# Patient Record
Sex: MALE | Race: BLACK OR AFRICAN AMERICAN | NOT HISPANIC OR LATINO | Employment: FULL TIME | URBAN - METROPOLITAN AREA
[De-identification: names, ages, dates, MRNs, and addresses within clinical notes are randomized per-mention and may not be internally consistent; named-entity substitution may affect disease eponyms.]

---

## 2019-01-26 ENCOUNTER — HOSPITAL ENCOUNTER (INPATIENT)
Facility: HOSPITAL | Age: 64
LOS: 2 days | Discharge: HOME | DRG: 101 | End: 2019-01-28
Attending: EMERGENCY MEDICINE | Admitting: EMERGENCY MEDICINE
Payer: MEDICARE

## 2019-01-26 DIAGNOSIS — R56.9 NEW ONSET SEIZURE: Primary | ICD-10-CM

## 2019-01-26 DIAGNOSIS — E87.20 LACTIC ACIDOSIS: ICD-10-CM

## 2019-01-26 DIAGNOSIS — R73.9 HYPERGLYCEMIA: ICD-10-CM

## 2019-01-26 DIAGNOSIS — G40.409 GENERALIZED TONIC-CLONIC SEIZURE: ICD-10-CM

## 2019-01-26 DIAGNOSIS — R56.9 SEIZURE: ICD-10-CM

## 2019-01-26 PROBLEM — Z00.8 MEDICAL CLEARANCE FOR PSYCHIATRIC ADMISSION: Status: ACTIVE | Noted: 2019-01-26

## 2019-01-26 LAB
ALBUMIN SERPL BCP-MCNC: 3.9 G/DL
ALP SERPL-CCNC: 79 U/L
ALT SERPL W/O P-5'-P-CCNC: 23 U/L
AMPHET+METHAMPHET UR QL: NEGATIVE
AMPHET+METHAMPHET UR QL: NEGATIVE
ANION GAP SERPL CALC-SCNC: 15 MMOL/L
AST SERPL-CCNC: 20 U/L
BACTERIA #/AREA URNS AUTO: ABNORMAL /HPF
BARBITURATES UR QL SCN>200 NG/ML: NEGATIVE
BARBITURATES UR QL SCN>200 NG/ML: NEGATIVE
BASOPHILS # BLD AUTO: 0.06 K/UL
BASOPHILS NFR BLD: 0.7 %
BENZODIAZ UR QL SCN>200 NG/ML: NEGATIVE
BENZODIAZ UR QL SCN>200 NG/ML: NEGATIVE
BILIRUB SERPL-MCNC: 0.3 MG/DL
BILIRUB UR QL STRIP: NEGATIVE
BUN SERPL-MCNC: 13 MG/DL
BZE UR QL SCN: NEGATIVE
BZE UR QL SCN: NEGATIVE
CALCIUM SERPL-MCNC: 9.4 MG/DL
CANNABINOIDS UR QL SCN: NEGATIVE
CANNABINOIDS UR QL SCN: NEGATIVE
CHLORIDE SERPL-SCNC: 104 MMOL/L
CK SERPL-CCNC: 314 U/L
CK SERPL-CCNC: 443 U/L
CK SERPL-CCNC: 625 U/L
CLARITY UR REFRACT.AUTO: ABNORMAL
CO2 SERPL-SCNC: 20 MMOL/L
COLOR UR AUTO: YELLOW
CREAT SERPL-MCNC: 1.1 MG/DL
CREAT UR-MCNC: 95 MG/DL
CREAT UR-MCNC: 95 MG/DL
DIFFERENTIAL METHOD: ABNORMAL
EOSINOPHIL # BLD AUTO: 0.2 K/UL
EOSINOPHIL NFR BLD: 2.2 %
ERYTHROCYTE [DISTWIDTH] IN BLOOD BY AUTOMATED COUNT: 14 %
EST. GFR  (AFRICAN AMERICAN): >60 ML/MIN/1.73 M^2
EST. GFR  (NON AFRICAN AMERICAN): >60 ML/MIN/1.73 M^2
ESTIMATED AVG GLUCOSE: 140 MG/DL
ETHANOL SERPL-MCNC: <10 MG/DL
ETHANOL UR-MCNC: <10 MG/DL
GLUCOSE SERPL-MCNC: 159 MG/DL
GLUCOSE UR QL STRIP: NEGATIVE
HBA1C MFR BLD HPLC: 6.5 %
HCT VFR BLD AUTO: 46 %
HGB BLD-MCNC: 14.1 G/DL
HGB UR QL STRIP: ABNORMAL
HYALINE CASTS UR QL AUTO: 0 /LPF
IMM GRANULOCYTES # BLD AUTO: 0.02 K/UL
IMM GRANULOCYTES NFR BLD AUTO: 0.2 %
KETONES UR QL STRIP: NEGATIVE
LACTATE SERPL-SCNC: 1.3 MMOL/L
LACTATE SERPL-SCNC: 3.2 MMOL/L
LEUKOCYTE ESTERASE UR QL STRIP: NEGATIVE
LYMPHOCYTES # BLD AUTO: 3.2 K/UL
LYMPHOCYTES NFR BLD: 38.7 %
MCH RBC QN AUTO: 27.3 PG
MCHC RBC AUTO-ENTMCNC: 30.7 G/DL
MCV RBC AUTO: 89 FL
METHADONE UR QL SCN>300 NG/ML: NEGATIVE
METHADONE UR QL SCN>300 NG/ML: NEGATIVE
MICROSCOPIC COMMENT: ABNORMAL
MONOCYTES # BLD AUTO: 0.6 K/UL
MONOCYTES NFR BLD: 6.6 %
NEUTROPHILS # BLD AUTO: 4.3 K/UL
NEUTROPHILS NFR BLD: 51.6 %
NITRITE UR QL STRIP: NEGATIVE
NRBC BLD-RTO: 0 /100 WBC
OPIATES UR QL SCN: NEGATIVE
OPIATES UR QL SCN: NEGATIVE
PCP UR QL SCN>25 NG/ML: NEGATIVE
PCP UR QL SCN>25 NG/ML: NEGATIVE
PH UR STRIP: 5 [PH] (ref 5–8)
PLATELET # BLD AUTO: 211 K/UL
PMV BLD AUTO: 11.8 FL
POCT GLUCOSE: 150 MG/DL (ref 70–110)
POTASSIUM SERPL-SCNC: 4.7 MMOL/L
PROT SERPL-MCNC: 8.1 G/DL
PROT UR QL STRIP: ABNORMAL
RBC # BLD AUTO: 5.17 M/UL
RBC #/AREA URNS AUTO: 1 /HPF (ref 0–4)
SODIUM SERPL-SCNC: 139 MMOL/L
SP GR UR STRIP: 1.01 (ref 1–1.03)
SQUAMOUS #/AREA URNS AUTO: 1 /HPF
TOXICOLOGY INFORMATION: NORMAL
TOXICOLOGY INFORMATION: NORMAL
TSH SERPL DL<=0.005 MIU/L-ACNC: 2.89 UIU/ML
URN SPEC COLLECT METH UR: ABNORMAL
WBC # BLD AUTO: 8.34 K/UL
WBC #/AREA URNS AUTO: 3 /HPF (ref 0–5)

## 2019-01-26 PROCEDURE — 25000003 PHARM REV CODE 250: Performed by: INTERNAL MEDICINE

## 2019-01-26 PROCEDURE — 99291 CRITICAL CARE FIRST HOUR: CPT | Mod: ,,, | Performed by: EMERGENCY MEDICINE

## 2019-01-26 PROCEDURE — 25000003 PHARM REV CODE 250: Performed by: EMERGENCY MEDICINE

## 2019-01-26 PROCEDURE — 80053 COMPREHEN METABOLIC PANEL: CPT

## 2019-01-26 PROCEDURE — 83036 HEMOGLOBIN GLYCOSYLATED A1C: CPT

## 2019-01-26 PROCEDURE — 95819 EEG AWAKE AND ASLEEP: CPT

## 2019-01-26 PROCEDURE — 99222 1ST HOSP IP/OBS MODERATE 55: CPT | Mod: AI,,, | Performed by: INTERNAL MEDICINE

## 2019-01-26 PROCEDURE — 87040 BLOOD CULTURE FOR BACTERIA: CPT | Mod: 59

## 2019-01-26 PROCEDURE — 96365 THER/PROPH/DIAG IV INF INIT: CPT

## 2019-01-26 PROCEDURE — 83605 ASSAY OF LACTIC ACID: CPT | Mod: 91

## 2019-01-26 PROCEDURE — 84443 ASSAY THYROID STIM HORMONE: CPT

## 2019-01-26 PROCEDURE — 99233 SBSQ HOSP IP/OBS HIGH 50: CPT | Mod: ,,, | Performed by: PSYCHIATRY & NEUROLOGY

## 2019-01-26 PROCEDURE — 93010 ELECTROCARDIOGRAM REPORT: CPT | Mod: ,,, | Performed by: INTERNAL MEDICINE

## 2019-01-26 PROCEDURE — 99233 PR SUBSEQUENT HOSPITAL CARE,LEVL III: ICD-10-PCS | Mod: ,,, | Performed by: PSYCHIATRY & NEUROLOGY

## 2019-01-26 PROCEDURE — 80320 DRUG SCREEN QUANTALCOHOLS: CPT

## 2019-01-26 PROCEDURE — 82550 ASSAY OF CK (CPK): CPT

## 2019-01-26 PROCEDURE — 25500020 PHARM REV CODE 255: Performed by: INTERNAL MEDICINE

## 2019-01-26 PROCEDURE — 93005 ELECTROCARDIOGRAM TRACING: CPT

## 2019-01-26 PROCEDURE — 81001 URINALYSIS AUTO W/SCOPE: CPT

## 2019-01-26 PROCEDURE — 85025 COMPLETE CBC W/AUTO DIFF WBC: CPT

## 2019-01-26 PROCEDURE — 93010 EKG 12-LEAD: ICD-10-PCS | Mod: ,,, | Performed by: INTERNAL MEDICINE

## 2019-01-26 PROCEDURE — 99285 EMERGENCY DEPT VISIT HI MDM: CPT | Mod: 25

## 2019-01-26 PROCEDURE — 95819 PR EEG,W/AWAKE & ASLEEP RECORD: ICD-10-PCS | Mod: 26,,, | Performed by: PSYCHIATRY & NEUROLOGY

## 2019-01-26 PROCEDURE — 80307 DRUG TEST PRSMV CHEM ANLYZR: CPT

## 2019-01-26 PROCEDURE — 99291 PR CRITICAL CARE, E/M 30-74 MINUTES: ICD-10-PCS | Mod: ,,, | Performed by: EMERGENCY MEDICINE

## 2019-01-26 PROCEDURE — A9585 GADOBUTROL INJECTION: HCPCS | Performed by: INTERNAL MEDICINE

## 2019-01-26 PROCEDURE — 95819 EEG AWAKE AND ASLEEP: CPT | Mod: 26,,, | Performed by: PSYCHIATRY & NEUROLOGY

## 2019-01-26 PROCEDURE — 99222 PR INITIAL HOSPITAL CARE,LEVL II: ICD-10-PCS | Mod: AI,,, | Performed by: INTERNAL MEDICINE

## 2019-01-26 PROCEDURE — 20600001 HC STEP DOWN PRIVATE ROOM

## 2019-01-26 PROCEDURE — 63600175 PHARM REV CODE 636 W HCPCS: Performed by: EMERGENCY MEDICINE

## 2019-01-26 RX ORDER — SODIUM CHLORIDE 0.9 % (FLUSH) 0.9 %
5 SYRINGE (ML) INJECTION
Status: DISCONTINUED | OUTPATIENT
Start: 2019-01-26 | End: 2019-01-26

## 2019-01-26 RX ORDER — LEVETIRACETAM 500 MG/1
500 TABLET ORAL 2 TIMES DAILY
Status: DISCONTINUED | OUTPATIENT
Start: 2019-01-26 | End: 2019-01-28 | Stop reason: HOSPADM

## 2019-01-26 RX ORDER — LORAZEPAM 2 MG/ML
4 INJECTION INTRAMUSCULAR
Status: DISCONTINUED | OUTPATIENT
Start: 2019-01-26 | End: 2019-01-28 | Stop reason: HOSPADM

## 2019-01-26 RX ORDER — SODIUM CHLORIDE, SODIUM LACTATE, POTASSIUM CHLORIDE, CALCIUM CHLORIDE 600; 310; 30; 20 MG/100ML; MG/100ML; MG/100ML; MG/100ML
INJECTION, SOLUTION INTRAVENOUS CONTINUOUS
Status: DISCONTINUED | OUTPATIENT
Start: 2019-01-26 | End: 2019-01-27

## 2019-01-26 RX ORDER — LORAZEPAM 2 MG/ML
4 INJECTION INTRAMUSCULAR
Status: DISCONTINUED | OUTPATIENT
Start: 2019-01-26 | End: 2019-01-26

## 2019-01-26 RX ORDER — ONDANSETRON 8 MG/1
8 TABLET, ORALLY DISINTEGRATING ORAL EVERY 8 HOURS PRN
Status: DISCONTINUED | OUTPATIENT
Start: 2019-01-26 | End: 2019-01-28 | Stop reason: HOSPADM

## 2019-01-26 RX ORDER — SODIUM CHLORIDE 0.9 % (FLUSH) 0.9 %
5 SYRINGE (ML) INJECTION
Status: DISCONTINUED | OUTPATIENT
Start: 2019-01-26 | End: 2019-01-28 | Stop reason: HOSPADM

## 2019-01-26 RX ORDER — GADOBUTROL 604.72 MG/ML
8 INJECTION INTRAVENOUS
Status: COMPLETED | OUTPATIENT
Start: 2019-01-26 | End: 2019-01-26

## 2019-01-26 RX ADMIN — LEVETIRACETAM 500 MG: 500 TABLET ORAL at 09:01

## 2019-01-26 RX ADMIN — SODIUM CHLORIDE, SODIUM LACTATE, POTASSIUM CHLORIDE, AND CALCIUM CHLORIDE: .6; .31; .03; .02 INJECTION, SOLUTION INTRAVENOUS at 07:01

## 2019-01-26 RX ADMIN — GADOBUTROL 8 ML: 604.72 INJECTION INTRAVENOUS at 12:01

## 2019-01-26 RX ADMIN — SODIUM CHLORIDE, SODIUM LACTATE, POTASSIUM CHLORIDE, AND CALCIUM CHLORIDE 1000 ML: .6; .31; .03; .02 INJECTION, SOLUTION INTRAVENOUS at 11:01

## 2019-01-26 RX ADMIN — SODIUM CHLORIDE, SODIUM LACTATE, POTASSIUM CHLORIDE, AND CALCIUM CHLORIDE: .6; .31; .03; .02 INJECTION, SOLUTION INTRAVENOUS at 01:01

## 2019-01-26 RX ADMIN — DEXTROSE 2000 MG: 5 SOLUTION INTRAVENOUS at 08:01

## 2019-01-26 NOTE — ED NOTES
Assumed care of patient with family at bedside - patient does not remember event of seizure - patient denies any pain - speech is slightly slurred, but comprehensible - family states it is different than his usual speech - patient oriented to person only, knows it is cold outside, but does not remember month, year or day - await urine for analysis (patient aware) - remains on monitor

## 2019-01-26 NOTE — ASSESSMENT & PLAN NOTE
- Etiology unclear, with no history of trauma, seizures, EtOH abuse, recent infections, or new meds in the setting of completely negative medical history  - No signs or symptoms of infection, UA clear, BCx ordered in abundance of caution  - Recurred x1 to date, s/p keppra load by the ED  - Continue keppra BID  - MRI ordered. EEG ordered  - Neuro consulted, appreciate assistance  - Seizure + fall precautions ordered  - Neuro checks Q4H. Ativan ordered PRN for seizure activity  - NPO for now while monitoring for recurrence, will place a diet if no seizures and his mental status normalizes  - CPK ordered Q6H, will empirically hydrate and monitor for 24h

## 2019-01-26 NOTE — HPI
"Mr. Cruz is a 63 year old man with no known medical history who presents with a first time seizure. His wife says she woke up around 0500 to hear him breathing loudly, like he was catching his breath. She did not witness any shaking but says he was "foaming at the mouth". He was not responsive so EMS was called. Per EMS patient witnessed to have tonic-clonic movements lasting 5 minutes, with 20 minutes of confusion afterwards. No tongue biting or incontinence. Upon arrival to the ED he had an episode described by nursing as "tonic-clonic/decorticate posturing with right eye gaze - lasted approximately 3 minutes".     Family denies a history of seizure in the patient, no history of head trauma. They do report a family history of seizures in his sister and niece.     Labs remarkable for , lactate 3.4 (improved to 1.3)  "

## 2019-01-26 NOTE — ED TRIAGE NOTES
Pt reported to have witnessed tonic clonic seizure at 0545, foaming at mouth, no evidence of loss of bladder/bowel continence.

## 2019-01-26 NOTE — ASSESSMENT & PLAN NOTE
-- resolved quickly  -- likely related to seizure activity  -- will defer any additional workup to primary team

## 2019-01-26 NOTE — HPI
"Mr. Cruz is a 63-year-old man with no medical history who presents after a seizure episode. He was in his usual state of health until until this morning at 0500, when his wife heard abnormal breathing patterns, "like he was catching his breath," and was found to be foaming at the mouth, all while sleeping. They tried to wake him up but couldn't, prompting their calling EMS. He had tonic-clonic motions for about 5 minutes, after which he was confused for 20 minutes with some slurred speech. No tongue biting or urine/stool incontinence. He has no history of head trauma (even remote) or seizures. He did have a hospitalization in 1963 for an infection (when he was 7), though his sister does not remember the exact nature of the infection. No recent fevers, chills, or night sweats. No alcohol use. No nausea, vomiting, constipation, or diarrhea as reported by his wife. The only recent aberration from his baseline that they can describe is that he was "hyper" and more vocal during [employer] yesterday per his  at bedside.    On arrival to the ED he had improved and was hemodynamically stable, but shortly afterwards had another episode that lasted about 5 minutes and was witnessed by the ED. During that episode he had urinary incontinence. He was loaded with 2g keppra and has not had any recurrent episodes. Lab workup largely normal (though tox screen pending) with the exception of metabolic acidosis with bicarb 20 and lactic acidosis at 3.2. He was given 2g of IV keppra, neurology was consulted, and medicine admission was requested for new onset seizures.  "

## 2019-01-26 NOTE — ED NOTES
Tonic-Clonic seizure activity ended - small amount of urinary incontinence without loss of bowel function - patient snoring - oxygen remains in place with sats at 98%

## 2019-01-26 NOTE — ED PROVIDER NOTES
Encounter Date: 1/26/2019    SCRIBE #1 NOTE: I, Allegra Head, am scribing for, and in the presence of,  Dr. Dempsey. I have scribed the entire note.       History     Chief Complaint   Patient presents with    Seizures     Seizure tonight. No hx of seizures, no major medical problems. Family herad unsual breathing sounds and noticed tonic-clonic type activity. Not following commands.      Time patient was seen by the provider: 6:15 AM      The patient is a 63 y.o. male who presents to the ED via EMS for evaluation of seizure. Per EMS, patient's family report they woke up to check on the patient upon hearing unusual breathing sounds, and found him having generalized seizure activity 1 hour PTA, described as tonic-clonic motions, lasted 5 minutes. Denies history of seizure, recent trauma or prescription medications. Patient denies smoking.      The history is provided by the patient.     Review of patient's allergies indicates:  No Known Allergies  History reviewed. No pertinent past medical history.  Past Surgical History:   Procedure Laterality Date    HERNIA REPAIR       History reviewed. No pertinent family history.  Social History     Tobacco Use    Smoking status: Former Smoker   Substance Use Topics    Alcohol use: No    Drug use: No     Review of Systems  General: No fever.  No chills.  Eyes: No visual changes.  Head: No headache.    Integument: No rashes or lesions.  Chest: No shortness of breath.  Cardiovascular: No chest pain.  Abdomen: No abdominal pain. No nausea or vomiting.  Urinary: No abnormal urination.  Neurologic: Positive for seizures. No focal weakness. No numbness.  Hematologic: No easy bruising.  Endocrine: No excessive thirst or urination.   Physical Exam     Initial Vitals   BP Pulse Resp Temp SpO2   01/26/19 0611 01/26/19 0611 01/26/19 0611 01/26/19 0701 01/26/19 0611   138/88 (!) 115 16 98.3 °F (36.8 °C) (!) 94 %      MAP       --                Physical Exam    Nursing note and vitals  reviewed.    Appearance: No acute distress.  Skin: No rashes seen.  Good turgor.  No abrasions.  No ecchymoses.  Eyes: No conjunctival injection.  ENT: Oropharynx clear.    Chest: Clear to auscultation bilaterally.  Good air movement.  No wheezes.  No rhonchi.  Cardiovascular: Regular rate and rhythm.  No murmurs. No gallops. No rubs.  Abdomen: Soft.  Not distended.  Nontender.  No guarding.  No rebound.  Musculoskeletal: Good range of motion all joints.  No deformities.  Neck supple.  No meningismus.  Neurologic: Postictal. Nonfocal. Moves all extremities. motor intact.  Sensation intact.  Cerebellar intact.  Cranial nerves intact.  Mental Status:  Alert and oriented x 3.  Appropriate, conversant.     ED Course   Critical Care  Date/Time: 1/26/2019 8:19 AM  Performed by: Srikanth Dempsey MD  Authorized by: Srikanth Dempsey MD   Total critical care time (exclusive of procedural time) : 60 minutes  Critical care was necessary to treat or prevent imminent or life-threatening deterioration of the following conditions: seizures.        Labs Reviewed   CBC W/ AUTO DIFFERENTIAL - Abnormal; Notable for the following components:       Result Value    MCHC 30.7 (*)     All other components within normal limits    Narrative:     RED USED AS ONE GREEN    COMPREHENSIVE METABOLIC PANEL - Abnormal; Notable for the following components:    CO2 20 (*)     Glucose 159 (*)     All other components within normal limits    Narrative:     RED USED AS ONE GREEN    URINALYSIS, REFLEX TO URINE CULTURE - Abnormal; Notable for the following components:    Appearance, UA Hazy (*)     Protein, UA 2+ (*)     Occult Blood UA 2+ (*)     All other components within normal limits    Narrative:     Preferred Collection Type->Urine, Clean Catch   URINALYSIS MICROSCOPIC - Abnormal; Notable for the following components:    Bacteria, UA Few (*)     All other components within normal limits    Narrative:     Preferred Collection Type->Urine, Clean Catch    POCT GLUCOSE - Abnormal; Notable for the following components:    POCT Glucose 150 (*)     All other components within normal limits   TSH    Narrative:     RED USED AS ONE GREEN    ALCOHOL,MEDICAL (ETHANOL)    Narrative:     RED USED AS ONE GREEN    TOXICOLOGY SCREEN, URINE, RANDOM (COMPLIANCE)   HEMOGLOBIN A1C   DRUG SCREEN PANEL, URINE EMERGENCY   LACTIC ACID, PLASMA   TOXICOLOGY SCREEN, URINE, RANDOM (COMPLIANCE)          Imaging Results          X-Ray Chest 1 View (Final result)  Result time 01/26/19 08:10:24    Final result by Francis Bowie MD (01/26/19 08:10:24)                 Impression:      No acute intrathoracic process.      Electronically signed by: Francis Bowie MD  Date:    01/26/2019  Time:    08:10             Narrative:    EXAMINATION:  XR CHEST 1 VIEW    CLINICAL HISTORY:  Unspecified convulsions    TECHNIQUE:  Single frontal view of the chest was performed.    COMPARISON:  Chest radiograph from 09/29/2014    FINDINGS:  No cardiomegaly.  Normal pulmonary vasculature.  Lungs are clear.  No pneumothorax.  No pleural effusion.  Degenerative changes of the spine.                               CT Head Without Contrast (Final result)  Result time 01/26/19 07:06:52    Final result by Brenda Gray MD (01/26/19 07:06:52)                 Impression:      No CT evidence of acute intracranial abnormality. Clinical correlation and further evaluation as warranted.      Electronically signed by: Brenda Gray MD  Date:    01/26/2019  Time:    07:06             Narrative:    EXAMINATION:  CT HEAD WITHOUT CONTRAST    CLINICAL HISTORY:  Seizures new or progressive;    TECHNIQUE:  Low dose axial images were obtained through the head.  Coronal and sagittal reformations were also performed. Contrast was not administered.    COMPARISON:  None.    FINDINGS:  There is no acute intracranial hemorrhage, hydrocephalus, midline shift or mass effect. Gray-white matter differentiation appears maintained. The  basal cisterns are patent. The mastoid air cells and paranasal sinuses are clear of acute process noting probable small right maxillary sinus mucous retention cyst..  The visualized bones of the calvarium demonstrate no acute osseous abnormality.                                 Medical Decision Making:   History:   Old Medical Records: I decided to obtain old medical records.  Clinical Tests:   Lab Tests: Reviewed and Ordered  Radiological Study: Ordered and Reviewed  Medical Tests: Ordered and Reviewed  ED Management:  Seizure like activity, tonic-clonic described to EMS, per family. Family not present currently. Currently postictal and confused. Underwear does not appeared to be soiled. According to EMS, he has no medical history, takes no medications. We're going to do new onset seizure workup.     7:55 AM - Second seizure occurred in ED. We're going to load with Keppra and admit him.     8:25 AM - Discussed with Neurology. Discussed with Hospital Medicine for admission. New onset seizures x 2. Neurology is comfortable with floor admission. No meningeal signs, fever or leukocytosis to suggest infection. No focal neurologic sign to suggest stroke.            Scribe Attestation:   Scribe #1: I performed the above scribed service and the documentation accurately describes the services I performed. I attest to the accuracy of the note.               Clinical Impression:   The primary encounter diagnosis was New onset seizure. A diagnosis of Seizure was also pertinent to this visit.      Disposition:   Disposition: Admitted  Condition: Naty Dempsey MD  01/26/19 0910

## 2019-01-26 NOTE — ED NOTES
Pt remains post-ictal - no opening eyes to voice or tactile stimulation - during straight cath, patient did eventually grab at my hands, but was able to obtain urine sample - patient will state first name with grossly slurred speech but will not answer any other questions at this time - side rails remain up x 2 and padded - family to remain at bedside - monitor remains in place

## 2019-01-26 NOTE — ED NOTES
Pt seizure noted - tonic-clonic/decorticate posturing with right eye gaze - lasted approximately 3 minutes - minimal oral secretions suctioned - patient without injury - Dr. Dempsey aware - orders received

## 2019-01-26 NOTE — ASSESSMENT & PLAN NOTE
- Admission lactic acid 3.2  - Suspect seizure activity as the precipitant. Will trend CPK for 24h  - Hydrating, will repeat at 1300

## 2019-01-26 NOTE — H&P
"[facility] Hospital Medicine  History & Physical    Patient Name: Cristiano Cruz  MRN: [id number]  Admission Date: 1/26/2019  Attending Physician: Orion Solis MD   Primary Care Provider: Primary Doctor [facility] Medicine Team: [facility] HOSP MED A Orion Solis MD     Patient information was obtained from patient, spouse/SO, relative(s), caregiver / friend and ER records.     Subjective:     Principal Problem:Generalized tonic-clonic seizure    Chief Complaint:   Chief Complaint   Patient presents with    Seizures     Seizure tonight. No hx of seizures, no major medical problems. Family herad unsual breathing sounds and noticed tonic-clonic type activity. Not following commands.         HPI: Mr. Cruz is a 63-year-old man with no medical history who presents after a seizure episode. He was in his usual state of health until until this morning at 0500, when his wife heard abnormal breathing patterns, "like he was catching his breath," and was found to be foaming at the mouth, all while sleeping. They tried to wake him up but couldn't, prompting their calling EMS. He had tonic-clonic motions for about 5 minutes, after which he was confused for 20 minutes with some slurred speech. No tongue biting or urine/stool incontinence. He has no history of head trauma (even remote) or seizures. He did have a hospitalization in 1963 for an infection (when he was 7), though his sister does not remember the exact nature of the infection. No recent fevers, chills, or night sweats. No alcohol use. No nausea, vomiting, constipation, or diarrhea as reported by his wife. The only recent aberration from his baseline that they can describe is that he was "hyper" and more vocal during [employer] yesterday per his  at bedside.    On arrival to the ED he had improved and was hemodynamically stable, but shortly afterwards had another episode that lasted about 5 minutes and was witnessed by the ED. During that " episode he had urinary incontinence. He was loaded with 2g keppra and has not had any recurrent episodes. Lab workup largely normal (though tox screen pending) with the exception of metabolic acidosis with bicarb 20 and lactic acidosis at 3.2. He was given 2g of IV keppra, neurology was consulted, and medicine admission was requested for new onset seizures.    History reviewed. No pertinent past medical history.    Past Surgical History:   Procedure Laterality Date    HERNIA REPAIR         Review of patient's allergies indicates:  No Known Allergies    No current facility-administered medications on file prior to encounter.      Current Outpatient Medications on File Prior to Encounter   Medication Sig    HYDROmorphone (DILAUDID) 2 MG tablet Take 1 tablet (2 mg total) by mouth every 4 (four) hours as needed for Pain (severe pain 7-10/10).    ondansetron (ZOFRAN) 8 MG tablet Take 1 tablet (8 mg total) by mouth every 8 (eight) hours as needed for Nausea.    oxycodone-acetaminophen (PERCOCET) 5-325 mg per tablet Take 1 tablet by mouth every 4 (four) hours as needed for Pain (moderate pain 4-6/10).     Family History     None        Tobacco Use    Smoking status: Former Smoker   Substance and Sexual Activity    Alcohol use: No    Drug use: No    Sexual activity: Yes     Partners: Female     Review of Systems   Unable to perform ROS: Patient unresponsive     Objective:     Vital Signs (Most Recent):  Temp: 98.3 °F (36.8 °C) (01/26/19 0701)  Pulse: (!) 116 (01/26/19 0846)  Resp: (!) 22 (01/26/19 0846)  BP: 113/64 (01/26/19 0846)  SpO2: 95 % (01/26/19 0846) Vital Signs (24h Range):  Temp:  [98.3 °F (36.8 °C)] 98.3 °F (36.8 °C)  Pulse:  [] 116  Resp:  [15-22] 22  SpO2:  [87 %-100 %] 95 %  BP: (113-141)/(64-88) 113/64     Weight: 73.5 kg (162 lb)  Body mass index is 25.37 kg/m².    Physical Exam   Constitutional: No distress.   HENT:   NC in place on 2L   Eyes: Pupils are equal, round, and reactive to light. No  "scleral icterus.   +pterygium OS   Neck: Normal range of motion. Neck supple.   Cardiovascular: Normal rate and regular rhythm.   No murmur heard.  Pulmonary/Chest: Effort normal and breath sounds normal. No respiratory distress. He has no wheezes.   Abdominal: Soft. Bowel sounds are normal. He exhibits no distension. There is no tenderness.   Musculoskeletal: He exhibits no edema or tenderness.   Neurological: He displays normal reflexes.   Sleepy but rouses to voice and physical stimulation  Follows one-step commands (two fingers, squeeze my hand etc.)  Two seemingly involuntary lower extremity movements at the end of my examination   Skin: Skin is warm and dry. No rash noted. He is not diaphoretic. No erythema.   Psychiatric: He has a normal mood and affect. His behavior is normal.         CRANIAL NERVES     CN III, IV, VI   Pupils are equal, round, and reactive to light.       Significant Labs:     CBC:  Recent Labs   Lab 01/26/19 0623   WBC 8.34   GRAN 51.6  4.3   HGB 14.1   HCT 46.0          Chem 10:  Recent Labs   Lab 01/26/19 0623      K 4.7      CO2 20*   BUN 13   CREATININE 1.1   *   CALCIUM 9.4       LFTs:  Recent Labs   Lab 01/26/19 0623   ALKPHOS 79   BILITOT 0.3   AST 20   ALT 23   ALBUMIN 3.9       Significant Imaging:     CXR  "No cardiomegaly.  Normal pulmonary vasculature.  Lungs are clear.  No pneumothorax.  No pleural effusion.  Degenerative changes of the spine." - per interpreting radiologist    CT head  "No CT evidence of acute intracranial abnormality. Clinical correlation and further evaluation as warranted." - per interpreting radiologist    Assessment/Plan:     Generalized tonic-clonic seizure      - Etiology unclear, with no history of trauma, seizures, EtOH abuse, recent infections, or new meds in the setting of completely negative medical history  - No signs or symptoms of infection, UA clear, BCx ordered in abundance of caution  - Recurred x1 to date, s/p " keppra load by the ED  - Continue keppra BID  - MRI ordered. EEG ordered  - Neuro consulted, appreciate assistance  - Seizure + fall precautions ordered  - Neuro checks Q4H. Ativan ordered PRN for seizure activity  - NPO for now while monitoring for recurrence, will place a diet if no seizures and his mental status normalizes  - CPK ordered Q6H, will empirically hydrate and monitor for 24h     Lactic acidosis      - Admission lactic acid 3.2  - Suspect seizure activity as the precipitant. Will trend CPK for 24h  - Hydrating, will repeat at 1300     Hyperglycemia      - Likely from the stress his seizure episodes  - No history of DM  - HbA1c ordered as add-on       VTE Risk Mitigation (From admission, onward)        Ordered     IP VTE LOW RISK PATIENT  Once      01/26/19 0900             Orion Solis MD  Department of Hospital Medicine   [facility]

## 2019-01-26 NOTE — SUBJECTIVE & OBJECTIVE
History reviewed. No pertinent past medical history.    Past Surgical History:   Procedure Laterality Date    HERNIA REPAIR         Review of patient's allergies indicates:  No Known Allergies    No current facility-administered medications on file prior to encounter.      Current Outpatient Medications on File Prior to Encounter   Medication Sig    HYDROmorphone (DILAUDID) 2 MG tablet Take 1 tablet (2 mg total) by mouth every 4 (four) hours as needed for Pain (severe pain 7-10/10).    ondansetron (ZOFRAN) 8 MG tablet Take 1 tablet (8 mg total) by mouth every 8 (eight) hours as needed for Nausea.    oxycodone-acetaminophen (PERCOCET) 5-325 mg per tablet Take 1 tablet by mouth every 4 (four) hours as needed for Pain (moderate pain 4-6/10).     Family History     None        Tobacco Use    Smoking status: Former Smoker   Substance and Sexual Activity    Alcohol use: No    Drug use: No    Sexual activity: Yes     Partners: Female     Review of Systems   Unable to perform ROS: Patient unresponsive     Objective:     Vital Signs (Most Recent):  Temp: 98.3 °F (36.8 °C) (01/26/19 0701)  Pulse: (!) 116 (01/26/19 0846)  Resp: (!) 22 (01/26/19 0846)  BP: 113/64 (01/26/19 0846)  SpO2: 95 % (01/26/19 0846) Vital Signs (24h Range):  Temp:  [98.3 °F (36.8 °C)] 98.3 °F (36.8 °C)  Pulse:  [] 116  Resp:  [15-22] 22  SpO2:  [87 %-100 %] 95 %  BP: (113-141)/(64-88) 113/64     Weight: 73.5 kg (162 lb)  Body mass index is 25.37 kg/m².    Physical Exam   Constitutional: No distress.   HENT:   NC in place on 2L   Eyes: Pupils are equal, round, and reactive to light. No scleral icterus.   +pterygium OS   Neck: Normal range of motion. Neck supple.   Cardiovascular: Normal rate and regular rhythm.   No murmur heard.  Pulmonary/Chest: Effort normal and breath sounds normal. No respiratory distress. He has no wheezes.   Abdominal: Soft. Bowel sounds are normal. He exhibits no distension. There is no tenderness.   Musculoskeletal:  "He exhibits no edema or tenderness.   Neurological: He displays normal reflexes.   Sleepy but rouses to voice and physical stimulation  Follows one-step commands (two fingers, squeeze my hand etc.)  Two seemingly involuntary lower extremity movements at the end of my examination   Skin: Skin is warm and dry. No rash noted. He is not diaphoretic. No erythema.   Psychiatric: He has a normal mood and affect. His behavior is normal.         CRANIAL NERVES     CN III, IV, VI   Pupils are equal, round, and reactive to light.       Significant Labs:     CBC:  Recent Labs   Lab 01/26/19 0623   WBC 8.34   GRAN 51.6  4.3   HGB 14.1   HCT 46.0          Chem 10:  Recent Labs   Lab 01/26/19 0623      K 4.7      CO2 20*   BUN 13   CREATININE 1.1   *   CALCIUM 9.4       LFTs:  Recent Labs   Lab 01/26/19 0623   ALKPHOS 79   BILITOT 0.3   AST 20   ALT 23   ALBUMIN 3.9       Significant Imaging:     CXR  "No cardiomegaly.  Normal pulmonary vasculature.  Lungs are clear.  No pneumothorax.  No pleural effusion.  Degenerative changes of the spine." - per interpreting radiologist    CT head  "No CT evidence of acute intracranial abnormality. Clinical correlation and further evaluation as warranted." - per interpreting radiologist  "

## 2019-01-26 NOTE — ASSESSMENT & PLAN NOTE
New onset seizure without any provoking factors. Labs unremarkable for infectious or metabolic derangements, Utox negative and MRI negative for acute stroke or other abnormalities. Etiology remains unclear at this time, but due to recurrent seizures recommend continue AEDs for now. Loaded with levetiracetam 2g in ED.    Recommendations:  -- Continue keppra 500mg BID - low threshold to increase  -- EEG pending  -- Seizure precautions; ativan IV prn for seizure >5 minutes

## 2019-01-26 NOTE — HOSPITAL COURSE
Mr. Cruz was admitted for new-onset seizures. His mental status normalized, and he had no recurrent seizures following keppra loading in the ED and twice-daily maintenance upon arrival to the floor. Workup into the etiology of his seizures remained negative. EEG was performed, with the preliminary interpretation being no epileptiform activity with normal background. Of note, he was hyperglycemic on arrival, and was diagnosed with DMII on the basis of elevated hemoglobin A1c at 6.5. It is likely this could be controlled by diet, so he was discharged with testing supplies and a strong recommendation to establish care with a PCP. His wife is calling her PCP at  today to facilitate this. Similarly, his BP was elevated, so he will need repeat checks to establish the diagnosis of essential HTN if persistently elevated.    Lastly he will need follow-up with neurology in 6-8 weeks.

## 2019-01-27 PROBLEM — R74.8 ELEVATED CPK: Status: ACTIVE | Noted: 2019-01-27

## 2019-01-27 PROBLEM — E11.65 TYPE 2 DIABETES MELLITUS WITH HYPERGLYCEMIA, WITHOUT LONG-TERM CURRENT USE OF INSULIN: Status: ACTIVE | Noted: 2019-01-26

## 2019-01-27 PROBLEM — E87.20 LACTIC ACIDOSIS: Status: RESOLVED | Noted: 2019-01-26 | Resolved: 2019-01-27

## 2019-01-27 PROBLEM — R03.0 ELEVATED BLOOD PRESSURE READING: Status: ACTIVE | Noted: 2019-01-27

## 2019-01-27 LAB
ANION GAP SERPL CALC-SCNC: 9 MMOL/L
BASOPHILS # BLD AUTO: 0.05 K/UL
BASOPHILS NFR BLD: 0.6 %
BUN SERPL-MCNC: 11 MG/DL
CALCIUM SERPL-MCNC: 9.1 MG/DL
CHLORIDE SERPL-SCNC: 105 MMOL/L
CK SERPL-CCNC: 493 U/L
CK SERPL-CCNC: 553 U/L
CK SERPL-CCNC: 609 U/L
CK SERPL-CCNC: 643 U/L
CO2 SERPL-SCNC: 26 MMOL/L
CREAT SERPL-MCNC: 0.9 MG/DL
DIFFERENTIAL METHOD: ABNORMAL
EOSINOPHIL # BLD AUTO: 0.1 K/UL
EOSINOPHIL NFR BLD: 1.4 %
ERYTHROCYTE [DISTWIDTH] IN BLOOD BY AUTOMATED COUNT: 14.1 %
EST. GFR  (AFRICAN AMERICAN): >60 ML/MIN/1.73 M^2
EST. GFR  (NON AFRICAN AMERICAN): >60 ML/MIN/1.73 M^2
GLUCOSE SERPL-MCNC: 114 MG/DL
HCT VFR BLD AUTO: 41.9 %
HGB BLD-MCNC: 13.7 G/DL
IMM GRANULOCYTES # BLD AUTO: 0.02 K/UL
IMM GRANULOCYTES NFR BLD AUTO: 0.2 %
LYMPHOCYTES # BLD AUTO: 2.7 K/UL
LYMPHOCYTES NFR BLD: 32.3 %
MCH RBC QN AUTO: 28.5 PG
MCHC RBC AUTO-ENTMCNC: 32.7 G/DL
MCV RBC AUTO: 87 FL
MONOCYTES # BLD AUTO: 0.6 K/UL
MONOCYTES NFR BLD: 6.9 %
NEUTROPHILS # BLD AUTO: 5 K/UL
NEUTROPHILS NFR BLD: 58.6 %
NRBC BLD-RTO: 0 /100 WBC
PLATELET # BLD AUTO: 183 K/UL
PMV BLD AUTO: 11.8 FL
POCT GLUCOSE: 108 MG/DL (ref 70–110)
POCT GLUCOSE: 117 MG/DL (ref 70–110)
POTASSIUM SERPL-SCNC: 3.6 MMOL/L
RBC # BLD AUTO: 4.81 M/UL
SODIUM SERPL-SCNC: 140 MMOL/L
WBC # BLD AUTO: 8.46 K/UL

## 2019-01-27 PROCEDURE — 99232 PR SUBSEQUENT HOSPITAL CARE,LEVL II: ICD-10-PCS | Mod: ,,, | Performed by: INTERNAL MEDICINE

## 2019-01-27 PROCEDURE — 25000003 PHARM REV CODE 250: Performed by: INTERNAL MEDICINE

## 2019-01-27 PROCEDURE — 82550 ASSAY OF CK (CPK): CPT | Mod: 91

## 2019-01-27 PROCEDURE — 36415 COLL VENOUS BLD VENIPUNCTURE: CPT

## 2019-01-27 PROCEDURE — 20600001 HC STEP DOWN PRIVATE ROOM

## 2019-01-27 PROCEDURE — 82550 ASSAY OF CK (CPK): CPT

## 2019-01-27 PROCEDURE — 80048 BASIC METABOLIC PNL TOTAL CA: CPT

## 2019-01-27 PROCEDURE — 92523 SPEECH SOUND LANG COMPREHEN: CPT

## 2019-01-27 PROCEDURE — 85025 COMPLETE CBC W/AUTO DIFF WBC: CPT

## 2019-01-27 PROCEDURE — 92610 EVALUATE SWALLOWING FUNCTION: CPT

## 2019-01-27 PROCEDURE — 99232 SBSQ HOSP IP/OBS MODERATE 35: CPT | Mod: ,,, | Performed by: INTERNAL MEDICINE

## 2019-01-27 RX ORDER — INSULIN ASPART 100 [IU]/ML
0-5 INJECTION, SOLUTION INTRAVENOUS; SUBCUTANEOUS
Status: DISCONTINUED | OUTPATIENT
Start: 2019-01-27 | End: 2019-01-28 | Stop reason: HOSPADM

## 2019-01-27 RX ORDER — IBUPROFEN 200 MG
16 TABLET ORAL
Status: DISCONTINUED | OUTPATIENT
Start: 2019-01-27 | End: 2019-01-28 | Stop reason: HOSPADM

## 2019-01-27 RX ORDER — GLUCAGON 1 MG
1 KIT INJECTION
Status: DISCONTINUED | OUTPATIENT
Start: 2019-01-27 | End: 2019-01-28 | Stop reason: HOSPADM

## 2019-01-27 RX ORDER — METFORMIN HYDROCHLORIDE 500 MG/1
500 TABLET ORAL 2 TIMES DAILY WITH MEALS
Qty: 60 TABLET | Refills: 0 | Status: CANCELLED | OUTPATIENT
Start: 2019-01-27 | End: 2019-02-26

## 2019-01-27 RX ORDER — ENOXAPARIN SODIUM 100 MG/ML
40 INJECTION SUBCUTANEOUS EVERY 24 HOURS
Status: DISCONTINUED | OUTPATIENT
Start: 2019-01-27 | End: 2019-01-28 | Stop reason: HOSPADM

## 2019-01-27 RX ORDER — IBUPROFEN 200 MG
24 TABLET ORAL
Status: DISCONTINUED | OUTPATIENT
Start: 2019-01-27 | End: 2019-01-28 | Stop reason: HOSPADM

## 2019-01-27 RX ADMIN — LEVETIRACETAM 500 MG: 500 TABLET ORAL at 09:01

## 2019-01-27 RX ADMIN — LEVETIRACETAM 500 MG: 500 TABLET ORAL at 08:01

## 2019-01-27 NOTE — PLAN OF CARE
Problem: Adult Inpatient Plan of Care  Goal: Plan of Care Review  Outcome: Ongoing (interventions implemented as appropriate)  Patient aaox4, pleasant, cooperative with cares - diminished ST memory. EDGAR, 5/5. No seizure activity t/o shift. VSS. Wife at bedside - high fall risk and seizure precautions explained and maintained. Will continue to monitor.

## 2019-01-27 NOTE — PLAN OF CARE
Chart reviewed. Patient has returned to baseline with no further seizure activity. MRI brain w wo was unremarkable. EEG completed - preliminary interpretation is no epileptiform activity with normal background, but awaiting final read.     New onset seizures with unclear etiology at this time.     Recommendations:  -- If patient remains stable and is at baseline, ok for discharge from Neurology perspective but defer to IM   -- Continue levetiracetam 500mg BID  -- Follow up in Epilepsy clinic in 6-8 weeks - I will send message to clinic  -- Will follow up final EEG      Signing Physician  Malina Lino MD  Neurology Resident, PGY4  Neuro Consult Spectralink # [id number]

## 2019-01-27 NOTE — SUBJECTIVE & OBJECTIVE
Interval History:     Mr. Cruz has significantly improved overnight. He has had no recurrent seizure activity. EEG completed with read pending. His mental status has almost normalized, though he is making some inappropriate remarks that are apparently not in keeping with his normal behavior.    Review of Systems   Constitutional: Negative for chills and fever.   Respiratory: Negative for cough and shortness of breath.    Cardiovascular: Negative for chest pain and leg swelling.   Gastrointestinal: Negative for abdominal pain, constipation, diarrhea, nausea and vomiting.   Genitourinary: Negative for difficulty urinating.   Musculoskeletal: Negative for myalgias.   Neurological: Positive for seizures (none since admission). Negative for weakness and numbness.   Psychiatric/Behavioral: Positive for behavioral problems (calm and cooperative, making sexual-based jokes that are apparently unusual for him). Negative for confusion. The patient is not nervous/anxious.      Objective:     Vital Signs (Most Recent):  Temp: 96.2 °F (35.7 °C) (01/27/19 1103)  Pulse: 69 (01/27/19 1103)  Resp: 18 (01/27/19 1103)  BP: (!) 158/90 (01/27/19 1103)  SpO2: 96 % (01/27/19 1103) Vital Signs (24h Range):  Temp:  [96.2 °F (35.7 °C)-98.7 °F (37.1 °C)] 96.2 °F (35.7 °C)  Pulse:  [69-92] 69  Resp:  [11-26] 18  SpO2:  [92 %-97 %] 96 %  BP: (128-158)/(72-99) 158/90     Weight: 75.1 kg (165 lb 9.1 oz)  Body mass index is 25.93 kg/m².    Intake/Output Summary (Last 24 hours) at 1/27/2019 1210  Last data filed at 1/27/2019 0600  Gross per 24 hour   Intake 2375 ml   Output 350 ml   Net 2025 ml      Physical Exam   Constitutional: He is oriented to person, place, and time. No distress.   Eyes: Pupils are equal, round, and reactive to light.   Cardiovascular: Normal rate and regular rhythm.   No murmur heard.  Pulmonary/Chest: Effort normal and breath sounds normal. No respiratory distress. He has no wheezes.   Abdominal: Soft. Bowel sounds are  normal. He exhibits no distension. There is no tenderness.   Musculoskeletal: He exhibits no edema or tenderness.   Neurological: He is alert and oriented to person, place, and time.   Skin: Skin is warm and dry. No rash noted. He is not diaphoretic. No erythema.   Psychiatric: He has a normal mood and affect.   Calm and cooperative, making sexual-based jokes that are apparently unusual for him       Significant Labs:  CBC:  Recent Labs   Lab 01/26/19  0623 01/27/19  0411   WBC 8.34 8.46   HGB 14.1 13.7*   HCT 46.0 41.9    183     CMP:  Recent Labs   Lab 01/26/19  0623 01/27/19  0411    140   K 4.7 3.6    105   CO2 20* 26   * 114*   BUN 13 11   CREATININE 1.1 0.9   CALCIUM 9.4 9.1   PROT 8.1  --    ALBUMIN 3.9  --    BILITOT 0.3  --    ALKPHOS 79  --    AST 20  --    ALT 23  --    ANIONGAP 15 9   EGFRNONAA >60.0 >60.0

## 2019-01-27 NOTE — ASSESSMENT & PLAN NOTE
- Suspect essential hypertension  - He needs a PCP to f/u as an outpatient with serial repeat measurements to establish the diagnosis

## 2019-01-27 NOTE — ASSESSMENT & PLAN NOTE
- New diagnosis  - HbA1c 6.5 diagnostic of DM  - Ordered aspart SSI + POCT glucose TID with meals and HS  - Diabetic diet  - Will order glucometer, lancet, strips, metformin, and DM education upon discharge. What he really needs is a PCP.

## 2019-01-27 NOTE — ASSESSMENT & PLAN NOTE
- Improving  - Etiology unclear, with no history of trauma, seizures, EtOH abuse, recent infections, or new meds in the setting of completely negative medical history  - No signs or symptoms of infection, UA clear, BCx NGTD  - Recurred x1 to date, s/p keppra load by the ED with none subsequently  - Continue keppra BID  - MRI unremarkable. EEG completed with interpretation pending  - Neuro following, appreciate assistance  - Seizure + fall precautions ordered  - Neuro checks Q4H. Ativan ordered PRN for seizure activity > 5 min  - Lactic acidosis resolved, hydrating for elevated CPK

## 2019-01-27 NOTE — PROGRESS NOTES
"[facility] Hospital Medicine  Progress Note    Patient Name: Cristiano Cruz  MRN: [id number]  Patient Class: IP- Inpatient   Admission Date: 1/26/2019  Length of Stay: 1 days  Attending Physician: Orion Solis MD  Primary Care Provider: Primary Doctor [facility] Medicine Team: [facility] HOSP MED A Orion Solis MD    Subjective:     Principal Problem:Generalized tonic-clonic seizure    HPI:  Mr. Cruz is a 63-year-old man with no medical history who presents after a seizure episode. He was in his usual state of health until until this morning at 0500, when his wife heard abnormal breathing patterns, "like he was catching his breath," and was found to be foaming at the mouth, all while sleeping. They tried to wake him up but couldn't, prompting their calling EMS. He had tonic-clonic motions for about 5 minutes, after which he was confused for 20 minutes with some slurred speech. No tongue biting or urine/stool incontinence. He has no history of head trauma (even remote) or seizures. He did have a hospitalization in 1963 for an infection (when he was 7), though his sister does not remember the exact nature of the infection. No recent fevers, chills, or night sweats. No alcohol use. No nausea, vomiting, constipation, or diarrhea as reported by his wife. The only recent aberration from his baseline that they can describe is that he was "hyper" and more vocal during Judaism yesterday per his  at bedside.    On arrival to the ED he had improved and was hemodynamically stable, but shortly afterwards had another episode that lasted about 5 minutes and was witnessed by the ED. During that episode he had urinary incontinence. He was loaded with 2g keppra and has not had any recurrent episodes. Lab workup largely normal (though tox screen pending) with the exception of metabolic acidosis with bicarb 20 and lactic acidosis at 3.2. He was given 2g of IV keppra, neurology was consulted, and medicine " admission was requested for new onset seizures.    Hospital Course:  1/26 Admitted to . Loaded with keppra in the ED. Neurology consulted. EEG ordered  1/27 Mentation greatly improved. No seizure activity. EEG completed, interpretation pending    Interval History:     Mr. Cruz has significantly improved overnight. He has had no recurrent seizure activity. EEG completed with read pending. His mental status has almost normalized, though he is making some inappropriate remarks that are apparently not in keeping with his normal behavior.    Review of Systems   Constitutional: Negative for chills and fever.   Respiratory: Negative for cough and shortness of breath.    Cardiovascular: Negative for chest pain and leg swelling.   Gastrointestinal: Negative for abdominal pain, constipation, diarrhea, nausea and vomiting.   Genitourinary: Negative for difficulty urinating.   Musculoskeletal: Negative for myalgias.   Neurological: Positive for seizures (none since admission). Negative for weakness and numbness.   Psychiatric/Behavioral: Positive for behavioral problems (calm and cooperative, making sexual-based jokes that are apparently unusual for him). Negative for confusion. The patient is not nervous/anxious.      Objective:     Vital Signs (Most Recent):  Temp: 96.2 °F (35.7 °C) (01/27/19 1103)  Pulse: 69 (01/27/19 1103)  Resp: 18 (01/27/19 1103)  BP: (!) 158/90 (01/27/19 1103)  SpO2: 96 % (01/27/19 1103) Vital Signs (24h Range):  Temp:  [96.2 °F (35.7 °C)-98.7 °F (37.1 °C)] 96.2 °F (35.7 °C)  Pulse:  [69-92] 69  Resp:  [11-26] 18  SpO2:  [92 %-97 %] 96 %  BP: (128-158)/(72-99) 158/90     Weight: 75.1 kg (165 lb 9.1 oz)  Body mass index is 25.93 kg/m².    Intake/Output Summary (Last 24 hours) at 1/27/2019 1210  Last data filed at 1/27/2019 0600  Gross per 24 hour   Intake 2375 ml   Output 350 ml   Net 2025 ml      Physical Exam   Constitutional: He is oriented to person, place, and time. No distress.   Eyes: Pupils are  equal, round, and reactive to light.   Cardiovascular: Normal rate and regular rhythm.   No murmur heard.  Pulmonary/Chest: Effort normal and breath sounds normal. No respiratory distress. He has no wheezes.   Abdominal: Soft. Bowel sounds are normal. He exhibits no distension. There is no tenderness.   Musculoskeletal: He exhibits no edema or tenderness.   Neurological: He is alert and oriented to person, place, and time.   Skin: Skin is warm and dry. No rash noted. He is not diaphoretic. No erythema.   Psychiatric: He has a normal mood and affect.   Calm and cooperative, making sexual-based jokes that are apparently unusual for him       Significant Labs:  CBC:  Recent Labs   Lab 01/26/19  0623 01/27/19  0411   WBC 8.34 8.46   HGB 14.1 13.7*   HCT 46.0 41.9    183     CMP:  Recent Labs   Lab 01/26/19  0623 01/27/19  0411    140   K 4.7 3.6    105   CO2 20* 26   * 114*   BUN 13 11   CREATININE 1.1 0.9   CALCIUM 9.4 9.1   PROT 8.1  --    ALBUMIN 3.9  --    BILITOT 0.3  --    ALKPHOS 79  --    AST 20  --    ALT 23  --    ANIONGAP 15 9   EGFRNONAA >60.0 >60.0     Assessment/Plan:      * Generalized tonic-clonic seizure      - Improving  - Etiology unclear, with no history of trauma, seizures, EtOH abuse, recent infections, or new meds in the setting of completely negative medical history  - No signs or symptoms of infection, UA clear, BCx NGTD  - Recurred x1 to date, s/p keppra load by the ED with none subsequently  - Continue keppra BID  - MRI unremarkable. EEG completed with interpretation pending  - Neuro following, appreciate assistance  - Seizure + fall precautions ordered  - Neuro checks Q4H. Ativan ordered PRN for seizure activity > 5 min  - Lactic acidosis resolved, hydrating for elevated CPK     Elevated blood pressure reading      - Suspect essential hypertension  - He needs a PCP to f/u as an outpatient with serial repeat measurements to establish the diagnosis     Elevated CPK       - Suspect seizure-activity prior to admission as the etiology  - No evidence of rhabdomyolysis  - Plateau reached in the 600s, anticipate improvement soon  - Continue LR infusion for an additional day, will monitor off tomorrow     Type 2 diabetes mellitus with hyperglycemia, without long-term current use of insulin      - New diagnosis  - HbA1c 6.5 diagnostic of DM  - Ordered aspart SSI + POCT glucose TID with meals and HS  - Diabetic diet  - Will order glucometer, lancet, strips, metformin, and DM education upon discharge. What he really needs is a PCP.       VTE Risk Mitigation (From admission, onward)        Ordered     enoxaparin injection 40 mg  Daily      01/27/19 1216     IP VTE LOW RISK PATIENT  Once      01/26/19 0900              Orion Solis MD  Department of Hospital Medicine   [facility]

## 2019-01-27 NOTE — PT/OT/SLP EVAL
"Speech Language Pathology Evaluation  Cognitive/Bedside Swallow/  Discharge Summary    Patient Name:  Cristiano Cruz   MRN:  [id number]   704/704 A    Admitting Diagnosis: Generalized tonic-clonic seizure    Recommendations:                  General Recommendations:  Follow-up not indicated  Diet recommendations:  Regular, Thin   Aspiration Precautions:  · Fully awake and alert for PO intake  · Fully upright position for PO intake  · Small bites/ sips  · Slow rate of eating/ drinking  · 1 bite/ sip @ a time  · Refrain from talking prior to swallow completion   · Discontinue PO upon: coughing, throat clearing, choking, wet vocal quality, wet breath sounds, watery eyes, reddened facial features  General Precautions: Standard, fall    History:     History reviewed. No pertinent past medical history.    Past Surgical History:   Procedure Laterality Date    HERNIA REPAIR       Social History: Per pt, lives with his wife. Does not drive. Responsible for finances.     Prior diet: Per pt, regular consistency solids and thin liquids. Per review of pt's medical chart, regular consistency solid diet and thin liquids ordered by medical team prior to initiation of this evaluation.     Occupation/hobbies/homemaking: Per pt, works in a [employer].     Subjective     "I don't remember it." Pt re: symptoms warranting this hospitalization.     Pain/Comfort:  · Pain Rating 1: 0/10  · Pain Rating Post-Intervention 1: 0/10    Objective:     Cognitive Status:    · Orientation WFL  · Answered 3/3 long term memory q's ind'ly  · During immediate memory task recalled series of 5, 1-digit numbers ind'ly and series of 5 unrelated words ind'ly   · Answered 4/4 basic problem solving q's ind'ly   · Sequenced series of 4 words according to given attribute ind'ly  · Generated comparison/ contrast between 2 similar, common objects ind'ly  · Appeared WFL in spontaneous conversation with clinician and family     Receptive Language:   · Answered 7/7 complex " y/n q's ind'ly  · Followed 4/4 complex directions ind'ly  · Appeared WFL in spontaneous conversation with clinician and family    Pragmatics:    · WFL    Expressive Language:  · Answered 6/6 responsive naming q's ind'ly  · Appeared WFL in spontaneous conversation with clinician and family      Motor Speech:  · WFL    Voice:   · WFL    Oral Musculature Evaluation  · Oral Musculature: WFL  · Dentition: edentulous, rarely or never uses dentures to eat  · Secretion Management: adequate  · Mucosal Quality: good  · Mandibular Strength and Mobility: WFL  · Oral Labial Strength and Mobility: WFL  · Lingual Strength and Mobility: WFL  · Velar Elevation: WFL  · Buccal Strength and Mobility: WFL  · Volitional Cough: WFL  · Volitional Swallow: WFL  · Voice Prior to PO Intake: Clear, dry     Bedside Swallow Eval:   Consistencies Assessed:  · Thin water- multiple cup and straw sips in isolation and as regular consistency solid liquid wash  · Regular consistency solid kim cracker- 1 cracker via bites x~3    Oral Phase:   · Appeared WFL    Pharyngeal Phase:   · No overt clinical signs aspiration observed    Treatment:   Pt awake and alert upon entry with wife and 6 other family members present at bedside. HOB raised. Education provided re: role of SLP, aspiration precautions listed above, and SLP POC. Encouragement provided to request SLP re-consult should pt experience swallowing changes. Pt and family verbalized understanding of education provided and agreement with SLP POC. No further questions.     Assessment:     Cristiano Cruz is a 63 y.o. male with no further acute SLP needs at this time. Please re-consult should changes occur.     Goals:   Multidisciplinary Problems     SLP Goals     Not on file          Multidisciplinary Problems (Resolved)        Problem: SLP Goal    Goal Priority Disciplines Outcome   SLP Goal   (Resolved)     SLP Outcome(s) achieved                 Plan:     · Plan of Care reviewed with:  patient,  spouse, family   · SLP Follow-Up:  No       Discharge recommendations:  Discharge Facility/Level of Care Needs: other (see comments)(Currently no SLP needs upon d/c)     Time Tracking:     SLP Treatment Date:   01/27/19  Speech Start Time:  1238  Speech Stop Time:  1254     Speech Total Time (min):  16 min    Billable Minutes: Eval 8  and Eval Swallow and Oral Function 8    BRENDON Cavazos, CCC-SLP  [phone]  1/27/2019

## 2019-01-27 NOTE — PLAN OF CARE
Problem: SLP Goal  Goal: SLP Goal  Outcome: Outcome(s) achieved Date Met: 01/27/19    Clinical evaluation of swallow and speech/ language/ cognitive evaluation complete. Recommend ongoing regular consistency solid diet and thin liquids. Pt appears at cognitive-linguistic baseline. No further acute SLP needs at this time. Please re-consult should changes occur.     BRENDON Cavazos, CCC-SLP  [phone]  1/27/2019

## 2019-01-27 NOTE — ASSESSMENT & PLAN NOTE
- Suspect seizure-activity prior to admission as the etiology  - No evidence of rhabdomyolysis  - Plateau reached in the 600s, anticipate improvement soon  - Continue LR infusion for an additional day, will monitor off tomorrow

## 2019-01-27 NOTE — CONSULTS
Consult received re: Diabetes. Noted pt's A1C is 6.5%, RD does not provide inpatient education until A1C >9. No education warranted at this time. Will continue to monitor.

## 2019-01-27 NOTE — SUBJECTIVE & OBJECTIVE
History reviewed. No pertinent past medical history.    Past Surgical History:   Procedure Laterality Date    HERNIA REPAIR         Review of patient's allergies indicates:  No Known Allergies    Current Neurological Medications: levetiracetam    No current facility-administered medications on file prior to encounter.      Current Outpatient Medications on File Prior to Encounter   Medication Sig    HYDROmorphone (DILAUDID) 2 MG tablet Take 1 tablet (2 mg total) by mouth every 4 (four) hours as needed for Pain (severe pain 7-10/10).    ondansetron (ZOFRAN) 8 MG tablet Take 1 tablet (8 mg total) by mouth every 8 (eight) hours as needed for Nausea.    oxycodone-acetaminophen (PERCOCET) 5-325 mg per tablet Take 1 tablet by mouth every 4 (four) hours as needed for Pain (moderate pain 4-6/10).     Family History     None        Tobacco Use    Smoking status: Former Smoker   Substance and Sexual Activity    Alcohol use: No    Drug use: No    Sexual activity: Yes     Partners: Female     Review of Systems   Constitutional: Negative for activity change, chills and fever.   HENT: Negative for sinus pressure and trouble swallowing.    Eyes: Negative for pain and visual disturbance.   Respiratory: Negative for cough, chest tightness and shortness of breath.    Cardiovascular: Negative for chest pain and palpitations.   Gastrointestinal: Negative for abdominal pain, diarrhea, nausea and vomiting.   Genitourinary: Negative for difficulty urinating and dysuria.   Musculoskeletal: Negative for back pain and neck pain.   Skin: Negative for rash and wound.   Neurological: Negative for weakness, numbness and headaches.   Psychiatric/Behavioral: Negative for agitation and confusion.     Objective:     Vital Signs (Most Recent):  Temp: 98.6 °F (37 °C) (01/26/19 1702)  Pulse: 92 (01/26/19 1702)  Resp: (!) 26 (01/26/19 1702)  BP: (!) 140/72 (01/26/19 1702)  SpO2: 97 % (01/26/19 1702) Vital Signs (24h Range):  Temp:  [98.3 °F  (36.8 °C)-98.7 °F (37.1 °C)] 98.6 °F (37 °C)  Pulse:  [] 92  Resp:  [11-26] 26  SpO2:  [87 %-100 %] 97 %  BP: (113-151)/(64-99) 140/72     Weight: 73.5 kg (162 lb)  Body mass index is 25.37 kg/m².    Physical Exam   Constitutional: He appears well-developed and well-nourished.   HENT:   Head: Normocephalic and atraumatic.   Eyes: EOM are normal. Pupils are equal, round, and reactive to light.   Pulmonary/Chest: Effort normal. No respiratory distress.   Abdominal: He exhibits no distension.   Neurological: He has normal strength.   Reflex Scores:       Tricep reflexes are 2+ on the right side and 2+ on the left side.       Bicep reflexes are 2+ on the right side and 2+ on the left side.       Brachioradialis reflexes are 2+ on the right side and 2+ on the left side.       Patellar reflexes are 2+ on the right side and 2+ on the left side.       Achilles reflexes are 2+ on the right side and 2+ on the left side.  Skin: Skin is warm and dry.   Psychiatric: He has a normal mood and affect. His speech is normal and behavior is normal.   Nursing note and vitals reviewed.      NEUROLOGICAL EXAMINATION:     MENTAL STATUS   Attention: normal. Concentration: normal.   Speech: speech is normal   Level of consciousness: alert    CRANIAL NERVES     CN III, IV, VI   Pupils are equal, round, and reactive to light.  Extraocular motions are normal.     CN V   Facial sensation intact.     CN VII   Facial expression full, symmetric.     CN XII   Tongue deviation: none    MOTOR EXAM   Muscle bulk: normal  Overall muscle tone: normal    Strength   Strength 5/5 throughout.     REFLEXES     Reflexes   Right brachioradialis: 2+  Left brachioradialis: 2+  Right biceps: 2+  Left biceps: 2+  Right triceps: 2+  Left triceps: 2+  Right patellar: 2+  Left patellar: 2+  Right achilles: 2+  Left achilles: 2+  Right plantar: equivocal  Left plantar: equivocal    SENSORY EXAM   Light touch normal.     GAIT AND COORDINATION     Tremor   Resting  tremor: absent  Intention tremor: absent  Action tremor: absent      Significant Labs:   CBC:   Recent Labs   Lab 01/26/19  0623   WBC 8.34   HGB 14.1   HCT 46.0        CMP:   Recent Labs   Lab 01/26/19  0623   *      K 4.7      CO2 20*   BUN 13   CREATININE 1.1   CALCIUM 9.4   PROT 8.1   ALBUMIN 3.9   BILITOT 0.3   ALKPHOS 79   AST 20   ALT 23   ANIONGAP 15   EGFRNONAA >60.0       Significant Imaging: I have reviewed all pertinent imaging results/findings within the past 24 hours.

## 2019-01-28 VITALS
DIASTOLIC BLOOD PRESSURE: 82 MMHG | TEMPERATURE: 97 F | WEIGHT: 165.38 LBS | BODY MASS INDEX: 25.96 KG/M2 | OXYGEN SATURATION: 95 % | HEIGHT: 67 IN | RESPIRATION RATE: 18 BRPM | HEART RATE: 65 BPM | SYSTOLIC BLOOD PRESSURE: 151 MMHG

## 2019-01-28 PROBLEM — R74.8 ELEVATED CPK: Status: RESOLVED | Noted: 2019-01-27 | Resolved: 2019-01-28

## 2019-01-28 LAB
ANION GAP SERPL CALC-SCNC: 7 MMOL/L
BACTERIA #/AREA URNS AUTO: ABNORMAL /HPF
BILIRUB UR QL STRIP: NEGATIVE
BUN SERPL-MCNC: 11 MG/DL
CALCIUM SERPL-MCNC: 9.4 MG/DL
CHLORIDE SERPL-SCNC: 105 MMOL/L
CK SERPL-CCNC: 474 U/L
CK SERPL-CCNC: 482 U/L
CLARITY UR REFRACT.AUTO: ABNORMAL
CO2 SERPL-SCNC: 28 MMOL/L
COLOR UR AUTO: ABNORMAL
CREAT SERPL-MCNC: 1 MG/DL
EST. GFR  (AFRICAN AMERICAN): >60 ML/MIN/1.73 M^2
EST. GFR  (NON AFRICAN AMERICAN): >60 ML/MIN/1.73 M^2
GLUCOSE SERPL-MCNC: 121 MG/DL
GLUCOSE UR QL STRIP: NEGATIVE
HGB UR QL STRIP: ABNORMAL
HYALINE CASTS UR QL AUTO: 0 /LPF
KETONES UR QL STRIP: NEGATIVE
LEUKOCYTE ESTERASE UR QL STRIP: ABNORMAL
MICROSCOPIC COMMENT: ABNORMAL
NITRITE UR QL STRIP: NEGATIVE
PH UR STRIP: 8 [PH] (ref 5–8)
POTASSIUM SERPL-SCNC: 4.6 MMOL/L
PROT UR QL STRIP: ABNORMAL
RBC #/AREA URNS AUTO: >100 /HPF (ref 0–4)
SODIUM SERPL-SCNC: 140 MMOL/L
SP GR UR STRIP: 1.01 (ref 1–1.03)
SQUAMOUS #/AREA URNS AUTO: 6 /HPF
URN SPEC COLLECT METH UR: ABNORMAL
WBC #/AREA URNS AUTO: >100 /HPF (ref 0–5)

## 2019-01-28 PROCEDURE — 97530 THERAPEUTIC ACTIVITIES: CPT

## 2019-01-28 PROCEDURE — 87088 URINE BACTERIA CULTURE: CPT

## 2019-01-28 PROCEDURE — 25000003 PHARM REV CODE 250: Performed by: INTERNAL MEDICINE

## 2019-01-28 PROCEDURE — 87147 CULTURE TYPE IMMUNOLOGIC: CPT

## 2019-01-28 PROCEDURE — 97161 PT EVAL LOW COMPLEX 20 MIN: CPT

## 2019-01-28 PROCEDURE — 36415 COLL VENOUS BLD VENIPUNCTURE: CPT

## 2019-01-28 PROCEDURE — 87086 URINE CULTURE/COLONY COUNT: CPT

## 2019-01-28 PROCEDURE — 82550 ASSAY OF CK (CPK): CPT

## 2019-01-28 PROCEDURE — 82550 ASSAY OF CK (CPK): CPT | Mod: 91

## 2019-01-28 PROCEDURE — 87077 CULTURE AEROBIC IDENTIFY: CPT

## 2019-01-28 PROCEDURE — 80048 BASIC METABOLIC PNL TOTAL CA: CPT

## 2019-01-28 PROCEDURE — 99238 PR HOSPITAL DISCHARGE DAY,<30 MIN: ICD-10-PCS | Mod: ,,, | Performed by: INTERNAL MEDICINE

## 2019-01-28 PROCEDURE — 99238 HOSP IP/OBS DSCHRG MGMT 30/<: CPT | Mod: ,,, | Performed by: INTERNAL MEDICINE

## 2019-01-28 PROCEDURE — 87186 SC STD MICRODIL/AGAR DIL: CPT

## 2019-01-28 PROCEDURE — 81001 URINALYSIS AUTO W/SCOPE: CPT

## 2019-01-28 RX ORDER — DEXTROSE 4 G
1 TABLET,CHEWABLE ORAL ONCE
Qty: 1 EACH | Refills: 0 | Status: SHIPPED | OUTPATIENT
Start: 2019-01-28 | End: 2019-01-29

## 2019-01-28 RX ORDER — LANCETS
1 EACH MISCELLANEOUS 2 TIMES DAILY
Qty: 100 EACH | Refills: 11 | Status: SHIPPED | OUTPATIENT
Start: 2019-01-28 | End: 2021-07-28

## 2019-01-28 RX ORDER — LEVETIRACETAM 500 MG/1
500 TABLET ORAL 2 TIMES DAILY
Qty: 60 TABLET | Refills: 2 | Status: SHIPPED | OUTPATIENT
Start: 2019-01-28 | End: 2019-02-08 | Stop reason: SDUPTHER

## 2019-01-28 RX ADMIN — LEVETIRACETAM 500 MG: 500 TABLET ORAL at 10:01

## 2019-01-28 NOTE — PLAN OF CARE
Problem: Adult Inpatient Plan of Care  Goal: Plan of Care Review  Outcome: Ongoing (interventions implemented as appropriate)  No new neuro changes, no seizure activity. Mild forgetfulness / impulsiveness. VSS, CBS within goal range. Seizure and fall precautions maintained.

## 2019-01-28 NOTE — CARE UPDATE
01/28/2019      Cristiano Cruz  [address] [place] [address]          [facility] Medicine Dept.  [facility] [address] [place] [address]  [phone] after hours  [phone] fax Diagnosis:  Generalized tonic-clonic seizure                         Debility        To whom it may concern,    Mr. Cruz was hospitalized on 1/26 and should be excused from work until 2/4.            _______________________  Orion Solis MD  01/28/2019

## 2019-01-28 NOTE — CARE UPDATE
EEG pre-calvin negative and CPK down-trending. Will hold fluids overnight and check in the AM in preparation for potential discharge.

## 2019-01-28 NOTE — PT/OT/SLP EVAL
"Physical Therapy Evaluation   Discharge Summary    Patient Name: Cristiano Cruz  MRN: [id number]   Diagnosis: Generalized tonic-clonic seizure    Recommendations:   Discharge Recommendations:  (outpatient PT)   Discharge Equipment Recommendations: none   Barriers to Discharge: n/a    Assessment:   Cristiano Cruz is a 63 y.o. male admitted with a medical diagnosis of Generalized tonic-clonic seizure.  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  he now presents with the following impairments/functional limitations: decreased safety awareness, gait instability. Cristiano Cruz is at their prior level of function with mobility but demonstrated gait instability due to balance deficits. Pt would benefit from outpatient PT services to address these mobility limitations.    History:   History reviewed. No pertinent past medical history.    Past Surgical History:   Procedure Laterality Date    HERNIA REPAIR         Subjective   Patient comments/goals: "I don't remember anything that happened just that I woke up and was here." regarding reason for admit.  Pain/Comfort:  ·  Pain Rating 1: 0/10  · Pain Rating Post-Intervention 1: 0/10     Living Environment:  Home: The patient lives in first floor in apartment with his wife. 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   PLOF:  independent  DME owned: none.    Assistance Available: Upon discharge, patient will return home have assistance from his wife.    Objective:   The patient had peripheral IV    General Precautions: fall, seizure  Recent Surgery: * No surgery found *    Recent Vital Signs:   Pulse: 65 (01/28/19 0741)  BP: (!) 151/82 (01/28/19 0741)  SpO2: 95 % (01/28/19 0741)    Physical Examination:   The patient was found supine in bed.     Cognitive Function:  - Oriented to: person, place, time, situation  - Level of Alertness: awake, alert, pleasant  - Follows " Commands/attention: Follows multistep  commands  - Communication: clear/fluent  - Safety awareness/insight to disability: intact  Musculoskeletal System  Facial droop: n/a    Lower Extremities:  PROM: WFL  Strength:  Muscle Group R LE L LE Comments   Hip ext 5/5 5/5    Hip flexion  5/5 5/5       Knee flexion  5/5 5/5       Knee ext. 5/5 5/5    Ankle DF 5/5 5/5    Ankle PF 5/5 5/5      Integumentary System: Visible skin intact    Neuromuscular System:  · Sensation: grossly intact BLE  · Coordination:    Finger to thumb opposition: intact   Finger to nose: intact   Heel to shin: intact  Posture and gross symmetry: forward head posture, rounded shoulders, flexed trunk    BALANCE:  Sitting: independent  · Time: 5 minutes  · Comments: pt did not require assistance with sitting balance with back unsupported with feet on floor    Standing: independent  · Time: 10 minutes   · Comments: pt shows good standing balance with eyes opened and preferred foot placement at shoulder width  · Lawrence Balance Assessment - shows deficits with balance testing resulting in 41/56 total score = low fall risk  · Sit to stand (4)  · Standing unsupported (4)  · Sitting with back unsupported but feet on floor (4)  · Standing to sitting (4)  · Transfers (4)  · Standing unsupported with eyes closed (3)  · Standing unsupported with feet together (2)  · Reaching forward with outstretched arm while standing (4)  ·  an object from floor when standing (4)  · Turn 360 degrees (3)  · Placing alternate foot on step while standing unsupported (3)  · Standing unsupported with 1 foot in front (0)  · Standing on 1 leg (1)    · Romberg/Sharpened Romberg Tests  · Feet together eyes open - 30 sec  · Feet together eyes closed - 20 sec  · Tandem eyes open - 17 sec  · Tandem eyes closed - <5 sec    FUNCTIONAL MOBILITY ASSESSMENT:  Bed Mobility:  · Rolling/Turning R: independent  · Rolling/Turning L: indepedent  · Supine > sit: independent  · Sit > supine:  NT  · Scooting EOB: independent    Transfers:  · Sit <> stand transfer: independent   · Bed <> chair transfer: independent    Gait:   Gait x 280 feet supervision  · Patient demonstrated path deviation up to 12 inches with slight scissoring gait pattern during gait trial. Pt states that it is normal for him to deviate although wife disagrees.  · Pt reveals decreased sway when prompted to slow down although he continues to deviate laterally due to balance deficits.    Stairs:  Pt ascended/descended 24 step(s) with use of unilateral handrail.    Therapeutic Activities, Education, or Exercises:  The therapist educated the patient and spouse on the role of PT, POC, and therapy recommendations of outpatient PT to address balance deficits.  The therapist discussed the patients current mobility status, deficits, and level of assistance with standing. Therapist discussed results of balance assessments and role of outpatient PT to improve overall safety and mobility. Time was provided for active listening, discussion of health disposition, and discussion of safe discharge recommendations. Therapist answered questions to patient/familys satisfaction within scope of practice.  Patient and family are aware of patient's deficits and therapy progression. White board updated to reflect current level of assistance.    FUNCTIONAL OUTCOME MEASURES:  [facility]  Turning over in bed (including adjusting bedclothes, sheets and blankets)?: 4  Sitting down on and standing up from a chair with arms (e.g., wheelchair, bedside commode, etc.): 4  Moving from lying on back to sitting on the side of the bed?: 4  Moving to and from a bed to a chair (including a wheelchair)?: 4  Need to walk in hospital room?: 4  Climbing 3-5 steps with a railing?: 4  Basic Mobility Total Score: 24    Goals:     Multidisciplinary Problems     Physical Therapy Goals     Not on file          Multidisciplinary Problems (Resolved)        Problem: Physical Therapy Goal     Goal Priority Disciplines Outcome Goal Variances Interventions   Physical Therapy Goal   (Resolved)     PT, PT/OT Outcome(s) achieved     Description:  Pt participated well in therapy today. He performed all transfer tasks independently. Pt will require outpatient PT upon discharge address balance deficits for safety.    Rissamariel Winters, SPT  1/28/2019                    Plan:   Discharge from skilled PT services. Please re-consult therapy if there is a significant change in the patient's functional status or independence.   · Plan of Care Expires:     Plan of Care Reviewed with: patient, spouse    This plan of care has been discussed with the patient and/or family who were involved in its development and are in agreement with the identified goals and treatment plan.     Clinical Decision Making:   COMPLEXITY OF PT EXAMINATION:  HISTORY  - Comorbidities that affect the PT plan of care or the patient's ability to participate in/progress with therapy:  · Type 2 DM  · HTN / elevated BP reading  - Personal Factors:   · N/A  EXAMINATION  - Body Systems:  · Communication ability, affect, cognition, language, and learning style: the assessment of the ability to make needs known, consciousness, orientation, expected emotional /behavioral responses, and learning preferences  · Neuromuscular system: a general assessment of gross coordinated movement (eg, balance, gait, locomotion, transfers, and transitions) and motor function (motor control and motor learning)  · Musculoskeletal system: the assessment of gross symmetry, gross range of motion, gross strength, height, and weight  · Cardiovascular/pulmonary system: the assessment of heart rate, respiratory rate, blood pressure, SpO2, and edema   - Activity or participation limitations:   · N/A  CLINICAL PRESENTATION: Stable and/or uncomplicated  Evaluation Complexity:  Low- 34157     Time Tracking:   PT Received On:  01/28/19  PT Start Time:   0851    PT Stop Time:  0911  PT  Total Time (min): 20 min      Billable Minutes: Evaluation 12 and Therapeutic Activity 8    Aurelia Winters, SPT  1/28/2019

## 2019-01-28 NOTE — PLAN OF CARE
01/28/19 0945   Discharge Assessment   Assessment Type Discharge Planning Assessment   Confirmed/corrected address and phone number on facesheet? Yes   Assessment information obtained from? Patient   Expected Length of Stay (days) 2   Communicated expected length of stay with patient/caregiver yes   Prior to hospitilization cognitive status: Alert/Oriented   Prior to hospitalization functional status: Independent   Current cognitive status: Alert/Oriented   Current Functional Status: Independent   Lives With spouse  (spouse, Caor Cruz ([phone]))   Able to Return to Prior Arrangements yes   Is patient able to care for self after discharge? Yes   Patient's perception of discharge disposition home or selfcare   Readmission Within the Last 30 Days no previous admission in last 30 days   Patient currently being followed by outpatient case management? No   Patient currently receives any other outside agency services? No   Equipment Currently Used at Home none   Do you have any problems affording any of your prescribed medications? No   Is the patient taking medications as prescribed? yes   Does the patient have transportation home? Yes   Transportation Anticipated family or friend will provide  (spouse)   Does the patient receive services at the [facility]? No   Discharge Plan A Home with family   Discharge Plan B Home Health   Patient/Family in Agreement with Plan yes     Dx: seizure  Consult: PT/OT  Pharm: [facility]  Hosp f/u appt: Dr. Malina Lino (neuro for epilepsy) on 4/2/19 at 1300

## 2019-01-29 NOTE — DISCHARGE SUMMARY
"[facility] Hospital Medicine  Discharge Summary      Patient Name: Cristiano Cruz  MRN: [id number]  Admission Date: 1/26/2019  Hospital Length of Stay: 2 days  Discharge Date and Time:  01/28/2019 6:27 PM  Attending Physician: Adamaris att. providers found   Discharging Provider: Orion Solis MD  Primary Care Provider: Primary Doctor Adamaris  [facility] Medicine Team: [facility] HOSP MED A Orion Solis MD    HPI:   Mr. Cruz is a 63-year-old man with no medical history who presents after a seizure episode. He was in his usual state of health until until this morning at 0500, when his wife heard abnormal breathing patterns, "like he was catching his breath," and was found to be foaming at the mouth, all while sleeping. They tried to wake him up but couldn't, prompting their calling EMS. He had tonic-clonic motions for about 5 minutes, after which he was confused for 20 minutes with some slurred speech. No tongue biting or urine/stool incontinence. He has no history of head trauma (even remote) or seizures. He did have a hospitalization in 1963 for an infection (when he was 7), though his sister does not remember the exact nature of the infection. No recent fevers, chills, or night sweats. No alcohol use. No nausea, vomiting, constipation, or diarrhea as reported by his wife. The only recent aberration from his baseline that they can describe is that he was "hyper" and more vocal during Alevism yesterday per his  at bedside.    On arrival to the ED he had improved and was hemodynamically stable, but shortly afterwards had another episode that lasted about 5 minutes and was witnessed by the ED. During that episode he had urinary incontinence. He was loaded with 2g keppra and has not had any recurrent episodes. Lab workup largely normal (though tox screen pending) with the exception of metabolic acidosis with bicarb 20 and lactic acidosis at 3.2. He was given 2g of IV keppra, neurology was consulted, and " medicine admission was requested for new onset seizures.    * No surgery found *      Hospital Course:   Mr. Cruz was admitted for new-onset seizures. His mental status normalized, and he had no recurrent seizures following keppra loading in the ED and twice-daily maintenance upon arrival to the floor. Workup into the etiology of his seizures remained negative. EEG was performed, with the preliminary interpretation being no epileptiform activity with normal background. Of note, he was hyperglycemic on arrival, and was diagnosed with DMII on the basis of elevated hemoglobin A1c at 6.5. It is likely this could be controlled by diet, so he was discharged with testing supplies and a strong recommendation to establish care with a PCP. His wife is calling her PCP at  today to facilitate this. Similarly, his BP was elevated, so he will need repeat checks to establish the diagnosis of essential HTN if persistently elevated.    Lastly he will need follow-up with neurology in 6-8 weeks.     Consults:   Consults (From admission, onward)        Status Ordering Provider     Inpatient consult to neurology  Once     Provider:  (Not yet assigned)    Completed FELIX TIERNEY     Inpatient consult to Registered Dietitian/Nutritionist  Once     Provider:  (Not yet assigned)    Completed SHAHAB ZAPIEN          * Generalized tonic-clonic seizure      - Resolved on keppra  - Etiology unclear, with no history of trauma, seizures, EtOH abuse, recent infections, or new meds in the setting of completely negative medical history  - No signs or symptoms of infection, UA clear, BCx NGTD  - MRI clear  - EEG preliminary interpretation is no epileptiform activity with normal background  - Discharged on keppra BID with plans for epilepsy follow-up       Elevated blood pressure reading      - Suspect essential hypertension  - f/u PCP as above for serial measurements     Type 2 diabetes mellitus with hyperglycemia, without long-term current  use of insulin      - New diagnosis  - HbA1c 6.5 diagnostic of DM  - Discharged with  glucometer, lancet, strips, and DM education upon discharge. He is going to establish care with his wife's PCP, who can determine the need for metformin       Final Active Diagnoses:    Diagnosis Date Noted POA    PRINCIPAL PROBLEM:  Generalized tonic-clonic seizure [G40.409] 01/26/2019 Yes    Elevated blood pressure reading [R03.0] 01/27/2019 Yes    Type 2 diabetes mellitus with hyperglycemia, without long-term current use of insulin [E11.65] 01/26/2019 Yes      Problems Resolved During this Admission:    Diagnosis Date Noted Date Resolved POA    Elevated CPK [R74.8] 01/27/2019 01/28/2019 Yes    Lactic acidosis [E87.2] 01/26/2019 01/27/2019 Yes       Discharged Condition: good    Disposition: Home or Self Care    Follow Up:  Follow-up Information     Malina Bright MD. Schedule an appointment as soon as possible for a visit on 4/2/2019.    Specialty:  Neurology  Why:  at 1:00pm  Contact information:  [facility] [id number]  [phone]                 Patient Instructions:      Ambulatory consult to Neurology Epilepsy   Referral Priority: Routine Referral Type: Consultation   Referral Reason: Specialty Services Required   Requested Specialty: Neurology   Number of Visits Requested: 1     Ambulatory consult to Physical Therapy   Referral Priority: Routine Referral Type: Physical Medicine   Referral Reason: Specialty Services Required   Requested Specialty: Physical Therapy   Number of Visits Requested: 1     Ambulatory consult to Occupational Therapy   Referral Priority: Routine Referral Type: Occupational Therapy   Referral Reason: Specialty Services Required   Requested Specialty: Occupational Therapy   Number of Visits Requested: 1     Call MD for:  temperature >100.4     Call MD for:  persistent nausea and vomiting or diarrhea     Call MD for:  severe uncontrolled pain     Call MD for:  redness,  tenderness, or signs of infection (pain, swelling, redness, odor or green/yellow discharge around incision site)     Call MD for:  difficulty breathing or increased cough     Call MD for:  severe persistent headache     Call MD for:  worsening rash     Call MD for:  persistent dizziness, light-headedness, or visual disturbances     Call MD for:  increased confusion or weakness         Medications:  Reconciled Home Medications:      Medication List      START taking these medications    ACCU-CHEK KRISTOPHER PLUS METER Misc  Generic drug:  blood-glucose meter  Use as directed     ACCU-CHEK KRISTOPHER PLUS TEST STRP Strp  Generic drug:  blood sugar diagnostic  Use 2 (two) times daily.     ACCU-CHEK FASTCLIX LANCET DRUM Misc  Generic drug:  lancets  Use twice daily as directed     levETIRAcetam 500 MG Tab  Commonly known as:  KEPPRA  Take 1 tablet (500 mg total) by mouth 2 (two) times daily.        STOP taking these medications    HYDROmorphone 2 MG tablet  Commonly known as:  DILAUDID     ondansetron 8 MG tablet  Commonly known as:  ZOFRAN     oxyCODONE-acetaminophen 5-325 mg per tablet  Commonly known as:  PERCOCET            Indwelling Lines/Drains at time of discharge:   Lines/Drains/Airways          None          Time spent on the discharge of patient: < 30 minutes  Patient was seen and examined on the date of discharge and determined to be suitable for discharge.         Orion Solis MD  Department of Hospital Medicine  [facility]

## 2019-01-29 NOTE — ASSESSMENT & PLAN NOTE
- New diagnosis  - HbA1c 6.5 diagnostic of DM  - Discharged with  glucometer, lancet, strips, and DM education upon discharge. He is going to establish care with his wife's PCP, who can determine the need for metformin

## 2019-01-29 NOTE — ASSESSMENT & PLAN NOTE
- Resolved on keppra  - Etiology unclear, with no history of trauma, seizures, EtOH abuse, recent infections, or new meds in the setting of completely negative medical history  - No signs or symptoms of infection, UA clear, BCx NGTD  - MRI clear  - EEG preliminary interpretation is no epileptiform activity with normal background  - Discharged on keppra BID with plans for epilepsy follow-up

## 2019-01-30 LAB
BACTERIA UR CULT: NORMAL
BACTERIA UR CULT: NORMAL

## 2019-01-31 ENCOUNTER — OFFICE VISIT (OUTPATIENT)
Dept: INTERNAL MEDICINE | Facility: CLINIC | Age: 64
End: 2019-01-31
Payer: MEDICARE

## 2019-01-31 VITALS
DIASTOLIC BLOOD PRESSURE: 82 MMHG | WEIGHT: 172.63 LBS | SYSTOLIC BLOOD PRESSURE: 114 MMHG | HEIGHT: 67 IN | BODY MASS INDEX: 27.09 KG/M2 | OXYGEN SATURATION: 98 % | HEART RATE: 74 BPM

## 2019-01-31 DIAGNOSIS — G40.409 GENERALIZED TONIC-CLONIC SEIZURE: Primary | ICD-10-CM

## 2019-01-31 DIAGNOSIS — Z12.5 PROSTATE CANCER SCREENING: ICD-10-CM

## 2019-01-31 DIAGNOSIS — N30.01 ACUTE CYSTITIS WITH HEMATURIA: ICD-10-CM

## 2019-01-31 DIAGNOSIS — E11.9 TYPE 2 DIABETES MELLITUS WITHOUT COMPLICATION, WITHOUT LONG-TERM CURRENT USE OF INSULIN: ICD-10-CM

## 2019-01-31 DIAGNOSIS — Z11.59 NEED FOR HEPATITIS C SCREENING TEST: ICD-10-CM

## 2019-01-31 LAB
BACTERIA BLD CULT: NORMAL
BACTERIA BLD CULT: NORMAL

## 2019-01-31 PROCEDURE — 99214 OFFICE O/P EST MOD 30 MIN: CPT | Mod: S$GLB,,, | Performed by: INTERNAL MEDICINE

## 2019-01-31 PROCEDURE — 99499 UNLISTED E&M SERVICE: CPT | Mod: S$PBB,,, | Performed by: INTERNAL MEDICINE

## 2019-01-31 PROCEDURE — 3008F BODY MASS INDEX DOCD: CPT | Mod: CPTII,S$GLB,, | Performed by: INTERNAL MEDICINE

## 2019-01-31 PROCEDURE — 1101F PR PT FALLS ASSESS DOC 0-1 FALLS W/OUT INJ PAST YR: ICD-10-PCS | Mod: CPTII,S$GLB,, | Performed by: INTERNAL MEDICINE

## 2019-01-31 PROCEDURE — 99999 PR PBB SHADOW E&M-EST. PATIENT-LVL IV: CPT | Mod: PBBFAC,,, | Performed by: INTERNAL MEDICINE

## 2019-01-31 PROCEDURE — 99499 RISK ADDL DX/OHS AUDIT: ICD-10-PCS | Mod: S$PBB,,, | Performed by: INTERNAL MEDICINE

## 2019-01-31 PROCEDURE — 3008F PR BODY MASS INDEX (BMI) DOCUMENTED: ICD-10-PCS | Mod: CPTII,S$GLB,, | Performed by: INTERNAL MEDICINE

## 2019-01-31 PROCEDURE — 3044F HG A1C LEVEL LT 7.0%: CPT | Mod: CPTII,S$GLB,, | Performed by: INTERNAL MEDICINE

## 2019-01-31 PROCEDURE — 3044F PR MOST RECENT HEMOGLOBIN A1C LEVEL <7.0%: ICD-10-PCS | Mod: CPTII,S$GLB,, | Performed by: INTERNAL MEDICINE

## 2019-01-31 PROCEDURE — 1101F PT FALLS ASSESS-DOCD LE1/YR: CPT | Mod: CPTII,S$GLB,, | Performed by: INTERNAL MEDICINE

## 2019-01-31 PROCEDURE — 99214 PR OFFICE/OUTPT VISIT, EST, LEVL IV, 30-39 MIN: ICD-10-PCS | Mod: S$GLB,,, | Performed by: INTERNAL MEDICINE

## 2019-01-31 PROCEDURE — 99999 PR PBB SHADOW E&M-EST. PATIENT-LVL IV: ICD-10-PCS | Mod: PBBFAC,,, | Performed by: INTERNAL MEDICINE

## 2019-01-31 RX ORDER — CLOTRIMAZOLE 1 %
CREAM (GRAM) TOPICAL
Refills: 0 | COMMUNITY
Start: 2018-11-06 | End: 2019-01-31

## 2019-01-31 RX ORDER — NITROFURANTOIN (MACROCRYSTALS) 100 MG/1
100 CAPSULE ORAL EVERY 12 HOURS
Qty: 10 CAPSULE | Refills: 0 | Status: SHIPPED | OUTPATIENT
Start: 2019-01-31 | End: 2019-02-05

## 2019-01-31 NOTE — PROGRESS NOTES
Internal Medicine    Subjective:      Patient ID: Cristiano Cruz is a 63 y.o. male.    Chief Complaint: Hospital Follow Up    HPI:  Patient presents for Hospital follow up.  He does not have a PCP.    Hospitalized from 1/26/19 to 1/28/19 for new onset seizures.  Started on Keppra 500mg BID.  No seizures since discharge.  Has Neurology appointment in April.    UTI:  >100,000 EColi on urine culture.  Has not been treated.  Reports he was having some dysuria and blood in urine during hospitalization.  Denies back pain, fever, chills, or night sweats.      HTN:  Not discharged on medication.  Well controlled today.    DM2:  New diagnosis.  HgA1c 6.5 during hospitalization.  Would like to work on diet before considering medication.      Review of Systems   Constitutional: Negative for appetite change, chills, fatigue, fever and unexpected weight change.   HENT: Negative for sore throat and trouble swallowing.    Eyes: Negative for visual disturbance.   Respiratory: Negative for cough, chest tightness, shortness of breath and wheezing.    Cardiovascular: Negative for chest pain, palpitations and leg swelling.   Gastrointestinal: Negative for abdominal pain, blood in stool, constipation, diarrhea, nausea and vomiting.   Genitourinary: Negative for difficulty urinating, dysuria, flank pain and frequency.   Musculoskeletal: Negative for arthralgias and myalgias.   Skin: Negative for rash and wound.   Neurological: Negative for dizziness, weakness, numbness and headaches.   Psychiatric/Behavioral: Negative for behavioral problems and confusion.       Past Medical History:   Diagnosis Date    Type 2 diabetes mellitus with hyperglycemia, without long-term current use of insulin      Past Surgical History:   Procedure Laterality Date    HERNIA REPAIR       History reviewed. No pertinent family history.  Social History     Tobacco Use    Smoking status: Former Smoker     Packs/day: 3.00     Years: 25.00     Pack years: 75.00      "Last attempt to quit: 2012     Years since quittin.0    Smokeless tobacco: Never Used   Substance Use Topics    Alcohol use: No    Drug use: No       Medications and allergies reviewed.     Objective:     Vitals:    19 0858   BP: 114/82   BP Location: Left arm   Patient Position: Sitting   BP Method: Large (Manual)   Pulse: 74   SpO2: 98%   Weight: 78.3 kg (172 lb 9.9 oz)   Height: 5' 7" (1.702 m)     Physical Exam   Constitutional: He is oriented to person, place, and time. He appears well-developed and well-nourished. No distress.   HENT:   Head: Normocephalic and atraumatic.   Eyes: Conjunctivae and EOM are normal. No scleral icterus.   Neck: No thyromegaly present.   Cardiovascular: Normal rate and regular rhythm. Exam reveals no gallop and no friction rub.   No murmur heard.  Pulmonary/Chest: Effort normal and breath sounds normal. No respiratory distress. He has no wheezes. He has no rales.   Abdominal: Soft. He exhibits no distension and no mass. There is no tenderness. There is no rebound and no guarding.   Musculoskeletal: He exhibits no edema or tenderness.   Lymphadenopathy:     He has no cervical adenopathy.   Neurological: He is alert and oriented to person, place, and time.   Skin: No rash noted. No erythema.   Psychiatric: He has a normal mood and affect. His behavior is normal. Judgment and thought content normal.       Assessment:     1. Generalized tonic-clonic seizure    2. Type 2 diabetes mellitus without complication, without long-term current use of insulin    3. Acute cystitis with hematuria    4. Prostate cancer screening    5. Need for hepatitis C screening test        Plan:     *Continue medication/plan as discussed in HPI except for changes discussed below.    Cristiano was seen today for hospital follow up.    Diagnoses and all orders for this visit:    Generalized tonic-clonic seizure    Type 2 diabetes mellitus without complication, without long-term current use of " insulin  -     Lipid panel; Future; Expected date: 01/31/2019  -     Ambulatory consult to Diabetic Education    Acute cystitis with hematuria  -     nitrofurantoin (MACRODANTIN) 100 MG capsule; Take 1 capsule (100 mg total) by mouth every 12 (twelve) hours. for 5 days    Prostate cancer screening  -     PSA, Screening; Future; Expected date: 01/31/2019    Need for hepatitis C screening test  -     Hepatitis C antibody; Future; Expected date: 01/31/2019    Health maintenance reviewed with patient.     Follow-up in about 4 weeks (around 2/28/2019) with new PCP.    Nita Connolly MD  Internal Medicine  [facility]

## 2019-02-01 PROBLEM — E11.9 TYPE 2 DIABETES MELLITUS WITHOUT COMPLICATION, WITHOUT LONG-TERM CURRENT USE OF INSULIN: Status: ACTIVE | Noted: 2019-01-26

## 2019-02-04 ENCOUNTER — TELEPHONE (OUTPATIENT)
Dept: INTERNAL MEDICINE | Facility: CLINIC | Age: 64
End: 2019-02-04

## 2019-02-04 DIAGNOSIS — R76.8 POSITIVE HEPATITIS C ANTIBODY TEST: Primary | ICD-10-CM

## 2019-02-04 NOTE — TELEPHONE ENCOUNTER
Spoke with patient about positive Hep C antibody.  Additional labs ordered.  Patient would like to have them drawn after appointment with Dr. Son on Friday.

## 2019-02-06 NOTE — PROCEDURES
EEG REPORT      Cristiano Cruz  [id number]  1955    DATE OF SERVICE: 1/26/2019    METHODOLOGY      Extended electroencephalographic recording is made while the patient is ambulatory and continuing normal daily activities.  Electrodes are placed according to the International 10-20 placement system and included T1 and T2 electrode placement.  Twenty four (24) channels of digital signal (sampling rate of 512/sec) was simultaneously recorded from the scalp including EKG and eye monitors.  Recording band pass was 0.1 to 100 hz and all data was stored digitally on the recorder.  The patient is instructed to press an event button when clinical symptoms occur and write the symptoms into a diary. Activation procedures which include photic stimulation, hyperventilation and instructing patients to perform simple task are done in selected patients.        The EEG is displayed on a monitor screen and can be reformatted into different montages for evaluation.  The entire recoding is submitted for computer assisted analysis to detect spike and electrographic seizure activity.  The entire recording is visually reviewed and the times identified by computer analysis as being spikes or seizures are reviewed again.  Compresses spectral analysis (CSA) is also performed on the activity recorded from each individual channel.  This is displayed as a power display of frequencies from 0 to 30 Hz over time.   The CSA analysis is done and displayed continuously.  This is reviewed for asymmetries in power between homologous areas of the scalp and for presence of changes in power which canbe seen when seizures occur.  Sections of suspected abnormalities on the CSA is then compared with the original EEG recording.  .     [employer] software was also utilized in the review of this study.  This software suite analyzes the EEG recording in multiple domains.  Coherence and rhythmicity is computed to identify EEG sections which may contain organized  seizures.  Each channel undergoes analysis to detect presence of spike and sharp waves which have special and morphological characteristic of epileptic activity.  The routine EEG recording is converted from spacial into frequency domain.  This is then displayed comparing homologous areas to identify areas of significant asymmetry.  Algorithm to identify non-cortically generated artifact is used to separate eye movement, EMG and other artifact from the EEG     Recording Times    A total of 00:29:12 hours of EEG was recorded.      EEG FINDINGS:    Background activity:   Much of the study was spent in stage II sleep.  In the waking state there was alpha and anterior dominant beta activity with a 8-9 Hz posterior dominant alpha rhythm at 30-70 microvolts.     Symmetry and continuity: the background was continuous and symmetric     Sleep:   Normal sleep transients including sleep spindles, K complexes, vertex waves     Activation procedures:   none    Abnormal activity:   No epileptiform discharges, periodic discharges, lateralized rhythmic delta activity or electrographic seizures were seen.      IMPRESSION:   This is a normal EEG of the awake and asleep states, there is borderline slowing of the posterior dominant rhythm which evelyne be related to drowsiness and is not clearly pathologic.    Nikita Bonilla MD.  Neurology-Epilepsy.  [facility].

## 2019-02-08 ENCOUNTER — OFFICE VISIT (OUTPATIENT)
Dept: INTERNAL MEDICINE | Facility: CLINIC | Age: 64
End: 2019-02-08
Payer: MEDICARE

## 2019-02-08 ENCOUNTER — LAB VISIT (OUTPATIENT)
Dept: LAB | Facility: HOSPITAL | Age: 64
End: 2019-02-08
Attending: INTERNAL MEDICINE
Payer: MEDICARE

## 2019-02-08 VITALS
OXYGEN SATURATION: 97 % | WEIGHT: 175.06 LBS | HEIGHT: 67 IN | BODY MASS INDEX: 27.48 KG/M2 | SYSTOLIC BLOOD PRESSURE: 128 MMHG | HEART RATE: 72 BPM | DIASTOLIC BLOOD PRESSURE: 84 MMHG

## 2019-02-08 DIAGNOSIS — K40.90 RIGHT INGUINAL HERNIA: ICD-10-CM

## 2019-02-08 DIAGNOSIS — Z13.83 SCREENING FOR RESPIRATORY CONDITION: ICD-10-CM

## 2019-02-08 DIAGNOSIS — R76.8 HEPATITIS C ANTIBODY TEST POSITIVE: ICD-10-CM

## 2019-02-08 DIAGNOSIS — R76.8 POSITIVE HEPATITIS C ANTIBODY TEST: ICD-10-CM

## 2019-02-08 DIAGNOSIS — Z12.9 SCREENING FOR CANCER: ICD-10-CM

## 2019-02-08 DIAGNOSIS — G40.409 GENERALIZED TONIC-CLONIC SEIZURE: ICD-10-CM

## 2019-02-08 DIAGNOSIS — Z12.11 COLON CANCER SCREENING: ICD-10-CM

## 2019-02-08 DIAGNOSIS — E11.9 TYPE 2 DIABETES MELLITUS WITHOUT COMPLICATION, WITHOUT LONG-TERM CURRENT USE OF INSULIN: ICD-10-CM

## 2019-02-08 DIAGNOSIS — Z87.891 PERSONAL HISTORY OF NICOTINE DEPENDENCE: ICD-10-CM

## 2019-02-08 DIAGNOSIS — Z76.89 ESTABLISHING CARE WITH NEW DOCTOR, ENCOUNTER FOR: Primary | ICD-10-CM

## 2019-02-08 LAB
ALBUMIN/CREAT UR: 58.2 UG/MG
BILIRUB UR QL STRIP: NEGATIVE
CLARITY UR REFRACT.AUTO: CLEAR
COLOR UR AUTO: YELLOW
CREAT UR-MCNC: 158 MG/DL
GLUCOSE UR QL STRIP: NEGATIVE
HGB UR QL STRIP: NEGATIVE
KETONES UR QL STRIP: NEGATIVE
LEUKOCYTE ESTERASE UR QL STRIP: NEGATIVE
MICROALBUMIN UR DL<=1MG/L-MCNC: 92 UG/ML
NITRITE UR QL STRIP: NEGATIVE
PH UR STRIP: 5 [PH] (ref 5–8)
POST FEV1 FVC: NORMAL
POST FEV1: NORMAL
POST FVC: NORMAL
PRE FEV1 FVC: 81.41
PRE FEV1: 2.1
PRE FVC: 2.58
PREDICTED FEV1: 79
PREDICTED FVC: 74
PROT UR QL STRIP: NEGATIVE
SP GR UR STRIP: 1.02 (ref 1–1.03)
URN SPEC COLLECT METH UR: NORMAL

## 2019-02-08 PROCEDURE — 3008F BODY MASS INDEX DOCD: CPT | Mod: CPTII,S$GLB,, | Performed by: INTERNAL MEDICINE

## 2019-02-08 PROCEDURE — 87902 NFCT AGT GNTYP ALYS HEP C: CPT

## 2019-02-08 PROCEDURE — 82043 UR ALBUMIN QUANTITATIVE: CPT

## 2019-02-08 PROCEDURE — 99215 OFFICE O/P EST HI 40 MIN: CPT | Mod: S$GLB,,, | Performed by: INTERNAL MEDICINE

## 2019-02-08 PROCEDURE — 3044F HG A1C LEVEL LT 7.0%: CPT | Mod: CPTII,S$GLB,, | Performed by: INTERNAL MEDICINE

## 2019-02-08 PROCEDURE — 36415 COLL VENOUS BLD VENIPUNCTURE: CPT

## 2019-02-08 PROCEDURE — 1101F PR PT FALLS ASSESS DOC 0-1 FALLS W/OUT INJ PAST YR: ICD-10-PCS | Mod: CPTII,S$GLB,, | Performed by: INTERNAL MEDICINE

## 2019-02-08 PROCEDURE — 1101F PT FALLS ASSESS-DOCD LE1/YR: CPT | Mod: CPTII,S$GLB,, | Performed by: INTERNAL MEDICINE

## 2019-02-08 PROCEDURE — 87522 HEPATITIS C REVRS TRNSCRPJ: CPT

## 2019-02-08 PROCEDURE — 3008F PR BODY MASS INDEX (BMI) DOCUMENTED: ICD-10-PCS | Mod: CPTII,S$GLB,, | Performed by: INTERNAL MEDICINE

## 2019-02-08 PROCEDURE — 99999 PR PBB SHADOW E&M-EST. PATIENT-LVL V: CPT | Mod: PBBFAC,,, | Performed by: INTERNAL MEDICINE

## 2019-02-08 PROCEDURE — 99215 PR OFFICE/OUTPT VISIT, EST, LEVL V, 40-54 MIN: ICD-10-PCS | Mod: S$GLB,,, | Performed by: INTERNAL MEDICINE

## 2019-02-08 PROCEDURE — 99999 PR PBB SHADOW E&M-EST. PATIENT-LVL V: ICD-10-PCS | Mod: PBBFAC,,, | Performed by: INTERNAL MEDICINE

## 2019-02-08 PROCEDURE — 81003 URINALYSIS AUTO W/O SCOPE: CPT

## 2019-02-08 PROCEDURE — 3044F PR MOST RECENT HEMOGLOBIN A1C LEVEL <7.0%: ICD-10-PCS | Mod: CPTII,S$GLB,, | Performed by: INTERNAL MEDICINE

## 2019-02-08 RX ORDER — DICLOFENAC SODIUM 50 MG/1
50 TABLET, DELAYED RELEASE ORAL 2 TIMES DAILY PRN
Qty: 30 TABLET | Refills: 0 | Status: SHIPPED | OUTPATIENT
Start: 2019-02-08 | End: 2019-02-23

## 2019-02-08 RX ORDER — LEVETIRACETAM 500 MG/1
500 TABLET ORAL 2 TIMES DAILY
Qty: 60 TABLET | Refills: 11 | Status: SHIPPED | OUTPATIENT
Start: 2019-02-08 | End: 2019-12-20 | Stop reason: SDUPTHER

## 2019-02-08 NOTE — PROGRESS NOTES
INTERNAL MEDICINE CLINIC    Initial Visit to Establish Care    PRESENTING HISTORY     Previous PCP: Srikanth Son MD  Chief Complaint/Reason for Visit:     Chief Complaint   Patient presents with    Establish Care       History of Present Illness & ROS : Mr. Cristiano Cruz is a 63 y.o. male.      Hospitalized 1-2019:  Hospital Course:   Mr. Cruz was admitted for new-onset seizures. His mental status normalized, and he had no recurrent seizures following keppra loading in the ED and twice-daily maintenance upon arrival to the floor. Workup into the etiology of his seizures remained negative. EEG was performed, with the preliminary interpretation being no epileptiform activity with normal background. Of note, he was hyperglycemic on arrival, and was diagnosed with DMII on the basis of elevated hemoglobin A1c at 6.5. It is likely this could be controlled by diet, so he was discharged with testing supplies and a strong recommendation to establish care with a PCP. His wife is calling her PCP at  today to facilitate this. Similarly, his BP was elevated, so he will need repeat checks to establish the diagnosis of essential HTN if persistently elevated.  Lastly he will need follow-up with neurology in 6-8 weeks.     Was diagnosed with DM in the hospital.    No doctor visit for a long time.  No more seizure post discharge.  Not driving.    Review of Systems   Constitutional: Negative for chills and fever.   HENT: Negative for congestion.    Eyes: Negative for blurred vision.   Respiratory: Negative for cough and shortness of breath.    Cardiovascular: Negative for chest pain and leg swelling.   Gastrointestinal: Negative for abdominal pain, blood in stool, nausea and vomiting.   Genitourinary: Negative for dysuria and frequency.   Musculoskeletal: Positive for myalgias (arm pain bilateral).   Skin: Negative for rash.   Neurological: Negative for dizziness, weakness and headaches.   Psychiatric/Behavioral: Negative for  depression.       PAST HISTORY:     Past Medical History:   Diagnosis Date    E. coli UTI 2019    During hospitalization for seizure    Generalized tonic-clonic seizure 2019 admitted for new-onset seizures.Normal CT head.  His mental status normalized, and he had no recurrent seizures following keppra loading in the ED and twice-daily maintenance upon arrival to the floor. Workup into the etiology of his seizures remained negative. EEG was performed, with the preliminary interpretation being no epileptiform activity with normal background.     Type 2 diabetes mellitus with hyperglycemia, without long-term current use of insulin        Past Surgical History:   Procedure Laterality Date    HERNIA REPAIR Right     [facility]       Family History   Problem Relation Age of Onset    Hypertension Mother        Social History     Socioeconomic History    Marital status:      Spouse name: None    Number of children: 1    Years of education: None    Highest education level: None   Social Needs    Financial resource strain: None    Food insecurity - worry: None    Food insecurity - inability: None    Transportation needs - medical: None    Transportation needs - non-medical: None   Occupational History    None   Tobacco Use    Smoking status: Former Smoker     Packs/day: 3.00     Years: 25.00     Pack years: 75.00     Last attempt to quit: 2012     Years since quittin.0    Smokeless tobacco: Never Used   Substance and Sexual Activity    Alcohol use: No     Comment: Used to drink    Drug use: No    Sexual activity: Yes     Partners: Female   Other Topics Concern    None   Social History Narrative     with 1 daughter (42 as of ).    Work in [employer]       MEDICATIONS & ALLERGIES:     Current Outpatient Medications on File Prior to Visit   Medication Sig Dispense Refill    blood sugar diagnostic Strp Use 2 (two) times daily. 100 each 11     lancets Misc Use twice daily as directed 100 each 11     levETIRAcetam (KEPPRA) 500 MG Tab Take 1 tablet (500 mg total) by mouth 2 (two) times daily. 60 tablet 2     No current facility-administered medications on file prior to visit.         Review of patient's allergies indicates:  No Known Allergies    Medications Reconciliation:   I have reconciled the patient's home medications with the patient/family. I have updated all changes.  Refer to After-Visit Medication List.    OBJECTIVE:     Vital Signs:  Vitals:    02/08/19 0916   BP: 128/84   Pulse: 72     Wt Readings from Last 1 Encounters:   02/08/19 0916 79.4 kg (175 lb 0.7 oz)     Body mass index is 27.42 kg/m².     Physical Exam:  General: Well developed, well nourished. No distress.  HEENT: Head is normocephalic, atraumatic; ears are normal.    Eyes: Clear conjunctiva.  Neck: Supple, symmetrical neck; trachea midline.  Lungs: Clear to auscultation bilaterally and normal respiratory effort.  Cardiovascular: Heart with regular rate and rhythm.    Extremities: No LE edema.    Abdomen: Abdomen is soft, non-tender non-distended with normal bowel sounds.  Musculoskeletal: Normal gait.   Genital:  Normal penis. Foreskin retractable. No rash.  Scrotum and epididymis normal. Large right inguinal hernia - not reducible.  No inguinal nodes. No rash found.  Rectal: Deferred.  Lymph Nodes: No cervical, supraclavicular or axillary adenopathy.  Psychiatric: Normal affect. Alert.    Laboratory  Lab Results   Component Value Date    WBC 8.46 01/27/2019    HGB 13.7 (L) 01/27/2019    HCT 41.9 01/27/2019     01/27/2019    CHOL 185 01/31/2019    TRIG 125 01/31/2019    HDL 46 01/31/2019    ALT 23 01/26/2019    AST 20 01/26/2019     01/28/2019    K 4.6 01/28/2019     01/28/2019    CREATININE 1.0 01/28/2019    BUN 11 01/28/2019    CO2 28 01/28/2019    TSH 2.887 01/26/2019    PSA 0.16 01/31/2019    HGBA1C 6.5 (H) 01/26/2019       ASSESSMENT & PLAN:     Patient is  new to me. Medical, surgical, social, medication and allergy histories were obtained during this visit.  I spent > 60 minutes reviewing his chart, medical problems and treatment. More than half of the time was in direct patient care and counseling.    Establishing care with new doctor, encounter for   - Reviewed and updated past and current medical problems.  Discussed treatment of current medical problems    Generalized tonic-clonic seizure  - No recurrence. Continue Keppra and follow up with Neurology.  -     levETIRAcetam (KEPPRA) 500 MG Tab; Take 1 tablet (500 mg total) by mouth 2 (two) times daily.  Dispense: 60 tablet; Refill: 11    Hepatitis C antibody test positive  -     Ambulatory Referral to Hepatology    Type 2 diabetes mellitus without complication, without long-term current use of insulin  - New Onset.  -     SITagliptin (JANUVIA) 25 MG Tab; Take 1 tablet (25 mg total) by mouth once daily.  Dispense: 90 tablet; Refill: 3  -     Ambulatory consult to Optometry  -     URINALYSIS  -     Microalbumin/creatinine urine ratio    Colon cancer screening  -     Case request GI: COLONOSCOPY    Screening for cancer  Personal history of nicotine dependence   -     CT Chest Lung Screening Low Dose; Future; Expected date: 02/08/2019    Right inguinal hernia  -     Ambulatory consult to General Surgery    Other orders  -     diclofenac (VOLTAREN) 50 MG EC tablet; Take 1 tablet (50 mg total) by mouth 2 (two) times daily as needed (Pain). Take with food.  Dispense: 30 tablet; Refill: 0    Preventive Health Maintenance:  Flu, Tdap, Prevnar 13, eye, colonoscopy, CT chest (smoking)    Return to Clinic for Follow Up with me:   3 months to see Elizabeth.    Scheduled Follow-up :  Future Appointments   Date Time Provider Department Center   4/2/2019  1:30 PM Malina Lino MD [facility]   5/22/2019 11:00 AM CLARICE Garnett [facility]       After Visit Medication List :     Medication List            Accurate as of 2/8/19 10:15 AM. If you have any questions, ask your nurse or doctor.               START taking these medications    diclofenac 50 MG EC tablet  Commonly known as:  VOLTAREN  Take 1 tablet (50 mg total) by mouth 2 (two) times daily as needed (Pain). Take with food.  Started by:  Srikanth Son MD     SITagliptin 25 MG Tab  Commonly known as:  JANUVIA  Take 1 tablet (25 mg total) by mouth once daily.  Started by:  Srikanth Son MD        CONTINUE taking these medications    blood sugar diagnostic Strp  Use 2 (two) times daily.     lancets Misc  Use twice daily as directed     levETIRAcetam 500 MG Tab  Commonly known as:  KEPPRA  Take 1 tablet (500 mg total) by mouth 2 (two) times daily.           Where to Get Your Medications      These medications [facility] [address] [address] [address] [facility] [place] [address]    Phone:  [phone]   · diclofenac 50 MG EC tablet  · levETIRAcetam 500 MG Tab  · SITagliptin 25 MG Tab         Signing Physician:  Srikanth Son MD

## 2019-02-08 NOTE — PATIENT INSTRUCTIONS
.  Discharge Instructions for Epilepsy  You have been diagnosed with epilepsy, a disorder of recurring seizures. When you have a seizure, an electrical disturbance happens in your brain. There are different kinds of seizures, and each patient may have one or many types of seizures. Here are some guidelines for you and your family.  If you have a seizure  Ask friends and family members to learn seizure management. Also, tell them to do the following if you have a seizure:  · Clear the area to prevent injury.  · Position you on a flat, carpeted surface, if possible.  · Dont try to restrain you.  · Dont put anything in your mouth.  · Turn you onto your side if you start to vomit.  · Keep track of the date and time the seizure started, how long it lasted, whether or not you lost consciousness, a description of your body movements, what provoked the seizure (if known), and any injuries you suffered. Using a watch may help keep correct time of events.    · Stay with you until you regain consciousness.  · Call 911 if the seizure is longer than 5 minutes, if there are multiple seizures, or if you do not begin to wake up after the seizure stops.    Activities  Following are some things to consider:  · Enjoy your normal activities. Most people with epilepsy lead normal lives.  · Avoid hazardous activities, such as mountain climbing or scuba diving. A seizure under these conditions could lead to a fatal accident.  · Do not swim alone or participate in other similar activities without others nearby.  · Ask your healthcare provider about any restrictions on driving or other activities.  · Check with your state department of public safety to learn whether there are any driving limitations based on your condition.  Other home care  Other considerations:  · Take your medicine exactly as directed. Skipping doses can affect the way your body handles the medicine, which could cause you to have a seizure.  · Dont drink alcohol or  use any medicine without talking with your healthcare provider first.  · Seizure medicines may interact with other medicines. Make sure all of your healthcare providers have a list of all your medicines.   · Birth control pills may not work as effectively when taking seizure medicines. Ask your healthcare provider if a change in birth control is needed.   · Wear a medical alert pendant or bracelet that alerts others to your condition, especially if you are allergic to seizure medicine.  · Join a local support group. Ask your healthcare provider for names and phone numbers.  .  When to seek medical attention  Tell your family members or friends to call 911 right away if you have:  · Seizure that lasts more than 5 minutes  · Multiple seizures in a row  · No recovery of consciousness after the seizure stops  Otherwise, have them call your healthcare provider right away if you have:  · Seizures that are getting longer and worse  · Seizures that are different from those youve had in the past  · Seizures strong enough to cause injury  · Skin rash  · Fever of 100.4°F (38°C) or higher, or as directed by your healthcare provider   Date Last Reviewed: 11/5/2015  © 8348-9476 [employer]. [address] [place] [address], [place] [address]. All rights reserved. This information is not intended as a substitute for professional medical care. Always follow your healthcare professional's instructions.

## 2019-02-11 ENCOUNTER — HOSPITAL ENCOUNTER (OUTPATIENT)
Dept: RADIOLOGY | Facility: HOSPITAL | Age: 64
Discharge: HOME | End: 2019-02-11
Attending: INTERNAL MEDICINE
Payer: MEDICARE

## 2019-02-11 ENCOUNTER — TELEPHONE (OUTPATIENT)
Dept: INTERNAL MEDICINE | Facility: CLINIC | Age: 64
End: 2019-02-11

## 2019-02-11 ENCOUNTER — OFFICE VISIT (OUTPATIENT)
Dept: SURGERY | Facility: CLINIC | Age: 64
End: 2019-02-11
Payer: MEDICARE

## 2019-02-11 ENCOUNTER — TELEPHONE (OUTPATIENT)
Dept: PREADMISSION TESTING | Facility: HOSPITAL | Age: 64
End: 2019-02-11

## 2019-02-11 VITALS
SYSTOLIC BLOOD PRESSURE: 136 MMHG | HEIGHT: 67 IN | BODY MASS INDEX: 27.7 KG/M2 | WEIGHT: 176.5 LBS | HEART RATE: 78 BPM | DIASTOLIC BLOOD PRESSURE: 75 MMHG | TEMPERATURE: 98 F

## 2019-02-11 DIAGNOSIS — K40.90 INGUINAL HERNIA OF RIGHT SIDE WITHOUT OBSTRUCTION OR GANGRENE: Primary | ICD-10-CM

## 2019-02-11 DIAGNOSIS — Z87.891 PERSONAL HISTORY OF NICOTINE DEPENDENCE: ICD-10-CM

## 2019-02-11 DIAGNOSIS — Z12.9 SCREENING FOR CANCER: ICD-10-CM

## 2019-02-11 PROBLEM — E11.29 TYPE 2 DIABETES MELLITUS WITH MICROALBUMINURIA, WITHOUT LONG-TERM CURRENT USE OF INSULIN: Status: ACTIVE | Noted: 2019-02-11

## 2019-02-11 PROBLEM — E11.9 TYPE 2 DIABETES MELLITUS WITHOUT COMPLICATION, WITHOUT LONG-TERM CURRENT USE OF INSULIN: Status: RESOLVED | Noted: 2019-01-26 | Resolved: 2019-02-11

## 2019-02-11 PROBLEM — R80.9 TYPE 2 DIABETES MELLITUS WITH MICROALBUMINURIA, WITHOUT LONG-TERM CURRENT USE OF INSULIN: Status: ACTIVE | Noted: 2019-02-11

## 2019-02-11 PROCEDURE — 99205 PR OFFICE/OUTPT VISIT, NEW, LEVL V, 60-74 MIN: ICD-10-PCS | Mod: S$GLB,,, | Performed by: SURGERY

## 2019-02-11 PROCEDURE — 99999 PR PBB SHADOW E&M-EST. PATIENT-LVL IV: ICD-10-PCS | Mod: PBBFAC,,, | Performed by: SURGERY

## 2019-02-11 PROCEDURE — G0297 CT CHEST LUNG SCREENING LOW DOSE: ICD-10-PCS | Mod: 26,,, | Performed by: RADIOLOGY

## 2019-02-11 PROCEDURE — 3008F PR BODY MASS INDEX (BMI) DOCUMENTED: ICD-10-PCS | Mod: CPTII,S$GLB,, | Performed by: SURGERY

## 2019-02-11 PROCEDURE — 3008F BODY MASS INDEX DOCD: CPT | Mod: CPTII,S$GLB,, | Performed by: SURGERY

## 2019-02-11 PROCEDURE — G0297 LDCT FOR LUNG CA SCREEN: HCPCS | Mod: TC

## 2019-02-11 PROCEDURE — 99205 OFFICE O/P NEW HI 60 MIN: CPT | Mod: S$GLB,,, | Performed by: SURGERY

## 2019-02-11 PROCEDURE — 1101F PR PT FALLS ASSESS DOC 0-1 FALLS W/OUT INJ PAST YR: ICD-10-PCS | Mod: CPTII,S$GLB,, | Performed by: SURGERY

## 2019-02-11 PROCEDURE — G0297 LDCT FOR LUNG CA SCREEN: HCPCS | Mod: 26,,, | Performed by: RADIOLOGY

## 2019-02-11 PROCEDURE — 1101F PT FALLS ASSESS-DOCD LE1/YR: CPT | Mod: CPTII,S$GLB,, | Performed by: SURGERY

## 2019-02-11 PROCEDURE — 99999 PR PBB SHADOW E&M-EST. PATIENT-LVL IV: CPT | Mod: PBBFAC,,, | Performed by: SURGERY

## 2019-02-11 NOTE — PATIENT INSTRUCTIONS
What Is a Hernia?    A hernia is when an organ or tissue pushes through a weak area in the belly (abdominal) wall. This weak area may be present at birth. Or it may be caused by abdominal strain over time. If not treated, a hernia can get worse with time and physical stress.  When a bulge forms  When there is a weak area in the abdominal wall, an organ or tissue can push outward. This often causes a bulge that you can see under your skin. The bulge may get bigger when you stand up. It may go away when you lie down. You may also feel some pressure or mild pain when lifting, coughing, urinating, or doing other activities.  Types of hernias  The type of hernia you have depends on its location. Most hernias form in the groin at or near the internal ring. This is the entrance to a canal between the abdomen and groin. Hernias can also occur in the abdomen, thigh, or genitals.  · An incisional hernia occurs at the site of a previous surgical incision.  · An umbilical hernia occurs at the navel.  · An indirect inguinal hernia occurs in the groin at the internal ring.  · A direct inguinal hernia occurs in the groin near the internal ring.  · A femoral hernia occurs just below the groin.  · An epigastric hernia occurs in the upper abdomen at the midline.  Diagnosis  In most cases, your healthcare provider can diagnose a hernia by doing a physical exam.  In some cases it might not be clear why you have a swelling in the belly wall. Then your provider may order an imaging test such as an ultrasound. This can help with the diagnosis.  Surgery  A hernia will not heal on its own. Surgery is needed to fix the weak spot in the abdominal wall. If not treated, a hernia can get larger. It can also cause serious health problems. The good news is that hernia surgery can be done quickly and safely. In some cases, you can go home the same day as your surgery.   When to call your provider  Call your healthcare provider right away if the  swelling around your hernia becomes larger, firmer, or more painful. These may be signs that your intestines are stuck in the abdominal wall. This is an emergency. The hernia must be repaired right away to avoid serious problems.  Date Last Reviewed: 7/1/2016  © 3829-8442 [employer]. [address] [place] [address]. All rights reserved. This information is not intended as a substitute for professional medical care. Always follow your healthcare professional's instructions.        How a Hernia Develops  Although a hernia bulge may appear suddenly, hernias often take years to develop. They grow larger as pressure inside the body presses the intestines or other tissues out through a weak area in the abdominal wall, often at the belly button or a site of previous surgery. With time, these tissues can bulge out beneath the skin.  Stages of hernia development    The wall weakens or tears. The abdominal lining bulges out through a weak area and begins to form a hernia sac. The sac may contain fat, intestine, or other tissues. At this point, the hernia may or may not cause a visible bulge.        The intestine pushes into the sac. As the intestine pushes further into the sac, it forms a visible bulge. The bulge may flatten when you lie down or push against it. This is called a reducible hernia and does not cause any immediate danger.             The intestine may become trapped. The sac containing the intestine may become trapped by muscle (incarcerated). If this happens, you wont be able to flatten the bulge. You may also have pain. Prompt treatment is needed.        The intestine may become strangulated. If the intestine is tightly trapped, it becomes strangulated. The strangulated area loses blood supply and may die. This can cause severe pain and block the intestine. Emergency surgery is needed.   Date Last Reviewed: 8/1/2016  © 2293-2095 [employer]. [address] [place] [address],  [place] [address]. All rights reserved. This information is not intended as a substitute for professional medical care. Always follow your healthcare professional's instructions.        Having Hernia Surgery: Patch Repair  Surgery treats a hernia by repairing the weakness in the abdominal wall. An incision is made so the surgeon has a direct view of the hernia. The repair is then done through this incision (open surgery). To repair the defect, special mesh materials are used to patch the weak area and make a tension-free repair. Follow your healthcare providers advice on how to get ready for the procedure. You can usually go home the same day as your surgery. In some cases, though, you may need to stay in the hospital overnight.  Getting ready for surgery  Your healthcare provider will talk with you about preparing for surgery. Follow all the instructions youre given and be sure to:   · Tell your healthcare provider about any medicines, supplements, or herbs you take. This includes both prescription and over-the-counter items.  · Stop taking aspirin, ibuprofen, naproxen and other NSAIDs as directed.  · Arrange for an adult family member or friend to give you a ride home after surgery.  · Stop smoking. Smoking affects blood flow and can slow healing.  · Gently wash the surgical area the night before surgery.  · Follow any directions you are given for not eating or drinking before surgery.     Repair in Front           Repair in Back           Combination Repair      The day of surgery  Arrive at the hospital or surgical center at your scheduled time. Youll be asked to change into a patient gown. Youll then be given an IV to provide fluids and medicine. Shortly before surgery, an anesthesiologist or nurse anesthetist will talk with you. He or she will explain the types of anesthesia used to prevent pain during surgery. You will have one or more of the following:  · Monitored sedation to make you relaxed and  sleepy.  · Local anesthesia to numb the surgical site.  · Regional anesthesia to numb specific areas of your body.  · General anesthesia to let you sleep during surgery.  During the surgery  Most hernias are treated using tension-free repairs. This is surgery that uses special mesh materials to repair the weak area. Unlike traditional repairs, the abdominal fascia (tissue around the muscle that gives strength to the abdominal wall) isnt sewn together. Instead, the mesh covers the weak area like a patch. This repairs the defect without tension on the muscles. It also makes it less likely to happen again. The mesh is made of strong, flexible plastic that stays in the body. Over time, nearby tissues grow into the mesh to strengthen the repair.  After surgery  When the procedure is over, youll be taken to the recovery area to rest. Your blood pressure and heart rate will be monitored. Youll also have a bandage over the surgical site. To help reduce discomfort, youll be given pain medicines. You may also be given breathing exercises to keep your lungs clear. Later, youll be asked to get up and walk. This helps prevent blood clots in the legs. You can go home when your healthcare provider says youre ready.     Risks and complications  Hernia surgery is safe, but does have risks including:  · Bleeding  · Infection  · Anesthesia risks  · Mesh complications  · Inability to urinate   · Bowel or bladder injury   · Numbness or pain in the groin or leg  · Risk the hernia will happen again  · Damage to the testicles or testicular function      Date Last Reviewed: 10/1/2016  © 2750-8793 [employer], [employer]. [address] [place] [address], [place] [address]. All rights reserved. This information is not intended as a substitute for professional medical care. Always follow your healthcare professional's instructions.

## 2019-02-11 NOTE — TELEPHONE ENCOUNTER
Preop Evaluation:    Surgery: Hernia repair     PMHx:  Generalized Seizure                Hep C positive                Mild DM 2 with microalbuminuria    No history of HTN or CAD    No family history of CAD.    Vital Signs during visit 19  /84, Pulse 72.    EC19 (post seizure) NSR, nonspecific ST-T changes             Likely due to recent seizure.    CXR : No cardiomegaly.  Normal pulmonary vasculature.  Lungs are clear.  No pneumothorax.  No pleural effusion.  Degenerative changes of the spine.    Normal CMP/CBC    Assessment:  Low risk for cardiopulmonary complications during surgery    Recommendation:  1. Proceed to surgery as scheduled.  2. Continue Keppra 500 mg BID perioperatively.  3. Hold Januvia perioperatively, use sliding scale insulin.      Srikanth Son MD

## 2019-02-11 NOTE — TELEPHONE ENCOUNTER
----- Message from Jessica Reed RN sent at 2/11/2019 11:03 AM CST -----  Regarding: pre op clearance for surgery  THI Pineda,  Can you please contact this patient and get him set up to see Dr Alves for surgical clearance as soon as possible.  His surgery is set up for Friday, February 22, 2019.  Please let me know when he is scheduled.  Thanks,  Emelia

## 2019-02-11 NOTE — LETTER
February 11, 2019      Srikanth Son MD  [address]  [place] [address]           [facility] - General Surgery  [address]  [facility] [address]  Phone: [phone]          Patient: Cristiano Cruz   MR Number: [id number]   YOB: 1955   Date of Visit: 2/11/2019       Dear Dr. Srikanth Son:    Thank you for referring Cristiano Cruz to me for evaluation. Attached you will find relevant portions of my assessment and plan of care.    If you have questions, please do not hesitate to call me. I look forward to following Cristiano Cruz along with you.    Sincerely,    Santino Montemayor MD    Enclosure  CC:  No Recipients    If you would like to receive this communication electronically, please contact [email or web address] or [phone] to request more information on [employer] Link access.    For providers and/or their staff who would like to refer a patient to [facility], please contact us through our one-stop-shop provider referral line, Lissett Golden, at [phone].    If you feel you have received this communication in error or would no longer like to receive these types of communications, please e-mail [email or web address]

## 2019-02-11 NOTE — H&P (VIEW-ONLY)
[facility] - General Surgery  General Surgery  History & Physical    Patient Name: Cristiano Cruz  MRN: [id number]  Primary Care Provider: Srikanth Son MD    Patient information was obtained from patient, past medical records and ER records.     Subjective:     Chief Complaint/Reason for Admission: right inguinal hernia    History of Present Illness:  Patient is a 63 y.o. male presents with a right inguinal bulge that he has noticed for the last year or so. He has a history of an inguinal hernia repair nearly 10 years ago with possible mesh at an OSH. The right inguinal bulge is reducible . He was recently hospitalized after suffering a seizure of unknown etiology. He has not has a seizure since then. He has a history of smoking; he used to smoke 3-4 ppd x30 years and quit 5-6 years ago. He also used to drink but not heavily and has since quit. His only surgery was the inguinal hernia repair that he believe was on the right.    Current Outpatient Medications on File Prior to Visit   Medication Sig    blood sugar diagnostic Strp Use 2 (two) times daily.    diclofenac (VOLTAREN) 50 MG EC tablet Take 1 tablet (50 mg total) by mouth 2 (two) times daily as needed (Pain). Take with food.    lancets Misc Use twice daily as directed    levETIRAcetam (KEPPRA) 500 MG Tab Take 1 tablet (500 mg total) by mouth 2 (two) times daily.    SITagliptin (JANUVIA) 25 MG Tab Take 1 tablet (25 mg total) by mouth once daily.     No current facility-administered medications on file prior to visit.        Review of patient's allergies indicates:  No Known Allergies    Past Medical History:   Diagnosis Date    E. coli UTI 01/2019    During hospitalization for seizure    Generalized tonic-clonic seizure 1/26/2019 1-2019 admitted for new-onset seizures.Normal CT head.  His mental status normalized, and he had no recurrent seizures following keppra loading in the ED and twice-daily maintenance upon arrival to the floor. Workup into the  etiology of his seizures remained negative. EEG was performed, with the preliminary interpretation being no epileptiform activity with normal background.     Type 2 diabetes mellitus with microalbuminuria, without long-term current use of insulin 2019     Past Surgical History:   Procedure Laterality Date    [facility]ERNIA REPAIR Right     [facility]     Family History     Problem Relation (Age of Onset)    Hypertension Mother        Tobacco Use    Smoking status: Former Smoker     Packs/day: 3.00     Years: 25.00     Pack years: 75.00     Last attempt to quit: 2012     Years since quittin.0    Smokeless tobacco: Never Used   Substance and Sexual Activity    Alcohol use: No     Comment: Used to drink    Drug use: No    Sexual activity: Yes     Partners: Female     Review of Systems   Constitutional: Negative for activity change and appetite change.   HENT: Negative.    Eyes: Negative.    Respiratory: Negative.    Cardiovascular: Negative for chest pain and palpitations.   Gastrointestinal: Negative.    Endocrine: Negative.    Musculoskeletal: Negative.    Skin: Negative.    Neurological: Positive for seizures. Negative for dizziness, syncope, facial asymmetry, weakness and headaches.   Hematological: Negative.    Psychiatric/Behavioral: Negative.      Objective:     Vital Signs (Most Recent):  Temp: 98.2 °F (36.8 °C) (19 1020)  Pulse: 78 (19 1020)  BP: 136/75 (19 1020) Vital Signs (24h Range):  [unfilled]     Weight: 80 kg (176 lb 7.7 oz)  Body mass index is 27.64 kg/m².    Physical Exam   Constitutional: He is oriented to person, place, and time. He appears well-developed and well-nourished. No distress.   HENT:   Head: Normocephalic and atraumatic.   Neck: Normal range of motion. Neck supple.   Cardiovascular: Normal rate and regular rhythm.   Pulmonary/Chest: Effort normal and breath sounds normal. No stridor. No respiratory distress.   Abdominal: Soft. Bowel sounds are  normal. He exhibits no distension. There is no tenderness. A hernia is present. Hernia confirmed positive in the right inguinal area.   Genitourinary:         Genitourinary Comments: Well-healed inguinal incision   Musculoskeletal: Normal range of motion. He exhibits no edema, tenderness or deformity.   Neurological: He is alert and oriented to person, place, and time.   Skin: Skin is warm and dry. Capillary refill takes less than 2 seconds.   Psychiatric: He has a normal mood and affect. His behavior is normal.       Significant Labs:  None    Significant Diagnostics:  None    Assessment/Plan:   64 yo M with reducible R inguinal hernia    -Plan for hernia repair with mesh on 2/22/19; consent obtained today  -Will need clearance due to recent onset seizures  -Will follow up with clearance via chart check      Delio Fried MD  General Surgery  [facility]

## 2019-02-11 NOTE — PROGRESS NOTES
[facility]  General Surgery  History & Physical    Patient Name: Cristiano Cruz  MRN: [id number]  Primary Care Provider: Srikanth Son MD    Patient information was obtained from patient, past medical records and ER records.     Subjective:     Chief Complaint/Reason for Admission: right inguinal hernia    History of Present Illness:  Patient is a 63 y.o. male presents with a right inguinal bulge that he has noticed for the last year or so. He has a history of an inguinal hernia repair nearly 10 years ago with possible mesh at an OSH. The right inguinal bulge is reducible . He was recently hospitalized after suffering a seizure of unknown etiology. He has not has a seizure since then. He has a history of smoking; he used to smoke 3-4 ppd x30 years and quit 5-6 years ago. He also used to drink but not heavily and has since quit. His only surgery was the inguinal hernia repair that he believe was on the right.    Current Outpatient Medications on File Prior to Visit   Medication Sig    blood sugar diagnostic Strp Use 2 (two) times daily.    diclofenac (VOLTAREN) 50 MG EC tablet Take 1 tablet (50 mg total) by mouth 2 (two) times daily as needed (Pain). Take with food.    lancets Misc Use twice daily as directed    levETIRAcetam (KEPPRA) 500 MG Tab Take 1 tablet (500 mg total) by mouth 2 (two) times daily.    SITagliptin (JANUVIA) 25 MG Tab Take 1 tablet (25 mg total) by mouth once daily.     No current facility-administered medications on file prior to visit.        Review of patient's allergies indicates:  No Known Allergies    Past Medical History:   Diagnosis Date    E. coli UTI 01/2019    During hospitalization for seizure    Generalized tonic-clonic seizure 1/26/2019 1-2019 admitted for new-onset seizures.Normal CT head.  His mental status normalized, and he had no recurrent seizures following keppra loading in the ED and twice-daily maintenance upon arrival to the floor. Workup into the  etiology of his seizures remained negative. EEG was performed, with the preliminary interpretation being no epileptiform activity with normal background.     Type 2 diabetes mellitus with microalbuminuria, without long-term current use of insulin 2019     Past Surgical History:   Procedure Laterality Date    HERNIA REPAIR Right     [facility]     Family History     Problem Relation (Age of Onset)    Hypertension Mother        Tobacco Use    Smoking status: Former Smoker     Packs/day: 3.00     Years: 25.00     Pack years: 75.00     Last attempt to quit: 2012     Years since quittin.0    Smokeless tobacco: Never Used   Substance and Sexual Activity    Alcohol use: No     Comment: Used to drink    Drug use: No    Sexual activity: Yes     Partners: Female     Review of Systems   Constitutional: Negative for activity change and appetite change.   HENT: Negative.    Eyes: Negative.    Respiratory: Negative.    Cardiovascular: Negative for chest pain and palpitations.   Gastrointestinal: Negative.    Endocrine: Negative.    Musculoskeletal: Negative.    Skin: Negative.    Neurological: Positive for seizures. Negative for dizziness, syncope, facial asymmetry, weakness and headaches.   Hematological: Negative.    Psychiatric/Behavioral: Negative.      Objective:     Vital Signs (Most Recent):  Temp: 98.2 °F (36.8 °C) (19 1020)  Pulse: 78 (19 1020)  BP: 136/75 (19 1020) Vital Signs (24h Range):  [unfilled]     Weight: 80 kg (176 lb 7.7 oz)  Body mass index is 27.64 kg/m².    Physical Exam   Constitutional: He is oriented to person, place, and time. He appears well-developed and well-nourished. No distress.   HENT:   Head: Normocephalic and atraumatic.   Neck: Normal range of motion. Neck supple.   Cardiovascular: Normal rate and regular rhythm.   Pulmonary/Chest: Effort normal and breath sounds normal. No stridor. No respiratory distress.   Abdominal: Soft. Bowel sounds are  normal. He exhibits no distension. There is no tenderness. A hernia is present. Hernia confirmed positive in the right inguinal area.   Genitourinary:         Genitourinary Comments: Well-healed inguinal incision   Musculoskeletal: Normal range of motion. He exhibits no edema, tenderness or deformity.   Neurological: He is alert and oriented to person, place, and time.   Skin: Skin is warm and dry. Capillary refill takes less than 2 seconds.   Psychiatric: He has a normal mood and affect. His behavior is normal.       Significant Labs:  None    Significant Diagnostics:  None    Assessment/Plan:   62 yo M with reducible R inguinal hernia    -Plan for hernia repair with mesh on 2/22/19; consent obtained today  -Will need clearance due to recent onset seizures  -Will follow up with clearance via chart check      Delio Fried MD  General Surgery  [facility]

## 2019-02-13 ENCOUNTER — TELEPHONE (OUTPATIENT)
Dept: INTERNAL MEDICINE | Facility: CLINIC | Age: 64
End: 2019-02-13

## 2019-02-13 LAB
HCV GENTYP SERPL NAA+PROBE: ABNORMAL
HCV RNA SERPL NAA+PROBE-LOG IU: 6.72 LOG (10) IU/ML
HCV RNA SERPL QL NAA+PROBE: DETECTED
HCV RNA SPEC NAA+PROBE-ACNC: ABNORMAL IU/ML

## 2019-02-13 NOTE — TELEPHONE ENCOUNTER
Spoke with patient regarding positive bloodwork for hepatitis C virus.  Dr. Son placed Hepatology referral at patient's 2/8/19 appointment but patient has not yet heard from anyone.  Please call patient and help him schedule this referral.

## 2019-02-13 NOTE — TELEPHONE ENCOUNTER
Unable to schedule appt---will send message to [facility] Hep C referral pool in basket to have them contact patient to schedule appt

## 2019-02-14 ENCOUNTER — TELEPHONE (OUTPATIENT)
Dept: PREADMISSION TESTING | Facility: HOSPITAL | Age: 64
End: 2019-02-14

## 2019-02-14 NOTE — TELEPHONE ENCOUNTER
----- Message from Jessica Reed RN sent at 2/13/2019  4:46 PM CST -----  Regarding: FW: pre op clearance for surgery  Just checking on a pre op appointment with [facility] for this patient.  His surgery is set up for 2/22/2019.  ThanksEmelia  ----- Message -----  From: Jessica Reed RN  Sent: 2/11/2019  11:03 AM  To: Jessica Reed RN, Libra Biswas  Subject: pre op clearance for surgery                     THI Pineda,  Can you please contact this patient and get him set up to see Dr Alves for surgical clearance as soon as possible.  His surgery is set up for Friday, February 22, 2019.  Please let me know when he is scheduled.  ThanksEmelia

## 2019-02-18 ENCOUNTER — TELEPHONE (OUTPATIENT)
Dept: PREADMISSION TESTING | Facility: HOSPITAL | Age: 64
End: 2019-02-18

## 2019-02-18 ENCOUNTER — TELEPHONE (OUTPATIENT)
Dept: SURGERY | Facility: CLINIC | Age: 64
End: 2019-02-18

## 2019-02-18 NOTE — TELEPHONE ENCOUNTER
Okay to cancel the appointment with Dr Alves per Dr Montemayor.  Patient will be notified by Betty Young MA.

## 2019-02-18 NOTE — TELEPHONE ENCOUNTER
----- Message from Dotty Young MA sent at 2/18/2019  9:46 AM CST -----  Ok thanks Emelia.  ----- Message -----  From: Jessica Reed RN  Sent: 2/18/2019   9:42 AM  To: Dotty Young MA    I am checking with Dr Montemayor right now and will let you know as soon as he lets me know.  Emelia Mcdaniel  ----- Message -----  From: Dotty Young MA  Sent: 2/18/2019   9:26 AM  To: Jessica Reed RN        Good Morning please see message below, Im following up to see if patient still needs to se  he was cleared for sx by  on 02/11/19?    ----- Message -----  From: Alisha Alves MD  Sent: 2/18/2019   9:14 AM  To: Dotty Young MA        ----- Message -----  From: Alisha Alves MD  Sent: 2/18/2019   6:32 AM  To: Danya Hannah, MARIE, Jessica Reed, RN, #    Good morning Emelia eD    Thank you for involving me in his care   I was reviewing his chart this AM and noted that he had a pre op evaluation on 2/11 through Internal Medicine    Santino, please take a look at the note and let me know your thoughts . I will be glad to help, if needed     Sridhar

## 2019-02-19 ENCOUNTER — DOCUMENTATION ONLY (OUTPATIENT)
Dept: TRANSPLANT | Facility: CLINIC | Age: 64
End: 2019-02-19

## 2019-02-19 DIAGNOSIS — K40.90 RIGHT INGUINAL HERNIA: ICD-10-CM

## 2019-02-19 RX ORDER — SODIUM CHLORIDE 9 MG/ML
INJECTION, SOLUTION INTRAVENOUS CONTINUOUS
Status: CANCELLED | OUTPATIENT
Start: 2019-02-19

## 2019-02-19 NOTE — LETTER
February 19, 2019    Cristiano Cruz  [address] [address]      Dear Cristiano Cruz:    Your doctor has referred you to the [facility]. We are sending this letter to remind you to make an appointment with us to complete the referral process.     Please call us at [phone] to schedule an appointment. We look forward to seeing you soon.     If you received a call and have been scheduled, please disregard this letter.       Sincerely,        [facility]   [address] [place] [address] [phone]

## 2019-02-19 NOTE — NURSING
Pt records reviewed.   Pt will be referred to Hepatitis C clinic    Initial referral received  from the workque.   Referring Provider/diagnosis  FELIX OCONNELL Provider:   Diagnosis: Hepatitis C antibody test positive       Referral letter sent to  patient.

## 2019-02-21 ENCOUNTER — TELEPHONE (OUTPATIENT)
Dept: SURGERY | Facility: CLINIC | Age: 64
End: 2019-02-21

## 2019-02-21 NOTE — TELEPHONE ENCOUNTER
Pre op call completed as charted. He was aware of all pre op instructions and repeated all back to me.  No further questions at this time.

## 2019-02-22 ENCOUNTER — ANESTHESIA EVENT (OUTPATIENT)
Dept: SURGERY | Facility: HOSPITAL | Age: 64
End: 2019-02-22
Payer: MEDICARE

## 2019-02-22 ENCOUNTER — ANESTHESIA (OUTPATIENT)
Dept: SURGERY | Facility: HOSPITAL | Age: 64
End: 2019-02-22
Payer: MEDICARE

## 2019-02-22 ENCOUNTER — HOSPITAL ENCOUNTER (OUTPATIENT)
Facility: HOSPITAL | Age: 64
Discharge: HOME | End: 2019-02-22
Attending: SURGERY | Admitting: SURGERY
Payer: MEDICARE

## 2019-02-22 VITALS
HEART RATE: 65 BPM | DIASTOLIC BLOOD PRESSURE: 74 MMHG | BODY MASS INDEX: 27.31 KG/M2 | HEIGHT: 67 IN | TEMPERATURE: 98 F | SYSTOLIC BLOOD PRESSURE: 117 MMHG | RESPIRATION RATE: 17 BRPM | WEIGHT: 174 LBS | OXYGEN SATURATION: 95 %

## 2019-02-22 DIAGNOSIS — K40.90 RIGHT INGUINAL HERNIA: Primary | ICD-10-CM

## 2019-02-22 LAB
POCT GLUCOSE: 106 MG/DL (ref 70–110)
POCT GLUCOSE: 97 MG/DL (ref 70–110)

## 2019-02-22 PROCEDURE — C1781 MESH (IMPLANTABLE): HCPCS | Performed by: SURGERY

## 2019-02-22 PROCEDURE — 25000003 PHARM REV CODE 250

## 2019-02-22 PROCEDURE — 71000033 HC RECOVERY, INTIAL HOUR: Performed by: SURGERY

## 2019-02-22 PROCEDURE — D9220A PRA ANESTHESIA: ICD-10-PCS | Mod: CRNA,,, | Performed by: NURSE ANESTHETIST, CERTIFIED REGISTERED

## 2019-02-22 PROCEDURE — 36000706: Performed by: SURGERY

## 2019-02-22 PROCEDURE — 82962 GLUCOSE BLOOD TEST: CPT | Performed by: SURGERY

## 2019-02-22 PROCEDURE — 94761 N-INVAS EAR/PLS OXIMETRY MLT: CPT

## 2019-02-22 PROCEDURE — 63600175 PHARM REV CODE 636 W HCPCS: Performed by: PHYSICIAN ASSISTANT

## 2019-02-22 PROCEDURE — 49505 PR REPAIR ING HERNIA,5+Y/O,REDUCIBL: ICD-10-PCS | Mod: RT,,, | Performed by: SURGERY

## 2019-02-22 PROCEDURE — D9220A PRA ANESTHESIA: ICD-10-PCS | Mod: ANES,,, | Performed by: ANESTHESIOLOGY

## 2019-02-22 PROCEDURE — D9220A PRA ANESTHESIA: Mod: CRNA,,, | Performed by: NURSE ANESTHETIST, CERTIFIED REGISTERED

## 2019-02-22 PROCEDURE — 82962 GLUCOSE BLOOD TEST: CPT | Mod: 91 | Performed by: SURGERY

## 2019-02-22 PROCEDURE — 36000707: Performed by: SURGERY

## 2019-02-22 PROCEDURE — 27000221 HC OXYGEN, UP TO 24 HOURS

## 2019-02-22 PROCEDURE — 63600175 PHARM REV CODE 636 W HCPCS: Performed by: NURSE ANESTHETIST, CERTIFIED REGISTERED

## 2019-02-22 PROCEDURE — 71000015 HC POSTOP RECOV 1ST HR: Performed by: SURGERY

## 2019-02-22 PROCEDURE — 25000003 PHARM REV CODE 250: Performed by: NURSE ANESTHETIST, CERTIFIED REGISTERED

## 2019-02-22 PROCEDURE — 25000003 PHARM REV CODE 250: Performed by: SURGERY

## 2019-02-22 PROCEDURE — S0020 INJECTION, BUPIVICAINE HYDRO: HCPCS | Performed by: SURGERY

## 2019-02-22 PROCEDURE — 25000003 PHARM REV CODE 250: Performed by: PHYSICIAN ASSISTANT

## 2019-02-22 PROCEDURE — D9220A PRA ANESTHESIA: Mod: ANES,,, | Performed by: ANESTHESIOLOGY

## 2019-02-22 PROCEDURE — 71000039 HC RECOVERY, EACH ADD'L HOUR: Performed by: SURGERY

## 2019-02-22 PROCEDURE — 37000008 HC ANESTHESIA 1ST 15 MINUTES: Performed by: SURGERY

## 2019-02-22 PROCEDURE — 37000009 HC ANESTHESIA EA ADD 15 MINS: Performed by: SURGERY

## 2019-02-22 PROCEDURE — 49505 PRP I/HERN INIT REDUC >5 YR: CPT | Mod: RT,,, | Performed by: SURGERY

## 2019-02-22 DEVICE — MESH HERNIA KEYHOLE LG 2X12: Type: IMPLANTABLE DEVICE | Site: GROIN | Status: FUNCTIONAL

## 2019-02-22 RX ORDER — DIPHENHYDRAMINE HYDROCHLORIDE 50 MG/ML
25 INJECTION INTRAMUSCULAR; INTRAVENOUS EVERY 6 HOURS PRN
Status: DISCONTINUED | OUTPATIENT
Start: 2019-02-22 | End: 2019-02-22 | Stop reason: HOSPADM

## 2019-02-22 RX ORDER — GLYCOPYRROLATE 0.2 MG/ML
INJECTION INTRAMUSCULAR; INTRAVENOUS
Status: DISCONTINUED | OUTPATIENT
Start: 2019-02-22 | End: 2019-02-22

## 2019-02-22 RX ORDER — BUPIVACAINE HYDROCHLORIDE 5 MG/ML
INJECTION, SOLUTION EPIDURAL; INTRACAUDAL
Status: DISCONTINUED | OUTPATIENT
Start: 2019-02-22 | End: 2019-02-22 | Stop reason: HOSPADM

## 2019-02-22 RX ORDER — OXYCODONE HYDROCHLORIDE 5 MG/1
5 TABLET ORAL ONCE AS NEEDED
Status: COMPLETED | OUTPATIENT
Start: 2019-02-22 | End: 2019-02-22

## 2019-02-22 RX ORDER — PHENYLEPHRINE HYDROCHLORIDE 10 MG/ML
INJECTION INTRAVENOUS
Status: DISCONTINUED | OUTPATIENT
Start: 2019-02-22 | End: 2019-02-22

## 2019-02-22 RX ORDER — NEOSTIGMINE METHYLSULFATE 1 MG/ML
INJECTION, SOLUTION INTRAVENOUS
Status: DISCONTINUED | OUTPATIENT
Start: 2019-02-22 | End: 2019-02-22

## 2019-02-22 RX ORDER — LIDOCAINE HCL/PF 100 MG/5ML
SYRINGE (ML) INTRAVENOUS
Status: DISCONTINUED | OUTPATIENT
Start: 2019-02-22 | End: 2019-02-22

## 2019-02-22 RX ORDER — ROCURONIUM BROMIDE 10 MG/ML
INJECTION, SOLUTION INTRAVENOUS
Status: DISCONTINUED | OUTPATIENT
Start: 2019-02-22 | End: 2019-02-22

## 2019-02-22 RX ORDER — ONDANSETRON 2 MG/ML
INJECTION INTRAMUSCULAR; INTRAVENOUS
Status: DISCONTINUED | OUTPATIENT
Start: 2019-02-22 | End: 2019-02-22

## 2019-02-22 RX ORDER — HYDROMORPHONE HYDROCHLORIDE 1 MG/ML
0.2 INJECTION, SOLUTION INTRAMUSCULAR; INTRAVENOUS; SUBCUTANEOUS EVERY 5 MIN PRN
Status: DISCONTINUED | OUTPATIENT
Start: 2019-02-22 | End: 2019-02-22 | Stop reason: HOSPADM

## 2019-02-22 RX ORDER — OXYCODONE HYDROCHLORIDE 5 MG/1
5 TABLET ORAL EVERY 6 HOURS PRN
Qty: 20 TABLET | Refills: 0 | Status: SHIPPED | OUTPATIENT
Start: 2019-02-22 | End: 2019-04-11 | Stop reason: ALTCHOICE

## 2019-02-22 RX ORDER — LORAZEPAM 2 MG/ML
0.25 INJECTION INTRAMUSCULAR ONCE AS NEEDED
Status: DISCONTINUED | OUTPATIENT
Start: 2019-02-22 | End: 2019-02-22 | Stop reason: HOSPADM

## 2019-02-22 RX ORDER — FENTANYL CITRATE 50 UG/ML
INJECTION, SOLUTION INTRAMUSCULAR; INTRAVENOUS
Status: DISCONTINUED | OUTPATIENT
Start: 2019-02-22 | End: 2019-02-22

## 2019-02-22 RX ORDER — SODIUM CHLORIDE 9 MG/ML
INJECTION, SOLUTION INTRAVENOUS CONTINUOUS
Status: DISCONTINUED | OUTPATIENT
Start: 2019-02-22 | End: 2019-02-22 | Stop reason: HOSPADM

## 2019-02-22 RX ORDER — CEFAZOLIN SODIUM 1 G/3ML
2 INJECTION, POWDER, FOR SOLUTION INTRAMUSCULAR; INTRAVENOUS
Status: COMPLETED | OUTPATIENT
Start: 2019-02-22 | End: 2019-02-22

## 2019-02-22 RX ORDER — KETOROLAC TROMETHAMINE 30 MG/ML
INJECTION, SOLUTION INTRAMUSCULAR; INTRAVENOUS
Status: DISCONTINUED | OUTPATIENT
Start: 2019-02-22 | End: 2019-02-22

## 2019-02-22 RX ORDER — ACETAMINOPHEN 10 MG/ML
INJECTION, SOLUTION INTRAVENOUS
Status: DISCONTINUED | OUTPATIENT
Start: 2019-02-22 | End: 2019-02-22

## 2019-02-22 RX ORDER — SODIUM CHLORIDE 0.9 % (FLUSH) 0.9 %
3 SYRINGE (ML) INJECTION
Status: DISCONTINUED | OUTPATIENT
Start: 2019-02-22 | End: 2019-02-22 | Stop reason: HOSPADM

## 2019-02-22 RX ORDER — LIDOCAINE HYDROCHLORIDE 10 MG/ML
INJECTION, SOLUTION EPIDURAL; INFILTRATION; INTRACAUDAL; PERINEURAL
Status: DISCONTINUED | OUTPATIENT
Start: 2019-02-22 | End: 2019-02-22 | Stop reason: HOSPADM

## 2019-02-22 RX ORDER — PROPOFOL 10 MG/ML
VIAL (ML) INTRAVENOUS
Status: DISCONTINUED | OUTPATIENT
Start: 2019-02-22 | End: 2019-02-22

## 2019-02-22 RX ORDER — MIDAZOLAM HYDROCHLORIDE 1 MG/ML
INJECTION, SOLUTION INTRAMUSCULAR; INTRAVENOUS
Status: DISCONTINUED | OUTPATIENT
Start: 2019-02-22 | End: 2019-02-22

## 2019-02-22 RX ORDER — OXYCODONE HYDROCHLORIDE 5 MG/1
TABLET ORAL
Status: COMPLETED
Start: 2019-02-22 | End: 2019-02-22

## 2019-02-22 RX ADMIN — ROCURONIUM BROMIDE 10 MG: 10 INJECTION, SOLUTION INTRAVENOUS at 08:02

## 2019-02-22 RX ADMIN — SODIUM CHLORIDE, SODIUM GLUCONATE, SODIUM ACETATE, POTASSIUM CHLORIDE, MAGNESIUM CHLORIDE, SODIUM PHOSPHATE, DIBASIC, AND POTASSIUM PHOSPHATE: .53; .5; .37; .037; .03; .012; .00082 INJECTION, SOLUTION INTRAVENOUS at 07:02

## 2019-02-22 RX ADMIN — OXYCODONE HYDROCHLORIDE 5 MG: 5 TABLET ORAL at 10:02

## 2019-02-22 RX ADMIN — KETOROLAC TROMETHAMINE 30 MG: 30 INJECTION, SOLUTION INTRAMUSCULAR at 07:02

## 2019-02-22 RX ADMIN — SODIUM CHLORIDE: 0.9 INJECTION, SOLUTION INTRAVENOUS at 06:02

## 2019-02-22 RX ADMIN — ONDANSETRON 4 MG: 2 INJECTION INTRAMUSCULAR; INTRAVENOUS at 09:02

## 2019-02-22 RX ADMIN — CEFAZOLIN 2 G: 330 INJECTION, POWDER, FOR SOLUTION INTRAMUSCULAR; INTRAVENOUS at 07:02

## 2019-02-22 RX ADMIN — NEOSTIGMINE METHYLSULFATE 4 MG: 1 INJECTION INTRAVENOUS at 09:02

## 2019-02-22 RX ADMIN — FENTANYL CITRATE 100 MCG: 50 INJECTION, SOLUTION INTRAMUSCULAR; INTRAVENOUS at 07:02

## 2019-02-22 RX ADMIN — PHENYLEPHRINE HYDROCHLORIDE 100 MCG: 10 INJECTION INTRAVENOUS at 08:02

## 2019-02-22 RX ADMIN — ACETAMINOPHEN 1000 MG: 10 INJECTION, SOLUTION INTRAVENOUS at 07:02

## 2019-02-22 RX ADMIN — MIDAZOLAM HYDROCHLORIDE 2 MG: 1 INJECTION, SOLUTION INTRAMUSCULAR; INTRAVENOUS at 06:02

## 2019-02-22 RX ADMIN — LIDOCAINE HYDROCHLORIDE 100 MG: 20 INJECTION INTRAVENOUS at 07:02

## 2019-02-22 RX ADMIN — PROPOFOL 200 MG: 10 INJECTION, EMULSION INTRAVENOUS at 07:02

## 2019-02-22 RX ADMIN — GLYCOPYRROLATE 0.4 MG: 0.2 INJECTION, SOLUTION INTRAMUSCULAR; INTRAVENOUS at 09:02

## 2019-02-22 RX ADMIN — ROCURONIUM BROMIDE 40 MG: 10 INJECTION, SOLUTION INTRAVENOUS at 07:02

## 2019-02-22 NOTE — DISCHARGE INSTRUCTIONS
Hernia Repair Surgery  A hernia (or rupture) is a weakness or defect in the wall of the abdomen. A hernia will not heal on its own. Surgery is needed to repair the defect in the abdominal wall. If not treated, a hernia can get larger. It can also lead to serious health complications. Fortunately hernia surgery can be done quickly and safely. Below is an overview of hernia repair surgery.  Preparing for surgery  Your healthcare provider will talk with you about getting ready for surgery. Follow all the instructions youre given and be sure to:  · Tell your healthcare provider about any medicines, supplements, or herbs you take. This includes both prescription and over-the-counter items. Ask if you should stop taking any of them.  · Stop taking aspirin, ibuprofen, naproxen, and other NSAIDs 7 to 14 days before surgery.  · Arrange for an adult family member or friend to give you a ride home after surgery.  · Stop smoking. Smoking affects blood flow and can slow healing.  · Gently wash the surgical area the night before surgery.  · Follow any directions you are given for not eating or drinking before surgery.  The day of surgery  Arrive at the hospital or surgical center at your scheduled time. Youll be asked to change into a patient gown. Youll then be given an IV to provide fluids and medicine. Shortly before surgery, an anesthesiologist or nurse anesthetist will talk with you. He or she will explain the types of anesthesia used to prevent pain during surgery. You will have one or more of the following:  · Monitored sedation to make you relaxed and sleepy.  · Local anesthesia to numb the surgical site.  · Regional anesthesia to numb specific areas of your body.  · General anesthesia to let you sleep during surgery.  Fixing the weakness  Surgery treats a hernia by repairing the weakness in the abdominal wall. Most hernias are treated using tension-free repairs. This is surgery that uses special mesh materials  to repair the weak area. The mesh covers the weak area like a patch. The mesh is made of strong, flexible plastic that stays in the body. Over time, nearby tissues grow into the mesh to strengthen the repair.  After surgery  When the procedure is over, youll be taken to the recovery area to rest. Your blood pressure and heart rate will be monitored. Youll also have a bandage over the surgical site. To help reduce discomfort, youll be given pain medicines. You may also be given breathing exercises to keep your lungs clear. Later youll be asked to get up and walk. This helps prevent blood clots in the legs. You can go home when your healthcare provider says youre ready.     Risks and possible complications of hernia surgery  · Bleeding  · Infection  · Numbness or pain in the groin or leg  · Risk the hernia will recur  · Damage to the testicles or testicular function · Anesthesia risks  · Mesh complications  · Inability to urinate  · Bowel or bladder injury       Date Last Reviewed: 10/1/2016  © 5763-0886 [employer]. [address] [place] [address]. All rights reserved. This information is not intended as a substitute for professional medical care. Always follow your healthcare professional's instructions.      PATIENT INSTRUCTIONS  POST-ANESTHESIA    IMMEDIATELY FOLLOWING SURGERY:  Do not drive or operate machinery for the first twenty four hours after surgery.  Do not make any important decisions for twenty four hours after surgery or while taking narcotic pain medications or sedatives.  If you develop intractable nausea and vomiting or a severe headache please notify your doctor immediately.    FOLLOW-UP:  Please make an appointment with your surgeon as instructed. You do not need to follow up with anesthesia unless specifically instructed to do so.    WOUND CARE INSTRUCTIONS (if applicable):  Keep a dry clean dressing on the anesthesia/puncture wound site if there is drainage.  Once  the wound has quit draining you may leave it open to air.  Generally you should leave the bandage intact for twenty four hours unless there is drainage.  If the epidural site drains for more than 36-48 hours please call the anesthesia department.    QUESTIONS?:  Please feel free to call your physician or the hospital  if you have any questions, and they will be happy to assist you.       [facility] Anesthesia Department  [address] [facility]  [phone]

## 2019-02-22 NOTE — TRANSFER OF CARE
"Anesthesia Transfer of Care Note    Patient: Cristiano Cruz    Procedure(s) Performed: Procedure(s) (LRB):  REPAIR, HERNIA, INGUINAL, WITHOUT HISTORY OF PRIOR REPAIR, AGE 3 YEARS OR OLDER with mesh (Right)    Patient location: PACU    Anesthesia Type: general    Transport from OR: Transported from OR on 6-10 L/min O2 by face mask with adequate spontaneous ventilation    Post pain: adequate analgesia    Post assessment: no apparent anesthetic complications    Post vital signs: stable    Level of consciousness: sedated    Nausea/Vomiting: no nausea/vomiting    Complications: none    Transfer of care protocol was followed      Last vitals:   Visit Vitals  BP (!) 177/99 (BP Location: Left arm, Patient Position: Lying)   Pulse (P) 69   Temp (P) 36.5 °C (97.7 °F) (Temporal)   Resp (P) 14   Ht 5' 7" (1.702 m)   Wt 78.9 kg (174 lb)   SpO2 (P) 100%   BMI 27.25 kg/m²     "

## 2019-02-22 NOTE — BRIEF OP NOTE
[facility]  Brief Operative Note     SUMMARY     Surgery Date: 2/22/2019     Surgeon(s) and Role:     * Santino Montemayor MD - Primary     * Delio Fried MD - Resident - Assisting     * Sia Guerrero MD - Resident - Assisting    Pre-op Diagnosis:  Inguinal hernia of right side without obstruction or gangrene [K40.90]    Post-op Diagnosis:  Post-Op Diagnosis Codes:     * Inguinal hernia of right side without obstruction or gangrene [K40.90]    Procedure(s) (LRB):  REPAIR, HERNIA, INGUINAL, WITHOUT HISTORY OF PRIOR REPAIR, AGE 3 YEARS OR OLDER with mesh (Right)    Anesthesia: General    Description of the findings of the procedure: Direct inguinal hernia noted of the right side without obstruction or gangrene.     Findings/Key Components: Right direct inguinal hernia noted without obstruction or gangrene. Large abdominal wall defect noted contributing to direct inguinal hernia, about 3 x 3 cm in size. Defect repaired with mesh.     Estimated Blood Loss: 5 cc         Specimens:   Specimen (12h ago, onward)    None          Discharge Note    SUMMARY     Admit Date: 2/22/2019    Discharge Date and Time:  02/22/2019 9:47 AM    Hospital Course   2/22/2019    Procedure Details:  The patient was seen in the Holding Room. The risks, benefits, complications, treatment options, and expected outcomes were discussed with the patient. The patient concurred with the proposed plan, giving informed consent.  The site of surgery was properly noted/marked.   Patient was brought to the Operating room and positioned supine on the table. General anesthesia was administered by anesthesia team. Patient's right groin was prepped and draped in usual sterile fashion. A Time Out was held and the above information confirmed. Patient then underwent right inguinal hernia repair with mesh. Incision was closed in a layered fashion, and incision was closed with dermabond. He tolerated the procedure well without  complication. He was then brought to PACU for recovery.     Implants:   Implant Name Type Inv. Item Serial No.  Lot No. LRB No. Used   MESH HERNIA KEYHOLE LG [id number] - [id number]  MESH HERNIA KEYHOLE LG 2X12  C.JACKSON LEONE [id number] Right 1       Final Diagnosis: Post-Op Diagnosis Codes:     * Inguinal hernia of right side without obstruction or gangrene [K40.90]    Disposition: Home or Self Care    Follow Up/Patient Instructions:   -Please refer to discharge instructions provided below.    Medications:  Reconciled Home Medications:      Medication List      START taking these medications    oxyCODONE 5 MG immediate release tablet  Commonly known as:  ROXICODONE  Take 1 tablet (5 mg total) by mouth every 6 (six) hours as needed for Pain (Can utilize for moderate to severe pain, as needed.).        CONTINUE taking these medications    blood sugar diagnostic Strp  Use 2 (two) times daily.     diclofenac 50 MG EC tablet  Commonly known as:  VOLTAREN  Take 1 tablet (50 mg total) by mouth 2 (two) times daily as needed (Pain). Take with food.     lancets Misc  Use twice daily as directed     levETIRAcetam 500 MG Tab  Commonly known as:  KEPPRA  Take 1 tablet (500 mg total) by mouth 2 (two) times daily.     SITagliptin 25 MG Tab  Commonly known as:  JANUVIA  Take 1 tablet (25 mg total) by mouth once daily.          Discharge Procedure Orders   Diet Adult Regular     Lifting restrictions   Scheduling Instructions: Please do not lift anything greater than 10 lbs (about a gallon of milk) for the following 4-6 weeks after surgery. This is is to protect your new hernia repair. We will discuss lifting your restrictions at your subsequent clinic visits.     No driving until:   Scheduling Instructions: Please do not drive while utilizing narcotics for pain control.     Other restrictions (specify):   Scheduling Instructions: Please utilizing a stool softener medication (colace) while taking narcotic medications for pain  control. This medication can be obtained over the counter at a drug store without a prescription. It will help keep your bowels regular while you are on pain medications; these medications can often be constipating.     Notify your health care provider if you experience any of the following:  temperature >100.4     Notify your health care provider if you experience any of the following:  persistent nausea and vomiting or diarrhea     Notify your health care provider if you experience any of the following:  severe uncontrolled pain     Notify your health care provider if you experience any of the following:  redness, tenderness, or signs of infection (pain, swelling, redness, odor or green/yellow discharge around incision site)     Notify your health care provider if you experience any of the following:  difficulty breathing or increased cough     No dressing needed   Scheduling Instructions: Your incision is covered with a surgical glue that will come off on its own over time. There is no need to scrub/take off-this will come off on its own over time. This glue can get wet in the shower. You do not need to cover it up with a band-aid.     Activity as tolerated     Shower on day dressing removed (No bath)   Scheduling Instructions: Ok to shower 48 hours after discharge; allow warm, soapy water to wash over incision. Then rinse off and pat dry. Do not scrub. Do not submerge in standing water (hot tub, bath tub, swimming pool) for at least 4 weeks after surgery to allow your incision time to complete heal.     Follow-up Information     Santino Montemayor MD In 2 weeks.    Specialty:  General Surgery  Why:  For wound re-check and post-operative follow up (2 week post-op visit s/p stacey alndis)  Contact information:  [address]  [facility] [id number] [phone]                   Sia Guerrero MD  General Surgery, PGYI [facility]

## 2019-02-22 NOTE — ANESTHESIA PREPROCEDURE EVALUATION
02/22/2019  Cristiano Cruz is a 63 y.o., male.    Anesthesia Evaluation    I have reviewed the Patient Summary Reports.    I have reviewed the Nursing Notes.   I have reviewed the Medications.     Review of Systems  Anesthesia Hx:  No problems with previous Anesthesia  History of prior surgery of interest to airway management or planning: Previous anesthesia: General Denies Family Hx of Anesthesia complications.   Denies Personal Hx of Anesthesia complications.   Social:  Non-Smoker    Hematology/Oncology:  Hematology Normal   Oncology Normal     EENT/Dental:EENT/Dental Normal   Cardiovascular:  Cardiovascular Normal Exercise tolerance: good     Pulmonary:  Pulmonary Normal    Renal/:  Renal/ Normal     Hepatic/GI:   GERD    Musculoskeletal:  Musculoskeletal Normal    Neurological:   Seizures Well controlled on medication   Endocrine:   Diabetes, type 2    Dermatological:  Skin Normal    Psych:  Psychiatric Normal           Physical Exam  General:  Well nourished, Obesity    Airway/Jaw/Neck:  Airway Findings: Mouth Opening: Normal Tongue: Large  General Airway Assessment: Adult, Average  Mallampati: III  Improves to II with phonation.  TM Distance: 4 - 6 cm        Eyes/Ears/Nose:  EYES/EARS/NOSE FINDINGS: Normal   Dental:  Dental Findings: Edentulous   Chest/Lungs:  Chest/Lungs Findings: Clear to auscultation, Normal Respiratory Rate     Heart/Vascular:  Heart Findings: Rate: Normal  Rhythm: Regular Rhythm  Sounds: Normal  Heart murmur: negative Vascular Findings: Normal    Abdomen:  Abdomen Findings: Normal    Musculoskeletal:  Musculoskeletal Findings: Normal   Skin:  Skin Findings: Normal    Mental Status:  Mental Status Findings:  Cooperative, Alert and Oriented         Anesthesia Plan  Type of Anesthesia, risks & benefits discussed:  Anesthesia Type:  general  Patient's Preference:   Intra-op Monitoring  Plan: standard ASA monitors  Intra-op Monitoring Plan Comments:   Post Op Pain Control Plan:   Post Op Pain Control Plan Comments:   Induction:   IV  Beta Blocker:  Patient is not currently on a Beta-Blocker (No further documentation required).       Informed Consent: Patient understands risks and agrees with Anesthesia plan.  Questions answered. Anesthesia consent signed with patient.  ASA Score: 2     Day of Surgery Review of History & Physical:            Ready For Surgery From Anesthesia Perspective.

## 2019-02-25 NOTE — ANESTHESIA POSTPROCEDURE EVALUATION
"Anesthesia Post Evaluation    Patient: Cristiano Cruz    Procedure(s) Performed: Procedure(s) (LRB):  REPAIR, HERNIA, INGUINAL, WITHOUT HISTORY OF PRIOR REPAIR, AGE 5 YEARS OR OLDER with mesh (Right)    Final Anesthesia Type: general  Patient location during evaluation: PACU  Patient participation: Yes- Able to Participate  Level of consciousness: awake and alert and oriented  Post-procedure vital signs: reviewed and stable  Pain management: adequate  Airway patency: patent  PONV status at discharge: No PONV  Anesthetic complications: no      Cardiovascular status: hemodynamically stable  Respiratory status: unassisted, spontaneous ventilation and room air  Hydration status: euvolemic  Follow-up not needed.        Visit Vitals  /74   Pulse 65   Temp 36.7 °C (98 °F) (Temporal)   Resp 17   Ht 5' 7" (1.702 m)   Wt 78.9 kg (174 lb)   SpO2 95%   BMI 27.25 kg/m²       Pain/Soledad Score: No Data Recorded      "

## 2019-02-27 NOTE — OP NOTE
DATE OF PROCEDURE: 2/22/19     PREOPERATIVE DIAGNOSIS: Right inguinal hernia.     POSTOPERATIVE DIAGNOSIS:  R inguinal hernia.     PROCEDURE PERFORMED: Open inguinal hernia repair with mesh.     ATTENDING SURGEON: Santino Montemayor M.D.     HOUSESTAFF SURGEON: Dr. Cesar M.D. ([facility])     : Dr. Fariha M.D. ([facility])     ANESTHESIA: GETA with local    ESTIMATED BLOOD LOSS: 18 mL.     FINDINGS: Right inguinal hernia repaired with polypropylene mesh.    SPECIMEN: None    DRAINS: None.     COMPLICATIONS: None.     INDICATIONS: Cristiano Cruz is a 63 y.o.male referred to my General Surgery Clinic with a history of a symptomatic, reducible inguinal bulge. The history and exam were consistent with inguinal hernia. We recommended an open inguinal hernia repair with mesh and the patient agreed to proceed. The patient signed informed consent and expressed understanding of the risks and benefits of surgery.     DESCRIPTION OF OPERATIVE PROCEDURE: The patient was identified in preoperative holding and brought back to the Operating Room. The patient was placed supine on the operating table and padded appropriately. Monitors were applied and monitored anesthesia care was initiated. His R groin was shaved with electric clippers   and prepped and draped in the standard sterile surgical fashion. A timeout was   performed and all team members present agreed this was the correct procedure on   the correct side of the correct patient. We also confirmed administration of   appropriate preoperative antibiotics.     We marked out an appropriate Right inguinal incision and administered a mix of   lidocaine and Marcaine.  A 5 cm   oblique skin incision was made. Subcutaneous tissue was divided with Bovie   electrocautery. This dissection was carried down through Lakesha fascia down to   the aponeurosis of the external oblique. The aponeurosis of the external   oblique was incised in line with its fibers, first with a scalpel and  then   Metzenbaum scissors, and this incision was extended to the external spermatic   ring. The inguinal canal contents were bluntly encircled, first digitally and   then with a Penrose drain. Weitlaner retractors were placed to facilitate the   dissection. We proceeded to identify a direct inguinal hernia,   which was carefully dissected away from the inguinal canal contents until it   could be reduced into the defect. We performed skeletonization of the spermatic cord. We then had appropriate borders of the inguinal canal identified and decided to repair the defect with a piece of polypropylene keyhole mesh. The mesh sewn in place with running 2-0 PDS suture. Medially, the mesh was anchored to the fascia overlying the pubic tubercle. Inferiorly, the mesh was anchored to the shelving edge of the inguinal ligament. Superiorly, the mesh was anchored to the conjoined tendon. The tails of the mesh were brought together around the cord structures creating a new internal ring that just admitted the tip of the   finger. The mesh was inspected and noted to lie in appropriate position without   any redundancy or tension. The testicle was pulled back down to the scrotum   and there was noted to be no tension on the cord structures. The aponeurosis of   the external oblique was closed with a running 3-0 Vicryl suture. Lakesha   fascia was closed with several buried interrupted 3-0 Vicryl sutures and the   skin was closed with a running subcuticular Monocryl suture. A sterile dressing   was applied. The patient was then transported to the Recovery Room in stable   condition. All sponge, instrument and needle counts were correct at the end of   the case. I was present and scrubbed for the entire procedure.

## 2019-03-11 ENCOUNTER — OFFICE VISIT (OUTPATIENT)
Dept: SURGERY | Facility: CLINIC | Age: 64
End: 2019-03-11
Payer: MEDICARE

## 2019-03-11 VITALS
DIASTOLIC BLOOD PRESSURE: 86 MMHG | SYSTOLIC BLOOD PRESSURE: 132 MMHG | TEMPERATURE: 98 F | WEIGHT: 169.63 LBS | HEIGHT: 67 IN | BODY MASS INDEX: 26.62 KG/M2 | HEART RATE: 67 BPM

## 2019-03-11 DIAGNOSIS — Z87.19 S/P RIGHT INGUINAL HERNIA REPAIR: Primary | ICD-10-CM

## 2019-03-11 DIAGNOSIS — Z98.890 S/P RIGHT INGUINAL HERNIA REPAIR: Primary | ICD-10-CM

## 2019-03-11 PROCEDURE — 99999 PR PBB SHADOW E&M-EST. PATIENT-LVL III: CPT | Mod: PBBFAC,,, | Performed by: SURGERY

## 2019-03-11 PROCEDURE — 99999 PR PBB SHADOW E&M-EST. PATIENT-LVL III: ICD-10-PCS | Mod: PBBFAC,,, | Performed by: SURGERY

## 2019-03-11 PROCEDURE — 99024 POSTOP FOLLOW-UP VISIT: CPT | Mod: S$GLB,,, | Performed by: SURGERY

## 2019-03-11 PROCEDURE — 99024 PR POST-OP FOLLOW-UP VISIT: ICD-10-PCS | Mod: S$GLB,,, | Performed by: SURGERY

## 2019-03-11 NOTE — PROGRESS NOTES
SUBJECTIVE:  The patient is a 63 y.o. y/o male 2 weeks s/p right inguinal hernia repair. He denies pain, fevers, chills, nausea, vomiting, diarrhea, or constipation. Eating well with normal appetite and bowel function. Denies redness around or drainage from incisions.    OBJECTIVE:  GEN: male in NAD  ABD: soft, non-tender, non-distended  INCISIONS: dermabond in place - clean, dry and intact, healing well without signs of infection or hernia, expected post op swelling noted    ASSESSMENT/PLAN:  Doing well 2 weeks s/p right inguinal hernia. Patient is advised to avoid heavy lifting or strenuous activity for another 2-4 weeks. May resume light cardio. Patient may bathe and continue to take a regular diet. Will follow-up with me on an as-needed basis. All questions answered; patient is comfortable with follow-up plan.

## 2019-03-12 ENCOUNTER — OFFICE VISIT (OUTPATIENT)
Dept: OPTOMETRY | Facility: CLINIC | Age: 64
End: 2019-03-12
Payer: MEDICARE

## 2019-03-12 DIAGNOSIS — H52.203 MYOPIA WITH ASTIGMATISM AND PRESBYOPIA, BILATERAL: ICD-10-CM

## 2019-03-12 DIAGNOSIS — E11.9 TYPE 2 DIABETES MELLITUS WITHOUT RETINOPATHY: Primary | ICD-10-CM

## 2019-03-12 DIAGNOSIS — H52.13 MYOPIA WITH ASTIGMATISM AND PRESBYOPIA, BILATERAL: ICD-10-CM

## 2019-03-12 DIAGNOSIS — Z79.84 LONG TERM CURRENT USE OF ORAL HYPOGLYCEMIC DRUG: ICD-10-CM

## 2019-03-12 DIAGNOSIS — H11.003 PTERYGIUM, BILATERAL: ICD-10-CM

## 2019-03-12 DIAGNOSIS — H52.4 MYOPIA WITH ASTIGMATISM AND PRESBYOPIA, BILATERAL: ICD-10-CM

## 2019-03-12 DIAGNOSIS — H04.123 BILATERAL DRY EYES: ICD-10-CM

## 2019-03-12 DIAGNOSIS — H25.13 NUCLEAR SCLEROTIC CATARACT OF BOTH EYES: ICD-10-CM

## 2019-03-12 DIAGNOSIS — H40.013 OAG (OPEN ANGLE GLAUCOMA) SUSPECT, LOW RISK, BILATERAL: ICD-10-CM

## 2019-03-12 PROCEDURE — 92004 COMPRE OPH EXAM NEW PT 1/>: CPT | Mod: S$GLB,,, | Performed by: OPTOMETRIST

## 2019-03-12 PROCEDURE — 92020 GONIOSCOPY: CPT | Mod: S$GLB,,, | Performed by: OPTOMETRIST

## 2019-03-12 PROCEDURE — 76514 PR  US, EYE, FOR CORNEAL THICKNESS: ICD-10-PCS | Mod: S$GLB,,, | Performed by: OPTOMETRIST

## 2019-03-12 PROCEDURE — 99499 UNLISTED E&M SERVICE: CPT | Mod: S$GLB,,, | Performed by: OPTOMETRIST

## 2019-03-12 PROCEDURE — 99499 RISK ADDL DX/OHS AUDIT: ICD-10-PCS | Mod: S$GLB,,, | Performed by: OPTOMETRIST

## 2019-03-12 PROCEDURE — 99999 PR PBB SHADOW E&M-EST. PATIENT-LVL III: CPT | Mod: PBBFAC,,, | Performed by: OPTOMETRIST

## 2019-03-12 PROCEDURE — 92004 PR EYE EXAM, NEW PATIENT,COMPREHESV: ICD-10-PCS | Mod: S$GLB,,, | Performed by: OPTOMETRIST

## 2019-03-12 PROCEDURE — 99999 PR PBB SHADOW E&M-EST. PATIENT-LVL III: ICD-10-PCS | Mod: PBBFAC,,, | Performed by: OPTOMETRIST

## 2019-03-12 PROCEDURE — 92015 DETERMINE REFRACTIVE STATE: CPT | Mod: S$GLB,,, | Performed by: OPTOMETRIST

## 2019-03-12 PROCEDURE — 92020 PR SPECIAL EYE EVAL,GONIOSCOPY: ICD-10-PCS | Mod: S$GLB,,, | Performed by: OPTOMETRIST

## 2019-03-12 PROCEDURE — 92015 PR REFRACTION: ICD-10-PCS | Mod: S$GLB,,, | Performed by: OPTOMETRIST

## 2019-03-12 PROCEDURE — 76514 ECHO EXAM OF EYE THICKNESS: CPT | Mod: S$GLB,,, | Performed by: OPTOMETRIST

## 2019-03-12 NOTE — PATIENT INSTRUCTIONS
MEIBOMITIS    Your eyes look dry today. Your eyes are dry not because you're missing the watery portion of your tear film but because you're missing an oily component. The oil component keeps the tears from evaporating and provides stability to the tears.    This portion of the tears is produced by the Meibomian glands which are located just behind the base of the eyelashes. Your Meibomian glands are clogged because of a condition called Meibomitis. Meibomitis is caused by chronic inflammation inside the glands thought to be a reaction to the normal bacteria that live on your skin.     As this is a chronic condition the goal of treatment is to reduce your symptoms and the inflammation under control. The initial treatment is as follows:     - Warm Compresses: Heat a wash cloth by running it under hot water. Then hold this wash cloth over your closed eyelids and allow your eyelids to absorb the heat. After 20-30 seconds once the wash cloth cools down you'll need to heat it up again.  (An alternative heat source is a gel pack or microwavable eye mask, warmed in the microwave or in a dish of water,  wrapped with a warm wet washcloth). You want to get 10 minutes of warmth to your eyelids, so if the compress feels cool before the end of 10 minutes you should reheat it. You should consistently do this 2 times a day.     - Artificial tears: Some good Artificial tear drops to try are Blink, Refresh Optive, Systane Balance, Thera Tears or Genteal. You should use these consistently 2-3 times a day more if you need them. It's important to use these when in breezy environments or when doing prolonged near work such as reading or computer. The goal is to prevent your eyes from drying rather than instilling them once your eye is already dry.     - Omega 3 Supplement: 1000 mg of good quality fish oil (labeled DHA and/or EPA).   Fish oil, flax seed oil or omega 3 supplements have found to be beneficial in Meibomitis and dry eye  syndrome. There are a lot of choices out there and not one single product has been shown to be more beneficial than others.    It usually takes 1-2 months of treatment before you'll notice a difference. However some will continue to have problems even with this therapy. If you continue to have problems please let me know.     ==============================================    CATARACT    Symptoms and Signs:  A cataract starts out small, and at first has little effect on your vision. You may notice that your vision is blurred a little, like looking through a cloudy piece of glass or viewing an impressionist painting. A cataract may make light from the sun or a lamp seem too bright or glaring. Or you may notice when you drive at night that the oncoming headlights cause more glare than before. Colors may not appear as bright as they once did.  The type of cataract you have will affect exactly which symptoms you experience and how soon they will occur. When a nuclear cataract first develops it can bring about a temporary improvement in your near vision, called second sight. Unfortunately, the improved vision is short-lived and will disappear as the cataract worsens. Meanwhile, a sub-capsular cataract may not produce any symptoms until it's well-developed.    Causes:  No one knows for sure why the eye's lens changes as we age, forming cataracts. Researchers are gradually identifying factors that may cause cataracts - and information that may help to prevent them.  Many studies suggest that exposure to ultraviolet light is associated with cataracts, so eye care practitioners recommend wearing sunglasses and a wide-brimmed hat to lessen your exposure.  Other studies suggest people with diabetes are at risk for developing a cataract.   Some eye care practitioners believe that a diet high in antioxidants, such as beta-carotene (vitamin A), selenium and vitamins C and E, may forestall cataracts.  The most important of these is  probably vitamin C; it might be helpful to supplement the diet with an extra Vitamin C tablet.  Meanwhile, eating a lot of salt may increase your risk.  Other risk factors include cigarette smoke, air pollution and heavy alcohol consumption.  We simply recommend that you be careful to use sunglasses and to take Vitamin C.    Treatment:  When symptoms begin to appear, we can improve your vision for a while using new glasses, strong bifocals, magnification, appropriate lighting or other visual aids.  This is true in your case; your cataract does not impact your vision very much at this time. If you experience any of the symptoms we described you can return at any time. Otherwise it is fine to see you in 1 year.

## 2019-03-12 NOTE — PROGRESS NOTES
HPI     Mr. Cristiano Cruz was referred by Srikanth Son MD for a diabetic eye exam.    He reports clear distance vision without correction, but later says he   thinks he needs prescription glasses for driving. He reports clear near   vision with +3.25 OTC readers. He requests refraction today.    No eye pain or irritation, but eyes are frequently watery.    (-)drops  (-)flashes  (+)floaters  (-)diplopia    (+)Diabetes  Hemoglobin A1C       Date                     Value               Ref Range             Status           01/26/2019               6.5 (H)             4.0 - 5.6 %         Final      OCULAR HISTORY  Last Eye Exam: today is his first eye exam  (-)eye surgery   (-)diagnosed or treated for any eye conditions or diseases    FAMILY HISTORY  (-)Glaucoma         Last edited by Betty Jensen, OD on 3/12/2019  3:36 PM. (History)            Assessment /Plan     For exam results, see Encounter Report.    Type 2 diabetes mellitus without retinopathy  Long term current use of oral hypoglycemic drug   No retinopathy noted OU. Monitor with yearly DFE.     Bilateral dry eyes  Pterygium, bilateral   Contributing to blurred vision. Explained relationship between dry eyes and tearing. Recommended artificial tears TID OU and warm compresses for 10 minutes qDay-BID OU.     Nuclear sclerotic cataract of both eyes   Contributing to reduced best-corrected visual acuity. Discussed cataract surgery, pt does not feel ready. Monitor with yearly DFE.    OAG (open angle glaucoma) suspect, low risk, bilateral   Low risk based on slightly elevated IOP (OD 22mmHg, OS 21mmHg) and CCT thinner than average OU (505/519). No family history of glaucoma, rim tissue healthy OU, angles open and normal with gonioscopy today OU.   Explained nature of glaucoma and potential to progress to permanent loss of peripheral vision if untreated. Monitor with yearly DFE. Consider HVF/OCT if changes noted in clinical appearance.    Stereo disc photos taken  today OD only (images hazy OD) (unable to do OS due to ptosis and pt falling asleep).    -     Cancel: Color Fundus Photography - OU - Both Eyes    Myopia with astigmatism and presbyopia, bilateral   New glasses prescription released, adaptation expected.     Patient educated that refractive error will change with cataract progression and after cataract surgery.  Eyeglass Final Rx     Eyeglass Final Rx       Sphere Cylinder Axis Add    Right -2.00 +1.25 007 +2.50    Left -1.00 +2.00 100 +2.50    Expiration Date:  3/12/2020                 RTC 1 year, or any time for cataract eval with any ophthalmologist

## 2019-03-12 NOTE — LETTER
March 13, 2019      Srikanth Son MD  [address]  [facility] [address]           [facility]  [address]  [facility] [address]  Phone: [phone]          Patient: Cristiano Cruz   MR Number: [id number]   YOB: 1955   Date of Visit: 3/12/2019       Dear Dr. Srikanth Son:    Thank you for referring Cristiano Cruz to me for evaluation. Attached you will find relevant portions of my assessment and plan of care.    If you have questions, please do not hesitate to call me. I look forward to following Cristiano Cruz along with you.    Sincerely,    Betty Jensen, OD    Enclosure  CC:  No Recipients    If you would like to receive this communication electronically, please contact [email or web address] or [phone] to request more information on [employer] access.    For providers and/or their staff who would like to refer a patient to [facility], please contact us through our one-stop-shop provider referral line, [facility], at [phone].    If you feel you have received this communication in error or would no longer like to receive these types of communications, please e-mail [email or web address]

## 2019-03-14 ENCOUNTER — INITIAL CONSULT (OUTPATIENT)
Dept: HEPATOLOGY | Facility: CLINIC | Age: 64
End: 2019-03-14
Payer: MEDICARE

## 2019-03-14 ENCOUNTER — LAB VISIT (OUTPATIENT)
Dept: LAB | Facility: HOSPITAL | Age: 64
End: 2019-03-14
Payer: MEDICARE

## 2019-03-14 VITALS
BODY MASS INDEX: 25.96 KG/M2 | WEIGHT: 171.31 LBS | HEART RATE: 66 BPM | HEIGHT: 68 IN | TEMPERATURE: 98 F | SYSTOLIC BLOOD PRESSURE: 134 MMHG | DIASTOLIC BLOOD PRESSURE: 93 MMHG | OXYGEN SATURATION: 98 %

## 2019-03-14 DIAGNOSIS — Z11.4 ENCOUNTER FOR SCREENING FOR HIV: ICD-10-CM

## 2019-03-14 DIAGNOSIS — B18.2 CHRONIC HEPATITIS C WITHOUT HEPATIC COMA: Primary | ICD-10-CM

## 2019-03-14 DIAGNOSIS — B18.2 CHRONIC HEPATITIS C WITHOUT HEPATIC COMA: ICD-10-CM

## 2019-03-14 LAB
ALBUMIN SERPL BCP-MCNC: 4.1 G/DL
ALP SERPL-CCNC: 75 U/L
ALT SERPL W/O P-5'-P-CCNC: 21 U/L
ANION GAP SERPL CALC-SCNC: 10 MMOL/L
AST SERPL-CCNC: 18 U/L
BASOPHILS # BLD AUTO: 0.06 K/UL
BASOPHILS NFR BLD: 0.8 %
BILIRUB SERPL-MCNC: 0.7 MG/DL
BUN SERPL-MCNC: 11 MG/DL
CALCIUM SERPL-MCNC: 10 MG/DL
CHLORIDE SERPL-SCNC: 104 MMOL/L
CO2 SERPL-SCNC: 27 MMOL/L
CREAT SERPL-MCNC: 1 MG/DL
DIFFERENTIAL METHOD: NORMAL
EOSINOPHIL # BLD AUTO: 0.2 K/UL
EOSINOPHIL NFR BLD: 2.5 %
ERYTHROCYTE [DISTWIDTH] IN BLOOD BY AUTOMATED COUNT: 13.8 %
EST. GFR  (AFRICAN AMERICAN): >60 ML/MIN/1.73 M^2
EST. GFR  (NON AFRICAN AMERICAN): >60 ML/MIN/1.73 M^2
GLUCOSE SERPL-MCNC: 105 MG/DL
HBV CORE AB SERPL QL IA: POSITIVE
HBV SURFACE AB SER-ACNC: POSITIVE M[IU]/ML
HBV SURFACE AG SERPL QL IA: NEGATIVE
HCT VFR BLD AUTO: 44.7 %
HEPATITIS A ANTIBODY, IGG: NEGATIVE
HGB BLD-MCNC: 14.3 G/DL
HIV 1+2 AB+HIV1 P24 AG SERPL QL IA: NEGATIVE
IMM GRANULOCYTES # BLD AUTO: 0.02 K/UL
IMM GRANULOCYTES NFR BLD AUTO: 0.3 %
INR PPP: 1
LYMPHOCYTES # BLD AUTO: 2.4 K/UL
LYMPHOCYTES NFR BLD: 34 %
MCH RBC QN AUTO: 27.6 PG
MCHC RBC AUTO-ENTMCNC: 32 G/DL
MCV RBC AUTO: 86 FL
MONOCYTES # BLD AUTO: 0.5 K/UL
MONOCYTES NFR BLD: 7.1 %
NEUTROPHILS # BLD AUTO: 4 K/UL
NEUTROPHILS NFR BLD: 55.3 %
NRBC BLD-RTO: 0 /100 WBC
PLATELET # BLD AUTO: 225 K/UL
PMV BLD AUTO: 11.9 FL
POTASSIUM SERPL-SCNC: 4.4 MMOL/L
PROT SERPL-MCNC: 8.3 G/DL
PROTHROMBIN TIME: 10.7 SEC
RBC # BLD AUTO: 5.18 M/UL
SODIUM SERPL-SCNC: 141 MMOL/L
WBC # BLD AUTO: 7.17 K/UL

## 2019-03-14 PROCEDURE — 1101F PT FALLS ASSESS-DOCD LE1/YR: CPT | Mod: CPTII,S$GLB,, | Performed by: PHYSICIAN ASSISTANT

## 2019-03-14 PROCEDURE — 85610 PROTHROMBIN TIME: CPT

## 2019-03-14 PROCEDURE — 99203 OFFICE O/P NEW LOW 30 MIN: CPT | Mod: S$GLB,,, | Performed by: PHYSICIAN ASSISTANT

## 2019-03-14 PROCEDURE — 1101F PR PT FALLS ASSESS DOC 0-1 FALLS W/OUT INJ PAST YR: ICD-10-PCS | Mod: CPTII,S$GLB,, | Performed by: PHYSICIAN ASSISTANT

## 2019-03-14 PROCEDURE — 86706 HEP B SURFACE ANTIBODY: CPT

## 2019-03-14 PROCEDURE — 99999 PR PBB SHADOW E&M-EST. PATIENT-LVL IV: CPT | Mod: PBBFAC,,, | Performed by: PHYSICIAN ASSISTANT

## 2019-03-14 PROCEDURE — 3008F PR BODY MASS INDEX (BMI) DOCUMENTED: ICD-10-PCS | Mod: CPTII,S$GLB,, | Performed by: PHYSICIAN ASSISTANT

## 2019-03-14 PROCEDURE — 3008F BODY MASS INDEX DOCD: CPT | Mod: CPTII,S$GLB,, | Performed by: PHYSICIAN ASSISTANT

## 2019-03-14 PROCEDURE — 99999 PR PBB SHADOW E&M-EST. PATIENT-LVL IV: ICD-10-PCS | Mod: PBBFAC,,, | Performed by: PHYSICIAN ASSISTANT

## 2019-03-14 PROCEDURE — 99203 PR OFFICE/OUTPT VISIT, NEW, LEVL III, 30-44 MIN: ICD-10-PCS | Mod: S$GLB,,, | Performed by: PHYSICIAN ASSISTANT

## 2019-03-14 PROCEDURE — 80053 COMPREHEN METABOLIC PANEL: CPT

## 2019-03-14 PROCEDURE — 36415 COLL VENOUS BLD VENIPUNCTURE: CPT

## 2019-03-14 PROCEDURE — 86704 HEP B CORE ANTIBODY TOTAL: CPT

## 2019-03-14 PROCEDURE — 85025 COMPLETE CBC W/AUTO DIFF WBC: CPT

## 2019-03-14 PROCEDURE — 86790 VIRUS ANTIBODY NOS: CPT

## 2019-03-14 PROCEDURE — 87340 HEPATITIS B SURFACE AG IA: CPT

## 2019-03-14 PROCEDURE — 86703 HIV-1/HIV-2 1 RESULT ANTBDY: CPT

## 2019-03-14 NOTE — LETTER
March 14, 2019      Srikanth Son MD  [address]  [facility] [address]           [facility]  [address]  [facility] [address]  Phone: [phone]  Fax: [phone]          Patient: Cristiano Cruz   MR Number: [id number]   YOB: 1955   Date of Visit: 3/14/2019       Dear Dr. Srikanth Son:    Thank you for referring Cristiano Cruz to me for evaluation. Attached you will find relevant portions of my assessment and plan of care.    If you have questions, please do not hesitate to call me. I look forward to following Cristiano Cruz along with you.    Sincerely,    Jennifer B. Scheuermann, PA    Enclosure  CC:  No Recipients    If you would like to receive this communication electronically, please contact [email or web address] or [phone] to request more information on [employer] Link access.    For providers and/or their staff who would like to refer a patient to [facility], please contact us through our one-stop-shop provider referral line, [facility], at [phone].    If you feel you have received this communication in error or would no longer like to receive these types of communications, please e-mail [email or web address]

## 2019-03-14 NOTE — PROGRESS NOTES
HEPATOLOGY CLINIC VISIT NOTE - HCV clinic    REFERRING PROVIDER: Srikanth Son MD    CHIEF COMPLAINT: Hepatitis C   (here w/ wife)    HISTORY: This is a 63 y.o. Black or  male with chronic hepatitis C, here for further eval / mngmt.       HCV history:  Recently diagnosed  Risks for HCV:  Tattoos placed, first one around 9    Nasal drug use: late 1970, early 1980s    No IVDA    No blood transfusions     - Treatment naive  - Genotype 1a  - NS5A resistance not known  - HCV RNA 5.2 million IU/mL - 2/2019    Liver staging:  No formal liver staging  No recent liver imaging  Labs and 2016 imaging reveal well preserved liver function w/ no evidence of advanced fibrosis      Feels well  Denies jaundice, dark urine, abdominal distention, hematemesis, melena, slowed mentation.   No abnormal skin rashes. No generalized joint pain.                     Past Medical History:   Diagnosis Date    E. coli UTI 01/2019    During hospitalization for seizure    Generalized tonic-clonic seizure 1/26/2019 1-2019 admitted for new-onset seizures.Normal CT head.  His mental status normalized, and he had no recurrent seizures following keppra loading in the ED and twice-daily maintenance upon arrival to the floor. Workup into the etiology of his seizures remained negative. EEG was performed, with the preliminary interpretation being no epileptiform activity with normal background.     Type 2 diabetes mellitus with microalbuminuria, without long-term current use of insulin 2/11/2019       Past Surgical History:   Procedure Laterality Date    HERN[facility] REPAIR Right 2000    [facility]    REPAIR, HERNIA, INGUINAL, WITHOUT HISTORY OF PRIOR REPAIR, AGE 5 YEARS OR OLDER with mesh Right 2/22/2019    Performed by Santino Montemayor MD at [facility] OR [address] [place] [address]       FAMILY HISTORY: Negative for liver disease    SOCIAL HISTORY:     Works w/ fiberglass insulation  Social History     Tobacco Use   Smoking Status Former Smoker     Packs/day: 3.00    Years: 25.00    Pack years: 75.00    Last attempt to quit: 2012    Years since quittin.1   Smokeless Tobacco Never Used     Alcohol - none for many years; moderate alcohol use in past  Drugs - remote hx nasal drug use -      ROS:   No fever, chills, weight loss, fatigue  No chest pain, palpitations, dyspnea, cough  No abdominal pain, change in bowel pattern, nausea, vomiting, GERD  No dysuria, hematuria   No skin rashes   No headaches  No lower extremity edema  No depression or anxiety      PHYSICAL EXAM:  Friendly Black or  male, in no acute distress; alert and oriented to person, place and time  VITALS: reviewed  HEENT: Sclerae anicteric.   NECK: Supple  CVS: Regular rate and rhythm. No murmurs  LUNGS: Normal respiratory effort. Clear bilaterally  ABDOMEN: Flat, soft, nontender. No organomegaly or masses. No ascites or hernias. Good bowel sounds.    SKIN: Warm and dry. No jaundice, No obvious rashes.   EXTREMITIES: No lower extremity edema  NEURO/PSYCH: Normal gate. Memory intact. Thought and speech pattern appropriate. Behavior normal. No depression or anxiety noted.    RECENT LABS:  Lab Results   Component Value Date    WBC 8.46 2019    HGB 13.7 (L) 2019     2019     No results found for: INR  Lab Results   Component Value Date    AST 20 2019    ALT 23 2019    BILITOT 0.3 2019    ALBUMIN 3.9 2019    ALKPHOS 79 2019    CREATININE 1.0 2019    BUN 11 2019     2019    K 4.6 2019       RECENT IMAGING:  none    ASSESSMENT  63 y.o. Black or  male with:  1. CHRONIC HEPATITIS C, GENOTYPE 1a - treatment naive  -- Unknown Immunity to HAV & HBV    2. SEIZURE DISORDER  -- stable on keppra    EDUCATION:  The natural history of Hepatitis C, including potential progression to cirrhosis was reviewed. We discussed the increased progression of liver disease secondary to  alcohol use; patient was advised to avoid alcohol completely.     Transmission of Hepatitis C was reviewed, including possible sexual transmission. Sexual contacts should be screened.   Patient should avoid sharing personal products such as razors, toothbrushes, etc.     Recommend avoiding raw seafood.  Limit acetaminophen to 2000mg daily.        PLAN:  1. Labs & imaging  Orders Placed This Encounter   Procedures    US Elastography Liver    US Abdomen Complete    CBC auto differential    Comprehensive metabolic panel    Protime-INR    HIV 1/2 Ag/Ab (4th Gen)    Hepatitis B surface antigen    Hepatitis B surface antibody    Hepatitis B core antibody, total    Hepatitis A antibody, IgG       2. Follow up visit. Goal of antiviral therapy

## 2019-03-21 ENCOUNTER — OFFICE VISIT (OUTPATIENT)
Dept: ORTHOPEDICS | Facility: CLINIC | Age: 64
End: 2019-03-21
Payer: MEDICARE

## 2019-03-21 ENCOUNTER — HOSPITAL ENCOUNTER (OUTPATIENT)
Dept: RADIOLOGY | Facility: HOSPITAL | Age: 64
Discharge: HOME | End: 2019-03-21
Attending: PHYSICIAN ASSISTANT
Payer: MEDICARE

## 2019-03-21 VITALS — BODY MASS INDEX: 25.96 KG/M2 | HEIGHT: 68 IN | WEIGHT: 171.31 LBS

## 2019-03-21 DIAGNOSIS — M25.512 BILATERAL SHOULDER PAIN, UNSPECIFIED CHRONICITY: ICD-10-CM

## 2019-03-21 DIAGNOSIS — M19.012 PRIMARY OSTEOARTHRITIS OF BOTH SHOULDERS: ICD-10-CM

## 2019-03-21 DIAGNOSIS — M25.511 BILATERAL SHOULDER PAIN, UNSPECIFIED CHRONICITY: Primary | ICD-10-CM

## 2019-03-21 DIAGNOSIS — M25.512 BILATERAL SHOULDER PAIN, UNSPECIFIED CHRONICITY: Primary | ICD-10-CM

## 2019-03-21 DIAGNOSIS — M25.511 RIGHT SHOULDER PAIN, UNSPECIFIED CHRONICITY: ICD-10-CM

## 2019-03-21 DIAGNOSIS — M25.812 SHOULDER IMPINGEMENT, LEFT: Primary | ICD-10-CM

## 2019-03-21 DIAGNOSIS — M19.011 PRIMARY OSTEOARTHRITIS OF BOTH SHOULDERS: ICD-10-CM

## 2019-03-21 DIAGNOSIS — M25.511 RIGHT SHOULDER PAIN, UNSPECIFIED CHRONICITY: Primary | ICD-10-CM

## 2019-03-21 DIAGNOSIS — M25.511 BILATERAL SHOULDER PAIN, UNSPECIFIED CHRONICITY: ICD-10-CM

## 2019-03-21 PROCEDURE — 99999 PR PBB SHADOW E&M-EST. PATIENT-LVL III: ICD-10-PCS | Mod: PBBFAC,,, | Performed by: PHYSICIAN ASSISTANT

## 2019-03-21 PROCEDURE — 99204 PR OFFICE/OUTPT VISIT, NEW, LEVL IV, 45-59 MIN: ICD-10-PCS | Mod: S$GLB,,, | Performed by: PHYSICIAN ASSISTANT

## 2019-03-21 PROCEDURE — 1101F PR PT FALLS ASSESS DOC 0-1 FALLS W/OUT INJ PAST YR: ICD-10-PCS | Mod: CPTII,S$GLB,, | Performed by: PHYSICIAN ASSISTANT

## 2019-03-21 PROCEDURE — 73030 XR SHOULDER COMPLETE 2 OR MORE VIEWS BILATERAL: ICD-10-PCS | Mod: 26,50,, | Performed by: RADIOLOGY

## 2019-03-21 PROCEDURE — 99999 PR PBB SHADOW E&M-EST. PATIENT-LVL III: CPT | Mod: PBBFAC,,, | Performed by: PHYSICIAN ASSISTANT

## 2019-03-21 PROCEDURE — 1101F PT FALLS ASSESS-DOCD LE1/YR: CPT | Mod: CPTII,S$GLB,, | Performed by: PHYSICIAN ASSISTANT

## 2019-03-21 PROCEDURE — 73030 X-RAY EXAM OF SHOULDER: CPT | Mod: TC,50

## 2019-03-21 PROCEDURE — 73030 X-RAY EXAM OF SHOULDER: CPT | Mod: 26,50,, | Performed by: RADIOLOGY

## 2019-03-21 PROCEDURE — 3008F BODY MASS INDEX DOCD: CPT | Mod: CPTII,S$GLB,, | Performed by: PHYSICIAN ASSISTANT

## 2019-03-21 PROCEDURE — 99204 OFFICE O/P NEW MOD 45 MIN: CPT | Mod: S$GLB,,, | Performed by: PHYSICIAN ASSISTANT

## 2019-03-21 PROCEDURE — 3008F PR BODY MASS INDEX (BMI) DOCUMENTED: ICD-10-PCS | Mod: CPTII,S$GLB,, | Performed by: PHYSICIAN ASSISTANT

## 2019-03-21 RX ORDER — MELOXICAM 15 MG/1
15 TABLET ORAL DAILY
Qty: 30 TABLET | Refills: 0 | Status: SHIPPED | OUTPATIENT
Start: 2019-03-21 | End: 2019-04-16 | Stop reason: SDUPTHER

## 2019-03-21 NOTE — PROGRESS NOTES
SUBJECTIVE:     Chief Complaint & History of Present Illness:  Cristiano Cruz is a 63 y.o. year old male who presents today with intermittent bilateral  shoulder pain that started about 6 weeks ago.  The left shoulder is worse than the right.  The pain began after he had a seizure, however he did not fall onto his shoulder.  He was lying down when the seizure occurred.  The pain is located in the anterior and  lateral aspect of the shoulder.  The pain is described as sharp, 5/10.  It is aggravated by activity, lifting and laying on his shoulder.  Associated symptoms include pain radiating to arm/hand, difficulty sleeping.  He denies numbness or tingling.  He denies neck pain.   He states the majority of his pain remains in his shoulder.  He has not had any prior treatment.  There is not a history of previous injury or surgery to the shoulder.      Review of patient's allergies indicates:  No Known Allergies      Current Outpatient Medications   Medication Sig Dispense Refill    blood sugar diagnostic Strp Use 2 (two) times daily. 100 each 11    lancets Misc Use twice daily as directed 100 each 11    levETIRAcetam (KEPPRA) 500 MG Tab Take 1 tablet (500 mg total) by mouth 2 (two) times daily. 60 tablet 11    oxyCODONE (ROXICODONE) 5 MG immediate release tablet Take 1 tablet (5 mg total) by mouth every 6 (six) hours as needed for Pain (Can utilize for moderate to severe pain, as needed.). 20 tablet 0    SITagliptin (JANUVIA) 25 MG Tab Take 1 tablet (25 mg total) by mouth once daily. 90 tablet 3     No current facility-administered medications for this visit.        Past Medical History:   Diagnosis Date    E. coli UTI 01/2019    During hospitalization for seizure    Generalized tonic-clonic seizure 1/26/2019 1-2019 admitted for new-onset seizures.Normal CT head.  His mental status normalized, and he had no recurrent seizures following keppra loading in the ED and twice-daily maintenance upon arrival to the  floor. Workup into the etiology of his seizures remained negative. EEG was performed, with the preliminary interpretation being no epileptiform activity with normal background.     Type 2 diabetes mellitus with microalbuminuria, without long-term current use of insulin 2/11/2019       Past Surgical History:   Procedure Laterality Date    HERNIA REPAIR Right 2000    [facility]    REPAIR, HERNIA, INGUINAL, WITHOUT HISTORY OF PRIOR REPAIR, AGE 5 YEARS OR OLDER with mesh Right 2/22/2019    Performed by Santino Montemayor MD at [facility] OR [address] [place] [address]     Review of Systems:  ROS:  Constitutional: no fever or chills  Eyes: no visual changes  ENT: no nasal congestion or sore throat  Respiratory: no cough or shortness of breath  Cardiovascular: no chest pain or palpitations  Gastrointestinal: no nausea or vomiting, tolerating diet  Genitourinary: no hematuria or dysuria  Integument/Breast: no rash or pruritis  Hematologic/Lymphatic: no easy bruising or lymphadenopathy  Musculoskeletal: no arthralgias or myalgias, positive for shoulder pain  Neurological: positive for seizures  Behavioral/Psych: no auditory or visual hallucinations  Endocrine: no heat or cold intolerance      OBJECTIVE:     PHYSICAL EXAM:        Vitals were reviewed  General: Well nourished, well developed, in no acute distress.  Neurological: Mood & affect are normal.  Resp:  Normal effort  Neck:  Full ROM.  Spurlings negative.  No tenderness to palpation.  Shoulder exam: Bilateral  Tenderness: no tenderness bilaterally  ROM:  Left:  forward flexion 140 / 160, external rotation 40 / 50, internal rotation to L5, pain at the extremes of mobility  Right:  forward flexion 160 / 160, external rotation 40 / 50, internal rotation to L5, pain at the extremes of mobility  Shoulder Strength: biceps 5/5, triceps 5/5, abduction 5/5, adduction 5/5 bilaterally  sensory exam normal  Special Tests:  Left shoulder:  Rodrigues' test: positive, Cross-chest abduction:  negative, positive empty can,  Anna Marie's positive, Speed's test: positive and Counce's test: negative  Right shoulder:  Rodrigues' test: negative, Cross-chest abduction: negative, negative empty can,   Speed's test: negative and Counce's test: negative    IMAGING:  X-ray of the bilateral shoulder ordered and reviewed.  Right shoulder:  Mild degenerative changes of the AC joint.  Mild joint space narrowing and osteophyte formation of the glenohumeral joint.  No fracture or dislocation.  Left shoulder:  Degenerative changes of the AC joint and mild glenohumeral joint space narrowing and osteophyte formation. No fracture or dislocation.    ASSESSMENT/PLAN:   63 y.o. year old male with right shoulder glenohumeral osteoarthritis, left shoulder glenohumeral osteoarthritis and subacromial bursitis.    Plan: We discussed with the patient at length all the different treatment options available for his right shoulder including anti-inflammatories, rest, ice, Physical therapy to include strengthening exercise, and occasional cortisone injections for temporary relief.    -He was given a HEP and prescription for meloxicam.  He was instructed to take for 2 weeks and then as needed.  -Steroid injection was offered however he was not interested.    -Consider physical therapy if no improvement.  -Follow up in 4 weeks.

## 2019-04-02 ENCOUNTER — OFFICE VISIT (OUTPATIENT)
Dept: NEUROLOGY | Facility: CLINIC | Age: 64
End: 2019-04-02
Payer: MEDICARE

## 2019-04-02 VITALS
HEART RATE: 62 BPM | HEIGHT: 68 IN | WEIGHT: 176 LBS | BODY MASS INDEX: 26.67 KG/M2 | SYSTOLIC BLOOD PRESSURE: 152 MMHG | DIASTOLIC BLOOD PRESSURE: 85 MMHG

## 2019-04-02 DIAGNOSIS — G40.409 GENERALIZED TONIC-CLONIC SEIZURE: Primary | ICD-10-CM

## 2019-04-02 DIAGNOSIS — R41.3 POOR MEMORY: ICD-10-CM

## 2019-04-02 PROCEDURE — 99999 PR PBB SHADOW E&M-EST. PATIENT-LVL III: ICD-10-PCS | Mod: PBBFAC,GC,, | Performed by: STUDENT IN AN ORGANIZED HEALTH CARE EDUCATION/TRAINING PROGRAM

## 2019-04-02 PROCEDURE — 99999 PR PBB SHADOW E&M-EST. PATIENT-LVL III: CPT | Mod: PBBFAC,GC,, | Performed by: STUDENT IN AN ORGANIZED HEALTH CARE EDUCATION/TRAINING PROGRAM

## 2019-04-02 NOTE — ASSESSMENT & PLAN NOTE
-- continue levetiracetam 500mg bid  -- check level with next labs  -- counseled on seizure precautions, and to avoid benadryl/tramadol in the future

## 2019-04-02 NOTE — PROGRESS NOTES
"Patient Name: Cristiano Cruz  MRN: [id number]    CC: Seizures    HPI: Cristiano Cruz is a 63 y.o. male who presents for hospital follow up. Briefly, he had presented to the ED after an episode concerning for seizure that was unprovoked. No sleep deprivation, new medications, alcohol/drug use. See history below for details of presenting event. During admission he had an MRI brain and EEG which were unremarkable. Labs were negative for any infectious or metabolic derangements that could have provoked the event. He was started on levetiracetam due to recurrent unprovoked seizures.    Since discharge he has been tolerating the levetiracetam well. No further seizures. His wife reports that he is having trouble with short term memory, but is unable to provide any specific examples. He says that sometimes when he is told something, he will forget it shortly after. They both deny any prior issues with memory. He is still able to work without any issue, and is not driving per our recommendations.       HPI from admission 1/26/19:   Mr. Cruz is a 63 year old man with no known medical history who presents with a first time seizure. His wife says she woke up around 0500 to hear him breathing loudly, like he was catching his breath. She did not witness any shaking but says he was "foaming at the mouth". He was not responsive so EMS was called. Per EMS patient witnessed to have tonic-clonic movements lasting 5 minutes, with 20 minutes of confusion afterwards. No tongue biting or incontinence. Upon arrival to the ED he had an episode described by nursing as "tonic-clonic/decorticate posturing with right eye gaze - lasted approximately 3 minutes".      Family denies a history of seizure in the patient, no history of head trauma. They do report a family history of seizures in his sister and niece.      Labs remarkable for , lactate 3.4 (improved to 1.3)    Current AEDs  - Levetiracetam 500mg bid    ROS:   Review of Systems "   Constitutional: Negative for malaise/fatigue. Negative for weight loss.   HENT: Negative for hearing loss.   Eyes: Negative for blurred vision and double vision.   Respiratory: Negative for shortness of breath and stridor.   Cardiovascular: Negative for chest pain and palpitations.   Gastrointestinal: Negative for nausea, vomiting and constipation.   Genitourinary: Negative for frequency. Negative for urgency.   Musculoskeletal: Negative for joint pain. Negative for myalgias and falls.   Skin: Negative for rash.   Neurological: Negative for dizziness and tremors. Negative for focal weakness and seizures.   Endo/Heme/Allergies: Does not bruise/bleed easily.   Psychiatric/Behavioral: + memory loss. Negative for depression and hallucinations. The patient is not nervous/anxious.      Past Medical History  Past Medical History:   Diagnosis Date    E. coli UTI 01/2019    During hospitalization for seizure    Generalized tonic-clonic seizure 1/26/2019 1-2019 admitted for new-onset seizures.Normal CT head.  His mental status normalized, and he had no recurrent seizures following keppra loading in the ED and twice-daily maintenance upon arrival to the floor. Workup into the etiology of his seizures remained negative. EEG was performed, with the preliminary interpretation being no epileptiform activity with normal background.     Type 2 diabetes mellitus with microalbuminuria, without long-term current use of insulin 2/11/2019       Medications    Current Outpatient Medications:     levETIRAcetam (KEPPRA) 500 MG Tab, Take 1 tablet (500 mg total) by mouth 2 (two) times daily., Disp: 60 tablet, Rfl: 11    meloxicam (MOBIC) 15 MG tablet, Take 1 tablet (15 mg total) by mouth once daily., Disp: 30 tablet, Rfl: 0    SITagliptin (JANUVIA) 25 MG Tab, Take 1 tablet (25 mg total) by mouth once daily., Disp: 90 tablet, Rfl: 3    blood sugar diagnostic Strp, Use 2 (two) times daily., Disp: 100 each, Rfl: 11    lancets Misc,  "Use twice daily as directed, Disp: 100 each, Rfl: 11    oxyCODONE (ROXICODONE) 5 MG immediate release tablet, Take 1 tablet (5 mg total) by mouth every 6 (six) hours as needed for Pain (Can utilize for moderate to severe pain, as needed.)., Disp: 20 tablet, Rfl: 0    Allergies  Review of patient's allergies indicates:  No Known Allergies    Social History  Social History     Socioeconomic History    Marital status:      Spouse name: Not on file    Number of children: 1    Years of education: Not on file    Highest education level: Not on file   Occupational History    Not on file   Social Needs    Financial resource strain: Not on file    Food insecurity:     Worry: Not on file     Inability: Not on file    Transportation needs:     Medical: Not on file     Non-medical: Not on file   Tobacco Use    Smoking status: Former Smoker     Packs/day: 3.00     Years: 25.00     Pack years: 75.00     Last attempt to quit: 2012     Years since quittin.1    Smokeless tobacco: Never Used   Substance and Sexual Activity    Alcohol use: No     Comment: Used to drink    Drug use: No    Sexual activity: Yes     Partners: Female   Lifestyle    Physical activity:     Days per week: Not on file     Minutes per session: Not on file    Stress: Not on file   Relationships    Social connections:     Talks on phone: Not on file     Gets together: Not on file     Attends Synagogue service: Not on file     Active member of club or organization: Not on file     Attends meetings of clubs or organizations: Not on file     Relationship status: Not on file   Other Topics Concern    Not on file   Social History Narrative     with 1 daughter (42 as of 2019).    Work in [employer]       Family History  Family History   Problem Relation Age of Onset    Hypertension Mother        Physical Exam  BP (!) 152/85   Pulse 62   Ht 5' 8" (1.727 m)   Wt 79.8 kg (176 lb)   BMI 26.76 kg/m²       Constitutional "  Well-developed, well-nourished, appears stated age   Neurological    * Mental status      - Orientation  Oriented to person, place, time, and situation     - Memory   Not tested     - Attention/concentration  Poor - able to spell WORLD forwards, not backwards     - Language  Naming & repetition intact, +2-step commands     - Fund of knowledge  Not tested     - Executive  Well-organized thoughts     - Other     * Cranial nerves       - CN II  PERRL     - CN III, IV, VI  Extraocular movements full, normal pursuits and saccades     - CN V  Sensation V1 - V3 intact     - CN VII  Face strong and symmetric bilaterally     - CN VIII  Hearing intact bilaterally     - CN IX, X  Palate raises midline and symmetric     - CN XI  SCM and trapezius 5/5 bilaterally     - CN XII  Tongue midline   * Motor  Muscle bulk normal, strength 5/5 throughout   * Sensory   Intact to light touch throughout   * Coordination  No dysmetria with finger-to-nose   * Gait  Normal casual gait   * Deep tendon reflexes  2+ and symmetric throughout       Lab and Test Results  Lab Results   Component Value Date    HGBA1C 6.5 (H) 01/26/2019     Lab Results   Component Value Date    TSH 2.887 01/26/2019       Images: MRI brain w wo contrast 1/26/19  No focal abnormalities, no structural abnormalities.        Independently reviewed YES    Other Tests  EEG 1/26/19  This is a normal EEG of the awake and asleep states, there is borderline slowing of the posterior dominant rhythm which evelyne be related to drowsiness and is not clearly pathologic.      Assessment and Plan    Cristiano Cruz is a 63 y.o. male with one episode of unprovoked seizure (3 seizures within a few hours). Workup was unremarkable including labs, EEG and MRI brain. He was started on levetiracetam which he has tolerated well and had no further seizures. We will keep him on the current dose (500mg BID) for now, and will check a level with his next blood draw. We may be able to taper off in the  future, but will continue until he is seizure free for at least 2 years. Patient counseled on seizure precautions  until six months seizure free including no driving or operating heavy machinery, no submerging in water, take showers instead of baths whenever possible, do not care for children alone, caution when using hot objects especially cooking and do not scale ladders or heights unsupported or unaccompanied.     He was asked to monitor his memory issues, and we can consider more formal testing at the next appointment. Will order Vitamin B12/B1 to rule out reversible causes.    He was advised to speak with his primary care regarding rash and dyspnea on exertion.      Problem List Items Addressed This Visit        Neuro    Generalized tonic-clonic seizure - Primary    Overview     1-2019 admitted for new-onset seizures.Normal CT head.  His mental status normalized, and he had no recurrent seizures following keppra loading in the ED and twice-daily maintenance upon arrival to the floor. Workup into the etiology of his seizures remained negative. EEG was performed, with the preliminary interpretation being no epileptiform activity with normal background.          Current Assessment & Plan     -- continue levetiracetam 500mg bid  -- check level with next labs  -- counseled on seizure precautions, and to avoid benadryl/tramadol in the future         Relevant Orders    Levetiracetam level    Poor memory    Current Assessment & Plan     -- subjective loss of short term memory that seemed to correlate to seizure according to family  -- I would not expect 1 seizure to cause memory impairment, and wonder if it's related more to mood in the setting of health issues (poor attention/concentration on screening today)  -- asked family to monitor, consider more detailed cognitive assessment at next visit  -- will add B12 and B1 to labs         Relevant Orders    VITAMIN B1    VITAMIN B12            Malina Lino,  MD  Neurology Resident   [facility]  [address] [place] [address]  Pager: [phone]

## 2019-04-02 NOTE — ASSESSMENT & PLAN NOTE
-- subjective loss of short term memory that seemed to correlate to seizure according to family  -- I would not expect 1 seizure to cause memory impairment, and wonder if it's related more to mood in the setting of health issues (poor attention/concentration on screening today)  -- asked family to monitor, consider more detailed cognitive assessment at next visit  -- will add B12 and B1 to labs

## 2019-04-11 ENCOUNTER — HOSPITAL ENCOUNTER (OUTPATIENT)
Dept: RADIOLOGY | Facility: HOSPITAL | Age: 64
Discharge: HOME | End: 2019-04-11
Attending: PHYSICIAN ASSISTANT
Payer: MEDICARE

## 2019-04-11 ENCOUNTER — TELEPHONE (OUTPATIENT)
Dept: HEPATOLOGY | Facility: CLINIC | Age: 64
End: 2019-04-11

## 2019-04-11 ENCOUNTER — OFFICE VISIT (OUTPATIENT)
Dept: HEPATOLOGY | Facility: CLINIC | Age: 64
End: 2019-04-11
Payer: MEDICARE

## 2019-04-11 ENCOUNTER — PROCEDURE VISIT (OUTPATIENT)
Dept: HEPATOLOGY | Facility: CLINIC | Age: 64
End: 2019-04-11
Attending: PHYSICIAN ASSISTANT
Payer: MEDICARE

## 2019-04-11 VITALS
BODY MASS INDEX: 26.53 KG/M2 | WEIGHT: 175.06 LBS | HEART RATE: 67 BPM | SYSTOLIC BLOOD PRESSURE: 194 MMHG | TEMPERATURE: 96 F | DIASTOLIC BLOOD PRESSURE: 99 MMHG | RESPIRATION RATE: 16 BRPM | HEIGHT: 68 IN

## 2019-04-11 DIAGNOSIS — B18.2 CHRONIC HEPATITIS C WITHOUT HEPATIC COMA: Primary | ICD-10-CM

## 2019-04-11 DIAGNOSIS — K76.9 LIVER LESION: ICD-10-CM

## 2019-04-11 DIAGNOSIS — B18.2 CHRONIC HEPATITIS C WITHOUT HEPATIC COMA: ICD-10-CM

## 2019-04-11 PROCEDURE — 76700 US EXAM ABDOM COMPLETE: CPT | Mod: 26,,, | Performed by: RADIOLOGY

## 2019-04-11 PROCEDURE — 76700 US ABDOMEN COMPLETE: ICD-10-PCS | Mod: 26,,, | Performed by: RADIOLOGY

## 2019-04-11 PROCEDURE — 99499 UNLISTED E&M SERVICE: CPT | Mod: S$GLB,,, | Performed by: PHYSICIAN ASSISTANT

## 2019-04-11 PROCEDURE — 99999 PR PBB SHADOW E&M-EST. PATIENT-LVL III: CPT | Mod: PBBFAC,,, | Performed by: PHYSICIAN ASSISTANT

## 2019-04-11 PROCEDURE — 99499 RISK ADDL DX/OHS AUDIT: ICD-10-PCS | Mod: S$GLB,,, | Performed by: PHYSICIAN ASSISTANT

## 2019-04-11 PROCEDURE — 99213 PR OFFICE/OUTPT VISIT, EST, LEVL III, 20-29 MIN: ICD-10-PCS | Mod: S$GLB,,, | Performed by: PHYSICIAN ASSISTANT

## 2019-04-11 PROCEDURE — 91200 PR LIVER ELASTOGRAPHY W/OUT IMAG W/INTERP & REPORT: ICD-10-PCS | Mod: S$GLB,,, | Performed by: PHYSICIAN ASSISTANT

## 2019-04-11 PROCEDURE — 99213 OFFICE O/P EST LOW 20 MIN: CPT | Mod: S$GLB,,, | Performed by: PHYSICIAN ASSISTANT

## 2019-04-11 PROCEDURE — 99999 PR PBB SHADOW E&M-EST. PATIENT-LVL III: ICD-10-PCS | Mod: PBBFAC,,, | Performed by: PHYSICIAN ASSISTANT

## 2019-04-11 PROCEDURE — 1101F PR PT FALLS ASSESS DOC 0-1 FALLS W/OUT INJ PAST YR: ICD-10-PCS | Mod: CPTII,S$GLB,, | Performed by: PHYSICIAN ASSISTANT

## 2019-04-11 PROCEDURE — 76700 US EXAM ABDOM COMPLETE: CPT | Mod: TC

## 2019-04-11 PROCEDURE — 91200 LIVER ELASTOGRAPHY: CPT | Mod: S$GLB,,, | Performed by: PHYSICIAN ASSISTANT

## 2019-04-11 PROCEDURE — 1101F PT FALLS ASSESS-DOCD LE1/YR: CPT | Mod: CPTII,S$GLB,, | Performed by: PHYSICIAN ASSISTANT

## 2019-04-11 NOTE — Clinical Note
pls tell pt I forgot to mention he needs vaccine for Hepatitis A. I have sent Rx to [facility]. They will likely need to get approval (authorization) from insurance and will call him when it can be done. thanks

## 2019-04-11 NOTE — PROGRESS NOTES
HEPATOLOGY CLINIC VISIT NOTE - HCV clinic    CHIEF COMPLAINT: Hepatitis C     HISTORY: This is a 64 y.o. Black or  male with chronic hepatitis C, here for f/u w/ labs, imaging, liver staging    HIV screen neg  (+) Prior resolved HBV infection  Lacking HAV immunity    U/S shows 1.2cm liver lesion, likely hemangioma; further contrast imaging recommended for eval    Motivated to have HCV treated.  Feels well  Denies jaundice, dark urine, hematemesis, melena, slowed mentation, abdominal distention.       HCV history:  Recently diagnosed  Risks for HCV:  Tattoos placed, first one around 9    Nasal drug use: late 1970, early 1980s    No IVDA    No blood transfusions     - Treatment naive  - Genotype 1a  - HCV RNA 5.2 million IU/mL - 2/2019    Liver staging:  FibroScan today 4/11/19 - kPa 4.9, F0-1  Labs and imaging support fibroscan                     Past Medical History:   Diagnosis Date    E. coli UTI 01/2019    During hospitalization for seizure    Generalized tonic-clonic seizure 1/26/2019 1-2019 admitted for new-onset seizures.Normal CT head.  His mental status normalized, and he had no recurrent seizures following keppra loading in the ED and twice-daily maintenance upon arrival to the floor. Workup into the etiology of his seizures remained negative. EEG was performed, with the preliminary interpretation being no epileptiform activity with normal background.     Type 2 diabetes mellitus with microalbuminuria, without long-term current use of insulin 2/11/2019       Past Surgical History:   Procedure Laterality Date    HERNIA REPAIR Right 2000    [facility]    REPAIR, HERNIA, INGUINAL, WITHOUT HISTORY OF PRIOR REPAIR, AGE 5 YEARS OR OLDER with mesh Right 2/22/2019    Performed by Santino Montemayor MD at [facility] OR [address] [place] [address]       FAMILY HISTORY: Negative for liver disease    SOCIAL HISTORY:     Works w/ fiberglass insulation  Social History     Tobacco Use   Smoking Status Former  Smoker    Packs/day: 3.00    Years: 25.00    Pack years: 75.00    Last attempt to quit: 2012    Years since quittin.1   Smokeless Tobacco Never Used     Alcohol - none for many years; moderate alcohol use in past  Drugs - remote hx nasal drug use -      ROS:   No fever, chills, weight loss, fatigue  No chest pain, palpitations, dyspnea, cough  No abdominal pain, nausea, vomiting, GERD  No skin rashes   No headaches  No lower extremity edema  No depression or anxiety      PHYSICAL EXAM:  Friendly Black or  male, in no acute distress; alert and oriented to person, place and time  VITALS: reviewed  HEENT: Sclerae anicteric.   NECK: Supple   CVS: Regular rate and rhythm. No murmurs  LUNGS: Normal respiratory effort. Clear bilaterally  ABDOMEN: Flat, soft, nontender..   SKIN: Warm and dry. No jaundice, No obvious rashes.   EXTREMITIES: No lower extremity edema  NEURO/PSYCH: Normal gate. Memory intact. Thought and speech pattern appropriate. Behavior normal. No depression or anxiety noted.    RECENT LABS:  Lab Results   Component Value Date    WBC 7.17 2019    HGB 14.3 2019     2019     Lab Results   Component Value Date    INR 1.0 2019     Lab Results   Component Value Date    AST 18 2019    ALT 21 2019    BILITOT 0.7 2019    ALBUMIN 4.1 2019    ALKPHOS 75 2019    CREATININE 1.0 2019    BUN 11 2019     2019    K 4.4 2019       RECENT IMAGING:  Results for orders placed during the hospital encounter of 19   US Abdomen Complete    Narrative EXAMINATION:  US ABDOMEN COMPLETE    CLINICAL HISTORY:  HCV, eval for cirrhosis and portal HTN; Chronic viral hepatitis C    TECHNIQUE:  Complete abdominal ultrasound    COMPARISON:  CT abdomen pelvis 2016    FINDINGS:  The liver measures 16.7 cm and demonstrates homogeneous echotexture.  There is an echogenic focus in the left lobe of the liver  measuring 1.0 x 1.0 x 1.2 cm.  Although hemangioma is favored, malignant etiology cannot be completely excluded.  The extrahepatic common bile duct measures 7 mm, unchanged compared to ultrasound 06/18/2016.  There is no intrahepatic ductal dilatation.    The gallbladder is unremarkable with no evidence of wall thickening, pericholecystic fluid, sonographic Villela's sign, or cholelithiasis.    The visualized portions of the pancreas, IVC, and abdominal aorta are unremarkable.  The right kidney measures 11.3 cm and is unremarkable.  The left kidney measures 11.3 cm and is unremarkable.  The spleen measures 7.8 x 3.5 cm and is unremarkable.    There is no free fluid within the visualized abdomen.      Impression 1.  Left lobe hepatic echogenic focus measuring 1.0 x 1.0 x 1.2 cm.  Although hemangioma is favored, malignant etiology cannot be completely excluded.  We recommend dedicated imaging with CT triple phase liver or MRI liver protocol to further characterize this lesion.    2.  Prominence of the extrahepatic common bile duct, unchanged when compared to ultrasound 06/18/2016.    Electronically signed by resident: Brijesh Cueto  Date:    04/11/2019  Time:    09:01    Electronically signed by: Rubin Nathan MD  Date:    04/11/2019  Time:    09:21         ASSESSMENT  64 y.o. Black or  male with:  1. CHRONIC HEPATITIS C, GENOTYPE 1a - treatment naive  -- FibroScan today 4/11/19 - kPa 4.9, F0-1  -- lacking Immunity to HAV   -- Prior resolved HBV infection    2. LIVER LESION on u/s    3. SEIZURE DISORDER  -- stable on keppra      EDUCATION:  Discussed goal of HCV eradication to prevent progression of liver disease.  Discussed use of Epclusa daily x 12 weeks w/ potential side effects of fatigue and headache.     Reviewed limitations on acid suppressant medications due to DDI w/ Epclusa:  -- Antacids, H2 Receptor Antagonist, PPI - Pt not taking  Patient instructed to contact me if experiencing acid related  symptoms so further recommendations can be made regarding acid suppression therapy.      Herbal / alternative therapies must be discontinued  Discussed importance of medication adherence and risk of treatment failure / viral resistance if not adherent. Pt has verbalized understanding.          PLAN:  1. Labs & imaging  Orders Placed This Encounter   Procedures    MRI Abdomen W WO Contrast    AFP tumor marker     2. Obtain authorization to treat HCV with Epclusi x 12 weeks  -- Rx will be routed to [facility]  -- Patient will notify me of exact treatment start date so appropriate lab f/u can be scheduled.    3. HAV vaccine series to [facility]      **Keppra is allowed while on Epclusa but many other seizure meds are not allowed due to DDIs. Pt aware of this and must contact me if there is any plan to change seizure meds

## 2019-04-11 NOTE — TELEPHONE ENCOUNTER
----- Message from Jennifer B. Scheuermann, PA sent at 4/11/2019 11:10 AM CDT -----  pls tell pt I forgot to mention he needs vaccine for Hepatitis A. I have sent Rx to [facility]. They will likely need to get approval (authorization) from insurance and will call him when it can be done. thanks

## 2019-04-11 NOTE — PROCEDURES
Procedures   Fibroscan Procedure     Name: Cristiano Cruz  Date of Procedure : 2019   :: Jennifer B Scheuermann, PA  Diagnosis: HCV    Probe: XL    Fibroscan readin.9 KPa    Fibrosis:F 0-1     CAP readin dB/m    Steatosis: :S1

## 2019-04-17 RX ORDER — MELOXICAM 15 MG/1
TABLET ORAL
Qty: 30 TABLET | Refills: 0 | Status: SHIPPED | OUTPATIENT
Start: 2019-04-17 | End: 2019-05-13 | Stop reason: SDUPTHER

## 2019-04-23 ENCOUNTER — HOSPITAL ENCOUNTER (OUTPATIENT)
Dept: RADIOLOGY | Facility: HOSPITAL | Age: 64
Discharge: HOME | End: 2019-04-23
Attending: PHYSICIAN ASSISTANT
Payer: MEDICARE

## 2019-04-23 DIAGNOSIS — K76.9 LIVER LESION: ICD-10-CM

## 2019-04-23 PROCEDURE — 74183 MRI ABD W/O CNTR FLWD CNTR: CPT | Mod: 26,,, | Performed by: RADIOLOGY

## 2019-04-23 PROCEDURE — A9585 GADOBUTROL INJECTION: HCPCS | Performed by: PHYSICIAN ASSISTANT

## 2019-04-23 PROCEDURE — 74183 MRI ABDOMEN W WO CONTRAST: ICD-10-PCS | Mod: 26,,, | Performed by: RADIOLOGY

## 2019-04-23 PROCEDURE — 74183 MRI ABD W/O CNTR FLWD CNTR: CPT | Mod: TC

## 2019-04-23 PROCEDURE — 25500020 PHARM REV CODE 255: Performed by: PHYSICIAN ASSISTANT

## 2019-04-23 RX ORDER — GADOBUTROL 604.72 MG/ML
10 INJECTION INTRAVENOUS
Status: COMPLETED | OUTPATIENT
Start: 2019-04-23 | End: 2019-04-23

## 2019-04-23 RX ADMIN — GADOBUTROL 10 ML: 604.72 INJECTION INTRAVENOUS at 12:04

## 2019-05-01 DIAGNOSIS — B18.2 CHRONIC HEPATITIS C WITHOUT HEPATIC COMA: Primary | ICD-10-CM

## 2019-05-01 RX ORDER — VELPATASVIR AND SOFOSBUVIR 100; 400 MG/1; MG/1
1 TABLET, FILM COATED ORAL DAILY
Qty: 28 TABLET | Refills: 2 | Status: SHIPPED | OUTPATIENT
Start: 2019-05-01 | End: 2019-08-22 | Stop reason: ALTCHOICE

## 2019-05-02 ENCOUNTER — TELEPHONE (OUTPATIENT)
Dept: HEPATOLOGY | Facility: CLINIC | Age: 64
End: 2019-05-02

## 2019-05-02 ENCOUNTER — TELEPHONE (OUTPATIENT)
Dept: PHARMACY | Facility: CLINIC | Age: 64
End: 2019-05-02

## 2019-05-02 DIAGNOSIS — B18.2 CHRONIC HEPATITIS C WITHOUT HEPATIC COMA: Primary | ICD-10-CM

## 2019-05-02 DIAGNOSIS — K76.9 LIVER LESION: ICD-10-CM

## 2019-05-02 DIAGNOSIS — K86.2 PANCREATIC CYST: ICD-10-CM

## 2019-05-02 DIAGNOSIS — K86.2 PANCREAS CYST: ICD-10-CM

## 2019-05-02 NOTE — TELEPHONE ENCOUNTER
----- Message from Jessica Martinez MD sent at 5/2/2019  9:35 AM CDT -----  After the age of 65, the guidelines are to repeat in 2 years. But since this patient is only 64 and he may have had this cyst for some time, 1 year follow up MRI is not unreasonable.   ----- Message -----  From: Jennifer B. Scheuermann, PA  Sent: 5/1/2019   5:44 PM  To: Jessica Martinez MD    [facility] Akhil Martinez,  This is a 65 y/o BM w/ HCV, pending treatment.  MRI to eval a liver lesion revealed an incidental 6mm pancreatic cyst.   Radiology is recommending 2 year f/u w/ MRI. Do you think this is sufficient? Or should I schedule an MRI for further characterization?  Thank you,  Shayna

## 2019-05-02 NOTE — TELEPHONE ENCOUNTER
Pls call pt:  MRI abdomen 4/23 showed   1. spot on liver looks like a hemangioma (collection of blood / blood vessels.) We will get one more picture of this w/ ultrasound in 6 months to make sure it is stable.   2. Cyst in pancreas. I had the pancreas specialist review this to see if any additional follow up or eval is needed. He recommends an MRI in 1 year to make sure it is not changing.    pls schedule:  1. AFP, CMP, U/S abdomen - 10/2019  2. MRI abdomen - 4/2020    thanks

## 2019-05-02 NOTE — TELEPHONE ENCOUNTER
Informed patient that [facility] received prescription for Epclusa and prior authorization is required.  [facility] will be back in touch once insurance determination is received.

## 2019-05-02 NOTE — TELEPHONE ENCOUNTER
----- Message from Carlitos Veliz MD sent at 5/2/2019  3:08 PM CDT -----  US in 6 months only    ----- Message -----  From: Jennifer B. Scheuermann, PA  Sent: 5/1/2019   5:50 PM  To: Carlitos Veliz MD    65 y/o BM w/ HCV, pending HCV treatment  F0-1 on fibroscan  U/S -- 1.2cm echogenic focus, favored to represent hemangioma, further eval recommended  MRI -- 1.6cm lesion w/ T2 hyperintensity and peripheral, nodular, discontinuous enhancement with central filling on delayed post images, compatible with a hemangioma  AFP normal.  Do you think he needs further eval for this liver lesion?  (I've sent Dr Akhil Martinez a msg to see what he recommends for the pancreatic cyst b/c I wasn't sure about the 2 year MRI surveillance)

## 2019-05-08 NOTE — TELEPHONE ENCOUNTER
Documentation Only:  Faxed prior authorization for Mavyret to insurance company for review on 05.08.2019 as URGENT AKF

## 2019-05-10 NOTE — TELEPHONE ENCOUNTER
Documentation Only:    Prior Authorization for Epclusa has been approved until 08/09/2019    Patient co-pay: $8.50    Patient Assistance IS NOT required.    Forwarding to clinical pharmacist for consult and shipment.    JACQUIE 11:06am

## 2019-05-13 ENCOUNTER — TELEPHONE (OUTPATIENT)
Dept: PHARMACY | Facility: CLINIC | Age: 64
End: 2019-05-13

## 2019-05-13 RX ORDER — MELOXICAM 15 MG/1
TABLET ORAL
Qty: 30 TABLET | Refills: 0 | Status: SHIPPED | OUTPATIENT
Start: 2019-05-13 | End: 2019-06-15 | Stop reason: SDUPTHER

## 2019-05-13 NOTE — TELEPHONE ENCOUNTER
Patient called for initial consult and ship on Epclusa - na lvm     Kit Vincent, HU.Ph., AAHIVP  Clinical Pharmacist, HIV/HCV  [facility]  Phone: [phone]

## 2019-05-17 NOTE — TELEPHONE ENCOUNTER
Epclusa initial consultation attempted. Mr. Cruz plans to pick-up on 5/22 at [facility]. Will review medication and [facility] services at pick-up.

## 2019-05-19 NOTE — PROGRESS NOTES
Subjective:      Patient ID: Cristiano Cruz is a 64 y.o. male.    Chief Complaint: No chief complaint on file.    HPI:   Here for follow up.   Last seen 2/8 per Dr. Son.   Since last seen, reports, no issues or problems. Here today with his supportive wife. Has not taken any prescribed medications today yet.       Review of Systems:  Eyes: denies visual changes at this time denies floaters   ENT: no nasal congestion or sore throat  Respiratory: no cough or shorness of breath  Cardiovascular: no chest pain or palpitations  Gastrointestinal: no nausea or vomiting, no abdominal pain or change in bowel habits  Genitourinary: no hematuria or dysuria; denies frequency  Hematologic/Lymphatic: no easy bruising or lymphadenopathy  Musculoskeletal: no arthralgias or myalgias  Neurological: no seizures or tremors  Endocrine: no heat or cold intolerance    Objective:   Vitals:  There were no vitals filed for this visit.  Wt Readings from Last 1 Encounters:   04/11/19 0921 79.4 kg (175 lb 0.7 oz)     There is no height or weight on file to calculate BMI.     Wt Readings from Last 3 Encounters:   05/22/19 79 kg (174 lb 2.6 oz)   04/11/19 79.4 kg (175 lb 0.7 oz)   04/02/19 79.8 kg (176 lb)     Temp Readings from Last 3 Encounters:   04/11/19 96.2 °F (35.7 °C)   03/14/19 97.6 °F (36.4 °C) (Oral)   03/11/19 98.3 °F (36.8 °C)     BP Readings from Last 3 Encounters:   05/22/19 130/84   04/11/19 (!) 194/99   04/02/19 (!) 152/85     Pulse Readings from Last 3 Encounters:   05/22/19 78   04/11/19 67   04/02/19 62       Physical Exam:  General: Well developed, well nourished. No distress.  HEENT: Head is normocephalic, atraumatic; ears are normal.   Eyes: Clear conjunctiva.  Neck: Supple, symmetrical neck; trachea midline.  Lungs: Clear to auscultation bilaterally and normal respiratory effort.  Cardiovascular: Heart with regular rate and rhythm. No murmurs, gallops or rubs  Extremities: No LE edema. Pulses 2+ and symmetric.   Abdomen:  Abdomen is soft, non-tender non-distended with normal bowel sounds.  Skin: Skin color, texture, turgor normal. No rashes.  Musculoskeletal: Normal gait.   Lymph Nodes: No cervical or supraclavicular adenopathy.  Neurologic: Normal strength and tone. No focal numbness or weakness.   Psychiatric: Not depressed.    Laboratory:  Lab Results   Component Value Date    WBC 7.17 03/14/2019    HGB 14.3 03/14/2019    HCT 44.7 03/14/2019     03/14/2019    CHOL 185 01/31/2019    TRIG 125 01/31/2019    HDL 46 01/31/2019    ALT 21 03/14/2019    AST 18 03/14/2019     03/14/2019    K 4.4 03/14/2019     03/14/2019    CREATININE 1.0 03/14/2019    BUN 11 03/14/2019    CO2 27 03/14/2019    TSH 2.887 01/26/2019    PSA 0.16 01/31/2019    INR 1.0 03/14/2019    HGBA1C 6.5 (H) 01/26/2019       Assessment:     1) Seizure Disorder 1/2019; last seen by Dr. Devries in 4/2019)  2) Hep C  3) DM II  4) s/p Inguinal Hernia Repair  5) Pancreatic Cyst on most recent screening MRI for HCV ;repeat in 2 years     Plan:       Seizure Disorder:    ` continue Keppra  ` Neuro f/u with Dr. Devries, 7/2      DM II:   *Most recent A1c: (1/2019) 6.5%, reflective of optimal OP control   Home Range: (Fasting: < 180s, reported)   ` continue Januvia 25      Pancreatic Cyst on most recent screening MRI for HCV *Per guidelines, recommended for repeat in 2 years  *Suggest, given his history, to repeat in 12 months; deferred to Hep who is aware and following.         Hep C:   *Repeat MRI in 2020  *AFP and US in 10/2019 (every 6 months)  ` Plan per Hep:   Epclusa x 12 weeks (awaitiing approval per [facility])      Progressive Cognitive Delay / Memory Difficulty:   ` amb referral to Neuro        Future Appointments   Date Time Provider Department Center   7/2/2019  1:30 PM Malina Lino MD [facility] NEURO [address]   8/6/2019 11:30 AM Srikanth Son MD [facility] IM [address] [facility]            Medication List           Accurate as of 5/22/19 11:24 AM. If  you have any questions, ask your nurse or doctor.               CONTINUE taking these medications    blood sugar diagnostic Strp  Use 2 (two) times daily.     EPCLUSA 400-100 mg Tab  Generic drug:  sofosbuvir-velpatasvir  Take 1 tablet by mouth once daily.     lancets Misc  Use twice daily as directed     levETIRAcetam 500 MG Tab  Commonly known as:  KEPPRA  Take 1 tablet (500 mg total) by mouth 2 (two) times daily.     meloxicam 15 MG tablet  Commonly known as:  MOBIC  TAKE 1 TABLET(15 MG) BY MOUTH EVERY DAY     SITagliptin 25 MG Tab  Commonly known as:  JANUVIA  Take 1 tablet (25 mg total) by mouth once daily.            Signing Physician:  CLARICE Phillips

## 2019-05-22 ENCOUNTER — OFFICE VISIT (OUTPATIENT)
Dept: INTERNAL MEDICINE | Facility: CLINIC | Age: 64
End: 2019-05-22
Payer: MEDICARE

## 2019-05-22 VITALS
WEIGHT: 174.19 LBS | BODY MASS INDEX: 26.4 KG/M2 | SYSTOLIC BLOOD PRESSURE: 130 MMHG | DIASTOLIC BLOOD PRESSURE: 84 MMHG | OXYGEN SATURATION: 95 % | HEART RATE: 78 BPM | HEIGHT: 68 IN

## 2019-05-22 DIAGNOSIS — E11.29 TYPE 2 DIABETES MELLITUS WITH MICROALBUMINURIA, WITHOUT LONG-TERM CURRENT USE OF INSULIN: ICD-10-CM

## 2019-05-22 DIAGNOSIS — K86.2 PANCREATIC CYST: ICD-10-CM

## 2019-05-22 DIAGNOSIS — R80.9 TYPE 2 DIABETES MELLITUS WITH MICROALBUMINURIA, WITHOUT LONG-TERM CURRENT USE OF INSULIN: ICD-10-CM

## 2019-05-22 DIAGNOSIS — G40.409 GENERALIZED TONIC-CLONIC SEIZURE: Primary | ICD-10-CM

## 2019-05-22 DIAGNOSIS — R76.8 HEPATITIS C ANTIBODY TEST POSITIVE: ICD-10-CM

## 2019-05-22 PROCEDURE — 1101F PT FALLS ASSESS-DOCD LE1/YR: CPT | Mod: CPTII,S$GLB,, | Performed by: NURSE PRACTITIONER

## 2019-05-22 PROCEDURE — 99999 PR PBB SHADOW E&M-EST. PATIENT-LVL IV: ICD-10-PCS | Mod: PBBFAC,,, | Performed by: NURSE PRACTITIONER

## 2019-05-22 PROCEDURE — 99214 PR OFFICE/OUTPT VISIT, EST, LEVL IV, 30-39 MIN: ICD-10-PCS | Mod: S$GLB,,, | Performed by: NURSE PRACTITIONER

## 2019-05-22 PROCEDURE — 1101F PR PT FALLS ASSESS DOC 0-1 FALLS W/OUT INJ PAST YR: ICD-10-PCS | Mod: CPTII,S$GLB,, | Performed by: NURSE PRACTITIONER

## 2019-05-22 PROCEDURE — 3044F HG A1C LEVEL LT 7.0%: CPT | Mod: CPTII,S$GLB,, | Performed by: NURSE PRACTITIONER

## 2019-05-22 PROCEDURE — 99999 PR PBB SHADOW E&M-EST. PATIENT-LVL IV: CPT | Mod: PBBFAC,,, | Performed by: NURSE PRACTITIONER

## 2019-05-22 PROCEDURE — 99214 OFFICE O/P EST MOD 30 MIN: CPT | Mod: S$GLB,,, | Performed by: NURSE PRACTITIONER

## 2019-05-22 PROCEDURE — 3044F PR MOST RECENT HEMOGLOBIN A1C LEVEL <7.0%: ICD-10-PCS | Mod: CPTII,S$GLB,, | Performed by: NURSE PRACTITIONER

## 2019-05-22 NOTE — TELEPHONE ENCOUNTER
Initial Medication Consultation: Epclusa    Initial Epclusa consult completed on . Epclusa picked up at [facility] on  via [employer]. $8.50 copay. Patient will start Epclusa on . Address confirmed, CC on file. Confirmed 2 patient identifiers - name and . Therapy Appropriate.     Epclusa 400/100mg- Take one tablet by mouth daily x 12 weeks  Counseling was reviewed:   1. Patient MUST take Epclusa at the SAME time every day.   2. Patient MUST avoid acid reducers without consulting with myself or provider first.    3. Potential Side effects include: nausea, headaches, insomnia and fatigue.   Headache: Patient may treat with OTC remedies. If Tylenol is used, dose should not exceed 2000mg per day.    4. Medication list reviewed. No DDIs or allergies noted. Patient MUST contact myself or provider prior to starting any new OTC, herbal, or prescription drugs to avoid potential DDIs.    DDI: Medication list reviewed and potential DDIs addressed.    Discussed the importance of staying well hydrated while on therapy. Compliance stressed - patient to take missed doses as soon as remembered, but NOT to take 2 doses in one day. Patient will report questions or concerns to myself or practitioner. Patient verbalizes understanding. Patient plans to start Epclusa on .  Consultation included: indication; goals of treatment; administration; storage and handling; side effects; how to handle side effects; the importance of compliance; how to handle missed doses; the importance of laboratory monitoring; the importance of keeping all follow up appointments.  Patient understands to report any medication changes to [facility] and provider. All questions answered and addressed to patients satisfaction. I will f/u with her in 1 week from start, [facility] to contact patient in 3 weeks for refills.     Mary Lowery, PharmD, [facility]  Clinical Pharmacist   [facility]  Phone: [phone]

## 2019-05-23 ENCOUNTER — EPISODE CHANGES (OUTPATIENT)
Dept: HEPATOLOGY | Facility: CLINIC | Age: 64
End: 2019-05-23

## 2019-05-23 ENCOUNTER — TELEPHONE (OUTPATIENT)
Dept: HEPATOLOGY | Facility: CLINIC | Age: 64
End: 2019-05-23

## 2019-05-23 DIAGNOSIS — B18.2 CHRONIC HEPATITIS C WITHOUT HEPATIC COMA: Primary | ICD-10-CM

## 2019-05-23 NOTE — TELEPHONE ENCOUNTER
Pt beginning 12 weeks of Epclusa on 5/23/19  Elizabeth 1A, Tx naive  F 0-1      Pls schedule:  - CMP, HCV RNA, HBV DNA at week 6 - 7/3  - CMP, HCV RNA, HBV DNA at week 12 - end of treatment - 8/14  - CMP, HCV RNA, HBV DNA - SVR12 - 11/6

## 2019-05-30 ENCOUNTER — TELEPHONE (OUTPATIENT)
Dept: PHARMACY | Facility: CLINIC | Age: 64
End: 2019-05-30

## 2019-05-30 NOTE — TELEPHONE ENCOUNTER
Initial clinical follow-up conducted for Epclusa - Name/ confirmed.  Confirmed patient started medication as instructed on .  Patient confirms that he is taking Epclusa every day at 6 AM.  Patient denies skipping or missing doses.  Patient reports experiencing no side effects since beginning therapy. Reviewed that Tums can be taken, if needed, 4 hours after Epclusa (after 10 AM). Patient reports no new medications, otc remedies, or allergies. Patient reminded of lab appointments.  Patient counseled on importance of maintaining adherence and keeping lab appointments which were scheduled.  Advised to call [facility] and provider if any issues arise.  No questions or concerns. Aware [facility] will call for refills when she has 7 days of medication on hand.

## 2019-06-13 ENCOUNTER — TELEPHONE (OUTPATIENT)
Dept: PHARMACY | Facility: CLINIC | Age: 64
End: 2019-06-13

## 2019-06-17 ENCOUNTER — TELEPHONE (OUTPATIENT)
Dept: PHARMACY | Facility: CLINIC | Age: 64
End: 2019-06-17

## 2019-06-17 RX ORDER — MELOXICAM 15 MG/1
TABLET ORAL
Qty: 30 TABLET | Refills: 0 | Status: SHIPPED | OUTPATIENT
Start: 2019-06-17 | End: 2019-07-16 | Stop reason: SDUPTHER

## 2019-06-17 NOTE — TELEPHONE ENCOUNTER
Epclusa (2 of 3)  refill confirmed. We will ship Epclusa  refill on  via [employer] to arrive on . $0.00 copay- 004. Confirmed 2 patient identifiers - name and .       Spoke with patient's wife Ms Elizondo, per wife patient has 2 doses of Epclusa remaining and takes it around 6am daily. Ms Elizondo expressed concern because patient only had 2 doses and we had not shipped medication, explained to Ms Elizondo we must speak with someone prior to shipping, if we attempted to set -up at refill and got no answer,  we keep calling, but we do not ship without first speaking to someone. Ms Elizondo verbalized understanding. Reports  patient reports they are not having any side effects so far. No missed doses, no new medications, no new allergies or health conditions reported at this time.  All questions answered and addressed to patients satisfaction.

## 2019-06-26 ENCOUNTER — TELEPHONE (OUTPATIENT)
Dept: NEUROLOGY | Facility: CLINIC | Age: 64
End: 2019-06-26

## 2019-06-26 NOTE — TELEPHONE ENCOUNTER
Spoke to the patient to reschedule his lab appointment per his request due to he had to go to work the day before and the day after the holiday to get paid. The patient Neurology appointment was rescheduled to 7/19 at 1:30. An appointment slip will be mailed to him.

## 2019-06-28 ENCOUNTER — HOSPITAL ENCOUNTER (OUTPATIENT)
Facility: HOSPITAL | Age: 64
Discharge: HOME | End: 2019-06-29
Attending: EMERGENCY MEDICINE | Admitting: INTERNAL MEDICINE
Payer: MEDICARE

## 2019-06-28 DIAGNOSIS — R76.8 HEPATITIS C ANTIBODY TEST POSITIVE: Chronic | ICD-10-CM

## 2019-06-28 DIAGNOSIS — R03.0 ELEVATED BLOOD PRESSURE READING WITHOUT DIAGNOSIS OF HYPERTENSION: ICD-10-CM

## 2019-06-28 DIAGNOSIS — N20.0 KIDNEY STONE ON LEFT SIDE: Primary | ICD-10-CM

## 2019-06-28 DIAGNOSIS — E11.29 TYPE 2 DIABETES MELLITUS WITH MICROALBUMINURIA, WITHOUT LONG-TERM CURRENT USE OF INSULIN: Chronic | ICD-10-CM

## 2019-06-28 DIAGNOSIS — R80.9 TYPE 2 DIABETES MELLITUS WITH MICROALBUMINURIA, WITHOUT LONG-TERM CURRENT USE OF INSULIN: Chronic | ICD-10-CM

## 2019-06-28 DIAGNOSIS — G40.909 SEIZURE DISORDER: ICD-10-CM

## 2019-06-28 LAB
ALBUMIN SERPL BCP-MCNC: 4 G/DL (ref 3.5–5.2)
ALP SERPL-CCNC: 61 U/L (ref 55–135)
ALT SERPL W/O P-5'-P-CCNC: 9 U/L (ref 10–44)
ANION GAP SERPL CALC-SCNC: 10 MMOL/L (ref 8–16)
AST SERPL-CCNC: 15 U/L (ref 10–40)
BACTERIA #/AREA URNS AUTO: ABNORMAL /HPF
BASOPHILS # BLD AUTO: 0.04 K/UL (ref 0–0.2)
BASOPHILS NFR BLD: 0.6 % (ref 0–1.9)
BILIRUB SERPL-MCNC: 0.2 MG/DL (ref 0.1–1)
BILIRUB UR QL STRIP: NEGATIVE
BUN SERPL-MCNC: 12 MG/DL (ref 8–23)
CALCIUM SERPL-MCNC: 9.5 MG/DL (ref 8.7–10.5)
CHLORIDE SERPL-SCNC: 108 MMOL/L (ref 95–110)
CLARITY UR REFRACT.AUTO: ABNORMAL
CO2 SERPL-SCNC: 26 MMOL/L (ref 23–29)
COLOR UR AUTO: YELLOW
CREAT SERPL-MCNC: 0.9 MG/DL (ref 0.5–1.4)
DIFFERENTIAL METHOD: NORMAL
EOSINOPHIL # BLD AUTO: 0.2 K/UL (ref 0–0.5)
EOSINOPHIL NFR BLD: 2.7 % (ref 0–8)
ERYTHROCYTE [DISTWIDTH] IN BLOOD BY AUTOMATED COUNT: 14.5 % (ref 11.5–14.5)
EST. GFR  (AFRICAN AMERICAN): >60 ML/MIN/1.73 M^2
EST. GFR  (NON AFRICAN AMERICAN): >60 ML/MIN/1.73 M^2
GLUCOSE SERPL-MCNC: 110 MG/DL (ref 70–110)
GLUCOSE UR QL STRIP: NEGATIVE
HCT VFR BLD AUTO: 43.4 % (ref 40–54)
HGB BLD-MCNC: 14.2 G/DL (ref 14–18)
HGB UR QL STRIP: ABNORMAL
IMM GRANULOCYTES # BLD AUTO: 0.01 K/UL (ref 0–0.04)
IMM GRANULOCYTES NFR BLD AUTO: 0.1 % (ref 0–0.5)
KETONES UR QL STRIP: NEGATIVE
LEUKOCYTE ESTERASE UR QL STRIP: ABNORMAL
LIPASE SERPL-CCNC: 166 U/L (ref 4–60)
LYMPHOCYTES # BLD AUTO: 2 K/UL (ref 1–4.8)
LYMPHOCYTES NFR BLD: 28.4 % (ref 18–48)
MCH RBC QN AUTO: 27.7 PG (ref 27–31)
MCHC RBC AUTO-ENTMCNC: 32.7 G/DL (ref 32–36)
MCV RBC AUTO: 85 FL (ref 82–98)
MICROSCOPIC COMMENT: ABNORMAL
MONOCYTES # BLD AUTO: 0.4 K/UL (ref 0.3–1)
MONOCYTES NFR BLD: 6.2 % (ref 4–15)
NEUTROPHILS # BLD AUTO: 4.4 K/UL (ref 1.8–7.7)
NEUTROPHILS NFR BLD: 62 % (ref 38–73)
NITRITE UR QL STRIP: NEGATIVE
NRBC BLD-RTO: 0 /100 WBC
PH UR STRIP: 5 [PH] (ref 5–8)
PLATELET # BLD AUTO: 199 K/UL (ref 150–350)
PMV BLD AUTO: 12.6 FL (ref 9.2–12.9)
POTASSIUM SERPL-SCNC: 4.9 MMOL/L (ref 3.5–5.1)
PROT SERPL-MCNC: 8.3 G/DL (ref 6–8.4)
PROT UR QL STRIP: NEGATIVE
RBC # BLD AUTO: 5.12 M/UL (ref 4.6–6.2)
RBC #/AREA URNS AUTO: 15 /HPF (ref 0–4)
SODIUM SERPL-SCNC: 144 MMOL/L (ref 136–145)
SP GR UR STRIP: 1.02 (ref 1–1.03)
SQUAMOUS #/AREA URNS AUTO: 5 /HPF
URN SPEC COLLECT METH UR: ABNORMAL
WBC # BLD AUTO: 7.14 K/UL (ref 3.9–12.7)
WBC #/AREA URNS AUTO: 29 /HPF (ref 0–5)

## 2019-06-28 PROCEDURE — 63600175 PHARM REV CODE 636 W HCPCS: Performed by: EMERGENCY MEDICINE

## 2019-06-28 PROCEDURE — 99285 PR EMERGENCY DEPT VISIT,LEVEL V: ICD-10-PCS | Mod: ,,, | Performed by: EMERGENCY MEDICINE

## 2019-06-28 PROCEDURE — 87086 URINE CULTURE/COLONY COUNT: CPT

## 2019-06-28 PROCEDURE — 99220 PR INITIAL OBSERVATION CARE,LEVL III: ICD-10-PCS | Mod: ,,, | Performed by: PHYSICIAN ASSISTANT

## 2019-06-28 PROCEDURE — 96374 THER/PROPH/DIAG INJ IV PUSH: CPT

## 2019-06-28 PROCEDURE — 87186 SC STD MICRODIL/AGAR DIL: CPT

## 2019-06-28 PROCEDURE — 99220 PR INITIAL OBSERVATION CARE,LEVL III: CPT | Mod: ,,, | Performed by: PHYSICIAN ASSISTANT

## 2019-06-28 PROCEDURE — 25000003 PHARM REV CODE 250: Performed by: EMERGENCY MEDICINE

## 2019-06-28 PROCEDURE — 85025 COMPLETE CBC W/AUTO DIFF WBC: CPT

## 2019-06-28 PROCEDURE — 96375 TX/PRO/DX INJ NEW DRUG ADDON: CPT

## 2019-06-28 PROCEDURE — 87077 CULTURE AEROBIC IDENTIFY: CPT

## 2019-06-28 PROCEDURE — G0378 HOSPITAL OBSERVATION PER HR: HCPCS

## 2019-06-28 PROCEDURE — 96361 HYDRATE IV INFUSION ADD-ON: CPT

## 2019-06-28 PROCEDURE — 87088 URINE BACTERIA CULTURE: CPT

## 2019-06-28 PROCEDURE — 99285 EMERGENCY DEPT VISIT HI MDM: CPT | Mod: 25

## 2019-06-28 PROCEDURE — 83690 ASSAY OF LIPASE: CPT

## 2019-06-28 PROCEDURE — 80053 COMPREHEN METABOLIC PANEL: CPT

## 2019-06-28 PROCEDURE — 81001 URINALYSIS AUTO W/SCOPE: CPT

## 2019-06-28 PROCEDURE — 99285 EMERGENCY DEPT VISIT HI MDM: CPT | Mod: ,,, | Performed by: EMERGENCY MEDICINE

## 2019-06-28 PROCEDURE — 87147 CULTURE TYPE IMMUNOLOGIC: CPT

## 2019-06-28 RX ORDER — SODIUM CHLORIDE 9 MG/ML
500 INJECTION, SOLUTION INTRAVENOUS
Status: COMPLETED | OUTPATIENT
Start: 2019-06-28 | End: 2019-06-28

## 2019-06-28 RX ORDER — TAMSULOSIN HYDROCHLORIDE 0.4 MG/1
0.4 CAPSULE ORAL
Status: COMPLETED | OUTPATIENT
Start: 2019-06-28 | End: 2019-06-28

## 2019-06-28 RX ORDER — ONDANSETRON 2 MG/ML
4 INJECTION INTRAMUSCULAR; INTRAVENOUS
Status: COMPLETED | OUTPATIENT
Start: 2019-06-28 | End: 2019-06-28

## 2019-06-28 RX ORDER — CEFTRIAXONE 1 G/1
1 INJECTION, POWDER, FOR SOLUTION INTRAMUSCULAR; INTRAVENOUS
Status: COMPLETED | OUTPATIENT
Start: 2019-06-28 | End: 2019-06-28

## 2019-06-28 RX ORDER — MORPHINE SULFATE 4 MG/ML
4 INJECTION, SOLUTION INTRAMUSCULAR; INTRAVENOUS
Status: COMPLETED | OUTPATIENT
Start: 2019-06-28 | End: 2019-06-28

## 2019-06-28 RX ADMIN — SODIUM CHLORIDE 500 ML: 0.9 INJECTION, SOLUTION INTRAVENOUS at 08:06

## 2019-06-28 RX ADMIN — ONDANSETRON 4 MG: 2 INJECTION INTRAMUSCULAR; INTRAVENOUS at 08:06

## 2019-06-28 RX ADMIN — MORPHINE SULFATE 4 MG: 4 INJECTION INTRAVENOUS at 08:06

## 2019-06-28 RX ADMIN — TAMSULOSIN HYDROCHLORIDE 0.4 MG: 0.4 CAPSULE ORAL at 10:06

## 2019-06-28 RX ADMIN — CEFTRIAXONE SODIUM 1 G: 1 INJECTION, POWDER, FOR SOLUTION INTRAMUSCULAR; INTRAVENOUS at 10:06

## 2019-06-29 VITALS
RESPIRATION RATE: 18 BRPM | DIASTOLIC BLOOD PRESSURE: 95 MMHG | BODY MASS INDEX: 26.65 KG/M2 | HEIGHT: 68 IN | HEART RATE: 78 BPM | SYSTOLIC BLOOD PRESSURE: 148 MMHG | TEMPERATURE: 98 F | WEIGHT: 175.81 LBS | OXYGEN SATURATION: 98 %

## 2019-06-29 PROBLEM — E11.29 TYPE 2 DIABETES MELLITUS WITH MICROALBUMINURIA, WITHOUT LONG-TERM CURRENT USE OF INSULIN: Chronic | Status: ACTIVE | Noted: 2019-02-11

## 2019-06-29 PROBLEM — R76.8 HEPATITIS C ANTIBODY TEST POSITIVE: Chronic | Status: ACTIVE | Noted: 2019-02-08

## 2019-06-29 PROBLEM — R80.9 TYPE 2 DIABETES MELLITUS WITH MICROALBUMINURIA, WITHOUT LONG-TERM CURRENT USE OF INSULIN: Chronic | Status: ACTIVE | Noted: 2019-02-11

## 2019-06-29 PROBLEM — G40.909 SEIZURE DISORDER: Status: ACTIVE | Noted: 2019-06-29

## 2019-06-29 PROBLEM — R03.0 ELEVATED BLOOD PRESSURE READING WITHOUT DIAGNOSIS OF HYPERTENSION: Status: ACTIVE | Noted: 2019-06-29

## 2019-06-29 LAB
ALBUMIN SERPL BCP-MCNC: 3.5 G/DL (ref 3.5–5.2)
ALP SERPL-CCNC: 52 U/L (ref 55–135)
ALT SERPL W/O P-5'-P-CCNC: 8 U/L (ref 10–44)
ANION GAP SERPL CALC-SCNC: 8 MMOL/L (ref 8–16)
AST SERPL-CCNC: 13 U/L (ref 10–40)
BASOPHILS # BLD AUTO: 0.03 K/UL (ref 0–0.2)
BASOPHILS NFR BLD: 0.4 % (ref 0–1.9)
BILIRUB SERPL-MCNC: 0.3 MG/DL (ref 0.1–1)
BUN SERPL-MCNC: 10 MG/DL (ref 8–23)
CALCIUM SERPL-MCNC: 9.1 MG/DL (ref 8.7–10.5)
CHLORIDE SERPL-SCNC: 106 MMOL/L (ref 95–110)
CO2 SERPL-SCNC: 28 MMOL/L (ref 23–29)
CREAT SERPL-MCNC: 0.9 MG/DL (ref 0.5–1.4)
DIFFERENTIAL METHOD: ABNORMAL
EOSINOPHIL # BLD AUTO: 0.2 K/UL (ref 0–0.5)
EOSINOPHIL NFR BLD: 2.7 % (ref 0–8)
ERYTHROCYTE [DISTWIDTH] IN BLOOD BY AUTOMATED COUNT: 14.6 % (ref 11.5–14.5)
EST. GFR  (AFRICAN AMERICAN): >60 ML/MIN/1.73 M^2
EST. GFR  (NON AFRICAN AMERICAN): >60 ML/MIN/1.73 M^2
ESTIMATED AVG GLUCOSE: 140 MG/DL (ref 68–131)
GLUCOSE SERPL-MCNC: 116 MG/DL (ref 70–110)
HBA1C MFR BLD HPLC: 6.5 % (ref 4–5.6)
HCT VFR BLD AUTO: 44.1 % (ref 40–54)
HGB BLD-MCNC: 13.7 G/DL (ref 14–18)
IMM GRANULOCYTES # BLD AUTO: 0.02 K/UL (ref 0–0.04)
IMM GRANULOCYTES NFR BLD AUTO: 0.3 % (ref 0–0.5)
LYMPHOCYTES # BLD AUTO: 2.4 K/UL (ref 1–4.8)
LYMPHOCYTES NFR BLD: 32.3 % (ref 18–48)
MAGNESIUM SERPL-MCNC: 1.7 MG/DL (ref 1.6–2.6)
MCH RBC QN AUTO: 27.4 PG (ref 27–31)
MCHC RBC AUTO-ENTMCNC: 31.1 G/DL (ref 32–36)
MCV RBC AUTO: 88 FL (ref 82–98)
MONOCYTES # BLD AUTO: 0.5 K/UL (ref 0.3–1)
MONOCYTES NFR BLD: 6.3 % (ref 4–15)
NEUTROPHILS # BLD AUTO: 4.3 K/UL (ref 1.8–7.7)
NEUTROPHILS NFR BLD: 58 % (ref 38–73)
NRBC BLD-RTO: 0 /100 WBC
PHOSPHATE SERPL-MCNC: 4.1 MG/DL (ref 2.7–4.5)
PLATELET # BLD AUTO: 166 K/UL (ref 150–350)
PMV BLD AUTO: 11.9 FL (ref 9.2–12.9)
POCT GLUCOSE: 90 MG/DL (ref 70–110)
POTASSIUM SERPL-SCNC: 3.5 MMOL/L (ref 3.5–5.1)
PROT SERPL-MCNC: 7.3 G/DL (ref 6–8.4)
RBC # BLD AUTO: 5 M/UL (ref 4.6–6.2)
SODIUM SERPL-SCNC: 142 MMOL/L (ref 136–145)
WBC # BLD AUTO: 7.47 K/UL (ref 3.9–12.7)

## 2019-06-29 PROCEDURE — 99217 PR OBSERVATION CARE DISCHARGE: CPT | Mod: ,,, | Performed by: INTERNAL MEDICINE

## 2019-06-29 PROCEDURE — 85025 COMPLETE CBC W/AUTO DIFF WBC: CPT

## 2019-06-29 PROCEDURE — 80053 COMPREHEN METABOLIC PANEL: CPT

## 2019-06-29 PROCEDURE — 36415 COLL VENOUS BLD VENIPUNCTURE: CPT

## 2019-06-29 PROCEDURE — G0378 HOSPITAL OBSERVATION PER HR: HCPCS

## 2019-06-29 PROCEDURE — 83735 ASSAY OF MAGNESIUM: CPT

## 2019-06-29 PROCEDURE — 84100 ASSAY OF PHOSPHORUS: CPT

## 2019-06-29 PROCEDURE — 83036 HEMOGLOBIN GLYCOSYLATED A1C: CPT

## 2019-06-29 PROCEDURE — 99217 PR OBSERVATION CARE DISCHARGE: ICD-10-PCS | Mod: ,,, | Performed by: INTERNAL MEDICINE

## 2019-06-29 PROCEDURE — 25000003 PHARM REV CODE 250: Performed by: PHYSICIAN ASSISTANT

## 2019-06-29 RX ORDER — IBUPROFEN 200 MG
24 TABLET ORAL
Status: DISCONTINUED | OUTPATIENT
Start: 2019-06-29 | End: 2019-06-29 | Stop reason: HOSPADM

## 2019-06-29 RX ORDER — GLUCAGON 1 MG
1 KIT INJECTION
Status: DISCONTINUED | OUTPATIENT
Start: 2019-06-29 | End: 2019-06-29

## 2019-06-29 RX ORDER — INSULIN ASPART 100 [IU]/ML
0-5 INJECTION, SOLUTION INTRAVENOUS; SUBCUTANEOUS
Status: DISCONTINUED | OUTPATIENT
Start: 2019-06-29 | End: 2019-06-29 | Stop reason: HOSPADM

## 2019-06-29 RX ORDER — OXYCODONE HYDROCHLORIDE 5 MG/1
5 TABLET ORAL EVERY 6 HOURS PRN
Status: DISCONTINUED | OUTPATIENT
Start: 2019-06-29 | End: 2019-06-29 | Stop reason: HOSPADM

## 2019-06-29 RX ORDER — ACETAMINOPHEN 325 MG/1
650 TABLET ORAL EVERY 4 HOURS PRN
Status: DISCONTINUED | OUTPATIENT
Start: 2019-06-29 | End: 2019-06-29 | Stop reason: HOSPADM

## 2019-06-29 RX ORDER — LEVETIRACETAM 500 MG/1
500 TABLET ORAL 2 TIMES DAILY
Status: DISCONTINUED | OUTPATIENT
Start: 2019-06-29 | End: 2019-06-29 | Stop reason: HOSPADM

## 2019-06-29 RX ORDER — RAMELTEON 8 MG/1
8 TABLET ORAL NIGHTLY PRN
Status: DISCONTINUED | OUTPATIENT
Start: 2019-06-29 | End: 2019-06-29 | Stop reason: HOSPADM

## 2019-06-29 RX ORDER — SODIUM CHLORIDE 0.9 % (FLUSH) 0.9 %
5 SYRINGE (ML) INJECTION
Status: DISCONTINUED | OUTPATIENT
Start: 2019-06-29 | End: 2019-06-29 | Stop reason: HOSPADM

## 2019-06-29 RX ORDER — SODIUM CHLORIDE 9 MG/ML
1000 INJECTION, SOLUTION INTRAVENOUS CONTINUOUS
Status: DISCONTINUED | OUTPATIENT
Start: 2019-06-29 | End: 2019-06-29 | Stop reason: HOSPADM

## 2019-06-29 RX ORDER — IBUPROFEN 200 MG
16 TABLET ORAL
Status: DISCONTINUED | OUTPATIENT
Start: 2019-06-29 | End: 2019-06-29 | Stop reason: HOSPADM

## 2019-06-29 RX ORDER — SILODOSIN 4 MG/1
4 CAPSULE ORAL DAILY
Qty: 30 CAPSULE | Refills: 0 | Status: SHIPPED | OUTPATIENT
Start: 2019-06-29 | End: 2019-08-19 | Stop reason: SDUPTHER

## 2019-06-29 RX ORDER — ONDANSETRON 8 MG/1
8 TABLET, ORALLY DISINTEGRATING ORAL EVERY 8 HOURS PRN
Status: DISCONTINUED | OUTPATIENT
Start: 2019-06-29 | End: 2019-06-29 | Stop reason: HOSPADM

## 2019-06-29 RX ORDER — IPRATROPIUM BROMIDE AND ALBUTEROL SULFATE 2.5; .5 MG/3ML; MG/3ML
3 SOLUTION RESPIRATORY (INHALATION) EVERY 4 HOURS PRN
Status: DISCONTINUED | OUTPATIENT
Start: 2019-06-29 | End: 2019-06-29 | Stop reason: HOSPADM

## 2019-06-29 RX ORDER — CEFTRIAXONE 1 G/1
1 INJECTION, POWDER, FOR SOLUTION INTRAMUSCULAR; INTRAVENOUS
Status: DISCONTINUED | OUTPATIENT
Start: 2019-06-29 | End: 2019-06-29 | Stop reason: HOSPADM

## 2019-06-29 RX ORDER — PROMETHAZINE HYDROCHLORIDE 25 MG/1
25 TABLET ORAL EVERY 6 HOURS PRN
Status: DISCONTINUED | OUTPATIENT
Start: 2019-06-29 | End: 2019-06-29 | Stop reason: HOSPADM

## 2019-06-29 RX ORDER — AMOXICILLIN 250 MG
1 CAPSULE ORAL 2 TIMES DAILY
Status: DISCONTINUED | OUTPATIENT
Start: 2019-06-29 | End: 2019-06-29 | Stop reason: HOSPADM

## 2019-06-29 RX ORDER — MORPHINE SULFATE 4 MG/ML
2 INJECTION, SOLUTION INTRAMUSCULAR; INTRAVENOUS EVERY 4 HOURS PRN
Status: DISCONTINUED | OUTPATIENT
Start: 2019-06-29 | End: 2019-06-29 | Stop reason: HOSPADM

## 2019-06-29 RX ORDER — GLUCAGON 1 MG
1 KIT INJECTION
Status: DISCONTINUED | OUTPATIENT
Start: 2019-06-29 | End: 2019-06-29 | Stop reason: HOSPADM

## 2019-06-29 RX ORDER — INSULIN ASPART 100 [IU]/ML
1-10 INJECTION, SOLUTION INTRAVENOUS; SUBCUTANEOUS EVERY 6 HOURS PRN
Status: DISCONTINUED | OUTPATIENT
Start: 2019-06-29 | End: 2019-06-29

## 2019-06-29 RX ORDER — SODIUM CHLORIDE 0.9 % (FLUSH) 0.9 %
10 SYRINGE (ML) INJECTION
Status: DISCONTINUED | OUTPATIENT
Start: 2019-06-29 | End: 2019-06-29 | Stop reason: HOSPADM

## 2019-06-29 RX ADMIN — SENNOSIDES,DOCUSATE SODIUM 1 TABLET: 8.6; 5 TABLET, FILM COATED ORAL at 08:06

## 2019-06-29 RX ADMIN — SODIUM CHLORIDE 1000 ML: 0.9 INJECTION, SOLUTION INTRAVENOUS at 01:06

## 2019-06-29 RX ADMIN — LEVETIRACETAM 500 MG: 500 TABLET ORAL at 08:06

## 2019-06-29 RX ADMIN — SENNOSIDES,DOCUSATE SODIUM 1 TABLET: 8.6; 5 TABLET, FILM COATED ORAL at 01:06

## 2019-06-29 NOTE — ED NOTES
Patient identifiers verified and correct for Cristiano Anthony.    LOC: The patient is awake, alert and aware of environment with an appropriate affect, the patient is oriented x 3 and speaking appropriately.  APPEARANCE: Patient resting comfortably and in no acute distress, patient is clean and well groomed, patient's clothing is properly fastened.  SKIN: The skin is warm and dry, color consistent with ethnicity, patient has normal skin turgor and moist mucus membranes, skin intact, no breakdown or bruising noted.  MUSCULOSKELETAL: Patient moving all extremities spontaneously, no obvious swelling or deformities noted.  RESPIRATORY: Airway is open and patent, respirations are spontaneous, patient has a normal effort and rate, no accessory muscle use noted, bilateral breath sounds even and clear.  CARDIAC: Patient has a normal rate and regular rhythm, no periphreal edema noted, capillary refill < 3 seconds.  ABDOMEN: Soft and non tender to palpation, no distention noted, normoactive bowel sounds present in all four quadrants. Pt reports left flank pain  NEUROLOGIC: Pupils equal bilaterally, eyes open spontaneously, behavior appropriate to situation, follows commands, facial expression symmetrical, bilateral hand grasp equal and even, purposeful motor response noted, normal sensation in all extremities when touched with a finger.

## 2019-06-29 NOTE — HPI
65yo M with DMII (A1c 6.5) who presented [place] [facility] with left flank pain. Workup revealed 2mm left distal ureteral stone at the UVJ. He is afebrile with normal vital signs. He does not have a leukocytosis nor ROMMEL. His UA shows rbcs and wbcs and is nitrite negative. He has no prior history of kidney stones. He was admitted overnight for possible UTI. He denies prior urologic history. No LUTS.

## 2019-06-29 NOTE — ED NOTES
Pt is complaining of left sided flank pain 7/10; pt is uncomfortable and unable to sit still;  will notify ED physician.

## 2019-06-29 NOTE — SUBJECTIVE & OBJECTIVE
Past Medical History:   Diagnosis Date    E. coli UTI 2019    During hospitalization for seizure    Generalized tonic-clonic seizure 2019 admitted for new-onset seizures.Normal CT head.  His mental status normalized, and he had no recurrent seizures following keppra loading in the ED and twice-daily maintenance upon arrival to the floor. Workup into the etiology of his seizures remained negative. EEG was performed, with the preliminary interpretation being no epileptiform activity with normal background.     Type 2 diabetes mellitus with microalbuminuria, without long-term current use of insulin 2019       Past Surgical History:   Procedure Laterality Date    HERNIA REPAIR Right     [facility]    REPAIR, HERNIA, INGUINAL, WITHOUT HISTORY OF PRIOR REPAIR, AGE 3 YEARS OR OLDER with mesh Right 2019    Performed by Santino Montemayor MD at [facility] OR [address] [place] [address]       Review of patient's allergies indicates:  No Known Allergies    No current facility-administered medications on file prior to encounter.      Current Outpatient Medications on File Prior to Encounter   Medication Sig    blood sugar diagnostic Strp Use 2 (two) times daily.    EPCLUSA 400-100 mg Tab Take 1 tablet by mouth once daily.    lancets Misc Use twice daily as directed    levETIRAcetam (KEPPRA) 500 MG Tab Take 1 tablet (500 mg total) by mouth 2 (two) times daily.    meloxicam (MOBIC) 15 MG tablet TAKE 1 TABLET(15 MG) BY MOUTH EVERY DAY    SITagliptin (JANUVIA) 25 MG Tab Take 1 tablet (25 mg total) by mouth once daily.     Family History     Problem Relation (Age of Onset)    Hypertension Mother        Tobacco Use    Smoking status: Former Smoker     Packs/day: 3.00     Years: 25.00     Pack years: 75.00     Last attempt to quit: 2012     Years since quittin.4    Smokeless tobacco: Never Used   Substance and Sexual Activity    Alcohol use: No     Comment: Used to drink    Drug use: No    Sexual  activity: Yes     Partners: Female     Review of Systems   Constitutional: Negative for activity change, appetite change, chills, diaphoresis, fatigue, fever and unexpected weight change.   HENT: Negative for congestion, rhinorrhea, sore throat, trouble swallowing and voice change.    Eyes: Negative for visual disturbance.   Respiratory: Negative for cough, choking, chest tightness, shortness of breath and wheezing.    Cardiovascular: Negative for chest pain, palpitations and leg swelling.   Gastrointestinal: Negative for abdominal distention, abdominal pain, anal bleeding, blood in stool, constipation, diarrhea, nausea and vomiting.   Endocrine: Negative for cold intolerance, heat intolerance, polydipsia and polyuria.   Genitourinary: Positive for flank pain. Negative for dysuria, frequency, hematuria and urgency.   Musculoskeletal: Negative for arthralgias, back pain, joint swelling and myalgias.   Skin: Negative for color change and rash.   Neurological: Negative for dizziness, seizures, syncope, facial asymmetry, speech difficulty, weakness, light-headedness, numbness and headaches.   Hematological: Negative for adenopathy. Does not bruise/bleed easily.   Psychiatric/Behavioral: Negative for agitation, confusion, hallucinations and suicidal ideas.     Objective:     Vital Signs (Most Recent):  Temp: 97.8 °F (36.6 °C) (06/28/19 1753)  Pulse: 71 (06/28/19 2359)  Resp: 16 (06/28/19 2359)  BP: (!) 170/103 (06/28/19 2359)  SpO2: 98 % (06/28/19 2359) Vital Signs (24h Range):  Temp:  [97.8 °F (36.6 °C)] 97.8 °F (36.6 °C)  Pulse:  [71-72] 71  Resp:  [16] 16  SpO2:  [96 %-98 %] 98 %  BP: (170-191)/(103-113) 170/103     Weight: 78.9 kg (174 lb)  Body mass index is 26.46 kg/m².    Physical Exam   Constitutional: He is oriented to person, place, and time. He appears well-developed and well-nourished. No distress.   HENT:   Head: Normocephalic and atraumatic.   Eyes: Pupils are equal, round, and reactive to light.   Neck:  Neck supple. Carotid bruit is not present. No thyromegaly present.   Cardiovascular: Normal rate and regular rhythm. Exam reveals no gallop.   No murmur heard.  Pulmonary/Chest: Effort normal and breath sounds normal. No respiratory distress. He has no wheezes.   Abdominal: Bowel sounds are normal. He exhibits no distension. There is no splenomegaly or hepatomegaly. There is no tenderness.   Musculoskeletal: Normal range of motion. He exhibits no edema.   Neurological: He is alert and oriented to person, place, and time. No cranial nerve deficit or sensory deficit.   Skin: Skin is warm and dry. No rash noted.   Psychiatric: He has a normal mood and affect. His behavior is normal.         CRANIAL NERVES     CN III, IV, VI   Pupils are equal, round, and reactive to light.       Significant Labs:   CBC:   Recent Labs   Lab 06/28/19  1944   WBC 7.14   HGB 14.2   HCT 43.4        CMP:   Recent Labs   Lab 06/28/19  2256      K 4.9      CO2 26      BUN 12   CREATININE 0.9   CALCIUM 9.5   PROT 8.3   ALBUMIN 4.0   BILITOT 0.2   ALKPHOS 61   AST 15   ALT 9*   ANIONGAP 10   EGFRNONAA >60.0     Urine Studies:   Recent Labs   Lab 06/28/19 2016   COLORU Yellow   APPEARANCEUA Hazy*   PHUR 5.0   SPECGRAV 1.020   PROTEINUA Negative   GLUCUA Negative   KETONESU Negative   BILIRUBINUA Negative   OCCULTUA 2+*   NITRITE Negative   LEUKOCYTESUR Trace*   RBCUA 15*   WBCUA 29*   BACTERIA Occasional   SQUAMEPITHEL 5       Significant Imaging: I have reviewed all pertinent imaging results/findings within the past 24 hours.

## 2019-06-29 NOTE — PLAN OF CARE
Problem: Adult Inpatient Plan of Care  Goal: Plan of Care Review  Outcome: Ongoing (interventions implemented as appropriate)  Patient  AAOX4 vital signs stable. Safety and infection precautions maintained. Patient is comfortable at this time,bed is locked and in a low position with call light in reach. Will cont to monitor.

## 2019-06-29 NOTE — SUBJECTIVE & OBJECTIVE
Past Medical History:   Diagnosis Date    E. coli UTI 2019    During hospitalization for seizure    Generalized tonic-clonic seizure 2019 admitted for new-onset seizures.Normal CT head.  His mental status normalized, and he had no recurrent seizures following keppra loading in the ED and twice-daily maintenance upon arrival to the floor. Workup into the etiology of his seizures remained negative. EEG was performed, with the preliminary interpretation being no epileptiform activity with normal background.     Type 2 diabetes mellitus with microalbuminuria, without long-term current use of insulin 2019       Past Surgical History:   Procedure Laterality Date    [facility] REPAIR Right     [facility]    REPAIR, HERNIA, INGUINAL, WITHOUT HISTORY OF PRIOR REPAIR, AGE 3 YEARS OR OLDER with mesh Right 2019    Performed by Santino Montemayor MD at [facility] OR [address] [place] [address]       Review of patient's allergies indicates:  No Known Allergies    Family History     Problem Relation (Age of Onset)    Hypertension Mother          Tobacco Use    Smoking status: Former Smoker     Packs/day: 3.00     Years: 25.00     Pack years: 75.00     Last attempt to quit: 2012     Years since quittin.4    Smokeless tobacco: Never Used   Substance and Sexual Activity    Alcohol use: No     Comment: Used to drink    Drug use: No    Sexual activity: Yes     Partners: Female       Review of Systems   Constitutional: Negative for activity change, appetite change, chills, fever and unexpected weight change.   HENT: Negative.    Eyes: Negative.    Respiratory: Negative for chest tightness and shortness of breath.    Cardiovascular: Negative for chest pain.   Gastrointestinal: Negative for abdominal distention, abdominal pain, nausea and vomiting.   Genitourinary: Positive for flank pain. Negative for difficulty urinating, hematuria, penile swelling, scrotal swelling, testicular pain and urgency.   Skin:  Negative.    Neurological: Negative.    Psychiatric/Behavioral: Negative.        Objective:     Temp:  [97.5 °F (36.4 °C)-97.9 °F (36.6 °C)] 97.9 °F (36.6 °C)  Pulse:  [67-96] 67  Resp:  [16-18] 18  SpO2:  [92 %-98 %] 97 %  BP: (124-191)/() 174/91     Body mass index is 26.73 kg/m².           Drains          None          Physical Exam   Constitutional: He appears well-developed and well-nourished. No distress.   HENT:   Head: Normocephalic and atraumatic.   Eyes: EOM are normal. No scleral icterus.   Cardiovascular: Normal rate and regular rhythm.    Pulmonary/Chest: Effort normal. No respiratory distress.   Abdominal: Soft. He exhibits no distension. There is no tenderness.   Genitourinary:   Genitourinary Comments:   Uncircumcised, no epididymal or testicular tenderness   Musculoskeletal: He exhibits no edema.   Neurological: He is alert.   Skin: Skin is warm and dry.     Psychiatric: He has a normal mood and affect. His behavior is normal.       Significant Labs:    BMP:  Recent Labs   Lab 06/28/19  2256 06/29/19  0405    142   K 4.9 3.5    106   CO2 26 28   BUN 12 10   CREATININE 0.9 0.9   CALCIUM 9.5 9.1       CBC:  Recent Labs   Lab 06/28/19  1944 06/29/19  0405   WBC 7.14 7.47   HGB 14.2 13.7*   HCT 43.4 44.1    166       Urine Studies:   Recent Labs   Lab 06/28/19 2016   COLORU Yellow   APPEARANCEUA Hazy*   PHUR 5.0   SPECGRAV 1.020   PROTEINUA Negative   GLUCUA Negative   KETONESU Negative   BILIRUBINUA Negative   OCCULTUA 2+*   NITRITE Negative   LEUKOCYTESUR Trace*   RBCUA 15*   WBCUA 29*   BACTERIA Occasional   SQUAMEPITHEL 5       Significant Imaging:  CT: I have reviewed all results within the past 24 hours and my personal findings are:  no hydronephrosis bilaterally, left 2 mm UVJ stone, bladder appears unremarkable

## 2019-06-29 NOTE — H&P
[facility] Hospital Medicine  History & Physical    Patient Name: Cristiano Cruz  MRN: [id number]  Admission Date: 6/28/2019  Attending Physician: Charlotte Donovan MD   Primary Care Provider: Srikanth Son MD    [facility] Medicine Team: [facility] HOSP MED D Sophie Woodruff PA-C     Patient information was obtained from patient, past medical records and ER records.     Subjective:     Principal Problem:Kidney stone on left side    Chief Complaint:   Chief Complaint   Patient presents with    Flank Pain     began 1 hour ago. denies urinary/bowel symptoms. states unable to find a comfortable position.        HPI: Patient is a 64 year old gentleman with a h/o seizures, DM II, and hepatitis C.  He presents with acute onset left flank pain.  Pain began around 17:00 today.  Pain is sharp and radiates to the left side of his abdomen.  He denies dysuria, hematuria, chest pain, dizziness, palpitations, fever/chills, N/V/D.  Patient does not SOB at baseline, which is unchanged.  He denies h/o kidney stones.    Past Medical History:   Diagnosis Date    E. coli UTI 01/2019    During hospitalization for seizure    Generalized tonic-clonic seizure 1/26/2019 1-2019 admitted for new-onset seizures.Normal CT head.  His mental status normalized, and he had no recurrent seizures following keppra loading in the ED and twice-daily maintenance upon arrival to the floor. Workup into the etiology of his seizures remained negative. EEG was performed, with the preliminary interpretation being no epileptiform activity with normal background.     Type 2 diabetes mellitus with microalbuminuria, without long-term current use of insulin 2/11/2019       Past Surgical History:   Procedure Laterality Date    HERNIA REPAIR Right 2000    [facility]    REPAIR, HERNIA, INGUINAL, WITHOUT HISTORY OF PRIOR REPAIR, AGE 3 YEARS OR OLDER with mesh Right 2/22/2019    Performed by Santino Montemayor MD at [facility] OR [address] [place] [address]       Review  of patient's allergies indicates:  No Known Allergies    No current facility-administered medications on file prior to encounter.      Current Outpatient Medications on File Prior to Encounter   Medication Sig    blood sugar diagnostic Strp Use 2 (two) times daily.    EPCLUSA 400-100 mg Tab Take 1 tablet by mouth once daily.    lancets Misc Use twice daily as directed    levETIRAcetam (KEPPRA) 500 MG Tab Take 1 tablet (500 mg total) by mouth 2 (two) times daily.    meloxicam (MOBIC) 15 MG tablet TAKE 1 TABLET(15 MG) BY MOUTH EVERY DAY    SITagliptin (JANUVIA) 25 MG Tab Take 1 tablet (25 mg total) by mouth once daily.     Family History     Problem Relation (Age of Onset)    Hypertension Mother        Tobacco Use    Smoking status: Former Smoker     Packs/day: 3.00     Years: 25.00     Pack years: 75.00     Last attempt to quit: 2012     Years since quittin.4    Smokeless tobacco: Never Used   Substance and Sexual Activity    Alcohol use: No     Comment: Used to drink    Drug use: No    Sexual activity: Yes     Partners: Female     Review of Systems   Constitutional: Negative for activity change, appetite change, chills, diaphoresis, fatigue, fever and unexpected weight change.   HENT: Negative for congestion, rhinorrhea, sore throat, trouble swallowing and voice change.    Eyes: Negative for visual disturbance.   Respiratory: Negative for cough, choking, chest tightness, shortness of breath and wheezing.    Cardiovascular: Negative for chest pain, palpitations and leg swelling.   Gastrointestinal: Negative for abdominal distention, abdominal pain, anal bleeding, blood in stool, constipation, diarrhea, nausea and vomiting.   Endocrine: Negative for cold intolerance, heat intolerance, polydipsia and polyuria.   Genitourinary: Positive for flank pain. Negative for dysuria, frequency, hematuria and urgency.   Musculoskeletal: Negative for arthralgias, back pain, joint swelling and myalgias.   Skin:  Negative for color change and rash.   Neurological: Negative for dizziness, seizures, syncope, facial asymmetry, speech difficulty, weakness, light-headedness, numbness and headaches.   Hematological: Negative for adenopathy. Does not bruise/bleed easily.   Psychiatric/Behavioral: Negative for agitation, confusion, hallucinations and suicidal ideas.     Objective:     Vital Signs (Most Recent):  Temp: 97.8 °F (36.6 °C) (06/28/19 1753)  Pulse: 71 (06/28/19 2359)  Resp: 16 (06/28/19 2359)  BP: (!) 170/103 (06/28/19 2359)  SpO2: 98 % (06/28/19 2359) Vital Signs (24h Range):  Temp:  [97.8 °F (36.6 °C)] 97.8 °F (36.6 °C)  Pulse:  [71-72] 71  Resp:  [16] 16  SpO2:  [96 %-98 %] 98 %  BP: (170-191)/(103-113) 170/103     Weight: 78.9 kg (174 lb)  Body mass index is 26.46 kg/m².    Physical Exam   Constitutional: He is oriented to person, place, and time. He appears well-developed and well-nourished. No distress.   HENT:   Head: Normocephalic and atraumatic.   Eyes: Pupils are equal, round, and reactive to light.   Neck: Neck supple. Carotid bruit is not present. No thyromegaly present.   Cardiovascular: Normal rate and regular rhythm. Exam reveals no gallop.   No murmur heard.  Pulmonary/Chest: Effort normal and breath sounds normal. No respiratory distress. He has no wheezes.   Abdominal: Bowel sounds are normal. He exhibits no distension. There is no splenomegaly or hepatomegaly. There is no tenderness.   Musculoskeletal: Normal range of motion. He exhibits no edema.   Neurological: He is alert and oriented to person, place, and time. No cranial nerve deficit or sensory deficit.   Skin: Skin is warm and dry. No rash noted.   Psychiatric: He has a normal mood and affect. His behavior is normal.         CRANIAL NERVES     CN III, IV, VI   Pupils are equal, round, and reactive to light.       Significant Labs:   CBC:   Recent Labs   Lab 06/28/19 1944   WBC 7.14   HGB 14.2   HCT 43.4        CMP:   Recent Labs   Lab  06/28/19  2256      K 4.9      CO2 26      BUN 12   CREATININE 0.9   CALCIUM 9.5   PROT 8.3   ALBUMIN 4.0   BILITOT 0.2   ALKPHOS 61   AST 15   ALT 9*   ANIONGAP 10   EGFRNONAA >60.0     Urine Studies:   Recent Labs   Lab 06/28/19 2016   COLORU Yellow   APPEARANCEUA Hazy*   PHUR 5.0   SPECGRAV 1.020   PROTEINUA Negative   GLUCUA Negative   KETONESU Negative   BILIRUBINUA Negative   OCCULTUA 2+*   NITRITE Negative   LEUKOCYTESUR Trace*   RBCUA 15*   WBCUA 29*   BACTERIA Occasional   SQUAMEPITHEL 5       Significant Imaging: I have reviewed all pertinent imaging results/findings within the past 24 hours.    Assessment/Plan:     * Kidney stone on left side  - CT abdomen/pelvis showed 1-2 mm calculus at the left UVJ resulting in mild left hydronephrosis and left perinephric and periureteral inflammation.  Recommend correlation with urinalysis to exclude superimposed infection.  Right nonobstructive nephrolithiasis also noted.  - UA showed trace leukocytes, 15 RBCs, 29 WBCs, occasional bacteria.  Culture pending.  - Patient discussed with urology by ED provider.  Urology to see in morning.  Patient started on Rocephin in ED.  - IVFs, supportive care.    Type 2 diabetes mellitus with microalbuminuria, without long-term current use of insulin  - Januvia held.  - NPO SSI.      Hepatitis C antibody test positive  - Continue Epclusa 400-100mg BID.  Not on formulary.  Patient's SO to bring from home.      VTE Risk Mitigation (From admission, onward)        Ordered     IP VTE LOW RISK PATIENT  Once      06/29/19 0025     Place sequential compression device  Until discontinued      06/29/19 0025     Place KEAGAN hose  Until discontinued      06/29/19 0025             Sophie Woodruff PA-C  Department of Hospital Medicine   [facility]

## 2019-06-29 NOTE — PLAN OF CARE
Problem: Adult Inpatient Plan of Care  Goal: Plan of Care Review  Outcome: Ongoing (interventions implemented as appropriate)  Patient placed in observation for kidney stones. The POC was reviewed with the patient, he verbalizes understanding. Will strain all urine. No complains of pain. No acute events/falls this shift. Call bell within reach. Will continue to monitor.

## 2019-06-29 NOTE — ASSESSMENT & PLAN NOTE
- He has an uncomplicated distal small ureteral stone. His pain has not returned for several hours this morning and he is not requiring pain medication.   - It is likely that he has passed his stone into the bladder or passed it into urinal. He has been straining his urine but has not see the stone.  - No acute intervention from urology indicated  - OK to [place] home with flomax and urinary strainer  - He can follow up in a few weeks with urology, sooner if having issues.

## 2019-06-29 NOTE — NURSING
Patient discharged to home. IV removed,discharge instructions given. Patient verbalized his understanding.  Patient has taken all personal belongings. Patient escorted to car in a wheelchair.

## 2019-06-29 NOTE — HPI
Patient is a 64 year old gentleman with a h/o seizures, DM II, and hepatitis C.  He presents with acute onset left flank pain.  Pain began around 17:00 today.  Pain is sharp and radiates to the left side of his abdomen.  He denies dysuria, hematuria, chest pain, dizziness, palpitations, fever/chills, N/V/D.  Patient does not SOB at baseline, which is unchanged.  He denies h/o kidney stones.

## 2019-06-29 NOTE — CONSULTS
[facility]  Urology  Consult Note    Patient Name: Cristiano Cruz  MRN: [id number]  Admission Date: 6/28/2019  Hospital Length of Stay: 0   Code Status: Full Code   Attending Provider: Charlotte Donovan MD   Consulting Provider: Duke Kelley MD  Primary Care Physician: Srikanth Son MD  Principal Problem:Kidney stone on left side    Inpatient consult to Urology  Consult performed by: Duke Kelley MD  Consult ordered by: Frankie Hernandez MD          Subjective:     HPI:  65yo M with DMII (A1c 6.5) who presented to [facility] ED with left flank pain. Workup revealed 2mm left distal ureteral stone at the UVJ. He is afebrile with normal vital signs. He does not have a leukocytosis nor ROMMEL. His UA shows rbcs and wbcs and is nitrite negative. He has no prior history of kidney stones. He was admitted overnight for possible UTI. He denies prior urologic history. No LUTS.     Past Medical History:   Diagnosis Date    E. coli UTI 01/2019    During hospitalization for seizure    Generalized tonic-clonic seizure 1/26/2019 1-2019 admitted for new-onset seizures.Normal CT head.  His mental status normalized, and he had no recurrent seizures following keppra loading in the ED and twice-daily maintenance upon arrival to the floor. Workup into the etiology of his seizures remained negative. EEG was performed, with the preliminary interpretation being no epileptiform activity with normal background.     Type 2 diabetes mellitus with microalbuminuria, without long-term current use of insulin 2/11/2019       Past Surgical History:   Procedure Laterality Date    HERNIA REPAIR Right 2000    [facility]    REPAIR, HERNIA, INGUINAL, WITHOUT HISTORY OF PRIOR REPAIR, AGE 3 YEARS OR OLDER with mesh Right 2/22/2019    Performed by Santino Montemayor MD at [facility] OR [address] [place] [address]       Review of patient's allergies indicates:  No Known Allergies    Family History     Problem Relation (Age of Onset)    Hypertension Mother           Tobacco Use    Smoking status: Former Smoker     Packs/day: 3.00     Years: 25.00     Pack years: 75.00     Last attempt to quit: 2012     Years since quittin.4    Smokeless tobacco: Never Used   Substance and Sexual Activity    Alcohol use: No     Comment: Used to drink    Drug use: No    Sexual activity: Yes     Partners: Female       Review of Systems   Constitutional: Negative for activity change, appetite change, chills, fever and unexpected weight change.   HENT: Negative.    Eyes: Negative.    Respiratory: Negative for chest tightness and shortness of breath.    Cardiovascular: Negative for chest pain.   Gastrointestinal: Negative for abdominal distention, abdominal pain, nausea and vomiting.   Genitourinary: Positive for flank pain. Negative for difficulty urinating, hematuria, penile swelling, scrotal swelling, testicular pain and urgency.   Skin: Negative.    Neurological: Negative.    Psychiatric/Behavioral: Negative.        Objective:     Temp:  [97.5 °F (36.4 °C)-97.9 °F (36.6 °C)] 97.9 °F (36.6 °C)  Pulse:  [67-96] 67  Resp:  [16-18] 18  SpO2:  [92 %-98 %] 97 %  BP: (124-191)/() 174/91     Body mass index is 26.73 kg/m².           Drains          None          Physical Exam   Constitutional: He appears well-developed and well-nourished. No distress.   HENT:   Head: Normocephalic and atraumatic.   Eyes: EOM are normal. No scleral icterus.   Cardiovascular: Normal rate and regular rhythm.    Pulmonary/Chest: Effort normal. No respiratory distress.   Abdominal: Soft. He exhibits no distension. There is no tenderness.   Genitourinary:   Genitourinary Comments:   Uncircumcised, no epididymal or testicular tenderness   Musculoskeletal: He exhibits no edema.   Neurological: He is alert.   Skin: Skin is warm and dry.     Psychiatric: He has a normal mood and affect. His behavior is normal.       Significant Labs:    BMP:  Recent Labs   Lab 19  4196 19  0405    142    K 4.9 3.5    106   CO2 26 28   BUN 12 10   CREATININE 0.9 0.9   CALCIUM 9.5 9.1       CBC:  Recent Labs   Lab 06/28/19  1944 06/29/19  0405   WBC 7.14 7.47   HGB 14.2 13.7*   HCT 43.4 44.1    166       Urine Studies:   Recent Labs   Lab 06/28/19 2016   COLORU Yellow   APPEARANCEUA Hazy*   PHUR 5.0   SPECGRAV 1.020   PROTEINUA Negative   GLUCUA Negative   KETONESU Negative   BILIRUBINUA Negative   OCCULTUA 2+*   NITRITE Negative   LEUKOCYTESUR Trace*   RBCUA 15*   WBCUA 29*   BACTERIA Occasional   SQUAMEPITHEL 5       Significant Imaging:  CT: I have reviewed all results within the past 24 hours and my personal findings are:  no hydronephrosis bilaterally, left 2 mm UVJ stone, bladder appears unremarkable                    Assessment and Plan:     * Kidney stone on left side  - He has an uncomplicated distal small ureteral stone. His pain has not returned for several hours this morning and he is not requiring pain medication.   - It is likely that he has passed his stone into the bladder or passed it into urinal. He has been straining his urine but has not see the stone.  - No acute intervention from urology indicated  - OK to dc home with flomax and urinary strainer  - He can follow up in a few weeks with urology, sooner if having issues.         VTE Risk Mitigation (From admission, onward)        Ordered     IP VTE LOW RISK PATIENT  Once      06/29/19 0025     Place sequential compression device  Until discontinued      06/29/19 0025     Place KEAGAN hose  Until discontinued      06/29/19 0025          Thank you for your consult. I will sign off. Please contact us if you have any additional questions.    Duke Kelley MD  Urology  [facility]

## 2019-06-29 NOTE — ED TRIAGE NOTES
Cristiano Cruz, a 64 y.o. male presents to the ED w/ complaint of left flank pain that started today. Pt states it radiates to LLQ. Pt denies burning or difficulty urinating.    Triage note:  Chief Complaint   Patient presents with    Flank Pain     began 1 hour ago. denies urinary/bowel symptoms. states unable to find a comfortable position.     Review of patient's allergies indicates:  No Known Allergies  Past Medical History:   Diagnosis Date    E. coli UTI 01/2019    During hospitalization for seizure    Generalized tonic-clonic seizure 1/26/2019 1-2019 admitted for new-onset seizures.Normal CT head.  His mental status normalized, and he had no recurrent seizures following keppra loading in the ED and twice-daily maintenance upon arrival to the floor. Workup into the etiology of his seizures remained negative. EEG was performed, with the preliminary interpretation being no epileptiform activity with normal background.     Type 2 diabetes mellitus with microalbuminuria, without long-term current use of insulin 2/11/2019

## 2019-06-29 NOTE — ASSESSMENT & PLAN NOTE
- CT abdomen/pelvis showed 1-2 mm calculus at the left UVJ resulting in mild left hydronephrosis and left perinephric and periureteral inflammation.  Recommend correlation with urinalysis to exclude superimposed infection.  Right nonobstructive nephrolithiasis also noted.  - UA showed trace leukocytes, 15 RBCs, 29 WBCs, occasional bacteria.  Culture pending.  - Patient discussed with urology by ED provider.  Urology to see in morning.  Patient started on Rocephin in ED.  - IVFs, supportive care.

## 2019-06-29 NOTE — DISCHARGE SUMMARY
"Discharge Summary  Hospital Medicine    Patient Name: Cristiano Cruz  MRN: [id number]  Attending Provider on Discharge: Charlotte Donovan MD  Hospital Medicine Team: [facility] MED D  Date of Admission:  6/28/2019     Date of Discharge:  6/29/2019  4:09 PM  Code status: Full Code    Active Hospital Problems    Diagnosis  POA    *Kidney stone on left side [N20.0]  Yes    Elevated blood pressure reading without diagnosis of hypertension [R03.0]  Yes    Seizure disorder [G40.909]  Yes    Type 2 diabetes mellitus with microalbuminuria, without long-term current use of insulin [E11.29, R80.9]  Yes     Chronic    Hepatitis C antibody test positive [R76.8]  Yes     Chronic      Resolved Hospital Problems   No resolved problems to display.        HPI:  "64 year old gentleman with history of seizures, DM II, and hepatitis C presented with acute onset left flank pain.  Pain is sharp and radiates to the left side of his abdomen.  He denies dysuria, hematuria, chest pain, dizziness, palpitations, fever/chills, N/V/D. Workup in ED revealed 2mm left distal ureteral stone at the UVJ. He remained afebrile with normal vital signs. He does not have a leukocytosis nor ROMMEL. His UA shows RBCs and WBCs and is nitrite negative. He has no prior history of kidney stones. He denies prior urologic history. No LUTS."    Hospital Course:     Kidney stone on left side  - CT abdomen/pelvis showed 1-2 mm calculus at the left UVJ resulting in mild left hydronephrosis and left perinephric and periureteral inflammation.  Correlation with urinalysis made superimposed infection unlikely.  Right nonobstructive nephrolithiasis also noted.  - UA showed trace leukocytes, 15 RBCs, 29 WBCs, occasional bacteria.  Culture pending at discharge.  - Urology consulted.  Patient started on Rocephin in ED.  - IVFs, supportive care. Trial of Rapaflo x 1 month.   - Per Urology: he has an uncomplicated distal small ureteral stone. His pain did not return for several hours " and he is not requiring pain medication. It is likely that he has passed his stone into the bladder or passed it into urinal. He has been straining his urine but has not seen the stone. No acute intervention from Urology indicated.   - Urine culture later revealed > 100K E.coli (but UA nitrite negative) with 50K-99K Strept Group B, Specimen was NOT a clean-catch (5 epi's; should be none in a male). Doubt UTI. Discussed polymicrobial culture results with Urology: patient reported voiding through foreskin.    Type 2 diabetes mellitus with microalbuminuria, without long-term current use of insulin  - Januvia held while inpatient.     Elevated blood pressure reading without diagnosis of hypertension      - Needs OP follow up     Hepatitis C antibody test positive  - Continue Epclusa 400-100mg BID.  Not on formulary.  Patient's SO to bring from home.       Recent Labs   Lab 06/28/19 1944 06/29/19  0405   WBC 7.14 7.47   HGB 14.2 13.7*   HCT 43.4 44.1    166     Recent Labs   Lab 06/28/19 1944 06/28/19 2256 06/29/19  0405   NA  --  144 142   K  --  4.9 3.5   CL  --  108 106   CO2  --  26 28   BUN  --  12 10   CREATININE  --  0.9 0.9   GLU  --  110 116*   CALCIUM  --  9.5 9.1   MG  --   --  1.7   PHOS  --   --  4.1   LIPASE 166*  --   --      Recent Labs   Lab 06/28/19 2256 06/29/19  0405   ALKPHOS 61 52*   ALT 9* 8*   AST 15 13   ALBUMIN 4.0 3.5   PROT 8.3 7.3   BILITOT 0.2 0.3      Microbiology Results      ESCHERICHIA COLI   >100,000 cfu/ml     Urine Culture, Routine STREPTOCOCCUS AGALACTIAE (GROUP B)   50,000 - 99,999 cfu/ml             Procedures: none    Consultants:   Consults (From admission, onward)        Status Ordering Provider     Inpatient consult to Urology  Once     Provider:  (Not yet assigned)    Completed SONY SELBY        Medications:    Current Discharge Medication List      START taking these medications    Details   silodosin (RAPAFLO) 4 mg Cap capsule Take 1 capsule (4 mg total)  by mouth once daily.  Qty: 30 capsule, Refills: 0         CONTINUE these medications which have NOT CHANGED    Details   blood sugar diagnostic Strp Use 2 (two) times daily.  Qty: 100 each, Refills: 11      EPCLUSA 400-100 mg Tab Take 1 tablet by mouth once daily.  Qty: 28 tablet, Refills: 2    Associated Diagnoses: Chronic hepatitis C without hepatic coma      lancets Misc Use twice daily as directed  Qty: 100 each, Refills: 11      levETIRAcetam (KEPPRA) 500 MG Tab Take 1 tablet (500 mg total) by mouth 2 (two) times daily.  Qty: 60 tablet, Refills: 11    Associated Diagnoses: Generalized tonic-clonic seizure      meloxicam (MOBIC) 15 MG tablet TAKE 1 TABLET(15 MG) BY MOUTH EVERY DAY  Qty: 30 tablet, Refills: 0      SITagliptin (JANUVIA) 25 MG Tab Take 1 tablet (25 mg total) by mouth once daily.  Qty: 90 tablet, Refills: 3    Associated Diagnoses: Type 2 diabetes mellitus without complication, without long-term current use of insulin             Discharge Instructions:  Discharge Procedure Orders   Ambulatory referral to Urology   Referral Priority: Routine Referral Type: Consultation   Referral Reason: Specialty Services Required   Requested Specialty: Urology   Number of Visits Requested: 1     Diet diabetic     Notify your health care provider if you experience any of the following:  temperature >100.4     Notify your health care provider if you experience any of the following:  persistent nausea and vomiting or diarrhea     Notify your health care provider if you experience any of the following:  difficulty breathing or increased cough     Notify your health care provider if you experience any of the following:  persistent dizziness, light-headedness, or visual disturbances     Activity as tolerated       Discharge Condition: good    Disposition: Home or Self Care    Indwelling Lines/Drains at time of discharge: none     Tests pending at the time of discharge: Urine C&S     Time spent on the discharge of the  patient including review of hospital course with the patient, reviewing discharge medications and arranging follow-up care: 30 minutes.    Discharge examination Patient was seen and examined on 6/29/2019 and determined to be suitable for discharge.    Discharge plan and follow up:  Follow-up Information     Srikanth Son MD. Schedule an appointment as soon as possible for a visit in 1 week.    Specialties:  Internal Medicine, Hospitalist  Why:  For discharge from hospital follow up, BP check  Contact information:  [address]  [facility] [address]  [phone]         [facility] UROLOGY. Schedule an appointment as soon as possible for a visit in 2 weeks.    Specialty:  Urology  Why:  For discharge from hospital follow up, To establish care  Contact information:  [address]  [facility] [address]  [phone]             Future Appointments   Date Time Provider Department [place]   7/19/2019  1:30 PM Tosha Ramírez MD [facility] NEURO [place]   8/6/2019 11:30 AM Srikanth Son MD [facility] [place] PCW   8/14/2019 10:30 AM LAB, APPOINTMENT [facility] LAB VNP [facility]   11/6/2019 10:30 AM LAB, APPOINTMENT [facility] LAB P [facility]       Provider  Charlotte Donovan MD  Department of Hospital Medicine  [facility] [place]

## 2019-06-29 NOTE — ED PROVIDER NOTES
Encounter Date: 6/28/2019    SCRIBE #1 NOTE: I, Lisa Browning, am scribing for, and in the presence of,  Dr. Hernandez. I have scribed the following portions of the note - Other sections scribed: HPI, ROS, PE.       History     Chief Complaint   Patient presents with    Flank Pain     began 1 hour ago. denies urinary/bowel symptoms. states unable to find a comfortable position.     The patient is a 64 y.o. male with a medical history of seizures, DM2 and hepatitis C presenting with a chief complaint of left-sided flank pain. At 5 pm today, the patient experienced a sudden onset of left-sided flank pain that radiates into his abdomen. The patient relayed baseline shortness of breath, and often notices it while speaking. He denies nausea, vomiting, diarrhea, fever, cough, chest pain, dysuria, history of kidney stones or history of diverticulitis.     The patients available PMH, PSH, Social History, medications, allergies, and triage vital signs were reviewed just prior to their medical evaluation.  A ten point review of systems was completed and is negative except as documented above.  Patient denies any other acute medical complaint.      The history is provided by the patient and medical records.     Review of patient's allergies indicates:  No Known Allergies  Past Medical History:   Diagnosis Date    E. coli UTI 01/2019    During hospitalization for seizure    Generalized tonic-clonic seizure 1/26/2019 1-2019 admitted for new-onset seizures.Normal CT head.  His mental status normalized, and he had no recurrent seizures following keppra loading in the ED and twice-daily maintenance upon arrival to the floor. Workup into the etiology of his seizures remained negative. EEG was performed, with the preliminary interpretation being no epileptiform activity with normal background.     Type 2 diabetes mellitus with microalbuminuria, without long-term current use of insulin 2/11/2019     Past Surgical History:    Procedure Laterality Date    HERNIA REPAIR Right     [facility]    REPAIR, HERNIA, INGUINAL, WITHOUT HISTORY OF PRIOR REPAIR, AGE 3 YEARS OR OLDER with mesh Right 2019    Performed by Santino Montemayor MD at [facility] OR [address] [place] [address]     Family History   Problem Relation Age of Onset    Hypertension Mother      Social History     Tobacco Use    Smoking status: Former Smoker     Packs/day: 3.00     Years: 25.00     Pack years: 75.00     Last attempt to quit: 2012     Years since quittin.4    Smokeless tobacco: Never Used   Substance Use Topics    Alcohol use: No     Comment: Used to drink    Drug use: No     Review of Systems   Constitutional: Negative for fever.   HENT: Negative for sore throat.    Eyes: Negative for visual disturbance.   Respiratory: Positive for shortness of breath. Negative for cough.    Cardiovascular: Negative for chest pain.   Gastrointestinal: Positive for abdominal pain. Negative for diarrhea, nausea and vomiting.   Genitourinary: Positive for flank pain. Negative for dysuria.   Musculoskeletal: Negative for neck pain.   Skin: Negative for rash and wound.   Allergic/Immunologic: Negative for immunocompromised state.   Neurological: Negative for syncope.   Psychiatric/Behavioral: Negative for confusion.   All other systems reviewed and are negative.      Physical Exam     Initial Vitals [19 1753]   BP Pulse Resp Temp SpO2   (!) 191/113 72 16 97.8 °F (36.6 °C) 96 %      MAP       --         Physical Exam    Nursing note and vitals reviewed.  Constitutional: He appears well-developed and well-nourished. He is not diaphoretic. No distress.   HENT:   Head: Normocephalic and atraumatic.   Nose: Nose normal.   Eyes: Conjunctivae are normal. Right eye exhibits no discharge. Left eye exhibits no discharge.   Neck: Normal range of motion. Neck supple.   Cardiovascular: Normal rate, regular rhythm and normal heart sounds. Exam reveals no gallop and no friction rub.    No  murmur heard.  Pulmonary/Chest: Breath sounds normal. No respiratory distress. He has no wheezes. He has no rhonchi. He has no rales.   Abdominal: Soft. He exhibits no distension. There is tenderness. There is no rebound.   Left flank tenderness to palpation.     Musculoskeletal: Normal range of motion. He exhibits no edema or tenderness.   Neurological: He is alert and oriented to person, place, and time. GCS score is 15. GCS eye subscore is 4. GCS verbal subscore is 5. GCS motor subscore is 6.   Skin: Skin is warm and dry. No rash noted. No erythema.   Psychiatric: He has a normal mood and affect. His behavior is normal. Judgment and thought content normal.         ED Course   Procedures  Labs Reviewed   LIPASE - Abnormal; Notable for the following components:       Result Value    Lipase 166 (*)     All other components within normal limits   URINALYSIS, REFLEX TO URINE CULTURE - Abnormal; Notable for the following components:    Appearance, UA Hazy (*)     Occult Blood UA 2+ (*)     Leukocytes, UA Trace (*)     All other components within normal limits    Narrative:     Preferred Collection Type->Urine, Clean Catch   URINALYSIS MICROSCOPIC - Abnormal; Notable for the following components:    RBC, UA 15 (*)     WBC, UA 29 (*)     All other components within normal limits    Narrative:     Preferred Collection Type->Urine, Clean Catch   COMPREHENSIVE METABOLIC PANEL - Abnormal; Notable for the following components:    ALT 9 (*)     All other components within normal limits   CULTURE, URINE   CBC W/ AUTO DIFFERENTIAL          Imaging Results          CT Abdomen Pelvis  Without Contrast (Edited Result - FINAL)  Result time 06/28/19 21:19:22    Addendum 1 of 1 by Robert Santoyo MD (06/28/19 21:19:22)      There are postsurgical changes of the anterior abdominopelvic wall.      Electronically signed by: Robert Santoyo MD  Date:    06/28/2019  Time:    21:19               Final result by Robert Santoyo MD  (06/28/19 21:04:50)                 Impression:      1. 1-2 mm calculus at the left UVJ resulting in mild left hydronephrosis and left perinephric and periureteral inflammation.  Correlation with urinalysis to exclude superimposed infection.  2. Right nonobstructive nephrolithiasis.  3. Please see above for several additional findings.      Electronically signed by: Robert Santoyo MD  Date:    06/28/2019  Time:    21:04             Narrative:    EXAMINATION:  CT ABDOMEN PELVIS WITHOUT CONTRAST    CLINICAL HISTORY:  Flank pain, stone disease suspected;    TECHNIQUE:  Low dose axial images, sagittal and coronal reformations were obtained from the lung bases to the pubic symphysis.  Oral contrast was not administered.    COMPARISON:  MRI 04/23/2019, CT 06/18/2016    FINDINGS:  Images of the lower thorax are remarkable for bilateral dependent atelectasis/scarring.  The heart is not enlarged.    The liver has a grossly unremarkable noncontrast appearance, lesion identified as hemangioma on MRI 04/23/2019 is not readily apparent by noncontrast technique.  The spleen, adrenal glands and gallbladder are grossly unremarkable.  The pancreatic duct is prominent at the level of the pancreatic head measuring 0.5 cm, similar to the previous MRI.  Small pancreatic cyst identified on that exam is not identified on current exam.  No significant abdominal lymphadenopathy.    There is right nonobstructive nephrolithiasis.  No right hydronephrosis.  The right ureter is unremarkable without calculi seen.  There is left perinephric and periureteral inflammation.  There is mild prominence of the left renal collecting system and mild left hydroureter.  There is a punctate focus of calcification at the left UVJ measuring approximately 1-2 mm.  The urinary bladder is otherwise grossly unremarkable.  The prostate is not enlarged noting calcification.    Several colonic diverticula are noted, no surrounding inflammation to suggest  diverticulitis.  The terminal ileum is unremarkable.  The appendix is unremarkable.  The small bowel is grossly unremarkable.  There is atherosclerotic calcification of the aorta and its branches.  There is diastasis of the rectus abdominus musculature, small-bowel loops protrude into the defect, without obstruction or inflammation.  No focal organized pelvic fluid collection.    Degenerative changes are noted of the spine.  Several nonenlarged inguinal lymph nodes are noted.  There is a fat containing right inguinal hernia.                                 Medical Decision Making:   History:   Old Medical Records: I decided to obtain old medical records.  Clinical Tests:   Lab Tests: Ordered and Reviewed  Radiological Study: Ordered and Reviewed  ED Management:  64-year-old male presents with acute onset left flank pain.  Vitals with hypertension.  Physical exam as above.  Labs with whites and reds in the urine.  CT shows kidney stone on the left.  Some concerning findings for infection.  Treat with Rocephin.  Discussed with Urology.  Will admit to Internal Medicine observation.  Urology will see in the morning.  No indication for immediate intervention.  Did bedside teaching.  All questions answered.  Patient acknowledges understanding.  Other:   I have discussed this case with another health care provider.       <> Summary of the Discussion: Urology, IM            Scribe Attestation:   Scribe #1: I performed the above scribed service and the documentation accurately describes the services I performed. I attest to the accuracy of the note.               Clinical Impression:       ICD-10-CM ICD-9-CM   1. Kidney stone on left side N20.0 592.0         Disposition:   Disposition: Placed in Observation  Condition: Fair       Level of Complexity:  High, level 5.                 Frankie Hernandez MD  06/29/19 0001

## 2019-06-30 LAB
BACTERIA UR CULT: ABNORMAL
BACTERIA UR CULT: ABNORMAL

## 2019-07-02 ENCOUNTER — LAB VISIT (OUTPATIENT)
Dept: LAB | Facility: HOSPITAL | Age: 64
End: 2019-07-02
Attending: INTERNAL MEDICINE
Payer: MEDICARE

## 2019-07-02 DIAGNOSIS — B18.2 CHRONIC HEPATITIS C WITHOUT HEPATIC COMA: ICD-10-CM

## 2019-07-02 LAB
ALBUMIN SERPL BCP-MCNC: 4.1 G/DL (ref 3.5–5.2)
ALP SERPL-CCNC: 56 U/L (ref 55–135)
ALT SERPL W/O P-5'-P-CCNC: 18 U/L (ref 10–44)
ANION GAP SERPL CALC-SCNC: 9 MMOL/L (ref 8–16)
AST SERPL-CCNC: 17 U/L (ref 10–40)
BILIRUB SERPL-MCNC: 0.5 MG/DL (ref 0.1–1)
BUN SERPL-MCNC: 9 MG/DL (ref 8–23)
CALCIUM SERPL-MCNC: 9.9 MG/DL (ref 8.7–10.5)
CHLORIDE SERPL-SCNC: 105 MMOL/L (ref 95–110)
CO2 SERPL-SCNC: 26 MMOL/L (ref 23–29)
CREAT SERPL-MCNC: 1 MG/DL (ref 0.5–1.4)
EST. GFR  (AFRICAN AMERICAN): >60 ML/MIN/1.73 M^2
EST. GFR  (NON AFRICAN AMERICAN): >60 ML/MIN/1.73 M^2
GLUCOSE SERPL-MCNC: 121 MG/DL (ref 70–110)
POTASSIUM SERPL-SCNC: 3.9 MMOL/L (ref 3.5–5.1)
PROT SERPL-MCNC: 8.2 G/DL (ref 6–8.4)
SODIUM SERPL-SCNC: 140 MMOL/L (ref 136–145)

## 2019-07-02 PROCEDURE — 80053 COMPREHEN METABOLIC PANEL: CPT

## 2019-07-02 PROCEDURE — 87517 HEPATITIS B DNA QUANT: CPT

## 2019-07-02 PROCEDURE — 87522 HEPATITIS C REVRS TRNSCRPJ: CPT

## 2019-07-05 LAB
HBV DNA SERPL NAA+PROBE-ACNC: <10 IU/ML
HBV DNA SERPL NAA+PROBE-LOG IU: <1 LOG (10) IU/ML
HBV DNA SERPL QL NAA+PROBE: NOT DETECTED
HCV RNA SERPL NAA+PROBE-LOG IU: <1.08 LOG (10) IU/ML
HCV RNA SERPL QL NAA+PROBE: DETECTED IU/ML
HCV RNA SPEC NAA+PROBE-ACNC: <12 IU/ML

## 2019-07-06 ENCOUNTER — TELEPHONE (OUTPATIENT)
Dept: HEPATOLOGY | Facility: CLINIC | Age: 64
End: 2019-07-06

## 2019-07-06 NOTE — TELEPHONE ENCOUNTER
HCV LAB REVIEW  Week 6 of Epclusa, planning on 12 weeks treatment  Elizabeth 1A, Tx naive  F 0-1    Pertinent labs:  7/2  CMP stable  HCV <12, detected  HBV neg    pls call pt:  - Labs look good. Liver enzymes normal. HCV is too low to count  - Continue Epclusa - 1 pill daily - don't miss any doses. Should be about half way through treatment now.  - There is a small chance the virus can return during the 3 months after treatment ends. We will monitor blood for this.      Next labs to see if Hep C is cured:  - CMP, HCV RNA, HBV DNA at week 12 - end of treatment - 8/14  - CMP, HCV RNA, HBV DNA - SVR12 - 11/6

## 2019-07-09 ENCOUNTER — TELEPHONE (OUTPATIENT)
Dept: PHARMACY | Facility: CLINIC | Age: 64
End: 2019-07-09

## 2019-07-09 NOTE — TELEPHONE ENCOUNTER
Epclusa (3 of 3)  refill confirmed. We will ship Epclusa refill on  via fedex to arrive on . $0.00 copay- 004. Confirmed 2 patient identifiers - name and .     Patient has 7 doses of Epclusa remaining and takes it around 6am daily.  Patient reports no issue withtaking Epclusa and denies any side effects. No missed doses, no new medications, no new allergies or health conditions reported at this time. All questions answered and addressed to patients satisfaction. Pt verbalized understanding.

## 2019-07-16 RX ORDER — MELOXICAM 15 MG/1
TABLET ORAL
Qty: 30 TABLET | Refills: 0 | Status: SHIPPED | OUTPATIENT
Start: 2019-07-16 | End: 2019-08-06

## 2019-07-19 ENCOUNTER — OFFICE VISIT (OUTPATIENT)
Dept: NEUROLOGY | Facility: CLINIC | Age: 64
End: 2019-07-19
Payer: MEDICARE

## 2019-07-19 VITALS
DIASTOLIC BLOOD PRESSURE: 71 MMHG | HEIGHT: 68 IN | HEART RATE: 84 BPM | BODY MASS INDEX: 26.93 KG/M2 | WEIGHT: 177.69 LBS | SYSTOLIC BLOOD PRESSURE: 121 MMHG

## 2019-07-19 DIAGNOSIS — G40.409 GENERALIZED TONIC-CLONIC SEIZURE: Primary | ICD-10-CM

## 2019-07-19 PROCEDURE — 99215 OFFICE O/P EST HI 40 MIN: CPT | Mod: GC,S$GLB,, | Performed by: PSYCHIATRY & NEUROLOGY

## 2019-07-19 PROCEDURE — 1101F PR PT FALLS ASSESS DOC 0-1 FALLS W/OUT INJ PAST YR: ICD-10-PCS | Mod: CPTII,GC,S$GLB, | Performed by: PSYCHIATRY & NEUROLOGY

## 2019-07-19 PROCEDURE — 99999 PR PBB SHADOW E&M-EST. PATIENT-LVL III: ICD-10-PCS | Mod: PBBFAC,GC,, | Performed by: STUDENT IN AN ORGANIZED HEALTH CARE EDUCATION/TRAINING PROGRAM

## 2019-07-19 PROCEDURE — 99999 PR PBB SHADOW E&M-EST. PATIENT-LVL III: CPT | Mod: PBBFAC,GC,, | Performed by: STUDENT IN AN ORGANIZED HEALTH CARE EDUCATION/TRAINING PROGRAM

## 2019-07-19 PROCEDURE — 99215 PR OFFICE/OUTPT VISIT, EST, LEVL V, 40-54 MIN: ICD-10-PCS | Mod: GC,S$GLB,, | Performed by: PSYCHIATRY & NEUROLOGY

## 2019-07-19 PROCEDURE — 1101F PT FALLS ASSESS-DOCD LE1/YR: CPT | Mod: CPTII,GC,S$GLB, | Performed by: PSYCHIATRY & NEUROLOGY

## 2019-07-19 NOTE — ASSESSMENT & PLAN NOTE
Patient is compliant with medication and has had no further seizure-event. Has no desire to drive since he wasn't driving even before the seizure.     - will obtain keppra level  - continue current dose of keppra 500 mg Bid for now  - advised him to go to the ED in case of any seizures  - f/u in clinic in 6-9 months for monitoring recurrence of events and prescriptions  - if remains seizure-free for up to 2 years, will consider weaning off of AEDs

## 2019-07-19 NOTE — PROGRESS NOTES
Neurology Clinic  Initial Consult Visit    Patient Name: Cristiano Cruz  MRN: [id number]    CC: seizure    HPI: Cristiano Cruz is a 64 y.o. AA male with DM-II, Hep C, and remote Hx of drug abuse, who is presenting to the clinic today for evaluation of seizure disorder.    As mentioned below, patient had a new-onset TCS event in Jan 2019 with unremarkable work up (CTH, MRI, EEG). Patient was discharged on keppra 500 mg BID and since then has remained seizure-free. He does not have any neurological complaints (including focal weakness, numbness, change in vision or balance). He denies Hx of stroke. Reports Hx of head trauma in the 80s. Denies Hx of childhood seizure in self but questionable Hx in his daughter. He has quit drinking and smoking 7-8 years ago, prior to that he was a daily heavy drinker and 1 pack smoker x40 years.     Follow up with Dr. Bright (4/2/19):   Cristiano Cruz is a 63 y.o. male who presents for hospital follow up. Briefly, he had presented to the ED after an episode concerning for seizure that was unprovoked. No sleep deprivation, new medications, alcohol/drug use. See history below for details of presenting event. During admission he had an MRI brain and EEG which were unremarkable. Labs were negative for any infectious or metabolic derangements that could have provoked the event. He was started on levetiracetam due to recurrent unprovoked seizures.  Since discharge he has been tolerating the levetiracetam well. No further seizures. His wife reports that he is having trouble with short term memory, but is unable to provide any specific examples. He says that sometimes when he is told something, he will forget it shortly after. They both deny any prior issues with memory. He is still able to work without any issue, and is not driving per our recommendations.     Initial hospital encounter (1/26/19):  Mr. Cruz is a 63 year old man with no known medical history who presents with a first time seizure.  "His wife says she woke up around 0500 to hear him breathing loudly, like he was catching his breath. She did not witness any shaking but says he was "foaming at the mouth". He was not responsive so EMS was called. Per EMS patient witnessed to have tonic-clonic movements lasting 5 minutes, with 20 minutes of confusion afterwards. No tongue biting or incontinence. Upon arrival to the ED he had an episode described by nursing as "tonic-clonic/decorticate posturing with right eye gaze - lasted approximately 3 minutes".   Family denies a history of seizure in the patient, no history of head trauma. They do report a family history of seizures in his sister and niece.    Labs remarkable for , lactate 3.4 (improved to 1.3)      Review of Systems:  General: fevers -, chills -, fatigue -, change in appetite -  Eyes: blurred vision -, double vision -, photosensitivity -  ENT: hearing loss -, tinnitus -, difficulty swallowing -, drooling -, change in voice -  Respiratory: SOB -, cough -,  choking -  CV: chest pain -, palpitations -  GI: nausea -, vomiting -, abdominal pain -  Urinary: dysuria -, hematuria -, frequency -, urine incontinence  Skin: rash -, change of color -  Musculoskeletal: arthralgia -, myalgia -, joint swelling -  Psychiatric: hallucinations -, confusion -, dysphoric mood -, anxiety -    Neurological:   Headache -, dizziness -, weakness -, numbness -, seizure -, gait problem -, balance problem -, difficulty in speech -      Past Medical History  Past Medical History:   Diagnosis Date    E. coli UTI 01/2019    During hospitalization for seizure    Generalized tonic-clonic seizure 1/26/2019 1-2019 admitted for new-onset seizures.Normal CT head.  His mental status normalized, and he had no recurrent seizures following keppra loading in the ED and twice-daily maintenance upon arrival to the floor. Workup into the etiology of his seizures remained negative. EEG was performed, with the preliminary " interpretation being no epileptiform activity with normal background.     Type 2 diabetes mellitus with microalbuminuria, without long-term current use of insulin 2019       Medications    Current Outpatient Medications:     EPCLUSA 400-100 mg Tab, Take 1 tablet by mouth once daily., Disp: 28 tablet, Rfl: 2    levETIRAcetam (KEPPRA) 500 MG Tab, Take 1 tablet (500 mg total) by mouth 2 (two) times daily., Disp: 60 tablet, Rfl: 11    meloxicam (MOBIC) 15 MG tablet, TAKE 1 TABLET(15 MG) BY MOUTH EVERY DAY, Disp: 30 tablet, Rfl: 0    silodosin (RAPAFLO) 4 mg Cap capsule, Take 1 capsule (4 mg total) by mouth once daily., Disp: 30 capsule, Rfl: 0    blood sugar diagnostic Strp, Use 2 (two) times daily., Disp: 100 each, Rfl: 11    lancets Misc, Use twice daily as directed, Disp: 100 each, Rfl: 11    SITagliptin (JANUVIA) 25 MG Tab, Take 1 tablet (25 mg total) by mouth once daily., Disp: 90 tablet, Rfl: 3  Any other notable medications as documented in HPI    Allergies  Review of patient's allergies indicates:  No Known Allergies    Social History  Social History     Socioeconomic History    Marital status:      Spouse name: Not on file    Number of children: 1    Years of education: Not on file    Highest education level: Not on file   Occupational History    Not on file   Social Needs    Financial resource strain: Not on file    Food insecurity:     Worry: Not on file     Inability: Not on file    Transportation needs:     Medical: Not on file     Non-medical: Not on file   Tobacco Use    Smoking status: Former Smoker     Packs/day: 3.00     Years: 25.00     Pack years: 75.00     Last attempt to quit: 2012     Years since quittin.4    Smokeless tobacco: Never Used   Substance and Sexual Activity    Alcohol use: No     Comment: Used to drink    Drug use: No    Sexual activity: Yes     Partners: Female   Lifestyle    Physical activity:     Days per week: Not on file     Minutes per  "session: Not on file    Stress: Not on file   Relationships    Social connections:     Talks on phone: Not on file     Gets together: Not on file     Attends Rastafarian service: Not on file     Active member of club or organization: Not on file     Attends meetings of clubs or organizations: Not on file     Relationship status: Not on file   Other Topics Concern    Not on file   Social History Narrative     with 1 daughter (42 as of 2019).    Work in [employer]     Any other notable Social History as documented in HPI.    Family History  Family History   Problem Relation Age of Onset    Hypertension Mother      Any other notable FMH as documented in HPI.    Physical Exam  /71   Pulse 84   Ht 5' 8" (1.727 m)   Wt 80.6 kg (177 lb 11.1 oz)   BMI 27.02 kg/m²     General: Well-developed. No apparent distress  HENT: Normocephalic, atraumatic. No carotid bruits auscultated.   Musculoskeletal: No peripheral edema, No joint swelling  Skin: No rash    Neurologic Exam:   Awake, alert and oriented x3  Short term memory intact  Praxis normal  Speech is difficult to understand (seems baseline).     CN II - CN XII:  PERRLA. EOM intact. No Nystagmus. No ophthalmoplegia.   Visual fields are full to confrontation.    Facial sensation is normal to light touch.   Facial expression is full and symmetric.   Hearing is intact bilaterally.   Palate elevates symmetrically.   SCM and Trapezius full strength bilaterally.   Tongue is midline.     Motor:  normal bulk and tone in all four limbs.   There are no atrophy or fasciculations.   Strength is 5/5 throughout.    Sensory:  Sensation to light touch: intact in BUE and BLE  Sensation to vibration: mildly decreased in feet (R>L)  Romberg is negative.    DTRs:  2+ and symmetric throughout.    Babinski normal bilaterally.  No clonus.     Gait and Coordination:  Normal gait.  Normal tandem walking.  Finger to nose and heel to shin testing is normal " bilaterally.      Lab and Test Results    WBC   Date Value Ref Range Status   06/29/2019 7.47 3.90 - 12.70 K/uL Final   06/28/2019 7.14 3.90 - 12.70 K/uL Final   03/14/2019 7.17 3.90 - 12.70 K/uL Final     Hemoglobin   Date Value Ref Range Status   06/29/2019 13.7 (L) 14.0 - 18.0 g/dL Final   06/28/2019 14.2 14.0 - 18.0 g/dL Final   03/14/2019 14.3 14.0 - 18.0 g/dL Final     POC Hematocrit   Date Value Ref Range Status   06/19/2016 41 36 - 54 %PCV Final   06/19/2016 44 36 - 54 %PCV Final     Hematocrit   Date Value Ref Range Status   06/29/2019 44.1 40.0 - 54.0 % Final   06/28/2019 43.4 40.0 - 54.0 % Final   03/14/2019 44.7 40.0 - 54.0 % Final     Platelets   Date Value Ref Range Status   06/29/2019 166 150 - 350 K/uL Final   06/28/2019 199 150 - 350 K/uL Final   03/14/2019 225 150 - 350 K/uL Final     Glucose   Date Value Ref Range Status   07/02/2019 121 (H) 70 - 110 mg/dL Final   06/29/2019 116 (H) 70 - 110 mg/dL Final   06/28/2019 110 70 - 110 mg/dL Final     Sodium   Date Value Ref Range Status   07/02/2019 140 136 - 145 mmol/L Final   06/29/2019 142 136 - 145 mmol/L Final   06/28/2019 144 136 - 145 mmol/L Final     Potassium   Date Value Ref Range Status   07/02/2019 3.9 3.5 - 5.1 mmol/L Final   06/29/2019 3.5 3.5 - 5.1 mmol/L Final   06/28/2019 4.9 3.5 - 5.1 mmol/L Final     Chloride   Date Value Ref Range Status   07/02/2019 105 95 - 110 mmol/L Final   06/29/2019 106 95 - 110 mmol/L Final   06/28/2019 108 95 - 110 mmol/L Final     CO2   Date Value Ref Range Status   07/02/2019 26 23 - 29 mmol/L Final   06/29/2019 28 23 - 29 mmol/L Final   06/28/2019 26 23 - 29 mmol/L Final     BUN, Bld   Date Value Ref Range Status   07/02/2019 9 8 - 23 mg/dL Final   06/29/2019 10 8 - 23 mg/dL Final   06/28/2019 12 8 - 23 mg/dL Final     Creatinine   Date Value Ref Range Status   07/02/2019 1.0 0.5 - 1.4 mg/dL Final   06/29/2019 0.9 0.5 - 1.4 mg/dL Final   06/28/2019 0.9 0.5 - 1.4 mg/dL Final     Calcium   Date Value Ref  Range Status   07/02/2019 9.9 8.7 - 10.5 mg/dL Final   06/29/2019 9.1 8.7 - 10.5 mg/dL Final   06/28/2019 9.5 8.7 - 10.5 mg/dL Final     Magnesium   Date Value Ref Range Status   06/29/2019 1.7 1.6 - 2.6 mg/dL Final     Phosphorus   Date Value Ref Range Status   06/29/2019 4.1 2.7 - 4.5 mg/dL Final     Alkaline Phosphatase   Date Value Ref Range Status   07/02/2019 56 55 - 135 U/L Final   06/29/2019 52 (L) 55 - 135 U/L Final   06/28/2019 61 55 - 135 U/L Final     ALT   Date Value Ref Range Status   07/02/2019 18 10 - 44 U/L Final   06/29/2019 8 (L) 10 - 44 U/L Final   06/28/2019 9 (L) 10 - 44 U/L Final     AST   Date Value Ref Range Status   07/02/2019 17 10 - 40 U/L Final   06/29/2019 13 10 - 40 U/L Final   06/28/2019 15 10 - 40 U/L Final       Images:  I have reviewed all pertinent imaging studies. Unremarkable.    Other pertinent studies:  Routine EEG (2/6/19):  normal EEG of the awake and asleep states, there is borderline slowing of the posterior dominant rhythm which evelyne be related to drowsiness     Assessment and Plan    Problem List Items Addressed This Visit        Neuro    Generalized tonic-clonic seizure - Primary    Overview     new-onset seizures (1/209).  Normal CTH, MRI and routine EEG .  On keppra 500 mg BID         Current Assessment & Plan     Patient is compliant with medication and has had no further seizure-event. Has no desire to drive since he wasn't driving even before the seizure.     - will obtain keppra level  - continue current dose of keppra 500 mg Bid for now  - advised him to go to the ED in case of any seizures  - f/u in clinic in 6-9 months for monitoring recurrence of events and prescriptions  - if remains seizure-free for up to 2 years, will consider weaning off of AEDs         Relevant Orders    Levetiracetam level              Tosha Ramírez MD  PGY-III, Neurology Resident  [facility] [address] [place] [address]

## 2019-07-19 NOTE — PATIENT INSTRUCTIONS
Please stop by the lab on the second floor on your way out today to get your seizure medication levels checked.

## 2019-07-19 NOTE — PROGRESS NOTES
I have reviewed the notes, assessments, and/or procedures performed by the resident, I concur with her/his documentation of Cristiano Cruz.

## 2019-08-06 ENCOUNTER — OFFICE VISIT (OUTPATIENT)
Dept: INTERNAL MEDICINE | Facility: CLINIC | Age: 64
End: 2019-08-06
Payer: MEDICARE

## 2019-08-06 ENCOUNTER — LAB VISIT (OUTPATIENT)
Dept: LAB | Facility: HOSPITAL | Age: 64
End: 2019-08-06
Attending: INTERNAL MEDICINE
Payer: MEDICARE

## 2019-08-06 VITALS
HEIGHT: 67 IN | SYSTOLIC BLOOD PRESSURE: 132 MMHG | DIASTOLIC BLOOD PRESSURE: 82 MMHG | HEART RATE: 82 BPM | BODY MASS INDEX: 28.09 KG/M2 | WEIGHT: 179 LBS

## 2019-08-06 DIAGNOSIS — R06.00 DYSPNEA, UNSPECIFIED TYPE: Primary | ICD-10-CM

## 2019-08-06 DIAGNOSIS — Z12.11 SCREENING FOR COLON CANCER: ICD-10-CM

## 2019-08-06 DIAGNOSIS — G40.409 GENERALIZED TONIC-CLONIC SEIZURE: ICD-10-CM

## 2019-08-06 DIAGNOSIS — Z87.891 EX-VERY HEAVY CIGARETTE SMOKER (MORE THAN 40 PER DAY): ICD-10-CM

## 2019-08-06 DIAGNOSIS — R76.8 HEPATITIS C ANTIBODY TEST POSITIVE: Chronic | ICD-10-CM

## 2019-08-06 DIAGNOSIS — R41.3 POOR MEMORY: ICD-10-CM

## 2019-08-06 DIAGNOSIS — E11.29 TYPE 2 DIABETES MELLITUS WITH MICROALBUMINURIA, WITHOUT LONG-TERM CURRENT USE OF INSULIN: Chronic | ICD-10-CM

## 2019-08-06 DIAGNOSIS — R80.9 TYPE 2 DIABETES MELLITUS WITH MICROALBUMINURIA, WITHOUT LONG-TERM CURRENT USE OF INSULIN: Chronic | ICD-10-CM

## 2019-08-06 PROBLEM — N20.0 KIDNEY STONE ON LEFT SIDE: Status: RESOLVED | Noted: 2019-06-28 | Resolved: 2019-08-06

## 2019-08-06 PROBLEM — H11.003 PTERYGIUM, BILATERAL: Status: RESOLVED | Noted: 2019-03-12 | Resolved: 2019-08-06

## 2019-08-06 LAB — VIT B12 SERPL-MCNC: 149 PG/ML (ref 210–950)

## 2019-08-06 PROCEDURE — 82607 VITAMIN B-12: CPT

## 2019-08-06 PROCEDURE — 36415 COLL VENOUS BLD VENIPUNCTURE: CPT

## 2019-08-06 PROCEDURE — 99214 OFFICE O/P EST MOD 30 MIN: CPT | Mod: S$GLB,,, | Performed by: INTERNAL MEDICINE

## 2019-08-06 PROCEDURE — 1101F PT FALLS ASSESS-DOCD LE1/YR: CPT | Mod: CPTII,S$GLB,, | Performed by: INTERNAL MEDICINE

## 2019-08-06 PROCEDURE — 99999 PR PBB SHADOW E&M-EST. PATIENT-LVL III: CPT | Mod: PBBFAC,,, | Performed by: INTERNAL MEDICINE

## 2019-08-06 PROCEDURE — 84425 ASSAY OF VITAMIN B-1: CPT

## 2019-08-06 PROCEDURE — 99999 PR PBB SHADOW E&M-EST. PATIENT-LVL III: ICD-10-PCS | Mod: PBBFAC,,, | Performed by: INTERNAL MEDICINE

## 2019-08-06 PROCEDURE — 84207 ASSAY OF VITAMIN B-6: CPT

## 2019-08-06 PROCEDURE — 3044F HG A1C LEVEL LT 7.0%: CPT | Mod: CPTII,S$GLB,, | Performed by: INTERNAL MEDICINE

## 2019-08-06 PROCEDURE — 99214 PR OFFICE/OUTPT VISIT, EST, LEVL IV, 30-39 MIN: ICD-10-PCS | Mod: S$GLB,,, | Performed by: INTERNAL MEDICINE

## 2019-08-06 PROCEDURE — 3044F PR MOST RECENT HEMOGLOBIN A1C LEVEL <7.0%: ICD-10-PCS | Mod: CPTII,S$GLB,, | Performed by: INTERNAL MEDICINE

## 2019-08-06 PROCEDURE — 1101F PR PT FALLS ASSESS DOC 0-1 FALLS W/OUT INJ PAST YR: ICD-10-PCS | Mod: CPTII,S$GLB,, | Performed by: INTERNAL MEDICINE

## 2019-08-06 RX ORDER — PRAVASTATIN SODIUM 10 MG/1
10 TABLET ORAL DAILY
Qty: 90 TABLET | Refills: 3 | Status: SHIPPED | OUTPATIENT
Start: 2019-08-06 | End: 2019-12-20 | Stop reason: SDUPTHER

## 2019-08-06 NOTE — PROGRESS NOTES
INTERNAL MEDICINE CLINIC  Follow-up Visit Progress Note     PRESENTING HISTORY     PCP: Srikanth Son MD    Last Clinic Visit with me:  2-8-19    Current Chief Complaint/Problem:    Chief Complaint   Patient presents with    Memory Loss     History of Present Illness & ROS: Mr. Cristiano Cruz is a 64 y.o. male.    Occasional SOB while putting shoes on.  Some precordial chest burning with some SOB.    Occasional cough.    No arm pain.    No history of CAD.  History of heavy smoking in the past.    Some very short term memory.    PAST HISTORY:     Past Medical History:   Diagnosis Date    E. coli UTI 01/2019    During hospitalization for seizure    Generalized tonic-clonic seizure 1/26/2019 1-2019 admitted for new-onset seizures.Normal CT head.  His mental status normalized, and he had no recurrent seizures following keppra loading in the ED and twice-daily maintenance upon arrival to the floor. Workup into the etiology of his seizures remained negative. EEG was performed, with the preliminary interpretation being no epileptiform activity with normal background.     Kidney stone on left side 6/28/2019    Pterygium, bilateral 3/12/2019    Type 2 diabetes mellitus with microalbuminuria, without long-term current use of insulin 2/11/2019       Past Surgical History:   Procedure Laterality Date    HERNIA REPAIR Right 2000    [facility]    REPAIR, HERNIA, INGUINAL, WITHOUT HISTORY OF PRIOR REPAIR, AGE 3 YEARS OR OLDER with mesh Right 2/22/2019    Performed by Santino Montemayor MD at [facility] OR [address] [place] [address]       Family History   Problem Relation Age of Onset    Hypertension Mother        Social History     Socioeconomic History    Marital status:      Spouse name: Not on file    Number of children: 1    Years of education: Not on file    Highest education level: Not on file   Occupational History    Not on file   Social Needs    Financial resource strain: Not on file    Food insecurity:     Worry:  Not on file     Inability: Not on file    Transportation needs:     Medical: Not on file     Non-medical: Not on file   Tobacco Use    Smoking status: Former Smoker     Packs/day: 3.00     Years: 25.00     Pack years: 75.00     Last attempt to quit: 2012     Years since quittin.5    Smokeless tobacco: Never Used   Substance and Sexual Activity    Alcohol use: No     Comment: Used to drink    Drug use: No    Sexual activity: Yes     Partners: Female   Lifestyle    Physical activity:     Days per week: Not on file     Minutes per session: Not on file    Stress: Not on file   Relationships    Social connections:     Talks on phone: Not on file     Gets together: Not on file     Attends Latter day service: Not on file     Active member of club or organization: Not on file     Attends meetings of clubs or organizations: Not on file     Relationship status: Not on file   Other Topics Concern    Not on file   Social History Narrative     with 1 daughter (42 as of 2019).    Work in [employer] & [employer]       MEDICATIONS & ALLERGIES:     Current Outpatient Medications on File Prior to Visit   Medication Sig Dispense Refill    blood sugar diagnostic Strp Use 2 (two) times daily. 100 each 11    EPCLUSA 400-100 mg Tab Take 1 tablet by mouth once daily. 28 tablet 2    lancets Misc Use twice daily as directed 100 each 11    levETIRAcetam (KEPPRA) 500 MG Tab Take 1 tablet (500 mg total) by mouth 2 (two) times daily. 60 tablet 11    meloxicam (MOBIC) 15 MG tablet TAKE 1 TABLET(15 MG) BY MOUTH EVERY DAY 30 tablet 0    SITagliptin (JANUVIA) 25 MG Tab Take 1 tablet (25 mg total) by mouth once daily. 90 tablet 3    silodosin (RAPAFLO) 4 mg Cap capsule Take 1 capsule (4 mg total) by mouth once daily. 30 capsule 0     No current facility-administered medications on file prior to visit.         Review of patient's allergies indicates:  No Known Allergies    Medications Reconciliation:   I have reconciled the  patient's home medications and discharge medications with the patient/family. I have updated all changes.  Refer to After-Visit Medication List.    OBJECTIVE:     Vital Signs:  Vitals:    08/06/19 1131   BP: 132/82   Pulse: 82     Wt Readings from Last 1 Encounters:   08/06/19 1131 81.2 kg (179 lb 0.2 oz)     Body mass index is 28.04 kg/m².     Physical Exam:  General: Well developed, well nourished. No distress.  HEENT: Head is normocephalic, atraumatic  Eyes: Clear conjunctiva.  Neck: Supple, symmetrical neck; trachea midline.  Lungs: Clear to auscultation bilaterally and normal respiratory effort. Overall decreased breath sounds. No wheezing.  Cardiovascular: Heart with regular rate and rhythm.    Extremities: No LE edema.    Psychiatric: Normal affect. Alert.    Laboratory  Lab Results   Component Value Date    WBC 7.47 06/29/2019    HGB 13.7 (L) 06/29/2019    HCT 44.1 06/29/2019     06/29/2019    CHOL 185 01/31/2019    TRIG 125 01/31/2019    HDL 46 01/31/2019    ALT 18 07/02/2019    AST 17 07/02/2019     07/02/2019    K 3.9 07/02/2019     07/02/2019    CREATININE 1.0 07/02/2019    BUN 9 07/02/2019    CO2 26 07/02/2019    TSH 2.887 01/26/2019    PSA 0.16 01/31/2019    INR 1.0 03/14/2019    HGBA1C 6.5 (H) 06/29/2019       Diagnostic Results:     Ref. Range 1/31/2019 09:40   Cholesterol Latest Ref Range: 120 - 199 mg/dL 185   HDL Latest Ref Range: 40 - 75 mg/dL 46   Hdl/Cholesterol Ratio Latest Ref Range: 20.0 - 50.0 % 24.9   LDL Cholesterol External Latest Ref Range: 63.0 - 159.0 mg/dL 114.0   Non-HDL Cholesterol Latest Units: mg/dL 139   Total Cholesterol/HDL Ratio Latest Ref Range: 2.0 - 5.0  4.0   Triglycerides Latest Ref Range: 30 - 150 mg/dL 125       ASSESSMENT & PLAN:     Dyspnea   - History of heavy smoking.    Has mild Diabetes. No hypertension.    Plan:  -     Complete PFT with bronchodilator  -     Echocardiogram stress test with CFD; Future; Expected date: 08/20/2019    Poor  memory  - May be related to Keppra but need it for seizure control.    Will check:  -     Vitamin B12; Future; Expected date: 08/06/2019  -     Vitamin B1; Future; Expected date: 08/06/2019  -     VITAMIN B6; Future; Expected date: 08/06/2019    Generalized tonic-clonic seizure  - No recurrence.    7-19-19 Neurology:      - Advised him to go to the ED in case of any seizures   - f/u in clinic in 6-9 months for monitoring recurrence of events and prescriptions   -  if remains seizure-free for up to 2 years, will consider weaning off of AEDs     Hepatitis C antibody test positive  -    Hepatology: Epclusi x 12 weeks.     Type 2 diabetes mellitus without complication, without long-term current use of insulin  - A1c 6.5  -     SITagliptin (JANUVIA) 25 MG Tab; Take 1 tablet (25 mg total) by mouth once daily.      Start low dose statin:   -     pravastatin (PRAVACHOL) 10 MG tablet; Take 1 tablet (10 mg total) by mouth once daily.  Dispense: 90 tablet; Refill: 3      Colon cancer screening  -     Case request GI: COLONOSCOPY    Personal history of nicotine dependence   Ex-very heavy cigarette smoker (more than 40 per day)   2-11-29  Lung-RADS Category:  2 - Benign Appearance or Behavior - continue annual screening with LDCT in 12 months.      Preventive Health Maintenance:  Colonoscopy - pending.     Return to Clinic for Follow Up with me:       Scheduled Follow-up :  Future Appointments   Date Time Provider Department Center   8/14/2019 10:30 AM LAB, APPOINTMENT [facility]   11/6/2019 10:30 AM LAB, APPOINTMENT [facility]       After Visit Medication List :     Medication List           Accurate as of 8/6/19 11:57 AM. If you have any questions, ask your nurse or doctor.               START taking these medications    pravastatin 10 MG tablet  Commonly known as:  PRAVACHOL  Take 1 tablet (10 mg total) by mouth once daily.  Started by:  Srikanth Son MD        CONTINUE  taking these medications    blood sugar diagnostic Strp  Use 2 (two) times daily.     EPCLUSA 400-100 mg Tab  Generic drug:  sofosbuvir-velpatasvir  Take 1 tablet by mouth once daily.     lancets Misc  Use twice daily as directed     levETIRAcetam 500 MG Tab  Commonly known as:  KEPPRA  Take 1 tablet (500 mg total) by mouth 2 (two) times daily.     silodosin 4 mg Cap capsule  Commonly known as:  RAPAFLO  Take 1 capsule (4 mg total) by mouth once daily.     SITagliptin 25 MG Tab  Commonly known as:  JANUVIA  Take 1 tablet (25 mg total) by mouth once daily.        STOP taking these medications    meloxicam 15 MG tablet  Commonly known as:  MOBIC  Stopped by:  Srikanth Son MD           Where to Get Your Medications      These medications were sent to [id number] - [place] - [address] AT [facility] [address]  [facility] [place] [address]    Phone:  [phone]   · pravastatin 10 MG tablet         Signing Physician:  Srikanth Son MD

## 2019-08-07 ENCOUNTER — TELEPHONE (OUTPATIENT)
Dept: INTERNAL MEDICINE | Facility: CLINIC | Age: 64
End: 2019-08-07

## 2019-08-07 DIAGNOSIS — E53.8 B12 NUTRITIONAL DEFICIENCY: ICD-10-CM

## 2019-08-07 RX ORDER — CYANOCOBALAMIN 1000 UG/ML
1000 INJECTION, SOLUTION INTRAMUSCULAR; SUBCUTANEOUS WEEKLY
Qty: 10 ML | Refills: 0 | Status: SHIPPED | OUTPATIENT
Start: 2019-08-07 | End: 2019-09-06

## 2019-08-07 RX ORDER — CYANOCOBALAMIN 1000 UG/ML
1000 INJECTION, SOLUTION INTRAMUSCULAR; SUBCUTANEOUS
Qty: 10 ML | Refills: 3 | Status: SHIPPED | OUTPATIENT
Start: 2019-09-02 | End: 2020-09-09 | Stop reason: SDUPTHER

## 2019-08-07 NOTE — TELEPHONE ENCOUNTER
Discussed with the patient.  Low B12.  Need B12 injection weekly for 4 weeks then monthly.  This may explain his poor memory.  [facility] can help with the injection.

## 2019-08-08 LAB — PYRIDOXAL SERPL-MCNC: 6 UG/L (ref 5–50)

## 2019-08-09 LAB — VIT B1 BLD-MCNC: 51 UG/L (ref 38–122)

## 2019-08-14 ENCOUNTER — HOSPITAL ENCOUNTER (OUTPATIENT)
Dept: PULMONOLOGY | Facility: CLINIC | Age: 64
Discharge: HOME | End: 2019-08-14
Payer: MEDICARE

## 2019-08-14 DIAGNOSIS — Z87.891 EX-VERY HEAVY CIGARETTE SMOKER (MORE THAN 40 PER DAY): ICD-10-CM

## 2019-08-14 DIAGNOSIS — R06.00 DYSPNEA, UNSPECIFIED TYPE: ICD-10-CM

## 2019-08-14 LAB
POST FEV1 FVC: 0.83
POST FEV1: 2.03
POST FVC: 2.46
PRE FEV1 FVC: 79
PRE FEV1: 2
PRE FVC: 2.53
PREDICTED FEV1 FVC: 80
PREDICTED FEV1: 2.89
PREDICTED FVC: 3.6

## 2019-08-14 PROCEDURE — 94060 EVALUATION OF WHEEZING: CPT | Mod: S$GLB,,, | Performed by: INTERNAL MEDICINE

## 2019-08-14 PROCEDURE — 94727 PR PULM FUNCTION TEST BY GAS: ICD-10-PCS | Mod: S$GLB,,, | Performed by: INTERNAL MEDICINE

## 2019-08-14 PROCEDURE — 94729 DIFFUSING CAPACITY: CPT | Mod: S$GLB,,, | Performed by: INTERNAL MEDICINE

## 2019-08-14 PROCEDURE — 94729 PR C02/MEMBANE DIFFUSE CAPACITY: ICD-10-PCS | Mod: S$GLB,,, | Performed by: INTERNAL MEDICINE

## 2019-08-14 PROCEDURE — 94060 PR EVAL OF BRONCHOSPASM: ICD-10-PCS | Mod: S$GLB,,, | Performed by: INTERNAL MEDICINE

## 2019-08-14 PROCEDURE — 94727 GAS DIL/WSHOT DETER LNG VOL: CPT | Mod: S$GLB,,, | Performed by: INTERNAL MEDICINE

## 2019-08-15 PROBLEM — J44.9 MIXED TYPE COPD (CHRONIC OBSTRUCTIVE PULMONARY DISEASE): Status: ACTIVE | Noted: 2019-08-15

## 2019-08-15 RX ORDER — MELOXICAM 15 MG/1
TABLET ORAL
Qty: 30 TABLET | Refills: 0 | Status: SHIPPED | OUTPATIENT
Start: 2019-08-15 | End: 2019-09-17 | Stop reason: SDUPTHER

## 2019-08-19 ENCOUNTER — HOSPITAL ENCOUNTER (EMERGENCY)
Facility: HOSPITAL | Age: 64
Discharge: HOME | End: 2019-08-19
Attending: EMERGENCY MEDICINE
Payer: MEDICARE

## 2019-08-19 ENCOUNTER — TELEPHONE (OUTPATIENT)
Dept: INTERNAL MEDICINE | Facility: CLINIC | Age: 64
End: 2019-08-19

## 2019-08-19 VITALS
DIASTOLIC BLOOD PRESSURE: 72 MMHG | RESPIRATION RATE: 26 BRPM | OXYGEN SATURATION: 94 % | WEIGHT: 185 LBS | HEIGHT: 67 IN | SYSTOLIC BLOOD PRESSURE: 126 MMHG | TEMPERATURE: 98 F | BODY MASS INDEX: 29.03 KG/M2 | HEART RATE: 109 BPM

## 2019-08-19 DIAGNOSIS — R56.9 SEIZURE: ICD-10-CM

## 2019-08-19 DIAGNOSIS — Z91.199 NON COMPLIANCE WITH MEDICAL TREATMENT: ICD-10-CM

## 2019-08-19 DIAGNOSIS — G40.909 SEIZURE DISORDER: Primary | ICD-10-CM

## 2019-08-19 PROCEDURE — 99284 PR EMERGENCY DEPT VISIT,LEVEL IV: ICD-10-PCS | Mod: ,,, | Performed by: EMERGENCY MEDICINE

## 2019-08-19 PROCEDURE — 25000003 PHARM REV CODE 250: Performed by: STUDENT IN AN ORGANIZED HEALTH CARE EDUCATION/TRAINING PROGRAM

## 2019-08-19 PROCEDURE — 99283 EMERGENCY DEPT VISIT LOW MDM: CPT

## 2019-08-19 PROCEDURE — 99284 EMERGENCY DEPT VISIT MOD MDM: CPT | Mod: ,,, | Performed by: EMERGENCY MEDICINE

## 2019-08-19 RX ORDER — LEVETIRACETAM 500 MG/1
1500 TABLET ORAL 2 TIMES DAILY
Status: DISCONTINUED | OUTPATIENT
Start: 2019-08-19 | End: 2019-08-19

## 2019-08-19 RX ORDER — SILODOSIN 4 MG/1
4 CAPSULE ORAL DAILY
Qty: 30 CAPSULE | Refills: 0 | Status: SHIPPED | OUTPATIENT
Start: 2019-08-19 | End: 2019-09-23 | Stop reason: SDUPTHER

## 2019-08-19 RX ORDER — LORAZEPAM 2 MG/ML
5 INJECTION INTRAMUSCULAR
Status: DISCONTINUED | OUTPATIENT
Start: 2019-08-19 | End: 2019-08-19

## 2019-08-19 RX ORDER — LEVETIRACETAM 500 MG/1
1500 TABLET ORAL ONCE
Status: COMPLETED | OUTPATIENT
Start: 2019-08-19 | End: 2019-08-19

## 2019-08-19 RX ORDER — HALOPERIDOL 5 MG/ML
2 INJECTION INTRAMUSCULAR
Status: DISCONTINUED | OUTPATIENT
Start: 2019-08-19 | End: 2019-08-19

## 2019-08-19 RX ADMIN — LEVETIRACETAM 1500 MG: 500 TABLET, FILM COATED ORAL at 03:08

## 2019-08-19 NOTE — TELEPHONE ENCOUNTER
----- Message from Myrna Chavez sent at 8/19/2019 10:11 AM CDT -----  Contact: Minerva Ellis [phone]  Patient is calling for an RX refill or new RX.  Is this a refill or new RX:  refill  RX name and strength: silodosin (RAPAFLO) 4 mg Cap capsule  Directions (copy/paste from chart):    Is this a 30 day or 90 day RX:    Local pharmacy or mail order pharmacy:  local  Pharmacy name and phone # (copy/paste from chart): [id number] - [place], [place] - [address] AT [facility] [address] [phone] (Phone) [phone] (Fax)      Comments:    Please call and advise  Thank you

## 2019-08-19 NOTE — ED TRIAGE NOTES
Cristiano Cruz, a 64 y.o. male presents to the ED w/ complaint of a seizure. Pt was combative during arrival and on restraints PTA. Pt is complaint with Keppra 500mg. Patient is back to baseline during assessment.    Triage note:  Chief Complaint   Patient presents with    Seizures     Seizure tonight, witnessed by wife. Hx of seizure, probably non-compliant with medication. Pt viiolent, postictal and agitated at this time.      Review of patient's allergies indicates:  No Known Allergies  Past Medical History:   Diagnosis Date    B12 nutritional deficiency 8/7/2019    E. coli UTI 01/2019    During hospitalization for seizure    Generalized tonic-clonic seizure 1/26/2019 1-2019 admitted for new-onset seizures.Normal CT head.  His mental status normalized, and he had no recurrent seizures following keppra loading in the ED and twice-daily maintenance upon arrival to the floor. Workup into the etiology of his seizures remained negative. EEG was performed, with the preliminary interpretation being no epileptiform activity with normal background.     Kidney stone on left side 6/28/2019    Mixed type COPD (chronic obstructive pulmonary disease) 8/15/2019    8-2019 PFT: Normal airflow. Airflow not improved after bronchodilator. Moderate restriction. Diffusion capacity is mildly decreased. Oximetry is normal.    Pterygium, bilateral 3/12/2019    Type 2 diabetes mellitus with microalbuminuria, without long-term current use of insulin 2/11/2019     Adult Physical Assessment  LOC: Cristiano Cruz, 64 y.o. male verified via two identifiers.  The patient is awake, alert, oriented and speaking appropriately at this time.  APPEARANCE: Patient resting comfortably and appears to be in no acute distress at this time. Patient is clean and well groomed, patient's clothing is properly fastened.  SKIN:The skin is warm and dry, color consistent with ethnicity, patient has normal skin turgor and moist mucus membranes, skin intact, no  breakdown or brusing noted.  MUSCULOSKELETAL: Patient moving all extremities well, no obvious swelling or deformities noted.  RESPIRATORY: Airway is open and patent, respirations are spontaneous, patient has a normal effort and rate, no accessory muscle use noted.  CARDIAC: Patient is tachycardic, no periphreal edema noted in any extremity, capillary refill < 3 seconds in all extremities  ABDOMEN: Soft and non tender to palpation, no abdominal distention noted.   NEUROLOGIC: Eyes open spontaneously, behavior appropriate to situation, follows commands, facial expression symmetrical, bilateral hand grasp equal and even, purposeful motor response noted, normal sensation in all extremities when touched with a finger.

## 2019-08-19 NOTE — TELEPHONE ENCOUNTER
----- Message from Denae Hernandez sent at 8/19/2019 10:37 AM CDT -----  No answer  - unable to schedule for follow-up appointment in Oct or Nov. as requested.

## 2019-08-20 ENCOUNTER — NURSE TRIAGE (OUTPATIENT)
Dept: ADMINISTRATIVE | Facility: CLINIC | Age: 64
End: 2019-08-20

## 2019-08-20 ENCOUNTER — HOSPITAL ENCOUNTER (EMERGENCY)
Facility: HOSPITAL | Age: 64
Discharge: HOME | End: 2019-08-20
Attending: EMERGENCY MEDICINE
Payer: MEDICARE

## 2019-08-20 VITALS
DIASTOLIC BLOOD PRESSURE: 80 MMHG | SYSTOLIC BLOOD PRESSURE: 142 MMHG | HEART RATE: 80 BPM | RESPIRATION RATE: 16 BRPM | OXYGEN SATURATION: 99 % | TEMPERATURE: 99 F

## 2019-08-20 DIAGNOSIS — M62.82 NON-TRAUMATIC RHABDOMYOLYSIS: Primary | ICD-10-CM

## 2019-08-20 LAB
BACTERIA #/AREA URNS AUTO: ABNORMAL /HPF
BILIRUB UR QL STRIP: NEGATIVE
BUN SERPL-MCNC: 8 MG/DL (ref 6–30)
CHLORIDE SERPL-SCNC: 104 MMOL/L (ref 95–110)
CK SERPL-CCNC: 1682 U/L (ref 20–200)
CK SERPL-CCNC: 2151 U/L (ref 20–200)
CLARITY UR REFRACT.AUTO: CLEAR
COLOR UR AUTO: YELLOW
CREAT SERPL-MCNC: 0.9 MG/DL (ref 0.5–1.4)
GLUCOSE SERPL-MCNC: 150 MG/DL (ref 70–110)
GLUCOSE UR QL STRIP: NEGATIVE
HCT VFR BLD CALC: 43 %PCV (ref 36–54)
HGB UR QL STRIP: NEGATIVE
KETONES UR QL STRIP: NEGATIVE
LEUKOCYTE ESTERASE UR QL STRIP: ABNORMAL
MICROSCOPIC COMMENT: ABNORMAL
NITRITE UR QL STRIP: NEGATIVE
PH UR STRIP: 5 [PH] (ref 5–8)
POC IONIZED CALCIUM: 1.19 MMOL/L (ref 1.06–1.42)
POC TCO2 (MEASURED): 26 MMOL/L (ref 23–29)
POTASSIUM BLD-SCNC: 3.7 MMOL/L (ref 3.5–5.1)
PROT UR QL STRIP: NEGATIVE
RBC #/AREA URNS AUTO: 1 /HPF (ref 0–4)
SAMPLE: ABNORMAL
SODIUM BLD-SCNC: 144 MMOL/L (ref 136–145)
SP GR UR STRIP: 1.02 (ref 1–1.03)
SQUAMOUS #/AREA URNS AUTO: 1 /HPF
URN SPEC COLLECT METH UR: ABNORMAL
WBC #/AREA URNS AUTO: 19 /HPF (ref 0–5)

## 2019-08-20 PROCEDURE — 99284 EMERGENCY DEPT VISIT MOD MDM: CPT | Mod: 25

## 2019-08-20 PROCEDURE — 99284 EMERGENCY DEPT VISIT MOD MDM: CPT | Mod: ,,, | Performed by: EMERGENCY MEDICINE

## 2019-08-20 PROCEDURE — 96361 HYDRATE IV INFUSION ADD-ON: CPT

## 2019-08-20 PROCEDURE — 25000003 PHARM REV CODE 250: Performed by: EMERGENCY MEDICINE

## 2019-08-20 PROCEDURE — 87088 URINE BACTERIA CULTURE: CPT

## 2019-08-20 PROCEDURE — 87086 URINE CULTURE/COLONY COUNT: CPT

## 2019-08-20 PROCEDURE — 99284 PR EMERGENCY DEPT VISIT,LEVEL IV: ICD-10-PCS | Mod: ,,, | Performed by: EMERGENCY MEDICINE

## 2019-08-20 PROCEDURE — 82550 ASSAY OF CK (CPK): CPT | Mod: 91

## 2019-08-20 PROCEDURE — 87147 CULTURE TYPE IMMUNOLOGIC: CPT

## 2019-08-20 PROCEDURE — 96360 HYDRATION IV INFUSION INIT: CPT

## 2019-08-20 PROCEDURE — 63600175 PHARM REV CODE 636 W HCPCS: Performed by: EMERGENCY MEDICINE

## 2019-08-20 PROCEDURE — 81001 URINALYSIS AUTO W/SCOPE: CPT

## 2019-08-20 RX ORDER — ACETAMINOPHEN 500 MG
1000 TABLET ORAL
Status: COMPLETED | OUTPATIENT
Start: 2019-08-20 | End: 2019-08-20

## 2019-08-20 RX ADMIN — SODIUM CHLORIDE 1000 ML: 0.9 INJECTION, SOLUTION INTRAVENOUS at 01:08

## 2019-08-20 RX ADMIN — ACETAMINOPHEN 1000 MG: 500 TABLET ORAL at 12:08

## 2019-08-20 RX ADMIN — SODIUM CHLORIDE 1000 ML: 0.9 INJECTION, SOLUTION INTRAVENOUS at 12:08

## 2019-08-20 NOTE — TELEPHONE ENCOUNTER
Reason for Disposition   Has diabetes (diabetes mellitus)    Additional Information   Negative: First seizure ever   Negative: Epileptic seizure (in adult with known epilepsy) and continues > 5 minutes   Negative: Two or more seizures and stays confused between seizures   Negative: Head injury caused the seizure   Negative: Bluish (or gray) lips or face   Negative: Pregnant or postpartum (<1 month)   Negative: Known poisoning or overdose   Negative: Seizure in a swimming pool    Protocols used: [id number]-SB    Pt had a seizure Sunday night. Pt c/o HA, neck pain, bilateral arm numbness, and body aches. Wife denies head injury with seizure. Wife advised per protocol to call 911 and wife verbalizes understanding.

## 2019-08-20 NOTE — ED TRIAGE NOTES
Pt presents with c/o numbness to left arm that started Monday 4 am. Pt was seen yesterday after having a seizure. Pt reports posterior head pain since yesterday. Pt reports weakness in bilateral legs. Pt reports shortness of breath for the last few weeks. Pt reports intermittent chest pain, denies chest pain at this time.

## 2019-08-20 NOTE — ED PROVIDER NOTES
Encounter Date: 8/20/2019    SCRIBE #1 NOTE: I, Eli Amaya, eliane scribing for, and in the presence of,  Dr. Miller. I have scribed the following portions of the note - Other sections scribed: HPI, ROS, PE.       History     Chief Complaint   Patient presents with    Numbness     and pain all over - had a seizures yesterday and was seen here and discharged      Time patient was seen by the provider: 11:23 AM      The patient is a 64 y.o. male with co-morbidities including: COPD and seizure disorder on Keppra, who presents to the ED with a complaint of bilateral arm pain and leg pain since this morning. The patient was seen here yesterday after having a seizure. Today he notes of soreness to his arms, more significant to the left arm than the right, and soreness to his legs bilaterally. No numbness/tingling to arms or legs. Denies any bladder/bowel incontinence. Reported numbness and headache to triage nusre but the patient denies any new numbness or headache when in the room.       The history is provided by the patient and medical records.     Review of patient's allergies indicates:  No Known Allergies  Past Medical History:   Diagnosis Date    B12 nutritional deficiency 8/7/2019    E. coli UTI 01/2019    During hospitalization for seizure    Generalized tonic-clonic seizure 1/26/2019 1-2019 admitted for new-onset seizures.Normal CT head.  His mental status normalized, and he had no recurrent seizures following keppra loading in the ED and twice-daily maintenance upon arrival to the floor. Workup into the etiology of his seizures remained negative. EEG was performed, with the preliminary interpretation being no epileptiform activity with normal background.     Kidney stone on left side 6/28/2019    Mixed type COPD (chronic obstructive pulmonary disease) 8/15/2019    8-2019 PFT: Normal airflow. Airflow not improved after bronchodilator. Moderate restriction. Diffusion capacity is mildly decreased.  Oximetry is normal.    Pterygium, bilateral 3/12/2019    Type 2 diabetes mellitus with microalbuminuria, without long-term current use of insulin 2019     Past Surgical History:   Procedure Laterality Date    [facility]ERNIA REPAIR Right     [facility]    REPAIR, HERNIA, INGUINAL, WITHOUT HISTORY OF PRIOR REPAIR, AGE 3 YEARS OR OLDER with mesh Right 2019    Performed by Santino Montemayor MD at [facility] OR [address] [place] [address]     Family History   Problem Relation Age of Onset    Hypertension Mother      Social History     Tobacco Use    Smoking status: Former Smoker     Packs/day: 3.00     Years: 25.00     Pack years: 75.00     Last attempt to quit: 2012     Years since quittin.5    Smokeless tobacco: Never Used   Substance Use Topics    Alcohol use: No     Comment: Used to drink    Drug use: No     Review of Systems   Constitutional: Negative for fever.   HENT: Negative for sore throat.    Respiratory: Negative for shortness of breath.    Cardiovascular: Negative for chest pain.   Gastrointestinal: Negative for nausea.   Genitourinary: Negative for dysuria.   Musculoskeletal: Positive for myalgias. Negative for back pain.   Skin: Negative for rash.   Neurological: Negative for weakness, numbness and headaches.   Hematological: Does not bruise/bleed easily.       Physical Exam     Initial Vitals [19 1052]   BP Pulse Resp Temp SpO2   (!) 160/88 82 19 99.4 °F (37.4 °C) 98 %      MAP       --         Physical Exam    Nursing note and vitals reviewed.  Constitutional: He appears well-developed. No distress.   HENT:   Head: Normocephalic and atraumatic.   No tongue laceration   Neck: Normal range of motion. Neck supple.   Pulmonary/Chest: No respiratory distress.   Abdominal: Normal appearance. He exhibits no distension.   Musculoskeletal: He exhibits no edema.   Neurological: He is alert and oriented to person, place, and time. He has normal strength. No cranial nerve deficit or sensory deficit.    Upper and lower extremities are 5/5 in strength and sensation are intact   Skin: No rash noted.   Psychiatric: He has a normal mood and affect.         ED Course   Procedures  Labs Reviewed   URINALYSIS, REFLEX TO URINE CULTURE - Abnormal; Notable for the following components:       Result Value    Leukocytes, UA Trace (*)     All other components within normal limits    Narrative:     Preferred Collection Type->Urine, Clean Catch   CK - Abnormal; Notable for the following components:    CPK 2151 (*)     All other components within normal limits   URINALYSIS MICROSCOPIC - Abnormal; Notable for the following components:    WBC, UA 19 (*)     All other components within normal limits    Narrative:     Preferred Collection Type->Urine, Clean Catch   CK - Abnormal; Notable for the following components:    CPK 1682 (*)     All other components within normal limits   ISTAT PROCEDURE - Abnormal; Notable for the following components:    POC Glucose 150 (*)     All other components within normal limits   CULTURE, URINE   ISTAT CHEM8          Imaging Results    None          Medical Decision Making:   History:   Old Medical Records: I decided to obtain old medical records.  Old Records Summarized: records from clinic visits, records from previous admission(s) and other records.       <> Summary of Records: Patient with history DM, Hep C, and tonic-clonic seizure, followed by neurology here, on Keppra, he was seen at ED here yesterday s/p breakthrough seizure. Initially agitated on arrival but return baseline status. He has a history of seizures. No acute work-up was done although he was given 1500 dose of IV Keppra.   Initial Assessment:   64-year-old male with a past medical history as above, now presenting for diffuse muscular soreness, status post a tonic-clonic seizure yesterday for which he was evaluated here as above    There is reports to the nurse that the patient had numbness but on my assessment the patient denies  any acute numbness, weakness, severe headache and reports that his primary problem is soreness to his muscles in his upper and lower extremities left greater than right    His vital signs are unremarkable  He does have diffuse muscular tenderness but no acute neurologic deficits    Differential Diagnosis:   Suspected myalgias are secondary to seizure activity yesterday and there could be a component of rhabdomyolysis  Do not suspect an acute neurologic process at this time  Patient denies any acute infectious symptoms  Clinical Tests:   Lab Tests: Ordered and Reviewed  ED Management:  Will get i-STAT, CPK  Will give IV fluids and Tylenol  Anticipate likely discharge home if testing unremarkable    1:18 PM  CPK is 2100 consistent with mild-to-moderate rhabdo  Renal function is normal  Will hydrate patient with 2 L of fluid and recheck and if trending down given that he has normal renal function, would be safe for discharge    4:08 PM  CPK is trending down from 2100 to 1600 on repeat testing status post 2 L of fluid  I think it is very likely that the patient has mild rhabdo secondary to his witness seizure yesterday and I do not suspect another acute process  Patient reports his muscle pain has improved significantly  Will discharge home with a recommendation to continue aggressive hydration at home and return for any new symptoms            Scribe Attestation:   Scribe #1: I performed the above scribed service and the documentation accurately describes the services I performed. I attest to the accuracy of the note.    I, Dr. Isabella Miller, personally performed the services described in this documentation. All medical record entries made by the scribe were at my direction and in my presence.  I have reviewed the chart and agree that the record reflects my personal performance and is accurate and complete. Isabella Miller MD.  11:51 AM 08/20/2019           Clinical Impression:       ICD-10-CM ICD-9-CM   1.  Non-traumatic rhabdomyolysis M62.82 728.88         Disposition:   Disposition: Discharged  Condition: Stable                        Isabella Miller MD  08/20/19 6212

## 2019-08-21 LAB — BACTERIA UR CULT: ABNORMAL

## 2019-08-22 ENCOUNTER — TELEPHONE (OUTPATIENT)
Dept: INTERNAL MEDICINE | Facility: CLINIC | Age: 64
End: 2019-08-22

## 2019-08-22 ENCOUNTER — TELEPHONE (OUTPATIENT)
Dept: HEPATOLOGY | Facility: CLINIC | Age: 64
End: 2019-08-22

## 2019-08-22 DIAGNOSIS — B18.2 CHRONIC HEPATITIS C WITHOUT HEPATIC COMA: Primary | ICD-10-CM

## 2019-08-22 NOTE — TELEPHONE ENCOUNTER
HCV LAB REVIEW  Week 12 of Epclusa,  END OF TREATMENT  Elizabeth 1A, Tx naive  F 0-1    Pertinent labs:  8/14  CMP stable  HCV neg  HBV neg    pls call pt:  - Labs look good. Liver enzymes normal. HCV is negative  - Patient should be finished HCV treatment now.   - There is a small chance the virus can return over the next 3 months. We will monitor blood for this.      Next labs to see if Hep C is cured:  - CMP, HCV RNA, HBV DNA - SVR12 - 11/6

## 2019-08-22 NOTE — TELEPHONE ENCOUNTER
Spoke with pt, he states that he has been out of work all week, he is unsure if he will be able to make it to appt next week however he will try.

## 2019-08-22 NOTE — TELEPHONE ENCOUNTER
----- Message from Srikanth Son MD sent at 8/22/2019  4:15 PM CDT -----  He needs follow up with our clinic next week.  Please schedule with Elizabeth.    Elizabeth: please make sure he is getting his B12 shots that I ordered previously as he is becoming forgetful.        ----- Message -----  From: Brenda Patel NP  Sent: 8/22/2019   3:28 PM  To: Srikanth Son MD    Urine culture is growing Streptococcus agalactiae.  I discussed these results with the patient.  He is currently not on antibiotics.  He denies fever, chills, urinary symptoms or pain. I have called Augmentin 875 mg 1 tablet orally every 12 hr for 10 days # 20 no refills into his [facility]'s pharmacy.  I advised he follow up with Dr. Son, his primary care provider, within the next week for recheck.  ER return precautions discussed.  Patient agrees.

## 2019-08-28 ENCOUNTER — TELEPHONE (OUTPATIENT)
Dept: INTERNAL MEDICINE | Facility: CLINIC | Age: 64
End: 2019-08-28

## 2019-08-28 NOTE — TELEPHONE ENCOUNTER
----- Message from Myrna Chavez sent at 8/28/2019  5:10 PM CDT -----  Contact: Patient [phone]  Patient is only off from work on Friday and is able to come in at 9:30.  Requesting  Callback if this appointment is available.    Please call and advise  Thank you

## 2019-08-28 NOTE — TELEPHONE ENCOUNTER
Pt states that he can't keep his appt this week, because he can only come in on a Friday. Appt booked for next Friday 9-6

## 2019-09-01 NOTE — PROGRESS NOTES
"Subjective:      Patient ID: Cristiano Cruz is a 64 y.o. male.    Chief Complaint: No chief complaint on file.    HPI:   Here for follow up.   He has just completed his Vitamin B 12 weekly injections.   Reports also that has a rash, been there for "about 6 months" with a "small rash to right arm and now on shoulder". No pain, + "itching".     No been taking anything for relief.     Review of Systems:  Eyes: denies visual changes at this time denies floaters   ENT: no nasal congestion or sore throat  Respiratory: no cough or shorness of breath  Cardiovascular: no chest pain or palpitations  Gastrointestinal: no nausea or vomiting, no abdominal pain or change in bowel habits  Genitourinary: no hematuria or dysuria; denies frequency  Hematologic/Lymphatic: no easy bruising or lymphadenopathy  Musculoskeletal: no arthralgias or myalgias  Neurological: no seizures or tremors  Endocrine: no heat or cold intolerance    Objective:   Vitals:  There were no vitals filed for this visit.  Wt Readings from Last 1 Encounters:   08/19/19 0313 83.9 kg (185 lb)     There is no height or weight on file to calculate BMI.     Wt Readings from Last 3 Encounters:   09/06/19 80.7 kg (177 lb 14.6 oz)   08/19/19 83.9 kg (185 lb)   08/06/19 81.2 kg (179 lb 0.2 oz)     Temp Readings from Last 3 Encounters:   08/20/19 98.9 °F (37.2 °C)   08/19/19 98 °F (36.7 °C) (Oral)   06/29/19 98 °F (36.7 °C) (Oral)     BP Readings from Last 3 Encounters:   09/06/19 120/82   08/20/19 (!) 142/80   08/19/19 126/72     Pulse Readings from Last 3 Encounters:   09/06/19 76   08/20/19 80   08/19/19 109         Physical Exam:  General: Well developed, well nourished. No distress.  HEENT: Head is normocephalic, atraumatic; ears are normal.   Eyes: Clear conjunctiva.  Neck: Supple, symmetrical neck; trachea midline.  Lungs: Clear to auscultation bilaterally and normal respiratory effort.  Cardiovascular: Heart with regular rate and rhythm. No murmurs, gallops or " rubs  Extremities: No LE edema. Pulses 2+ and symmetric.   Abdomen: Abdomen is soft, non-tender non-distended with normal bowel sounds.  Skin: Skin color, texture, turgor normal.   Scaly, rash to right arm and right shoulder region; no pustles, + satellite lesions  Musculoskeletal: Normal gait.   Lymph Nodes: No cervical or supraclavicular adenopathy.  Neurologic: Normal strength and tone. No focal numbness or weakness.   Psychiatric: Not depressed.      Laboratory:  Lab Results   Component Value Date    WBC 7.47 06/29/2019    HGB 13.7 (L) 06/29/2019    HCT 43 08/20/2019     06/29/2019    CHOL 185 01/31/2019    TRIG 125 01/31/2019    HDL 46 01/31/2019    ALT 11 08/14/2019    AST 13 08/14/2019     08/14/2019    K 4.0 08/14/2019     08/14/2019    CREATININE 0.9 08/14/2019    BUN 9 08/14/2019    CO2 26 08/14/2019    TSH 2.887 01/26/2019    PSA 0.16 01/31/2019    INR 1.0 03/14/2019    HGBA1C 6.5 (H) 06/29/2019       Assessment:     1) Vit B 12 Def  2) Seizure Disorder  3) DM II  4) Hep C  5) tinea corporis     Plan:     Est'd with Dr. Son in 2/2019; last seen on 8/6/2019.   Here for Follow Up.  Screenings:   ` needs C Scope (discussed with patient)                Tinea corporis  -     nystatin (MYCOSTATIN) cream; Apply topically 2 (two) times daily.  Dispense: 1 Tube; Refill: 3  -     fluconazole (DIFLUCAN) 150 MG Tab; Take 1 tab by mouth weekly x 4 weeks.  Dispense: 4 tablet; Refill: 0  -     hydrOXYzine pamoate (VISTARIL) 25 MG Cap; Take 1 capsule (25 mg total) by mouth every 8 (eight) hours as needed.  Dispense: 20 capsule; Refill: 0  -     cetirizine (ZYRTEC) 10 MG tablet; Take 1 tablet (10 mg total) by mouth once daily.; Refill: 0      Vitamin B12 Deficiency:   8/6: 149  (as on 8/7/2019, Rx'd B12 injections weekly x 4 weeks, then monthly)  ` continue B 12 repletion therapy (starting monthly this week)        Generalized tonic-clonic seizure / Chronic Memory Lapse:   *Recent ED Visit for Seizure  Activity; would not wean AEDs at this time  ` continue Keppra  ` follow up with Neurology (last seen: 7/2019)         Type 2 diabetes mellitus without complication, without long-term current use of insulin:   *Most recent A1c: 6.5%, reflective of longstanding control   ` continue SITagliptin (JANUVIA) 25 MG Tab; Take 1 tablet (25 mg total) by mouth once daily  *On Statin therapy        Hepatitis C antibody test positive:   *Hep Est'd  ` continue Epclusi      Personal history of nicotine dependence / Ex-very heavy cigarette smoker (more than 40 per day):   `  continue annual screening with LDCT  ` Referred for PFTs         Future Appointments   Date Time Provider Department Center   11/6/2019 10:30 AM LAB, APPOINTMENT [facility]   11/8/2019  9:30 AM Srikanth Son MD [facility]        Medication List           Accurate as of 9/6/19  9:04 AM. If you have any questions, ask your nurse or doctor.               START taking these medications    cetirizine 10 MG tablet  Commonly known as:  ZYRTEC  Take 1 tablet (10 mg total) by mouth once daily.  Started by:  CLARICE Phillips     fluconazole 150 MG Tab  Commonly known as:  DIFLUCAN  Take 1 tab by mouth weekly x 4 weeks.  Started by:  CLARICE Phillips     hydrOXYzine pamoate 25 MG Cap  Commonly known as:  VISTARIL  Take 1 capsule (25 mg total) by mouth every 8 (eight) hours as needed.  Started by:  CLARICE Phillips     nystatin cream  Commonly known as:  MYCOSTATIN  Apply topically 2 (two) times daily.  Started by:  CLARICE Phillips        CHANGE how you take these medications    cyanocobalamin 1,000 mcg/mL injection  Inject 1 mL (1,000 mcg total) into the muscle every 30 days.  What changed:  Another medication with the same name was removed. Continue taking this medication, and follow the directions you see here.  Changed by:  CLARICE Phillips        CONTINUE taking these  medications    blood sugar diagnostic Strp  Use 2 (two) times daily.     lancets Misc  Use twice daily as directed     levETIRAcetam 500 MG Tab  Commonly known as:  KEPPRA  Take 1 tablet (500 mg total) by mouth 2 (two) times daily.     meloxicam 15 MG tablet  Commonly known as:  MOBIC  TAKE 1 TABLET(15 MG) BY MOUTH EVERY DAY     pravastatin 10 MG tablet  Commonly known as:  PRAVACHOL  Take 1 tablet (10 mg total) by mouth once daily.     silodosin 4 mg Cap capsule  Commonly known as:  RAPAFLO  Take 1 capsule (4 mg total) by mouth once daily.     SITagliptin 25 MG Tab  Commonly known as:  JANUVIA  Take 1 tablet (25 mg total) by mouth once daily.           Where to Get Your Medications      These medications were sent to [id number] - [place] [facility]  [facility] [address] AT [facility] [address]  [facility] [address] [address]    Phone:  [phone]   · fluconazole 150 MG Tab  · hydrOXYzine pamoate 25 MG Cap  · nystatin cream     You can get these medications from any pharmacy    You don't need a prescription for these medications  · cetirizine 10 MG tablet         Signing Physician:  CLARICE Phillips

## 2019-09-06 ENCOUNTER — OFFICE VISIT (OUTPATIENT)
Dept: INTERNAL MEDICINE | Facility: CLINIC | Age: 64
End: 2019-09-06
Payer: MEDICARE

## 2019-09-06 VITALS
WEIGHT: 177.94 LBS | BODY MASS INDEX: 27.93 KG/M2 | DIASTOLIC BLOOD PRESSURE: 82 MMHG | SYSTOLIC BLOOD PRESSURE: 120 MMHG | OXYGEN SATURATION: 97 % | HEIGHT: 67 IN | HEART RATE: 76 BPM

## 2019-09-06 DIAGNOSIS — G40.409 GENERALIZED TONIC-CLONIC SEIZURE: ICD-10-CM

## 2019-09-06 DIAGNOSIS — E11.29 TYPE 2 DIABETES MELLITUS WITH MICROALBUMINURIA, WITHOUT LONG-TERM CURRENT USE OF INSULIN: Chronic | ICD-10-CM

## 2019-09-06 DIAGNOSIS — B35.4 TINEA CORPORIS: Primary | ICD-10-CM

## 2019-09-06 DIAGNOSIS — E53.8 B12 NUTRITIONAL DEFICIENCY: ICD-10-CM

## 2019-09-06 DIAGNOSIS — R41.3 POOR MEMORY: ICD-10-CM

## 2019-09-06 DIAGNOSIS — R80.9 TYPE 2 DIABETES MELLITUS WITH MICROALBUMINURIA, WITHOUT LONG-TERM CURRENT USE OF INSULIN: Chronic | ICD-10-CM

## 2019-09-06 PROCEDURE — 3044F HG A1C LEVEL LT 7.0%: CPT | Mod: CPTII,S$GLB,, | Performed by: NURSE PRACTITIONER

## 2019-09-06 PROCEDURE — 99214 PR OFFICE/OUTPT VISIT, EST, LEVL IV, 30-39 MIN: ICD-10-PCS | Mod: S$GLB,,, | Performed by: NURSE PRACTITIONER

## 2019-09-06 PROCEDURE — 3044F PR MOST RECENT HEMOGLOBIN A1C LEVEL <7.0%: ICD-10-PCS | Mod: CPTII,S$GLB,, | Performed by: NURSE PRACTITIONER

## 2019-09-06 PROCEDURE — 99999 PR PBB SHADOW E&M-EST. PATIENT-LVL IV: CPT | Mod: PBBFAC,,, | Performed by: NURSE PRACTITIONER

## 2019-09-06 PROCEDURE — 1101F PT FALLS ASSESS-DOCD LE1/YR: CPT | Mod: CPTII,S$GLB,, | Performed by: NURSE PRACTITIONER

## 2019-09-06 PROCEDURE — 99214 OFFICE O/P EST MOD 30 MIN: CPT | Mod: S$GLB,,, | Performed by: NURSE PRACTITIONER

## 2019-09-06 PROCEDURE — 1101F PR PT FALLS ASSESS DOC 0-1 FALLS W/OUT INJ PAST YR: ICD-10-PCS | Mod: CPTII,S$GLB,, | Performed by: NURSE PRACTITIONER

## 2019-09-06 PROCEDURE — 99999 PR PBB SHADOW E&M-EST. PATIENT-LVL IV: ICD-10-PCS | Mod: PBBFAC,,, | Performed by: NURSE PRACTITIONER

## 2019-09-06 RX ORDER — HYDROXYZINE PAMOATE 25 MG/1
25 CAPSULE ORAL EVERY 8 HOURS PRN
Qty: 20 CAPSULE | Refills: 0 | Status: SHIPPED | OUTPATIENT
Start: 2019-09-06 | End: 2019-12-20

## 2019-09-06 RX ORDER — CETIRIZINE HYDROCHLORIDE 10 MG/1
10 TABLET ORAL DAILY
Refills: 0 | COMMUNITY
Start: 2019-09-06 | End: 2019-12-20

## 2019-09-06 RX ORDER — FLUCONAZOLE 150 MG/1
TABLET ORAL
Qty: 4 TABLET | Refills: 0 | Status: SHIPPED | OUTPATIENT
Start: 2019-09-06 | End: 2019-12-20

## 2019-09-06 RX ORDER — NYSTATIN 100000 U/G
CREAM TOPICAL 2 TIMES DAILY
Qty: 1 TUBE | Refills: 3 | Status: SHIPPED | OUTPATIENT
Start: 2019-09-06 | End: 2019-12-20

## 2019-09-08 ENCOUNTER — HOSPITAL ENCOUNTER (EMERGENCY)
Facility: HOSPITAL | Age: 64
Discharge: HOME | End: 2019-09-08
Attending: EMERGENCY MEDICINE
Payer: MEDICARE

## 2019-09-08 VITALS
WEIGHT: 179 LBS | SYSTOLIC BLOOD PRESSURE: 153 MMHG | RESPIRATION RATE: 20 BRPM | BODY MASS INDEX: 28.09 KG/M2 | HEIGHT: 67 IN | TEMPERATURE: 99 F | OXYGEN SATURATION: 97 % | DIASTOLIC BLOOD PRESSURE: 92 MMHG | HEART RATE: 60 BPM

## 2019-09-08 DIAGNOSIS — R51.9 ACUTE NONINTRACTABLE HEADACHE, UNSPECIFIED HEADACHE TYPE: Primary | ICD-10-CM

## 2019-09-08 LAB
BACTERIA #/AREA URNS AUTO: ABNORMAL /HPF
BILIRUB UR QL STRIP: NEGATIVE
BUN SERPL-MCNC: 10 MG/DL (ref 6–30)
CHLORIDE SERPL-SCNC: 103 MMOL/L (ref 95–110)
CLARITY UR REFRACT.AUTO: CLEAR
COLOR UR AUTO: YELLOW
CREAT SERPL-MCNC: 0.9 MG/DL (ref 0.5–1.4)
GLUCOSE SERPL-MCNC: 124 MG/DL (ref 70–110)
GLUCOSE UR QL STRIP: NEGATIVE
HCT VFR BLD CALC: 43 %PCV (ref 36–54)
HGB UR QL STRIP: NEGATIVE
KETONES UR QL STRIP: NEGATIVE
LEUKOCYTE ESTERASE UR QL STRIP: ABNORMAL
MICROSCOPIC COMMENT: ABNORMAL
NITRITE UR QL STRIP: NEGATIVE
PH UR STRIP: 5 [PH] (ref 5–8)
POC IONIZED CALCIUM: 1.16 MMOL/L (ref 1.06–1.42)
POC TCO2 (MEASURED): 25 MMOL/L (ref 23–29)
POCT GLUCOSE: 124 MG/DL (ref 70–110)
POTASSIUM BLD-SCNC: 4 MMOL/L (ref 3.5–5.1)
PROT UR QL STRIP: NEGATIVE
RBC #/AREA URNS AUTO: 1 /HPF (ref 0–4)
SAMPLE: ABNORMAL
SODIUM BLD-SCNC: 140 MMOL/L (ref 136–145)
SP GR UR STRIP: 1.02 (ref 1–1.03)
SQUAMOUS #/AREA URNS AUTO: 1 /HPF
URN SPEC COLLECT METH UR: ABNORMAL
WBC #/AREA URNS AUTO: 11 /HPF (ref 0–5)

## 2019-09-08 PROCEDURE — 99284 EMERGENCY DEPT VISIT MOD MDM: CPT | Mod: ,,, | Performed by: PHYSICIAN ASSISTANT

## 2019-09-08 PROCEDURE — 82962 GLUCOSE BLOOD TEST: CPT

## 2019-09-08 PROCEDURE — 63600175 PHARM REV CODE 636 W HCPCS: Performed by: PHYSICIAN ASSISTANT

## 2019-09-08 PROCEDURE — 25000003 PHARM REV CODE 250: Performed by: PHYSICIAN ASSISTANT

## 2019-09-08 PROCEDURE — 81001 URINALYSIS AUTO W/SCOPE: CPT

## 2019-09-08 PROCEDURE — 99284 EMERGENCY DEPT VISIT MOD MDM: CPT | Mod: 25

## 2019-09-08 PROCEDURE — 87086 URINE CULTURE/COLONY COUNT: CPT

## 2019-09-08 PROCEDURE — 96360 HYDRATION IV INFUSION INIT: CPT

## 2019-09-08 PROCEDURE — 99284 PR EMERGENCY DEPT VISIT,LEVEL IV: ICD-10-PCS | Mod: ,,, | Performed by: PHYSICIAN ASSISTANT

## 2019-09-08 RX ORDER — ACETAMINOPHEN 500 MG
1000 TABLET ORAL
Status: COMPLETED | OUTPATIENT
Start: 2019-09-08 | End: 2019-09-08

## 2019-09-08 RX ADMIN — SODIUM CHLORIDE 1000 ML: 0.9 INJECTION, SOLUTION INTRAVENOUS at 08:09

## 2019-09-08 RX ADMIN — ACETAMINOPHEN 1000 MG: 500 TABLET ORAL at 08:09

## 2019-09-08 NOTE — ED PROVIDER NOTES
"Encounter Date: 9/8/2019       History     Chief Complaint   Patient presents with    Headache     woke up and my mouth was dry, head hurts on r side     64-year-old  male with history of seizures, diabetes, COPD presents to the ED complaining of left-sided headache and dry mouth since 2:00 this morning.  He awoke from sleep to use the restroom when he noticed that he had a left-sided headache. He describes the headache as 5/10 "stabbing".  He has not taken any over-the-counter pain medication prior to arrival.  He denies any head trauma.  He denies any change in his medications.  He ate some candy yesterday and thinks that his symptoms may be due to hyperglycemia.  Had a seizure 1 month ago but no recurrent seizures since then.  He denies fever, chills, chest pain, dysuria, polydipsia, changes in vision, confusion, lightheadedness, dizziness, numbness, weakness. She denies tobacco use.    The history is provided by the patient.     Review of patient's allergies indicates:  No Known Allergies  Past Medical History:   Diagnosis Date    B12 nutritional deficiency 8/7/2019    E. coli UTI 01/2019    During hospitalization for seizure    Generalized tonic-clonic seizure 1/26/2019 1-2019 admitted for new-onset seizures.Normal CT head.  His mental status normalized, and he had no recurrent seizures following keppra loading in the ED and twice-daily maintenance upon arrival to the floor. Workup into the etiology of his seizures remained negative. EEG was performed, with the preliminary interpretation being no epileptiform activity with normal background.     Kidney stone on left side 6/28/2019    Mixed type COPD (chronic obstructive pulmonary disease) 8/15/2019    8-2019 PFT: Normal airflow. Airflow not improved after bronchodilator. Moderate restriction. Diffusion capacity is mildly decreased. Oximetry is normal.    Pterygium, bilateral 3/12/2019    Type 2 diabetes mellitus with microalbuminuria, " without long-term current use of insulin 2019     Past Surgical History:   Procedure Laterality Date    HERNIA REPAIR Right     [facility]    REPAIR, HERNIA, INGUINAL, WITHOUT HISTORY OF PRIOR REPAIR, AGE 3 YEARS OR OLDER with mesh Right 2019    Performed by Santino Montemayor MD at [facility] OR [address] [place] [address]     Family History   Problem Relation Age of Onset    Hypertension Mother      Social History     Tobacco Use    Smoking status: Former Smoker     Packs/day: 3.00     Years: 25.00     Pack years: 75.00     Last attempt to quit: 2012     Years since quittin.6    Smokeless tobacco: Never Used   Substance Use Topics    Alcohol use: No     Comment: Used to drink    Drug use: No     Review of Systems   Constitutional: Negative for chills and fever.   HENT: Negative for congestion, rhinorrhea and sore throat.         +dry mouth   Eyes: Negative for photophobia and visual disturbance.   Respiratory: Positive for shortness of breath (at baseline). Negative for cough.    Cardiovascular: Negative for chest pain.   Gastrointestinal: Negative for abdominal pain, constipation, diarrhea, nausea and vomiting.   Endocrine: Positive for polyuria. Negative for polydipsia.   Genitourinary: Positive for frequency. Negative for dysuria and hematuria.   Musculoskeletal: Negative for neck pain and neck stiffness.   Skin: Negative for rash and wound.   Neurological: Positive for headaches. Negative for light-headedness and numbness.   Psychiatric/Behavioral: Negative for confusion.       Physical Exam     Initial Vitals [19 0756]   BP Pulse Resp Temp SpO2   (!) 137/90 81 18 97.7 °F (36.5 °C) 96 %      MAP       --         Physical Exam    Nursing note and vitals reviewed.  Constitutional: He appears well-developed and well-nourished. He is not diaphoretic. No distress.   HENT:   Head: Normocephalic and atraumatic.   No tenderness over the left temporal artery   Eyes: EOM are normal. Pupils are equal,  round, and reactive to light.   Neck: Normal range of motion. Neck supple.   Cardiovascular: Normal rate, regular rhythm and normal heart sounds. Exam reveals no gallop and no friction rub.    No murmur heard.  Pulmonary/Chest: Breath sounds normal. He has no wheezes. He has no rhonchi. He has no rales.   Abdominal: Soft. Bowel sounds are normal. There is no tenderness. There is no rebound and no guarding.   Musculoskeletal: Normal range of motion.   Neurological: He is alert and oriented to person, place, and time. He has normal strength. No cranial nerve deficit or sensory deficit. He displays a negative Romberg sign. Gait normal. GCS score is 15. GCS eye subscore is 4. GCS verbal subscore is 5. GCS motor subscore is 6.   Skin: Skin is warm and dry. No rash noted. No erythema.   Psychiatric: He has a normal mood and affect.         ED Course   Procedures  Labs Reviewed   URINALYSIS, REFLEX TO URINE CULTURE - Abnormal; Notable for the following components:       Result Value    Leukocytes, UA Trace (*)     All other components within normal limits    Narrative:     Preferred Collection Type->Urine, Clean Catch   URINALYSIS MICROSCOPIC - Abnormal; Notable for the following components:    WBC, UA 11 (*)     All other components within normal limits    Narrative:     Preferred Collection Type->Urine, Clean Catch   POCT GLUCOSE - Abnormal; Notable for the following components:    POCT Glucose 124 (*)     All other components within normal limits   ISTAT PROCEDURE - Abnormal; Notable for the following components:    POC Glucose 124 (*)     All other components within normal limits   CULTURE, URINE          Imaging Results    None          Medical Decision Making:   History:   Old Medical Records: I decided to obtain old medical records.  Clinical Tests:   Lab Tests: Ordered and Reviewed       APC / Resident Notes:   64-year-old  male with history of seizures, diabetes, COPD presents to the ED complaining  of left-sided headache and dry mouth since 2:00 this morning.  Vital signs stable. Regular rate rhythm.  Lungs are clear.  No tenderness over the left temporal artery.  Neurologically intact without any focal neuro deficits.  I do not suspect ICH.  Differential diagnosis includes but is not limited to hyperglycemia, dehydration, acute kidney injury, electrolyte abnormality, UTI.  Will check labs, give IV fluids and Tylenol reassess.    Glucose 124.  UA with no signs of infection.  Chem 8 unremarkable.    He reports complete resolution of headache with IV fluids and Tylenol. I do not feel that he needs any further labs or imaging at this time. Stable for discharge.    He was discharged without any new prescriptions.  He will follow up with his PCP.  All of the patient's questions were answered.  I reviewed the patient's chart and labs and discussed the case with my supervising physician.              Attending Attestation:     Physician Attestation Statement for NP/PA:   I discussed this assessment and plan of this patient with the NP/PA, but I did not personally examine the patient. The face to face encounter was performed by the NP/PA.                     Clinical Impression:       ICD-10-CM ICD-9-CM   1. Acute nonintractable headache, unspecified headache type R51 784.0         Disposition:   Disposition: Discharged  Condition: Stable                        Mary Oswald PA-C  09/08/19 [id number]

## 2019-09-08 NOTE — ED TRIAGE NOTES
Pt c/o waking up with headache, neck pain and dry mouth this am.  Denies numbness/tingling/weakness to extremities.   Pain is intermittent and located on the left side of his head.  Pt did not take any pain meds this am.

## 2019-09-09 LAB — BACTERIA UR CULT: NO GROWTH

## 2019-09-18 RX ORDER — MELOXICAM 15 MG/1
TABLET ORAL
Qty: 30 TABLET | Refills: 0 | Status: SHIPPED | OUTPATIENT
Start: 2019-09-18 | End: 2019-10-23 | Stop reason: SDUPTHER

## 2019-09-23 DIAGNOSIS — R39.15 BENIGN PROSTATIC HYPERPLASIA (BPH) WITH URINARY URGENCY: Primary | ICD-10-CM

## 2019-09-23 DIAGNOSIS — N40.1 BENIGN PROSTATIC HYPERPLASIA (BPH) WITH URINARY URGENCY: Primary | ICD-10-CM

## 2019-09-23 RX ORDER — SILODOSIN 4 MG/1
4 CAPSULE ORAL DAILY
Qty: 90 CAPSULE | Refills: 3 | Status: SHIPPED | OUTPATIENT
Start: 2019-09-23 | End: 2019-12-20 | Stop reason: SDUPTHER

## 2019-09-23 RX ORDER — HYDROXYZINE PAMOATE 25 MG/1
25 CAPSULE ORAL EVERY 8 HOURS PRN
Qty: 20 CAPSULE | Refills: 0 | OUTPATIENT
Start: 2019-09-23

## 2019-10-18 ENCOUNTER — PATIENT OUTREACH (OUTPATIENT)
Dept: ADMINISTRATIVE | Facility: OTHER | Age: 64
End: 2019-10-18

## 2019-10-21 ENCOUNTER — INITIAL CONSULT (OUTPATIENT)
Dept: DERMATOLOGY | Facility: CLINIC | Age: 64
End: 2019-10-21
Payer: MEDICARE

## 2019-10-21 DIAGNOSIS — B35.4 TINEA CORPORIS: Primary | ICD-10-CM

## 2019-10-21 PROCEDURE — 99202 PR OFFICE/OUTPT VISIT, NEW, LEVL II, 15-29 MIN: ICD-10-PCS | Mod: 25,S$GLB,, | Performed by: DERMATOLOGY

## 2019-10-21 PROCEDURE — 87220 TISSUE EXAM FOR FUNGI: CPT | Mod: S$GLB,,, | Performed by: DERMATOLOGY

## 2019-10-21 PROCEDURE — 1101F PT FALLS ASSESS-DOCD LE1/YR: CPT | Mod: CPTII,S$GLB,, | Performed by: DERMATOLOGY

## 2019-10-21 PROCEDURE — 99999 PR PBB SHADOW E&M-EST. PATIENT-LVL II: CPT | Mod: PBBFAC,,, | Performed by: DERMATOLOGY

## 2019-10-21 PROCEDURE — 99999 PR PBB SHADOW E&M-EST. PATIENT-LVL II: ICD-10-PCS | Mod: PBBFAC,,, | Performed by: DERMATOLOGY

## 2019-10-21 PROCEDURE — 87220 PR  TISSUE EXAM BY KOH: ICD-10-PCS | Mod: S$GLB,,, | Performed by: DERMATOLOGY

## 2019-10-21 PROCEDURE — 99202 OFFICE O/P NEW SF 15 MIN: CPT | Mod: 25,S$GLB,, | Performed by: DERMATOLOGY

## 2019-10-21 PROCEDURE — 1101F PR PT FALLS ASSESS DOC 0-1 FALLS W/OUT INJ PAST YR: ICD-10-PCS | Mod: CPTII,S$GLB,, | Performed by: DERMATOLOGY

## 2019-10-21 RX ORDER — TERBINAFINE HYDROCHLORIDE 250 MG/1
250 TABLET ORAL DAILY
Qty: 30 TABLET | Refills: 0 | Status: SHIPPED | OUTPATIENT
Start: 2019-10-21 | End: 2019-11-20

## 2019-10-21 NOTE — PATIENT INSTRUCTIONS
Lamisil cream or spray to affected area and at least 1 inch around affected area 2 times/day for 1 month.

## 2019-10-21 NOTE — LETTER
October 21, 2019      Elizabeth Katz, FN  [address]  [facility] [address]           [facility] - Dermatology  [address]  [facility] [address]  Phone: [phone]  Fax: [phone]          Patient: Cristiano Cruz   MR Number: [id number]   YOB: 1955   Date of Visit: 10/21/2019       Dear Elizabeth Katz:    Thank you for referring Cristiano Cruz to me for evaluation. Attached you will find relevant portions of my assessment and plan of care.    If you have questions, please do not hesitate to call me. I look forward to following Cristiano Cruz along with you.    Sincerely,    Alisa Mobley MD    Enclosure  CC:  No Recipients    If you would like to receive this communication electronically, please contact [email or web address] or [phone] to request more information on [employer] Link access.    For providers and/or their staff who would like to refer a patient to [facility], please contact us through our one-stop-shop provider referral line, [facility], at [phone].    If you feel you have received this communication in error or would no longer like to receive these types of communications, please e-mail [email or web address]

## 2019-10-21 NOTE — PROGRESS NOTES
Subjective:       Patient ID:  Cristiano Cruz is a 64 y.o. male who presents for   Chief Complaint   Patient presents with    Rash     Lab Results       Component                Value               Date                       HGBA1C                   6.5 (H)             06/29/2019                Rash  - Initial  Affected locations: right arm and back  Duration: 2 months  Signs / symptoms: itching  Severity: moderate  Timing: intermittent and no history of same complaint  Treatments tried: a pill and cream from PCP (don't know names)  Improvement on treatment: mild        Review of Systems   Skin: Positive for itching and rash.   Hematologic/Lymphatic: Does not bruise/bleed easily.        Objective:    Physical Exam   Constitutional: He appears well-developed and well-nourished. No distress.   Neurological: He is alert and oriented to person, place, and time. He is not disoriented.   Psychiatric: He has a normal mood and affect.   Skin:   Areas Examined (abnormalities noted in diagram):   Head / Face Inspection Performed  Neck Inspection Performed  Chest / Axilla Inspection Performed  Abdomen Inspection Performed  Back Inspection Performed  RUE Inspected  LUE Inspection Performed              Diagram Legend     Erythematous scaling macule/papule c/w actinic keratosis       Vascular papule c/w angioma      Pigmented verrucoid papule/plaque c/w seborrheic keratosis      Yellow umbilicated papule c/w sebaceous hyperplasia      Irregularly shaped tan macule c/w lentigo     1-2 mm smooth white papules consistent with Milia      Movable subcutaneous cyst with punctum c/w epidermal inclusion cyst      Subcutaneous movable cyst c/w pilar cyst      Firm pink to brown papule c/w dermatofibroma      Pedunculated fleshy papule(s) c/w skin tag(s)      Evenly pigmented macule c/w junctional nevus     Mildly variegated pigmented, slightly irregular-bordered macule c/w mildly atypical nevus      Flesh colored to evenly pigmented  papule c/w intradermal nevus       Pink pearly papule/plaque c/w basal cell carcinoma      Erythematous hyperkeratotic cursted plaque c/w SCC      Surgical scar with no sign of skin cancer recurrence      Open and closed comedones      Inflammatory papules and pustules      Verrucoid papule consistent consistent with wart     Erythematous eczematous patches and plaques     Dystrophic onycholytic nail with subungual debris c/w onychomycosis     Umbilicated papule    Erythematous-base heme-crusted tan verrucoid plaque consistent with inflamed seborrheic keratosis     Erythematous Silvery Scaling Plaque c/w Psoriasis     See annotation      Assessment / Plan:        Tinea corporis - KOH ++  -     terbinafine HCl (LAMISIL) 250 mg tablet; Take 1 tablet (250 mg total) by mouth once daily.  Dispense: 30 tablet; Refill: 0    Lamisil cream or spray to affected area and at least 1 inch around affected area 2 times/day for 1 month.           Follow up if symptoms worsen or fail to improve.

## 2019-10-22 DIAGNOSIS — Z12.11 COLON CANCER SCREENING: ICD-10-CM

## 2019-10-24 ENCOUNTER — PATIENT OUTREACH (OUTPATIENT)
Dept: ADMINISTRATIVE | Facility: HOSPITAL | Age: 64
End: 2019-10-24

## 2019-10-24 RX ORDER — MELOXICAM 15 MG/1
TABLET ORAL
Qty: 30 TABLET | Refills: 0 | Status: SHIPPED | OUTPATIENT
Start: 2019-10-24 | End: 2019-11-29 | Stop reason: SDUPTHER

## 2019-11-06 ENCOUNTER — HOSPITAL ENCOUNTER (OUTPATIENT)
Dept: RADIOLOGY | Facility: HOSPITAL | Age: 64
Discharge: HOME | End: 2019-11-06
Attending: NURSE PRACTITIONER
Payer: MEDICARE

## 2019-11-06 ENCOUNTER — OFFICE VISIT (OUTPATIENT)
Dept: INTERNAL MEDICINE | Facility: CLINIC | Age: 64
End: 2019-11-06
Payer: MEDICARE

## 2019-11-06 VITALS
WEIGHT: 177.94 LBS | HEART RATE: 68 BPM | HEIGHT: 67 IN | DIASTOLIC BLOOD PRESSURE: 84 MMHG | SYSTOLIC BLOOD PRESSURE: 138 MMHG | OXYGEN SATURATION: 96 % | BODY MASS INDEX: 27.93 KG/M2

## 2019-11-06 DIAGNOSIS — M54.2 CERVICALGIA: Primary | ICD-10-CM

## 2019-11-06 DIAGNOSIS — M54.2 CERVICALGIA: ICD-10-CM

## 2019-11-06 PROCEDURE — 99499 RISK ADDL DX/OHS AUDIT: ICD-10-PCS | Mod: S$GLB,,, | Performed by: NURSE PRACTITIONER

## 2019-11-06 PROCEDURE — 72050 X-RAY EXAM NECK SPINE 4/5VWS: CPT | Mod: 26,,, | Performed by: RADIOLOGY

## 2019-11-06 PROCEDURE — 99499 UNLISTED E&M SERVICE: CPT | Mod: S$GLB,,, | Performed by: NURSE PRACTITIONER

## 2019-11-06 PROCEDURE — 99999 PR PBB SHADOW E&M-EST. PATIENT-LVL IV: CPT | Mod: PBBFAC,,, | Performed by: NURSE PRACTITIONER

## 2019-11-06 PROCEDURE — 1101F PT FALLS ASSESS-DOCD LE1/YR: CPT | Mod: CPTII,S$GLB,, | Performed by: NURSE PRACTITIONER

## 2019-11-06 PROCEDURE — 72050 X-RAY EXAM NECK SPINE 4/5VWS: CPT | Mod: TC

## 2019-11-06 PROCEDURE — 99213 OFFICE O/P EST LOW 20 MIN: CPT | Mod: S$GLB,,, | Performed by: NURSE PRACTITIONER

## 2019-11-06 PROCEDURE — 1101F PR PT FALLS ASSESS DOC 0-1 FALLS W/OUT INJ PAST YR: ICD-10-PCS | Mod: CPTII,S$GLB,, | Performed by: NURSE PRACTITIONER

## 2019-11-06 PROCEDURE — 99999 PR PBB SHADOW E&M-EST. PATIENT-LVL IV: ICD-10-PCS | Mod: PBBFAC,,, | Performed by: NURSE PRACTITIONER

## 2019-11-06 PROCEDURE — 72050 XR CERVICAL SPINE COMPLETE 5 VIEW: ICD-10-PCS | Mod: 26,,, | Performed by: RADIOLOGY

## 2019-11-06 PROCEDURE — 99213 PR OFFICE/OUTPT VISIT, EST, LEVL III, 20-29 MIN: ICD-10-PCS | Mod: S$GLB,,, | Performed by: NURSE PRACTITIONER

## 2019-11-06 RX ORDER — TIZANIDINE 4 MG/1
4 TABLET ORAL EVERY 8 HOURS PRN
Qty: 20 TABLET | Refills: 0 | Status: SHIPPED | OUTPATIENT
Start: 2019-11-06 | End: 2020-03-19

## 2019-11-06 RX ORDER — NAPROXEN 500 MG/1
500 TABLET ORAL 2 TIMES DAILY WITH MEALS
Qty: 20 TABLET | Refills: 0 | Status: SHIPPED | OUTPATIENT
Start: 2019-11-06 | End: 2019-11-29 | Stop reason: SDUPTHER

## 2019-11-06 NOTE — PROGRESS NOTES
INTERNAL MEDICINE URGENT CARE NOTE    CHIEF COMPLAINT     Chief Complaint   Patient presents with    Neck Pain       HPI     Cristiano Cruz is a 64 y.o. male with COPD, DM2, B12 def, Seizures, and former smoker who presents for an urgent visit today.  He is an established pt of Dr Son     Here with c/o neck pain - intermittent x 1 week worsened last night.   No injury or trauma   Pain starts behind the right ear and to the right posterior neck. With intermittent numbness and tingling to the upper arms and shoulders.  No n/t to hands/fingers   No upper ext weakness.   Treated with blue ice.       Past Medical History:  Past Medical History:   Diagnosis Date    B12 nutritional deficiency 8/7/2019    E. coli UTI 01/2019    During hospitalization for seizure    Generalized tonic-clonic seizure 1/26/2019 1-2019 admitted for new-onset seizures.Normal CT head.  His mental status normalized, and he had no recurrent seizures following keppra loading in the ED and twice-daily maintenance upon arrival to the floor. Workup into the etiology of his seizures remained negative. EEG was performed, with the preliminary interpretation being no epileptiform activity with normal background.     Kidney stone on left side 6/28/2019    Mixed type COPD (chronic obstructive pulmonary disease) 8/15/2019    8-2019 PFT: Normal airflow. Airflow not improved after bronchodilator. Moderate restriction. Diffusion capacity is mildly decreased. Oximetry is normal.    Pterygium, bilateral 3/12/2019    Type 2 diabetes mellitus with microalbuminuria, without long-term current use of insulin 2/11/2019       Home Medications:  Prior to Admission medications    Medication Sig Start Date End Date Taking? Authorizing Provider   blood sugar diagnostic Strp Use 2 (two) times daily. 1/28/19  Yes Orion Solis MD   cetirizine (ZYRTEC) 10 MG tablet Take 1 tablet (10 mg total) by mouth once daily. 9/6/19 9/5/20 Yes CLARICE Garnett    cyanocobalamin 1,000 mcg/mL injection Inject 1 mL (1,000 mcg total) into the muscle every 30 days. 9/2/19  Yes Srikanth Son MD   lancets Misc Use twice daily as directed 1/28/19  Yes Orion Solis MD   levETIRAcetam (KEPPRA) 500 MG Tab Take 1 tablet (500 mg total) by mouth 2 (two) times daily. 2/8/19  Yes Srikanth Son MD   meloxicam (MOBIC) 15 MG tablet TAKE 1 TABLET(15 MG) BY MOUTH EVERY DAY 10/24/19  Yes Elisa Gandhi PA-C   pravastatin (PRAVACHOL) 10 MG tablet Take 1 tablet (10 mg total) by mouth once daily. 8/6/19 8/5/20 Yes Srikanth Son MD   silodosin (RAPAFLO) 4 mg Cap capsule Take 1 capsule (4 mg total) by mouth once daily. 9/23/19  Yes Srikanth Son MD   terbinafine HCl (LAMISIL) 250 mg tablet Take 1 tablet (250 mg total) by mouth once daily. 10/21/19 11/20/19 Yes Alisa Mobley MD   fluconazole (DIFLUCAN) 150 MG Tab Take 1 tab by mouth weekly x 4 weeks.  Patient not taking: Reported on 11/6/2019 9/6/19   CLARICE Garnett   hydrOXYzine pamoate (VISTARIL) 25 MG Cap Take 1 capsule (25 mg total) by mouth every 8 (eight) hours as needed.  Patient not taking: Reported on 11/6/2019 9/6/19   CLARICE Garnett   nystatin (MYCOSTATIN) cream Apply topically 2 (two) times daily.  Patient not taking: Reported on 11/6/2019 9/6/19   CLARICE Garnett   SITagliptin (JANUVIA) 25 MG Tab Take 1 tablet (25 mg total) by mouth once daily.  Patient not taking: Reported on 11/6/2019 2/8/19   Srikanth Son MD       Review of Systems:  Review of Systems   Constitutional: Negative for chills and fever.   HENT: Negative.    Musculoskeletal: Positive for neck pain and neck stiffness.   Skin: Positive for rash (right upper arm - resolving with cream ).   Neurological: Positive for numbness and headaches (pain from upper head to the posterio neck. pt seen in ED for this - see ER records ). Negative for dizziness, weakness and light-headedness.       Health Maintainence:  "  Immunizations:  Health Maintenance       Date Due Completion Date    Colonoscopy 04/08/2003 ---    Influenza Vaccine (1) 09/01/2019 2/11/2019    Shingles Vaccine (1 of 2) 08/21/2021 (Originally 4/8/2003) ---    Hemoglobin A1c 12/29/2019 6/29/2019    Lipid Panel 01/31/2020 1/31/2019    Foot Exam 02/08/2020 2/8/2019 (Done)    Override on 2/8/2019: Done (Normal)    Urine Microalbumin 02/08/2020 2/8/2019    LDCT Lung Screen 02/11/2020 2/11/2019    Pneumococcal Vaccine (65+ Low/Medium Risk) (2 of 2 - PPSV23) 02/11/2020 2/11/2019    Eye Exam 03/12/2020 3/12/2019    TETANUS VACCINE 02/11/2029 2/11/2019           PHYSICAL EXAM     /84 (BP Location: Left arm, Patient Position: Sitting, BP Method: Large (Manual))   Pulse 68   Ht 5' 7" (1.702 m)   Wt 80.7 kg (177 lb 14.6 oz)   SpO2 96%   BMI 27.86 kg/m²     Physical Exam   Constitutional: He is oriented to person, place, and time. He appears well-developed and well-nourished.   HENT:   Head: Normocephalic and atraumatic.   Eyes: Pupils are equal, round, and reactive to light. Conjunctivae and EOM are normal.   Cardiovascular: Normal rate and regular rhythm.   Pulmonary/Chest: Effort normal.   Musculoskeletal:        Right shoulder: He exhibits pain. He exhibits normal range of motion, no tenderness, no bony tenderness, no swelling, no effusion, no crepitus, no spasm, normal pulse and normal strength.        Left shoulder: He exhibits pain. He exhibits normal range of motion, no tenderness, no bony tenderness, no swelling, no effusion, no crepitus, no spasm, normal pulse and normal strength.        Cervical back: He exhibits tenderness, pain and spasm. He exhibits normal range of motion, no swelling, no edema, no deformity and no laceration.        Back:    Neurological: He is alert and oriented to person, place, and time. He displays normal reflexes. No cranial nerve deficit or sensory deficit. He exhibits normal muscle tone. Coordination normal.   Psychiatric: He " has a normal mood and affect.   Vitals reviewed.      LABS     Lab Results   Component Value Date    HGBA1C 6.5 (H) 06/29/2019     CMP  Sodium   Date Value Ref Range Status   08/14/2019 142 136 - 145 mmol/L Final     Potassium   Date Value Ref Range Status   08/14/2019 4.0 3.5 - 5.1 mmol/L Final     Chloride   Date Value Ref Range Status   08/14/2019 108 95 - 110 mmol/L Final     CO2   Date Value Ref Range Status   08/14/2019 26 23 - 29 mmol/L Final     Glucose   Date Value Ref Range Status   08/14/2019 103 70 - 110 mg/dL Final     BUN, Bld   Date Value Ref Range Status   08/14/2019 9 8 - 23 mg/dL Final     Creatinine   Date Value Ref Range Status   08/14/2019 0.9 0.5 - 1.4 mg/dL Final     Calcium   Date Value Ref Range Status   08/14/2019 9.5 8.7 - 10.5 mg/dL Final     Total Protein   Date Value Ref Range Status   08/14/2019 7.8 6.0 - 8.4 g/dL Final     Albumin   Date Value Ref Range Status   08/14/2019 3.9 3.5 - 5.2 g/dL Final     Total Bilirubin   Date Value Ref Range Status   08/14/2019 0.4 0.1 - 1.0 mg/dL Final     Comment:     For infants and newborns, interpretation of results should be based  on gestational age, weight and in agreement with clinical  observations.  Premature Infant recommended reference ranges:  Up to 24 hours.............<8.0 mg/dL  Up to 48 hours............<12.0 mg/dL  3-5 days..................<15.0 mg/dL  6-29 days.................<15.0 mg/dL       Alkaline Phosphatase   Date Value Ref Range Status   08/14/2019 58 55 - 135 U/L Final     AST   Date Value Ref Range Status   08/14/2019 13 10 - 40 U/L Final     ALT   Date Value Ref Range Status   08/14/2019 11 10 - 44 U/L Final     Anion Gap   Date Value Ref Range Status   08/14/2019 8 8 - 16 mmol/L Final     eGFR if    Date Value Ref Range Status   08/14/2019 >60.0 >60 mL/min/1.73 m^2 Final     eGFR if non    Date Value Ref Range Status   08/14/2019 >60.0 >60 mL/min/1.73 m^2 Final     Comment:      Calculation used to obtain the estimated glomerular filtration  rate (eGFR) is the CKD-EPI equation.        Lab Results   Component Value Date    WBC 7.47 06/29/2019    HGB 13.7 (L) 06/29/2019    HCT 43 09/08/2019    MCV 88 06/29/2019     06/29/2019     Lab Results   Component Value Date    CHOL 185 01/31/2019     Lab Results   Component Value Date    HDL 46 01/31/2019     Lab Results   Component Value Date    LDLCALC 114.0 01/31/2019     Lab Results   Component Value Date    TRIG 125 01/31/2019     Lab Results   Component Value Date    CHOLHDL 24.9 01/31/2019     Lab Results   Component Value Date    TSH 2.887 01/26/2019       ASSESSMENT/PLAN     Cristiano Cruz is a 64 y.o. male with  Past Medical History:   Diagnosis Date    B12 nutritional deficiency 8/7/2019    E. coli UTI 01/2019    During hospitalization for seizure    Generalized tonic-clonic seizure 1/26/2019 1-2019 admitted for new-onset seizures.Normal CT head.  His mental status normalized, and he had no recurrent seizures following keppra loading in the ED and twice-daily maintenance upon arrival to the floor. Workup into the etiology of his seizures remained negative. EEG was performed, with the preliminary interpretation being no epileptiform activity with normal background.     Kidney stone on left side 6/28/2019    Mixed type COPD (chronic obstructive pulmonary disease) 8/15/2019    8-2019 PFT: Normal airflow. Airflow not improved after bronchodilator. Moderate restriction. Diffusion capacity is mildly decreased. Oximetry is normal.    Pterygium, bilateral 3/12/2019    Type 2 diabetes mellitus with microalbuminuria, without long-term current use of insulin 2/11/2019       Cervicalgia- will send for xrays. nsaids and muscle relaxer as needed.   -     X-Ray Cervical Spine Complete 5 view; Future; Expected date: 11/06/2019  -     naproxen (EC NAPROSYN) 500 MG EC tablet; Take 1 tablet (500 mg total) by mouth 2 (two) times daily with meals.   Dispense: 20 tablet; Refill: 0  -     tiZANidine (ZANAFLEX) 4 MG tablet; Take 1 tablet (4 mg total) by mouth every 8 (eight) hours as needed.  Dispense: 20 tablet; Refill: 0        Follow up with PCP as scheduled     Patient education provided from Darlene. Patient was counseled on when and how to seek emergent care.       Argentina GONZALEZ, CHANEL, FNP-c   Department of Internal Medicine - [facility]  10:50 AM

## 2019-11-06 NOTE — LETTER
November 6, 2019      [facility] Medicine  [address]  [facility] [address]  Phone: [phone]  Fax: [phone]       Patient: Cristiano Cruz   YOB: 1955  Date of Visit: 11/06/2019    To Whom It May Concern:    Joel Cruz  was at [facility] on 11/06/2019. He may return to work on Thursday November 7, 2019 with no restrictions. If you have any questions or concerns, or if I can be of further assistance, please do not hesitate to contact me.    Sincerely,    Argentina Veliz NP

## 2019-11-09 ENCOUNTER — TELEPHONE (OUTPATIENT)
Dept: HEPATOLOGY | Facility: CLINIC | Age: 64
End: 2019-11-09

## 2019-11-09 DIAGNOSIS — Z86.19 HISTORY OF HEPATITIS C: ICD-10-CM

## 2019-11-09 NOTE — TELEPHONE ENCOUNTER
HCV LAB REVIEW  Completed 12 weeks of Epclusa, 8/2019  Elizabeth 1A, Tx naive  F 0-1    Pertinent labs:  11/6  CMP stable  HCV neg  HBV neg    These labs document SVR12 following successful HCV treatment with Epclusa      pls tell pt:  HCV is negative still. This means HCV is cured!!  We do not expect virus to return.  This does not give protection from HCV and patient could be infected again if ever exposed to the virus again.        Please schedule   - HCV RNA in 6 months

## 2019-11-23 DIAGNOSIS — B35.4 TINEA CORPORIS: ICD-10-CM

## 2019-11-26 RX ORDER — TERBINAFINE HYDROCHLORIDE 250 MG/1
TABLET ORAL
Qty: 30 TABLET | Refills: 0 | OUTPATIENT
Start: 2019-11-26

## 2019-11-29 DIAGNOSIS — M54.2 CERVICALGIA: ICD-10-CM

## 2019-11-29 RX ORDER — NAPROXEN 500 MG/1
TABLET ORAL
Qty: 20 TABLET | Refills: 0 | Status: CANCELLED | OUTPATIENT
Start: 2019-11-29

## 2019-11-29 RX ORDER — MELOXICAM 7.5 MG/1
7.5 TABLET ORAL DAILY
Qty: 10 TABLET | Refills: 0 | Status: SHIPPED | OUTPATIENT
Start: 2019-11-29 | End: 2019-12-09

## 2019-11-29 RX ORDER — NAPROXEN 500 MG/1
TABLET ORAL
Qty: 20 TABLET | Refills: 0 | Status: SHIPPED | OUTPATIENT
Start: 2019-11-29 | End: 2019-11-29

## 2019-11-29 RX ORDER — MELOXICAM 15 MG/1
TABLET ORAL
Qty: 30 TABLET | Refills: 0 | Status: SHIPPED | OUTPATIENT
Start: 2019-11-29 | End: 2019-11-29

## 2019-11-29 NOTE — TELEPHONE ENCOUNTER
----- Message from Sandra Carrillo sent at 11/29/2019 11:56 AM CST -----  Prescription Alternative Needed:     The pharmacy needs alternative on the following RX:    naproxen (EC NAPROSYN) 500 MG EC tablet    Reason: Drug not covered. Preferred alternative Ibuprofen tab, naproxen tab, meloxiam    Pharmacy: [id number]  [place] - [address] AT [facility] [place]    Please advise.    Thank You

## 2019-11-29 NOTE — TELEPHONE ENCOUNTER
----- Message from Sandra Carrillo sent at 11/29/2019 11:56 AM CST -----  Prescription Alternative Needed:     The pharmacy needs alternative on the following RX:    naproxen (EC NAPROSYN) 500 MG EC tablet    Reason: Drug not covered. Preferred alternative Ibuprofen tab, naproxen tab, meloxiam    Pharmacy: [facility] #[id number]  [place], [place] - [address] AT [facility] [place] [address]    Please advise.    Thank You

## 2019-12-20 ENCOUNTER — TELEPHONE (OUTPATIENT)
Dept: INTERNAL MEDICINE | Facility: CLINIC | Age: 64
End: 2019-12-20

## 2019-12-20 ENCOUNTER — OFFICE VISIT (OUTPATIENT)
Dept: INTERNAL MEDICINE | Facility: CLINIC | Age: 64
End: 2019-12-20
Payer: MEDICARE

## 2019-12-20 VITALS
BODY MASS INDEX: 28.07 KG/M2 | SYSTOLIC BLOOD PRESSURE: 130 MMHG | DIASTOLIC BLOOD PRESSURE: 80 MMHG | HEIGHT: 67 IN | HEART RATE: 76 BPM | WEIGHT: 178.81 LBS | OXYGEN SATURATION: 92 %

## 2019-12-20 DIAGNOSIS — E53.8 B12 NUTRITIONAL DEFICIENCY: ICD-10-CM

## 2019-12-20 DIAGNOSIS — G40.409 GENERALIZED TONIC-CLONIC SEIZURE: ICD-10-CM

## 2019-12-20 DIAGNOSIS — N40.1 BENIGN PROSTATIC HYPERPLASIA WITH URINARY FREQUENCY: ICD-10-CM

## 2019-12-20 DIAGNOSIS — E11.29 TYPE 2 DIABETES MELLITUS WITH MICROALBUMINURIA, WITHOUT LONG-TERM CURRENT USE OF INSULIN: Chronic | ICD-10-CM

## 2019-12-20 DIAGNOSIS — Z87.891 EX-VERY HEAVY CIGARETTE SMOKER (MORE THAN 40 PER DAY): ICD-10-CM

## 2019-12-20 DIAGNOSIS — N52.9 ERECTILE DYSFUNCTION, UNSPECIFIED ERECTILE DYSFUNCTION TYPE: ICD-10-CM

## 2019-12-20 DIAGNOSIS — R41.3 POOR MEMORY: ICD-10-CM

## 2019-12-20 DIAGNOSIS — J44.9 MIXED TYPE COPD (CHRONIC OBSTRUCTIVE PULMONARY DISEASE): ICD-10-CM

## 2019-12-20 DIAGNOSIS — Z12.11 COLON CANCER SCREENING: Primary | ICD-10-CM

## 2019-12-20 DIAGNOSIS — R80.9 TYPE 2 DIABETES MELLITUS WITH MICROALBUMINURIA, WITHOUT LONG-TERM CURRENT USE OF INSULIN: Chronic | ICD-10-CM

## 2019-12-20 DIAGNOSIS — Z86.19 HISTORY OF HEPATITIS C: ICD-10-CM

## 2019-12-20 DIAGNOSIS — R35.0 BENIGN PROSTATIC HYPERPLASIA WITH URINARY FREQUENCY: ICD-10-CM

## 2019-12-20 PROCEDURE — 99999 PR PBB SHADOW E&M-EST. PATIENT-LVL III: ICD-10-PCS | Mod: PBBFAC,,, | Performed by: INTERNAL MEDICINE

## 2019-12-20 PROCEDURE — 1126F AMNT PAIN NOTED NONE PRSNT: CPT | Mod: S$GLB,,, | Performed by: INTERNAL MEDICINE

## 2019-12-20 PROCEDURE — 99999 PR PBB SHADOW E&M-EST. PATIENT-LVL III: CPT | Mod: PBBFAC,,, | Performed by: INTERNAL MEDICINE

## 2019-12-20 PROCEDURE — 99214 OFFICE O/P EST MOD 30 MIN: CPT | Mod: S$GLB,,, | Performed by: INTERNAL MEDICINE

## 2019-12-20 PROCEDURE — 3044F HG A1C LEVEL LT 7.0%: CPT | Mod: CPTII,S$GLB,, | Performed by: INTERNAL MEDICINE

## 2019-12-20 PROCEDURE — 99499 RISK ADDL DX/OHS AUDIT: ICD-10-PCS | Mod: S$GLB,,, | Performed by: INTERNAL MEDICINE

## 2019-12-20 PROCEDURE — 1159F MED LIST DOCD IN RCRD: CPT | Mod: S$GLB,,, | Performed by: INTERNAL MEDICINE

## 2019-12-20 PROCEDURE — 1159F PR MEDICATION LIST DOCUMENTED IN MEDICAL RECORD: ICD-10-PCS | Mod: S$GLB,,, | Performed by: INTERNAL MEDICINE

## 2019-12-20 PROCEDURE — 1101F PR PT FALLS ASSESS DOC 0-1 FALLS W/OUT INJ PAST YR: ICD-10-PCS | Mod: CPTII,S$GLB,, | Performed by: INTERNAL MEDICINE

## 2019-12-20 PROCEDURE — 1126F PR PAIN SEVERITY QUANTIFIED, NO PAIN PRESENT: ICD-10-PCS | Mod: S$GLB,,, | Performed by: INTERNAL MEDICINE

## 2019-12-20 PROCEDURE — 99499 UNLISTED E&M SERVICE: CPT | Mod: S$GLB,,, | Performed by: INTERNAL MEDICINE

## 2019-12-20 PROCEDURE — 1101F PT FALLS ASSESS-DOCD LE1/YR: CPT | Mod: CPTII,S$GLB,, | Performed by: INTERNAL MEDICINE

## 2019-12-20 PROCEDURE — 99214 PR OFFICE/OUTPT VISIT, EST, LEVL IV, 30-39 MIN: ICD-10-PCS | Mod: S$GLB,,, | Performed by: INTERNAL MEDICINE

## 2019-12-20 PROCEDURE — 3044F PR MOST RECENT HEMOGLOBIN A1C LEVEL <7.0%: ICD-10-PCS | Mod: CPTII,S$GLB,, | Performed by: INTERNAL MEDICINE

## 2019-12-20 RX ORDER — NAPROXEN 500 MG/1
TABLET ORAL
COMMUNITY
Start: 2019-11-29 | End: 2019-12-20

## 2019-12-20 RX ORDER — SILODOSIN 4 MG/1
4 CAPSULE ORAL DAILY
Qty: 90 CAPSULE | Refills: 3 | Status: SHIPPED | OUTPATIENT
Start: 2019-12-20 | End: 2021-07-28 | Stop reason: SDUPTHER

## 2019-12-20 RX ORDER — PRAVASTATIN SODIUM 10 MG/1
10 TABLET ORAL DAILY
Qty: 90 TABLET | Refills: 3 | Status: SHIPPED | OUTPATIENT
Start: 2019-12-20 | End: 2021-05-17 | Stop reason: SDUPTHER

## 2019-12-20 RX ORDER — TADALAFIL 10 MG/1
10 TABLET ORAL DAILY PRN
Qty: 10 TABLET | Refills: 5 | Status: SHIPPED | OUTPATIENT
Start: 2019-12-20 | End: 2020-03-05 | Stop reason: SDUPTHER

## 2019-12-20 RX ORDER — MELOXICAM 15 MG/1
TABLET ORAL
COMMUNITY
Start: 2019-11-29 | End: 2019-12-20

## 2019-12-20 RX ORDER — LEVETIRACETAM 500 MG/1
500 TABLET ORAL 2 TIMES DAILY
Qty: 180 TABLET | Refills: 3 | Status: SHIPPED | OUTPATIENT
Start: 2019-12-20 | End: 2020-05-29 | Stop reason: SDUPTHER

## 2019-12-20 NOTE — PROGRESS NOTES
INTERNAL MEDICINE CLINIC  Follow-up Visit Progress Note     PRESENTING HISTORY     PCP: Srikanth Son MD    Last Clinic Visit with me:  8-6-2019    Current Chief Complaint/Problem:    Chief Complaint   Patient presents with    Follow-up     History of Present Illness & ROS: Mr. Cristiano Cruz is a 64 y.o. male.    Doing well.  No SOB.  No chest pain.    Poor erection and duration sex.    PAST HISTORY:     Past Medical History:   Diagnosis Date    B12 nutritional deficiency 8/7/2019    E. coli UTI 01/2019    During hospitalization for seizure    Generalized tonic-clonic seizure 1/26/2019 1-2019 admitted for new-onset seizures.Normal CT head.  His mental status normalized, and he had no recurrent seizures following keppra loading in the ED and twice-daily maintenance upon arrival to the floor. Workup into the etiology of his seizures remained negative. EEG was performed, with the preliminary interpretation being no epileptiform activity with normal background.     History of hepatitis C, s/p successful Rx w/ SVR12 (cure) - 11/2019 2/8/2019    S/p epclusa    Kidney stone on left side 6/28/2019    Mixed type COPD (chronic obstructive pulmonary disease) 8/15/2019    8-2019 PFT: Normal airflow. Airflow not improved after bronchodilator. Moderate restriction. Diffusion capacity is mildly decreased. Oximetry is normal.    Pterygium, bilateral 3/12/2019    Type 2 diabetes mellitus with microalbuminuria, without long-term current use of insulin 2/11/2019       Past Surgical History:   Procedure Laterality Date    [facility]PAIR Right [facility]       Family History   Problem Relation Age of Onset    Hypertension Mother        Social History     Socioeconomic History    Marital status:      Spouse name: Not on file    Number of children: 1    Years of education: Not on file    Highest education level: Not on file   Occupational History    Not on file   Social Needs    Financial resource  strain: Not on file    Food insecurity:     Worry: Not on file     Inability: Not on file    Transportation needs:     Medical: Not on file     Non-medical: Not on file   Tobacco Use    Smoking status: Former Smoker     Packs/day: 3.00     Years: 25.00     Pack years: 75.00     Last attempt to quit: 2012     Years since quittin.8    Smokeless tobacco: Never Used   Substance and Sexual Activity    Alcohol use: No     Comment: Used to drink    Drug use: No    Sexual activity: Yes     Partners: Female   Lifestyle    Physical activity:     Days per week: Not on file     Minutes per session: Not on file    Stress: Not on file   Relationships    Social connections:     Talks on phone: Not on file     Gets together: Not on file     Attends Christian service: Not on file     Active member of club or organization: Not on file     Attends meetings of clubs or organizations: Not on file     Relationship status: Not on file   Other Topics Concern    Not on file   Social History Narrative     with 1 daughter (42 as of 2019).    Work in [employer]       MEDICATIONS & ALLERGIES:     Current Outpatient Medications on File Prior to Visit   Medication Sig Dispense Refill    blood sugar diagnostic Strp Use 2 (two) times daily. 100 each 11    cyanocobalamin 1,000 mcg/mL injection Inject 1 mL (1,000 mcg total) into the muscle every 30 days. 10 mL 3    lancets Misc Use twice daily as directed 100 each 11                          levETIRAcetam (KEPPRA) 500 MG Tab Take 1 tablet (500 mg total) by mouth 2 (two) times daily. 60 tablet 11                   nystatin (MYCOSTATIN) cream Apply topically 2 (two) times daily. 1 Tube 3     pravastatin (PRAVACHOL) 10 MG tablet Take 1 tablet (10 mg total) by mouth once daily. 90 tablet 3    silodosin (RAPAFLO) 4 mg Cap capsule Take 1 capsule (4 mg total) by mouth once daily. 90 capsule 3     SITagliptin (JANUVIA) 25 MG Tab Take 1 tablet (25 mg total) by mouth  once daily. 90 tablet 3     No current facility-administered medications on file prior to visit.         Review of patient's allergies indicates:  No Known Allergies    Medications Reconciliation:   I have reconciled the patient's home medications and discharge medications with the patient/family. I have updated all changes.  Refer to After-Visit Medication List.    OBJECTIVE:     Vital Signs:  Vitals:    12/20/19 1431   BP: 130/80   Pulse: 76     Wt Readings from Last 1 Encounters:   12/20/19 1401 81.1 kg (178 lb 12.7 oz)     Body mass index is 28 kg/m².     Physical Exam:  General: Well developed, well nourished. No distress.  HEENT: Head is normocephalic, atraumatic.  Eyes: Clear conjunctiva.  Neck: Supple, symmetrical neck; trachea midline.  Lungs: Clear to auscultation bilaterally and normal respiratory effort.  Overall decreased breath sounds.  Cardiovascular: Heart with regular rate and rhythm.    Extremities: No LE edema.   Abdomen: Abdomen is soft, non-tender non-distended with normal bowel sounds.  Skin: Skin color, texture, turgor normal. No rashes.  Musculoskeletal: Normal gait.   Neurologic: Normal strength and tone.  Psychiatric: Normal affect. Alert.      Laboratory  Lab Results   Component Value Date    WBC 7.47 06/29/2019    HGB 13.7 (L) 06/29/2019    HCT 43 09/08/2019     06/29/2019    CHOL 185 01/31/2019    TRIG 125 01/31/2019    HDL 46 01/31/2019    ALT 9 (L) 11/06/2019    AST 12 11/06/2019     11/06/2019    K 4.4 11/06/2019     11/06/2019    CREATININE 1.1 11/06/2019    BUN 12 11/06/2019    CO2 29 11/06/2019    TSH 2.887 01/26/2019    PSA 0.16 01/31/2019    INR 1.0 03/14/2019    HGBA1C 6.5 (H) 06/29/2019   Results for BOBBY JASON (MRN [id number]) as of 12/20/2019 14:16   Ref. Range 1/26/2019 06:23 6/29/2019 04:05   Hemoglobin A1C External Latest Ref Range: 4.0 - 5.6 % 6.5 (H) 6.5 (H)   Estimated Avg Glucose Latest Ref Range: 68 - 131 mg/dL 140 (H) 140 (H)       ASSESSMENT &  PLAN:     Generalized tonic-clonic seizure  - No recurrence.    7-19-19 Neurology:              - Advised him to go to the ED in case of any seizures              - f/u in clinic in 6-9 months for monitoring recurrence of events and prescriptions              -  if remains seizure-free for up to 2 years, will consider weaning off of AEDs    - On Keppra 500 mg BID.     Mixed COPD  - History of heavy smoking.    8-2019 PFT:  Normal airflow. Airflow not improved after bronchodilator. Moderate restriction.  Diffusion capacity is mildly decreased. Oximetry is normal.    - Did not get his echo.  - POLLACK and weakness have resolved after B12 injection.  - No inhaler necessary at present.    B12 Deficiency  - On B12 injection monthly.     Poor memory  - Improved with B12 injection.     Ref. Range 8/6/2019 12:22   Thiamine Latest Ref Range: 38 - 122 ug/L 51   Vitamin B-12 Latest Ref Range: 210 - 950 pg/mL 149 (L)   Vitamin B6 Latest Ref Range: 5 - 50 ug/L 6        Hepatitis C antibody test positive  -    Hepatology: Epclusi x 12 weeks.     Type 2 diabetes mellitus without complication, without long-term current use of insulin  - A1c 6.5  -     SITagliptin (JANUVIA) 25 MG Tab; Take 1 tablet (25 mg total) by mouth once daily.       Start low dose statin:   -     pravastatin (PRAVACHOL) 10 MG tablet; Take 1 tablet (10 mg total) by mouth once daily.       Colon cancer screening  -     Fecal Immunochemical Test (iFOBT); Future; Expected date: 12/20/2019     Personal history of nicotine dependence   Ex-very heavy cigarette smoker (more than 40 per day)   2-11-19  Lung-RADS Category:  2 - Benign Appearance or Behavior - continue annual screening with LDCT in 12 months.     Benign prostatic hyperplasia with urinary frequency  Refilled: -     silodosin (RAPAFLO) 4 mg Cap capsule; Take 1 capsule (4 mg total) by mouth once daily.  Dispense: 90 capsule; Refill: 3    Erectile dysfunction, unspecified erectile dysfunction type  - Discussed  ED. Will trial:  -     tadalafil (CIALIS) 10 MG tablet; Take 1 tablet (10 mg total) by mouth daily as needed for Erectile Dysfunction.  Dispense: 10 tablet; Refill: 5      Preventive Health Maintenance:  Colonoscopy - he did not schedule.   Ordered:  FitKit was given to patient on 12/20/2019 2:15 PM     Return to Clinic for Follow Up with me:  6 months.    Scheduled Follow-up :  Future Appointments   Date Time Provider Department Center   5/11/2020 10:30 AM LAB, APPOINTMENT [facility] LAB [facility]       After Visit Medication List :     Medication List           Accurate as of December 20, 2019  2:40 PM. If you have any questions, ask your nurse or doctor.               START taking these medications    tadalafil 10 MG tablet  Commonly known as:  CIALIS  Take 1 tablet (10 mg total) by mouth daily as needed for Erectile Dysfunction.  Started by:  Srikanth Son MD        CONTINUE taking these medications    blood sugar diagnostic Strp  Use 2 (two) times daily.     cyanocobalamin 1,000 mcg/mL injection  Inject 1 mL (1,000 mcg total) into the muscle every 30 days.     lancets Misc  Use twice daily as directed     levETIRAcetam 500 MG Tab  Commonly known as:  KEPPRA  Take 1 tablet (500 mg total) by mouth 2 (two) times daily.     pravastatin 10 MG tablet  Commonly known as:  PRAVACHOL  Take 1 tablet (10 mg total) by mouth once daily.     silodosin 4 mg Cap capsule  Commonly known as:  RAPAFLO  Take 1 capsule (4 mg total) by mouth once daily.     SITagliptin 25 MG Tab  Commonly known as:  JANUVIA  Take 1 tablet (25 mg total) by mouth once daily.        STOP taking these medications    cetirizine 10 MG tablet  Commonly known as:  ZYRTEC  Stopped by:  Srikanth Son MD     fluconazole 150 MG Tab  Commonly known as:  DIFLUCAN  Stopped by:  Srikanth Son MD     hydrOXYzine pamoate 25 MG Cap  Commonly known as:  VISTARIL  Stopped by:  Srikanth Son MD     meloxicam 15 MG tablet  Commonly known as:  MOBIC  Stopped  by:  Srikanth Son MD     naproxen 500 MG EC tablet  Commonly known as:  EC NAPROSYN  Stopped by:  Srikanth Son MD     nystatin cream  Commonly known as:  MYCOSTATIN  Stopped by:  Srikanth Son MD           Where to Get Your Medications      These medications were sent to [facility]  [address] [facility] [address]    Hours:  Mon-Fri, 8a-5:30p Phone:  [phone]   · tadalafil 10 MG tablet     These medications were sent to [id number] - [place] - [facility] [place] [facility] AT [facility] [place] [facility]  [address] [place] [address]    Phone:  [phone]   · levETIRAcetam 500 MG Tab  · pravastatin 10 MG tablet  · silodosin 4 mg Cap capsule  · SITagliptin 25 MG Tab         Signing Physician:  Srikanth Son MD

## 2019-12-30 RX ORDER — MELOXICAM 15 MG/1
TABLET ORAL
Qty: 30 TABLET | Refills: 0 | OUTPATIENT
Start: 2019-12-30

## 2020-01-02 DIAGNOSIS — M54.2 CERVICALGIA: ICD-10-CM

## 2020-01-02 RX ORDER — NAPROXEN 500 MG/1
TABLET ORAL
Qty: 20 TABLET | Refills: 0 | Status: SHIPPED | OUTPATIENT
Start: 2020-01-02 | End: 2020-03-05

## 2020-02-17 DIAGNOSIS — E11.9 TYPE 2 DIABETES MELLITUS WITHOUT COMPLICATION: ICD-10-CM

## 2020-02-20 ENCOUNTER — PATIENT OUTREACH (OUTPATIENT)
Dept: ADMINISTRATIVE | Facility: HOSPITAL | Age: 65
End: 2020-02-20

## 2020-02-20 NOTE — PROGRESS NOTES
Chart Reviewed- Links and Vaccines Updated -  left of need to call our office to schedule his Diabetic Eye Exam in mid March . He was instructed to collect his Fit Kit , add collection date on label and mail to our lab.

## 2020-03-05 ENCOUNTER — LAB VISIT (OUTPATIENT)
Dept: LAB | Facility: HOSPITAL | Age: 65
End: 2020-03-05
Attending: INTERNAL MEDICINE
Payer: MEDICARE

## 2020-03-05 ENCOUNTER — OFFICE VISIT (OUTPATIENT)
Dept: INTERNAL MEDICINE | Facility: CLINIC | Age: 65
End: 2020-03-05
Payer: MEDICARE

## 2020-03-05 VITALS
SYSTOLIC BLOOD PRESSURE: 139 MMHG | OXYGEN SATURATION: 93 % | HEIGHT: 67 IN | DIASTOLIC BLOOD PRESSURE: 85 MMHG | HEART RATE: 77 BPM | WEIGHT: 187.19 LBS | BODY MASS INDEX: 29.38 KG/M2

## 2020-03-05 DIAGNOSIS — E53.8 B12 NUTRITIONAL DEFICIENCY: ICD-10-CM

## 2020-03-05 DIAGNOSIS — N52.9 ERECTILE DYSFUNCTION, UNSPECIFIED ERECTILE DYSFUNCTION TYPE: ICD-10-CM

## 2020-03-05 DIAGNOSIS — R41.3 POOR MEMORY: ICD-10-CM

## 2020-03-05 DIAGNOSIS — R74.8 ELEVATED CPK: ICD-10-CM

## 2020-03-05 DIAGNOSIS — E11.29 TYPE 2 DIABETES MELLITUS WITH MICROALBUMINURIA, WITHOUT LONG-TERM CURRENT USE OF INSULIN: Chronic | ICD-10-CM

## 2020-03-05 DIAGNOSIS — R80.9 TYPE 2 DIABETES MELLITUS WITH MICROALBUMINURIA, WITHOUT LONG-TERM CURRENT USE OF INSULIN: Chronic | ICD-10-CM

## 2020-03-05 DIAGNOSIS — Z87.891 EX-VERY HEAVY CIGARETTE SMOKER (MORE THAN 40 PER DAY): ICD-10-CM

## 2020-03-05 DIAGNOSIS — Z86.19 HISTORY OF HEPATITIS C: ICD-10-CM

## 2020-03-05 DIAGNOSIS — N40.1 BENIGN PROSTATIC HYPERPLASIA WITH URINARY FREQUENCY: ICD-10-CM

## 2020-03-05 DIAGNOSIS — J44.9 MIXED TYPE COPD (CHRONIC OBSTRUCTIVE PULMONARY DISEASE): ICD-10-CM

## 2020-03-05 DIAGNOSIS — N52.02 CORPORO-VENOUS OCCLUSIVE ERECTILE DYSFUNCTION: ICD-10-CM

## 2020-03-05 DIAGNOSIS — Z12.9 SCREENING FOR CANCER: ICD-10-CM

## 2020-03-05 DIAGNOSIS — E11.9 TYPE 2 DIABETES MELLITUS WITHOUT COMPLICATION: ICD-10-CM

## 2020-03-05 DIAGNOSIS — G40.409 GENERALIZED TONIC-CLONIC SEIZURE: Primary | ICD-10-CM

## 2020-03-05 DIAGNOSIS — R35.0 BENIGN PROSTATIC HYPERPLASIA WITH URINARY FREQUENCY: ICD-10-CM

## 2020-03-05 LAB
ALBUMIN SERPL BCP-MCNC: 4 G/DL (ref 3.5–5.2)
ALP SERPL-CCNC: 59 U/L (ref 55–135)
ALT SERPL W/O P-5'-P-CCNC: 9 U/L (ref 10–44)
ANION GAP SERPL CALC-SCNC: 9 MMOL/L (ref 8–16)
AST SERPL-CCNC: 15 U/L (ref 10–40)
BASOPHILS # BLD AUTO: 0.03 K/UL (ref 0–0.2)
BASOPHILS NFR BLD: 0.5 % (ref 0–1.9)
BILIRUB SERPL-MCNC: 0.4 MG/DL (ref 0.1–1)
BUN SERPL-MCNC: 10 MG/DL (ref 8–23)
CALCIUM SERPL-MCNC: 9.4 MG/DL (ref 8.7–10.5)
CHLORIDE SERPL-SCNC: 108 MMOL/L (ref 95–110)
CHOLEST SERPL-MCNC: 177 MG/DL (ref 120–199)
CHOLEST/HDLC SERPL: 3.9 {RATIO} (ref 2–5)
CK SERPL-CCNC: 134 U/L (ref 20–200)
CO2 SERPL-SCNC: 27 MMOL/L (ref 23–29)
CREAT SERPL-MCNC: 1 MG/DL (ref 0.5–1.4)
DIFFERENTIAL METHOD: ABNORMAL
EOSINOPHIL # BLD AUTO: 0.1 K/UL (ref 0–0.5)
EOSINOPHIL NFR BLD: 1.8 % (ref 0–8)
ERYTHROCYTE [DISTWIDTH] IN BLOOD BY AUTOMATED COUNT: 14.2 % (ref 11.5–14.5)
EST. GFR  (AFRICAN AMERICAN): >60 ML/MIN/1.73 M^2
EST. GFR  (NON AFRICAN AMERICAN): >60 ML/MIN/1.73 M^2
ESTIMATED AVG GLUCOSE: 143 MG/DL (ref 68–131)
GLUCOSE SERPL-MCNC: 109 MG/DL (ref 70–110)
HBA1C MFR BLD HPLC: 6.6 % (ref 4–5.6)
HCT VFR BLD AUTO: 44.7 % (ref 40–54)
HDLC SERPL-MCNC: 45 MG/DL (ref 40–75)
HDLC SERPL: 25.4 % (ref 20–50)
HGB BLD-MCNC: 13.4 G/DL (ref 14–18)
IMM GRANULOCYTES # BLD AUTO: 0.01 K/UL (ref 0–0.04)
IMM GRANULOCYTES NFR BLD AUTO: 0.2 % (ref 0–0.5)
LDLC SERPL CALC-MCNC: 87 MG/DL (ref 63–159)
LYMPHOCYTES # BLD AUTO: 2 K/UL (ref 1–4.8)
LYMPHOCYTES NFR BLD: 30.2 % (ref 18–48)
MCH RBC QN AUTO: 27.4 PG (ref 27–31)
MCHC RBC AUTO-ENTMCNC: 30 G/DL (ref 32–36)
MCV RBC AUTO: 91 FL (ref 82–98)
MONOCYTES # BLD AUTO: 0.5 K/UL (ref 0.3–1)
MONOCYTES NFR BLD: 6.8 % (ref 4–15)
NEUTROPHILS # BLD AUTO: 4 K/UL (ref 1.8–7.7)
NEUTROPHILS NFR BLD: 60.5 % (ref 38–73)
NONHDLC SERPL-MCNC: 132 MG/DL
NRBC BLD-RTO: 0 /100 WBC
PLATELET # BLD AUTO: 179 K/UL (ref 150–350)
PMV BLD AUTO: 12.7 FL (ref 9.2–12.9)
POTASSIUM SERPL-SCNC: 4.3 MMOL/L (ref 3.5–5.1)
PROT SERPL-MCNC: 8.1 G/DL (ref 6–8.4)
RBC # BLD AUTO: 4.89 M/UL (ref 4.6–6.2)
SODIUM SERPL-SCNC: 144 MMOL/L (ref 136–145)
TRIGL SERPL-MCNC: 225 MG/DL (ref 30–150)
WBC # BLD AUTO: 6.62 K/UL (ref 3.9–12.7)

## 2020-03-05 PROCEDURE — 99999 PR PBB SHADOW E&M-EST. PATIENT-LVL V: ICD-10-PCS | Mod: PBBFAC,,, | Performed by: INTERNAL MEDICINE

## 2020-03-05 PROCEDURE — 36415 COLL VENOUS BLD VENIPUNCTURE: CPT

## 2020-03-05 PROCEDURE — 1126F AMNT PAIN NOTED NONE PRSNT: CPT | Mod: S$GLB,,, | Performed by: INTERNAL MEDICINE

## 2020-03-05 PROCEDURE — 85025 COMPLETE CBC W/AUTO DIFF WBC: CPT

## 2020-03-05 PROCEDURE — 83036 HEMOGLOBIN GLYCOSYLATED A1C: CPT

## 2020-03-05 PROCEDURE — 80053 COMPREHEN METABOLIC PANEL: CPT

## 2020-03-05 PROCEDURE — 82550 ASSAY OF CK (CPK): CPT

## 2020-03-05 PROCEDURE — 80061 LIPID PANEL: CPT

## 2020-03-05 PROCEDURE — 1159F MED LIST DOCD IN RCRD: CPT | Mod: S$GLB,,, | Performed by: INTERNAL MEDICINE

## 2020-03-05 PROCEDURE — 3044F PR MOST RECENT HEMOGLOBIN A1C LEVEL <7.0%: ICD-10-PCS | Mod: CPTII,S$GLB,, | Performed by: INTERNAL MEDICINE

## 2020-03-05 PROCEDURE — 1101F PT FALLS ASSESS-DOCD LE1/YR: CPT | Mod: CPTII,S$GLB,, | Performed by: INTERNAL MEDICINE

## 2020-03-05 PROCEDURE — 99214 PR OFFICE/OUTPT VISIT, EST, LEVL IV, 30-39 MIN: ICD-10-PCS | Mod: S$GLB,,, | Performed by: INTERNAL MEDICINE

## 2020-03-05 PROCEDURE — 99214 OFFICE O/P EST MOD 30 MIN: CPT | Mod: S$GLB,,, | Performed by: INTERNAL MEDICINE

## 2020-03-05 PROCEDURE — 3044F HG A1C LEVEL LT 7.0%: CPT | Mod: CPTII,S$GLB,, | Performed by: INTERNAL MEDICINE

## 2020-03-05 PROCEDURE — 87522 HEPATITIS C REVRS TRNSCRPJ: CPT

## 2020-03-05 PROCEDURE — 1126F PR PAIN SEVERITY QUANTIFIED, NO PAIN PRESENT: ICD-10-PCS | Mod: S$GLB,,, | Performed by: INTERNAL MEDICINE

## 2020-03-05 PROCEDURE — 99999 PR PBB SHADOW E&M-EST. PATIENT-LVL V: CPT | Mod: PBBFAC,,, | Performed by: INTERNAL MEDICINE

## 2020-03-05 PROCEDURE — 1159F PR MEDICATION LIST DOCUMENTED IN MEDICAL RECORD: ICD-10-PCS | Mod: S$GLB,,, | Performed by: INTERNAL MEDICINE

## 2020-03-05 PROCEDURE — 1101F PR PT FALLS ASSESS DOC 0-1 FALLS W/OUT INJ PAST YR: ICD-10-PCS | Mod: CPTII,S$GLB,, | Performed by: INTERNAL MEDICINE

## 2020-03-05 PROCEDURE — 82607 VITAMIN B-12: CPT

## 2020-03-05 RX ORDER — MELOXICAM 7.5 MG/1
TABLET ORAL
COMMUNITY
Start: 2019-12-26 | End: 2020-03-05

## 2020-03-05 RX ORDER — TADALAFIL 20 MG/1
20 TABLET ORAL DAILY PRN
Qty: 20 TABLET | Refills: 5 | Status: SHIPPED | OUTPATIENT
Start: 2020-03-05 | End: 2020-05-29 | Stop reason: SDUPTHER

## 2020-03-05 NOTE — PROGRESS NOTES
INTERNAL MEDICINE CLINIC  Follow-up Visit Progress Note     PRESENTING HISTORY     PCP: Srikanth Son MD    Last Clinic Visit with me:  12-    Current Chief Complaint/Problem:    Chief Complaint   Patient presents with    Follow-up     History of Present Illness & ROS: Mr. Cristiano Cruz is a 64 y.o. male.    Doing well.    He has been increasing his salt diet.    He has thumb arthritis.    PAST HISTORY:     Past Medical History:   Diagnosis Date    B12 nutritional deficiency 8/7/2019    E. coli UTI 01/2019    During hospitalization for seizure    Generalized tonic-clonic seizure 1/26/2019 1-2019 admitted for new-onset seizures.Normal CT head.  His mental status normalized, and he had no recurrent seizures following keppra loading in the ED and twice-daily maintenance upon arrival to the floor. Workup into the etiology of his seizures remained negative. EEG was performed, with the preliminary interpretation being no epileptiform activity with normal background.     History of hepatitis C, s/p successful Rx w/ SVR12 (cure) - 11/2019 2/8/2019    S/p epclusa    Kidney stone on left side 6/28/2019    Mixed type COPD (chronic obstructive pulmonary disease) 8/15/2019    8-2019 PFT: Normal airflow. Airflow not improved after bronchodilator. Moderate restriction. Diffusion capacity is mildly decreased. Oximetry is normal.    Pterygium, bilateral 3/12/2019    Type 2 diabetes mellitus with microalbuminuria, without long-term current use of insulin 2/11/2019       Past Surgical History:   Procedure Laterality Date    HERNIA REPAIR Right 2000    [facility]       Family History   Problem Relation Age of Onset    Hypertension Mother        Social History     Socioeconomic History    Marital status:      Spouse name: Not on file    Number of children: 1    Years of education: Not on file    Highest education level: Not on file   Occupational History    Not on file   Social Needs    Financial  resource strain: Not on file    Food insecurity:     Worry: Not on file     Inability: Not on file    Transportation needs:     Medical: Not on file     Non-medical: Not on file   Tobacco Use    Smoking status: Former Smoker     Packs/day: 3.00     Years: 25.00     Pack years: 75.00     Last attempt to quit: 2012     Years since quittin.0    Smokeless tobacco: Never Used   Substance and Sexual Activity    Alcohol use: No     Comment: Used to drink    Drug use: No    Sexual activity: Yes     Partners: Female   Lifestyle    Physical activity:     Days per week: Not on file     Minutes per session: Not on file    Stress: Not on file   Relationships    Social connections:     Talks on phone: Not on file     Gets together: Not on file     Attends Zoroastrianism service: Not on file     Active member of club or organization: Not on file     Attends meetings of clubs or organizations: Not on file     Relationship status: Not on file   Other Topics Concern    Not on file   Social History Narrative     with 1 daughter (42 as of 2019).    Work in [employer]       MEDICATIONS & ALLERGIES:     Current Outpatient Medications on File Prior to Visit   Medication Sig Dispense Refill    blood sugar diagnostic Strp Use 2 (two) times daily. 100 each 11    cyanocobalamin 1,000 mcg/mL injection Inject 1 mL (1,000 mcg total) into the muscle every 30 days. 10 mL 3    lancets Misc Use twice daily as directed 100 each 11    levETIRAcetam (KEPPRA) 500 MG Tab Take 1 tablet (500 mg total) by mouth 2 (two) times daily. 180 tablet 3    pravastatin (PRAVACHOL) 10 MG tablet Take 1 tablet (10 mg total) by mouth once daily. 90 tablet 3    silodosin (RAPAFLO) 4 mg Cap capsule Take 1 capsule (4 mg total) by mouth once daily. 90 capsule 3    SITagliptin (JANUVIA) 25 MG Tab Take 1 tablet (25 mg total) by mouth once daily. 90 tablet 3                   tadalafil (CIALIS) 10 MG tablet Take 1 tablet (10 mg total) by  mouth daily as needed for Erectile Dysfunction. 10 tablet 5     No current facility-administered medications on file prior to visit.         Review of patient's allergies indicates:  No Known Allergies    Medications Reconciliation:   I have reconciled the patient's home medications and discharge medications with the patient/family. I have updated all changes.  Refer to After-Visit Medication List.    OBJECTIVE:     Vital Signs:  Vitals:    03/05/20 1448   BP: 139/85   Pulse:      Wt Readings from Last 1 Encounters:   03/05/20 1434 84.9 kg (187 lb 2.7 oz)     Body mass index is 29.32 kg/m².     Physical Exam:  General: Well developed, well nourished. No distress.  HEENT: Head is normocephalic, atraumatic  Eyes: Clear conjunctiva.  Neck: Supple, symmetrical neck; trachea midline.  Lungs: Clear to auscultation bilaterally and normal respiratory effort.  Cardiovascular: Heart with regular rate and rhythm.    Extremities: No LE edema.   Abdomen: Abdomen is soft, non-tender non-distended with normal bowel sounds.  Skin: Skin color, texture, turgor normal. No rashes.  Musculoskeletal: Normal gait.   Psychiatric: Normal affect. Alert.    Laboratory  Lab Results   Component Value Date    WBC 7.47 06/29/2019    HGB 13.7 (L) 06/29/2019    HCT 43 09/08/2019     06/29/2019    CHOL 185 01/31/2019    TRIG 125 01/31/2019    HDL 46 01/31/2019    ALT 9 (L) 11/06/2019    AST 12 11/06/2019     11/06/2019    K 4.4 11/06/2019     11/06/2019    CREATININE 1.1 11/06/2019    BUN 12 11/06/2019    CO2 29 11/06/2019    TSH 2.887 01/26/2019    PSA 0.16 01/31/2019    INR 1.0 03/14/2019    HGBA1C 6.5 (H) 06/29/2019       ASSESSMENT & PLAN:     Generalized tonic-clonic seizure  - No recurrence.    7-19-19 Neurology:              - Advised him to go to the ED in case of any seizures              - f/u in clinic in 6-9 months for monitoring recurrence of events and prescriptions              -  if remains seizure-free for up to 2  years, will consider weaning off of AEDs     - On Keppra 500 mg BID.     Mixed COPD  - History of heavy smoking.     8-2019 PFT:  Normal airflow. Airflow not improved after bronchodilator. Moderate restriction.  Diffusion capacity is mildly decreased. Oximetry is normal.     - Did not get his echo.  - POLLACK and weakness have resolved after B12 injection.  - No inhaler necessary at present.     B12 Deficiency  Poor memory  - On B12 injection monthly.  - Improved with B12 injection.     Hepatitis C antibody test positive  -    Hepatology: Epclusi x 12 weeks.     Type 2 diabetes mellitus without complication, without long-term current use of insulin  - A1c 6.5  -     SITagliptin (JANUVIA) 25 MG Tab; Take 1 tablet (25 mg total) by mouth once daily.       On low dose statin:   -     pravastatin (PRAVACHOL) 10 MG tablet; Take 1 tablet (10 mg total) by mouth once daily.       -     Ambulatory referral/consult to Optometry; Future; Expected date: 03/12/2020  -     Hemoglobin A1c; Future; Expected date: 03/05/2020  -     Comprehensive metabolic panel; Future; Expected date: 03/05/2020  -     Ambulatory referral/consult to Podiatry; Future; Expected date: 03/12/2020    Colon cancer screening  -     Fecal Immunochemical Test (iFOBT); Future; Expected date: 12/20/2019     Personal history of nicotine dependence   Ex-very heavy cigarette smoker (more than 40 per day)   2-11-19  Lung-RADS Category:  2 - Benign Appearance or Behavior - continue annual screening with LDCT in 12 months.    -     CT Chest Lung Screening Low Dose; Future; Expected date: 03/05/2020    Benign prostatic hyperplasia with urinary frequency  On silodosin (RAPAFLO) 4 mg Cap capsule; Take 1 capsule (4 mg total) by mouth once daily.  Dispense: 90 capsule      Corporo-venous occlusive erectile dysfunction  - Discussed ED.    - -     tadalafil (CIALIS) 20 MG Tab; Take 1 tablet (20 mg total) by mouth daily as needed for Erectile Dysfunction.  Dispense: 20 tablet;  Refill: 5        Preventive Health Maintenance:  Colonoscopy - he did not schedule.   Ordered:  FitKit was given to patient on 12/20/2019 2:15 PM  He will send it in.     Return to Clinic for Follow Up with me:   6 months. Labs today.    Scheduled Follow-up :  Future Appointments   Date Time Provider Department Center   3/5/2020  3:00 PM LAB, SAME DAY [facility] [place]   3/5/2020  3:15 PM LAB, SAME DAY [facility] LAB [facility] [place] [facility]       After Visit Medication List :     Medication List           Accurate as of March 5, 2020  2:55 PM. If you have any questions, ask your nurse or doctor.               CHANGE how you take these medications    tadalafil 20 MG Tab  Commonly known as:  CIALIS  Take 1 tablet (20 mg total) by mouth daily as needed for Erectile Dysfunction.  What changed:    · medication strength  · how much to take  Changed by:  Srikanth Son MD        CONTINUE taking these medications    blood sugar diagnostic Strp  Use 2 (two) times daily.     cyanocobalamin 1,000 mcg/mL injection  Inject 1 mL (1,000 mcg total) into the muscle every 30 days.     lancets Misc  Use twice daily as directed     levETIRAcetam 500 MG Tab  Commonly known as:  KEPPRA  Take 1 tablet (500 mg total) by mouth 2 (two) times daily.     pravastatin 10 MG tablet  Commonly known as:  PRAVACHOL  Take 1 tablet (10 mg total) by mouth once daily.     silodosin 4 mg Cap capsule  Commonly known as:  RAPAFLO  Take 1 capsule (4 mg total) by mouth once daily.     SITagliptin 25 MG Tab  Commonly known as:  JANUVIA  Take 1 tablet (25 mg total) by mouth once daily.        STOP taking these medications    meloxicam 7.5 MG tablet  Commonly known as:  MOBIC  Stopped by:  Srikanth Son MD     naproxen 500 MG EC tablet  Commonly known as:  EC NAPROSYN  Stopped by:  Srikanth Son MD           Where to Get Your Medications      These medications were sent to [id number] - [place] - [address] AT [facility]  [facility] [address]  [address] [place] [address] [address]    Phone:  [phone]   · tadalafil 20 MG Tab         Signing Physician:  Srikanth Son MD

## 2020-03-06 LAB — VIT B12 SERPL-MCNC: 285 PG/ML (ref 210–950)

## 2020-03-07 ENCOUNTER — LAB VISIT (OUTPATIENT)
Dept: LAB | Facility: HOSPITAL | Age: 65
End: 2020-03-07
Attending: INTERNAL MEDICINE
Payer: MEDICARE

## 2020-03-07 DIAGNOSIS — Z12.11 COLON CANCER SCREENING: ICD-10-CM

## 2020-03-07 PROCEDURE — 82274 ASSAY TEST FOR BLOOD FECAL: CPT

## 2020-03-09 LAB
HCV RNA SERPL NAA+PROBE-LOG IU: <1.08 LOG (10) IU/ML
HCV RNA SERPL QL NAA+PROBE: NOT DETECTED IU/ML
HCV RNA SPEC NAA+PROBE-ACNC: <12 IU/ML

## 2020-03-09 NOTE — PROGRESS NOTES
HCV is still negative, meaning there is no Hepatitis C in the blood. This is what we expected. No additional HCV testing needed!

## 2020-03-10 ENCOUNTER — TELEPHONE (OUTPATIENT)
Dept: HEPATOLOGY | Facility: CLINIC | Age: 65
End: 2020-03-10

## 2020-03-10 NOTE — TELEPHONE ENCOUNTER
----- Message from Jennifer B. Scheuermann, PA sent at 3/9/2020  5:28 PM CDT -----  HCV is still negative, meaning there is no Hepatitis C in the blood. This is what we expected. No additional HCV testing needed!

## 2020-03-11 LAB — HEMOCCULT STL QL IA: NEGATIVE

## 2020-03-16 ENCOUNTER — HOSPITAL ENCOUNTER (OUTPATIENT)
Dept: RADIOLOGY | Facility: HOSPITAL | Age: 65
Discharge: HOME | End: 2020-03-16
Attending: INTERNAL MEDICINE
Payer: MEDICARE

## 2020-03-16 DIAGNOSIS — Z12.9 SCREENING FOR CANCER: ICD-10-CM

## 2020-03-16 DIAGNOSIS — Z87.891 EX-VERY HEAVY CIGARETTE SMOKER (MORE THAN 40 PER DAY): ICD-10-CM

## 2020-03-16 PROCEDURE — G0297 CT CHEST LUNG SCREENING LOW DOSE: ICD-10-PCS | Mod: 26,,, | Performed by: RADIOLOGY

## 2020-03-16 PROCEDURE — G0297 LDCT FOR LUNG CA SCREEN: HCPCS | Mod: TC

## 2020-03-16 PROCEDURE — G0297 LDCT FOR LUNG CA SCREEN: HCPCS | Mod: 26,,, | Performed by: RADIOLOGY

## 2020-03-19 DIAGNOSIS — M54.2 CERVICALGIA: ICD-10-CM

## 2020-03-19 RX ORDER — TIZANIDINE 4 MG/1
TABLET ORAL
Qty: 20 TABLET | Refills: 0 | Status: SHIPPED | OUTPATIENT
Start: 2020-03-19 | End: 2020-04-13

## 2020-04-11 DIAGNOSIS — M54.2 CERVICALGIA: ICD-10-CM

## 2020-04-13 ENCOUNTER — TELEPHONE (OUTPATIENT)
Dept: PODIATRY | Facility: CLINIC | Age: 65
End: 2020-04-13

## 2020-04-13 RX ORDER — TIZANIDINE 4 MG/1
TABLET ORAL
Qty: 20 TABLET | Refills: 0 | Status: SHIPPED | OUTPATIENT
Start: 2020-04-13 | End: 2020-05-11

## 2020-05-06 ENCOUNTER — HOSPITAL ENCOUNTER (EMERGENCY)
Facility: HOSPITAL | Age: 65
Discharge: HOME | End: 2020-05-06
Attending: EMERGENCY MEDICINE
Payer: MEDICARE

## 2020-05-06 VITALS
OXYGEN SATURATION: 100 % | DIASTOLIC BLOOD PRESSURE: 90 MMHG | SYSTOLIC BLOOD PRESSURE: 158 MMHG | TEMPERATURE: 99 F | RESPIRATION RATE: 18 BRPM | HEART RATE: 84 BPM

## 2020-05-06 DIAGNOSIS — G40.919 BREAKTHROUGH SEIZURE: Primary | ICD-10-CM

## 2020-05-06 DIAGNOSIS — R56.9 SEIZURE: ICD-10-CM

## 2020-05-06 DIAGNOSIS — J96.01 ACUTE RESPIRATORY FAILURE WITH HYPOXEMIA: ICD-10-CM

## 2020-05-06 LAB
ALBUMIN SERPL BCP-MCNC: 3.9 G/DL (ref 3.5–5.2)
ALP SERPL-CCNC: 62 U/L (ref 55–135)
ALT SERPL W/O P-5'-P-CCNC: 9 U/L (ref 10–44)
AMPHET+METHAMPHET UR QL: NEGATIVE
ANION GAP SERPL CALC-SCNC: 15 MMOL/L (ref 8–16)
AST SERPL-CCNC: 12 U/L (ref 10–40)
BACTERIA #/AREA URNS AUTO: NORMAL /HPF
BARBITURATES UR QL SCN>200 NG/ML: NEGATIVE
BASOPHILS # BLD AUTO: 0.04 K/UL (ref 0–0.2)
BASOPHILS NFR BLD: 0.4 % (ref 0–1.9)
BENZODIAZ UR QL SCN>200 NG/ML: NEGATIVE
BILIRUB SERPL-MCNC: 0.2 MG/DL (ref 0.1–1)
BILIRUB UR QL STRIP: NEGATIVE
BUN SERPL-MCNC: 10 MG/DL (ref 8–23)
BZE UR QL SCN: NEGATIVE
CALCIUM SERPL-MCNC: 9.2 MG/DL (ref 8.7–10.5)
CANNABINOIDS UR QL SCN: NEGATIVE
CHLORIDE SERPL-SCNC: 109 MMOL/L (ref 95–110)
CLARITY UR REFRACT.AUTO: CLEAR
CO2 SERPL-SCNC: 16 MMOL/L (ref 23–29)
COLOR UR AUTO: YELLOW
CREAT SERPL-MCNC: 1.2 MG/DL (ref 0.5–1.4)
CREAT UR-MCNC: 117 MG/DL (ref 23–375)
DIFFERENTIAL METHOD: ABNORMAL
EOSINOPHIL # BLD AUTO: 0 K/UL (ref 0–0.5)
EOSINOPHIL NFR BLD: 0.3 % (ref 0–8)
ERYTHROCYTE [DISTWIDTH] IN BLOOD BY AUTOMATED COUNT: 14.1 % (ref 11.5–14.5)
EST. GFR  (AFRICAN AMERICAN): >60 ML/MIN/1.73 M^2
EST. GFR  (NON AFRICAN AMERICAN): >60 ML/MIN/1.73 M^2
GLUCOSE SERPL-MCNC: 165 MG/DL (ref 70–110)
GLUCOSE UR QL STRIP: NEGATIVE
HCT VFR BLD AUTO: 45.5 % (ref 40–54)
HGB BLD-MCNC: 14.1 G/DL (ref 14–18)
HGB UR QL STRIP: ABNORMAL
HYALINE CASTS UR QL AUTO: 0 /LPF
IMM GRANULOCYTES # BLD AUTO: 0.03 K/UL (ref 0–0.04)
IMM GRANULOCYTES NFR BLD AUTO: 0.3 % (ref 0–0.5)
KETONES UR QL STRIP: NEGATIVE
LEUKOCYTE ESTERASE UR QL STRIP: NEGATIVE
LYMPHOCYTES # BLD AUTO: 2.1 K/UL (ref 1–4.8)
LYMPHOCYTES NFR BLD: 19.7 % (ref 18–48)
MCH RBC QN AUTO: 28.2 PG (ref 27–31)
MCHC RBC AUTO-ENTMCNC: 31 G/DL (ref 32–36)
MCV RBC AUTO: 91 FL (ref 82–98)
METHADONE UR QL SCN>300 NG/ML: NEGATIVE
MICROSCOPIC COMMENT: NORMAL
MONOCYTES # BLD AUTO: 0.6 K/UL (ref 0.3–1)
MONOCYTES NFR BLD: 5.2 % (ref 4–15)
NEUTROPHILS # BLD AUTO: 7.9 K/UL (ref 1.8–7.7)
NEUTROPHILS NFR BLD: 74.1 % (ref 38–73)
NITRITE UR QL STRIP: NEGATIVE
NRBC BLD-RTO: 0 /100 WBC
OPIATES UR QL SCN: NEGATIVE
PCP UR QL SCN>25 NG/ML: NEGATIVE
PH UR STRIP: 5 [PH] (ref 5–8)
PLATELET # BLD AUTO: 172 K/UL (ref 150–350)
PMV BLD AUTO: 11.8 FL (ref 9.2–12.9)
POTASSIUM SERPL-SCNC: 3.6 MMOL/L (ref 3.5–5.1)
PROT SERPL-MCNC: 8.3 G/DL (ref 6–8.4)
PROT UR QL STRIP: ABNORMAL
RBC # BLD AUTO: 5 M/UL (ref 4.6–6.2)
RBC #/AREA URNS AUTO: 2 /HPF (ref 0–4)
SARS-COV-2 RDRP RESP QL NAA+PROBE: NEGATIVE
SODIUM SERPL-SCNC: 140 MMOL/L (ref 136–145)
SP GR UR STRIP: 1.02 (ref 1–1.03)
SQUAMOUS #/AREA URNS AUTO: 0 /HPF
TOXICOLOGY INFORMATION: NORMAL
URN SPEC COLLECT METH UR: ABNORMAL
WBC # BLD AUTO: 10.65 K/UL (ref 3.9–12.7)
WBC #/AREA URNS AUTO: 4 /HPF (ref 0–5)

## 2020-05-06 PROCEDURE — 80053 COMPREHEN METABOLIC PANEL: CPT

## 2020-05-06 PROCEDURE — U0002 COVID-19 LAB TEST NON-CDC: HCPCS

## 2020-05-06 PROCEDURE — 96365 THER/PROPH/DIAG IV INF INIT: CPT

## 2020-05-06 PROCEDURE — 85025 COMPLETE CBC W/AUTO DIFF WBC: CPT

## 2020-05-06 PROCEDURE — 80307 DRUG TEST PRSMV CHEM ANLYZR: CPT

## 2020-05-06 PROCEDURE — 99284 PR EMERGENCY DEPT VISIT,LEVEL IV: ICD-10-PCS | Mod: ,,, | Performed by: EMERGENCY MEDICINE

## 2020-05-06 PROCEDURE — 63600175 PHARM REV CODE 636 W HCPCS: Performed by: EMERGENCY MEDICINE

## 2020-05-06 PROCEDURE — 99284 EMERGENCY DEPT VISIT MOD MDM: CPT | Mod: ,,, | Performed by: EMERGENCY MEDICINE

## 2020-05-06 PROCEDURE — 99284 EMERGENCY DEPT VISIT MOD MDM: CPT | Mod: 25

## 2020-05-06 PROCEDURE — 81001 URINALYSIS AUTO W/SCOPE: CPT | Mod: 59

## 2020-05-06 RX ORDER — LEVETIRACETAM 10 MG/ML
1000 INJECTION INTRAVASCULAR
Status: COMPLETED | OUTPATIENT
Start: 2020-05-06 | End: 2020-05-06

## 2020-05-06 RX ADMIN — LEVETIRACETAM 1000 MG: 10 INJECTION INTRAVENOUS at 05:05

## 2020-05-06 NOTE — ED NOTES
Adult Physical Assessment  LOC: Cristiano Cruz, 65 y.o. male verified via two identifiers.  The patient is lethargic and oriented x2.  APPEARANCE: Patient resting comfortably and appears to be in no acute distress at this time. Patient is clean and well groomed, patient's clothing is properly fastened.  SKIN:The skin is warm and dry, color consistent with ethnicity, patient has normal skin turgor and moist mucus membranes, skin intact, no breakdown or brusing noted.  MUSCULOSKELETAL: Patient moving all extremities well, no obvious swelling or deformities noted.  RESPIRATORY: Airway is open and patent, respirations are spontaneous, patient has a normal effort and rate, no accessory muscle use noted. Pt with O2 sat 89% room air, placed on 4L NC with O2 sat 93%.   CARDIAC: Patient tachycardic, no periphreal edema noted in any extremity, capillary refill < 3 seconds in all extremities  ABDOMEN: Soft and non tender to palpation, no abdominal distention noted. Bowel sounds present in all four quadrants.  NEUROLOGIC: Eyes open to verbal stimulation, behavior appropriate to situation, falls asleep while following commands, facial expression symmetrical, bilateral hand grasp equal and even, purposeful motor response noted, normal sensation in all extremities when touched with a finger.

## 2020-05-06 NOTE — ED TRIAGE NOTES
Pt presents via EMS after witnessed seizure by wife x2. Wife states seizures lasted about 2-3 min. Pt post ictal after and does not recall seizures. Pt oriented to self and place at this time. Pt takes keppra and is compliant with medications

## 2020-05-06 NOTE — ED NOTES
Patient asked for cold water and able to tolerate without any issues, no acute distress noted, patient states he is feeling much better, will continue to monitor

## 2020-05-06 NOTE — ED NOTES
Seizure precautions applied. Bed locked in lowest position, side rails up and wrapped. Pt with call light at bedside.

## 2020-05-06 NOTE — PROVIDER PROGRESS NOTES - EMERGENCY DEPT.
Encounter Date: 5/6/2020    ED Physician Progress Notes           ED Attending Sign-out Progress Note:  Patient signed out to me at shift change by Dr. Quintanilla to f/u labs, CXR read and overall disposition.     CXR w/o acute pathology on radiology read.  Labs grossly WNL w/o actionable values. Has mild acidosis likely due to known seizure activity.    No further seizures in ED.  Patient no longer postictal and is readily conversive. No longer has O2 requirement. Suspect this was due to somnolence earlier.  Unclear etiology of breakthrough seizures.   Tolerating PO in ED.  Advised to f/u w/ neurology soon.     Stable for discharge at this time. Return precautions discussed.     ED Clinical Impression:    ICD-10-CM ICD-9-CM   1. Breakthrough seizure G40.919 345.91   2. Seizure R56.9 780.39   3. Acute respiratory failure with hypoxemia J96.01 518.81

## 2020-05-09 DIAGNOSIS — M54.2 CERVICALGIA: ICD-10-CM

## 2020-05-11 RX ORDER — TIZANIDINE 4 MG/1
TABLET ORAL
Qty: 20 TABLET | Refills: 0 | Status: SHIPPED | OUTPATIENT
Start: 2020-05-11 | End: 2020-05-29

## 2020-05-12 DIAGNOSIS — M54.2 CERVICALGIA: ICD-10-CM

## 2020-05-12 RX ORDER — TIZANIDINE 4 MG/1
TABLET ORAL
Qty: 20 TABLET | Refills: 0 | OUTPATIENT
Start: 2020-05-12

## 2020-05-25 ENCOUNTER — PATIENT OUTREACH (OUTPATIENT)
Dept: ADMINISTRATIVE | Facility: OTHER | Age: 65
End: 2020-05-25

## 2020-05-25 NOTE — PROGRESS NOTES
Chart reviewed.   Immunizations: updated  Orders placed: n/a  Upcoming appts to satisfy DEMARCUS topics: Optometry 5/26

## 2020-05-26 ENCOUNTER — OFFICE VISIT (OUTPATIENT)
Dept: OPTOMETRY | Facility: CLINIC | Age: 65
End: 2020-05-26
Payer: MEDICARE

## 2020-05-26 DIAGNOSIS — H04.123 BILATERAL DRY EYES: ICD-10-CM

## 2020-05-26 DIAGNOSIS — H25.13 NUCLEAR SCLEROSIS OF BOTH EYES: ICD-10-CM

## 2020-05-26 DIAGNOSIS — E11.9 TYPE 2 DIABETES MELLITUS WITHOUT RETINOPATHY: Primary | ICD-10-CM

## 2020-05-26 DIAGNOSIS — H52.4 PRESBYOPIA: ICD-10-CM

## 2020-05-26 DIAGNOSIS — H40.053 OCULAR HYPERTENSION, BILATERAL: ICD-10-CM

## 2020-05-26 DIAGNOSIS — H11.003 PTERYGIUM, BILATERAL: ICD-10-CM

## 2020-05-26 DIAGNOSIS — R80.9 TYPE 2 DIABETES MELLITUS WITH MICROALBUMINURIA, WITHOUT LONG-TERM CURRENT USE OF INSULIN: Chronic | ICD-10-CM

## 2020-05-26 DIAGNOSIS — H40.013 OAG (OPEN ANGLE GLAUCOMA) SUSPECT, LOW RISK, BILATERAL: ICD-10-CM

## 2020-05-26 DIAGNOSIS — E11.29 TYPE 2 DIABETES MELLITUS WITH MICROALBUMINURIA, WITHOUT LONG-TERM CURRENT USE OF INSULIN: Chronic | ICD-10-CM

## 2020-05-26 PROCEDURE — 99999 PR PBB SHADOW E&M-EST. PATIENT-LVL III: ICD-10-PCS | Mod: PBBFAC,,, | Performed by: OPTOMETRIST

## 2020-05-26 PROCEDURE — 99499 RISK ADDL DX/OHS AUDIT: ICD-10-PCS | Mod: S$GLB,,, | Performed by: OPTOMETRIST

## 2020-05-26 PROCEDURE — 99499 UNLISTED E&M SERVICE: CPT | Mod: S$GLB,,, | Performed by: OPTOMETRIST

## 2020-05-26 PROCEDURE — 92014 PR EYE EXAM, EST PATIENT,COMPREHESV: ICD-10-PCS | Mod: S$GLB,,, | Performed by: OPTOMETRIST

## 2020-05-26 PROCEDURE — 99999 PR PBB SHADOW E&M-EST. PATIENT-LVL III: CPT | Mod: PBBFAC,,, | Performed by: OPTOMETRIST

## 2020-05-26 PROCEDURE — 92014 COMPRE OPH EXAM EST PT 1/>: CPT | Mod: S$GLB,,, | Performed by: OPTOMETRIST

## 2020-05-26 RX ORDER — LATANOPROST 50 UG/ML
1 SOLUTION/ DROPS OPHTHALMIC NIGHTLY
Qty: 2.5 ML | Refills: 3 | Status: SHIPPED | OUTPATIENT
Start: 2020-05-26 | End: 2020-07-13

## 2020-05-26 NOTE — LETTER
May 26, 2020      Srikanth Son MD  [address]  [facility] [address]           [facility]  [address]  [facility] [address]  Phone: [phone]          Patient: Cristiano Cruz   MR Number: [id number]   YOB: 1955   Date of Visit: 5/26/2020       Dear Dr. Srikanth Son:    Thank you for referring Cristiano Cruz to me for evaluation. Attached you will find relevant portions of my assessment and plan of care.    If you have questions, please do not hesitate to call me. I look forward to following Cristiano Cruz along with you.    Sincerely,    Allegra Scanlon, OD    Enclosure  CC:  No Recipients    If you would like to receive this communication electronically, please contact [email or web address] or [phone] to request more information on "[employer] Link access.    For providers and/or their staff who would like to refer a patient to [facility], please contact us through our one-stop-shop provider referral line, Lissett Golden, at [phone].    If you feel you have received this communication in error or would no longer like to receive these types of communications, please e-mail [email or web address]

## 2020-05-26 NOTE — PROGRESS NOTES
HPI     Mr. Cristiano Cruz was referred by PCP for a diabetic eye exam.    Patient complains of decreased vision distance and near, uses otc reading   glasses they work fine. He didn't have glasses made last visit. Eyes   water.    Would patient like a refraction today? yes     Paitent denies diplopia, headaches, flashes/floaters, itching, burning,   redness, and pain.    (-)drops    (+)Diabetes  LBS   Hemoglobin A1C       Date                     Value               Ref Range             Status                03/05/2020               6.6 (H)             4.0 - 5.6 %           Final                  06/29/2019               6.5 (H)             4.0 - 5.6 %           Final                  01/26/2019               6.5 (H)             4.0 - 5.6 %           Final                 OCULAR HISTORY  Last Eye Exam: 3/12/19 with Dr. Jensen  (-)eye surgery   (-)diagnosed or treated for any eye conditions or diseases, n/a    FAMILY HISTORY  (-)Glaucoma        Last edited by Allegra Scanlon, OD on 5/26/2020  8:48 AM. (History)            Assessment /Plan     For exam results, see Encounter Report.    Type 2 diabetes mellitus without retinopathy    Type 2 diabetes mellitus with microalbuminuria, without long-term current use of insulin  -     Ambulatory referral/consult to Optometry    Nuclear sclerosis of both eyes    Bilateral dry eyes    Pterygium, bilateral    Ocular hypertension, bilateral    OAG (open angle glaucoma) suspect, low risk, bilateral    Presbyopia    Other orders  -     latanoprost (XALATAN) 0.005 % ophthalmic solution; Place 1 drop into both eyes every evening.  Dispense: 2.5 mL; Refill: 3      1-2. No retinopathy noted, OU. Continue proper BS control and annual diabetic eye exams. Monitor yearly.     3. 3+ NS OU. Decent vision but assuming cataract progression playing a role in IOP spike. Refer to Dr. Calloway for cataract eval. Called pt with referral date and time. Monitor.     4-5. Educated pt. Discussed MCKENZIE  worsened by pterygium. Recommend ATs TID-QID for added comfort and lubrication. Monitor.     5-6. H/o increased IOP in the past. No dramatic change to optic nerve. Healthy rim OU. Increase in IOP, may be due to cataract progression. (-)FHx.   Pt given alphagan and dorzolamide in office, which decreased IOP to a moderate reading.   Rx latanoprost 1 gtt OU QHS for the time being. Pt may be able to d/c gtt after CE if IOP appropriate.   Discussed risk for LOV/blindness if IOP continues to be uncontrolled.   Monitor.     7. Continue use of OTC reading glasses prn. Will re-evaluate refraction after CE. Monitor.         RTC with Dr. Calloway for cataract eval, me yearly or prn.

## 2020-05-29 ENCOUNTER — OFFICE VISIT (OUTPATIENT)
Dept: INTERNAL MEDICINE | Facility: CLINIC | Age: 65
End: 2020-05-29
Payer: MEDICARE

## 2020-05-29 VITALS
DIASTOLIC BLOOD PRESSURE: 80 MMHG | SYSTOLIC BLOOD PRESSURE: 124 MMHG | HEIGHT: 67 IN | HEART RATE: 89 BPM | WEIGHT: 184.31 LBS | OXYGEN SATURATION: 95 % | BODY MASS INDEX: 28.93 KG/M2

## 2020-05-29 DIAGNOSIS — N40.1 BENIGN PROSTATIC HYPERPLASIA WITH URINARY FREQUENCY: ICD-10-CM

## 2020-05-29 DIAGNOSIS — R35.0 BENIGN PROSTATIC HYPERPLASIA WITH URINARY FREQUENCY: ICD-10-CM

## 2020-05-29 DIAGNOSIS — R41.3 POOR MEMORY: ICD-10-CM

## 2020-05-29 DIAGNOSIS — J44.9 MIXED TYPE COPD (CHRONIC OBSTRUCTIVE PULMONARY DISEASE): Primary | ICD-10-CM

## 2020-05-29 DIAGNOSIS — N52.9 ERECTILE DYSFUNCTION, UNSPECIFIED ERECTILE DYSFUNCTION TYPE: ICD-10-CM

## 2020-05-29 DIAGNOSIS — E53.8 B12 NUTRITIONAL DEFICIENCY: ICD-10-CM

## 2020-05-29 DIAGNOSIS — R80.9 TYPE 2 DIABETES MELLITUS WITH MICROALBUMINURIA, WITHOUT LONG-TERM CURRENT USE OF INSULIN: Chronic | ICD-10-CM

## 2020-05-29 DIAGNOSIS — E11.29 TYPE 2 DIABETES MELLITUS WITH MICROALBUMINURIA, WITHOUT LONG-TERM CURRENT USE OF INSULIN: Chronic | ICD-10-CM

## 2020-05-29 DIAGNOSIS — Z86.19 HISTORY OF HEPATITIS C: ICD-10-CM

## 2020-05-29 DIAGNOSIS — G40.409 GENERALIZED TONIC-CLONIC SEIZURE: ICD-10-CM

## 2020-05-29 DIAGNOSIS — Z87.891 EX-VERY HEAVY CIGARETTE SMOKER (MORE THAN 40 PER DAY): ICD-10-CM

## 2020-05-29 PROCEDURE — 99214 PR OFFICE/OUTPT VISIT, EST, LEVL IV, 30-39 MIN: ICD-10-PCS | Mod: S$GLB,,, | Performed by: INTERNAL MEDICINE

## 2020-05-29 PROCEDURE — 1159F MED LIST DOCD IN RCRD: CPT | Mod: S$GLB,,, | Performed by: INTERNAL MEDICINE

## 2020-05-29 PROCEDURE — 3044F PR MOST RECENT HEMOGLOBIN A1C LEVEL <7.0%: ICD-10-PCS | Mod: CPTII,S$GLB,, | Performed by: INTERNAL MEDICINE

## 2020-05-29 PROCEDURE — 3044F HG A1C LEVEL LT 7.0%: CPT | Mod: CPTII,S$GLB,, | Performed by: INTERNAL MEDICINE

## 2020-05-29 PROCEDURE — 99499 RISK ADDL DX/OHS AUDIT: ICD-10-PCS | Mod: S$GLB,,, | Performed by: INTERNAL MEDICINE

## 2020-05-29 PROCEDURE — 99499 UNLISTED E&M SERVICE: CPT | Mod: S$GLB,,, | Performed by: INTERNAL MEDICINE

## 2020-05-29 PROCEDURE — 99214 OFFICE O/P EST MOD 30 MIN: CPT | Mod: S$GLB,,, | Performed by: INTERNAL MEDICINE

## 2020-05-29 PROCEDURE — 1101F PT FALLS ASSESS-DOCD LE1/YR: CPT | Mod: CPTII,S$GLB,, | Performed by: INTERNAL MEDICINE

## 2020-05-29 PROCEDURE — 99999 PR PBB SHADOW E&M-EST. PATIENT-LVL IV: CPT | Mod: PBBFAC,,, | Performed by: INTERNAL MEDICINE

## 2020-05-29 PROCEDURE — 1101F PR PT FALLS ASSESS DOC 0-1 FALLS W/OUT INJ PAST YR: ICD-10-PCS | Mod: CPTII,S$GLB,, | Performed by: INTERNAL MEDICINE

## 2020-05-29 PROCEDURE — 99999 PR PBB SHADOW E&M-EST. PATIENT-LVL IV: ICD-10-PCS | Mod: PBBFAC,,, | Performed by: INTERNAL MEDICINE

## 2020-05-29 PROCEDURE — 1159F PR MEDICATION LIST DOCUMENTED IN MEDICAL RECORD: ICD-10-PCS | Mod: S$GLB,,, | Performed by: INTERNAL MEDICINE

## 2020-05-29 RX ORDER — TADALAFIL 20 MG/1
20 TABLET ORAL DAILY PRN
Qty: 30 TABLET | Refills: 5 | Status: SHIPPED | OUTPATIENT
Start: 2020-05-29 | End: 2021-07-28

## 2020-05-29 RX ORDER — LEVETIRACETAM 500 MG/1
750 TABLET ORAL 2 TIMES DAILY
Qty: 270 TABLET | Refills: 3 | Status: SHIPPED | OUTPATIENT
Start: 2020-05-29 | End: 2020-06-15

## 2020-05-29 NOTE — PROGRESS NOTES
INTERNAL MEDICINE CLINIC  Follow-up Visit Progress Note     PRESENTING HISTORY     PCP: Srikanth Son MD    Last Clinic Visit with me:  3-5-2020    Current Chief Complaint/Problem:  No chief complaint on file.    History of Present Illness & ROS: Mr. Cristiano Cruz is a 65 y.o. male.    ED 5-6-2020:     Seizures       seizures x2. Pt family member states seizures lasted 2-3 min each. Pt post ictal after seizure and does not remember seizures. Pt takes keppra and is compliant with meds     This is a 65-year-old male with a history of seizure disorder on Keppra, insulin-dependent diabetes, hep C status post curative treatment is brought in by EMS after 2 seizures tonight.  Per EMS, the patient had a seizure around 1:00 a.m. this evening, witnessed by his wife, but resolved spontaneously.  He had a 2nd seizure at 2:30 a.m., and remains postictal at this time.  EMS found the patient awake, but oriented x1, initially combative.  His vital signs on route including a fingerstick glucose were within normal limits.  History is limited as the patient is sleepy/postictal on my exam.  He denies any recent fevers, chills, change to baseline state of health. He is not sure if he's taken his medicines regularly, his wife gives him his medicines. EMS reports that his wife has been giving meds as prescribed. He has not had a seizure in more than a year.    Today:  No seizure since ED.  Had two seizures in May.  He has been having pain to neck and back.      PAST HISTORY:     Past Medical History:   Diagnosis Date    B12 nutritional deficiency 8/7/2019    E. coli UTI 01/2019    During hospitalization for seizure    Generalized tonic-clonic seizure 1/26/2019 1-2019 admitted for new-onset seizures.Normal CT head.  His mental status normalized, and he had no recurrent seizures following keppra loading in the ED and twice-daily maintenance upon arrival to the floor. Workup into the etiology of his seizures remained negative. EEG was  performed, with the preliminary interpretation being no epileptiform activity with normal background.     History of hepatitis C, s/p successful Rx w/ SVR12 (cure) - 2019    S/p epclusa    Kidney stone on left side 2019    Mixed type COPD (chronic obstructive pulmonary disease) 8/15/2019    8-2019 PFT: Normal airflow. Airflow not improved after bronchodilator. Moderate restriction. Diffusion capacity is mildly decreased. Oximetry is normal.    Pterygium, bilateral 3/12/2019    Type 2 diabetes mellitus with microalbuminuria, without long-term current use of insulin 2019       Past Surgical History:   Procedure Laterality Date    HERNIA REPAIR Right     [facility]       Family History   Problem Relation Age of Onset    Hypertension Mother        Social History     Socioeconomic History    Marital status:      Spouse name: Not on file    Number of children: 1    Years of education: Not on file    Highest education level: Not on file   Occupational History    Not on file   Social Needs    Financial resource strain: Not on file    Food insecurity:     Worry: Not on file     Inability: Not on file    Transportation needs:     Medical: Not on file     Non-medical: Not on file   Tobacco Use    Smoking status: Former Smoker     Packs/day: 3.00     Years: 25.00     Pack years: 75.00     Last attempt to quit: 2012     Years since quittin.3    Smokeless tobacco: Never Used   Substance and Sexual Activity    Alcohol use: No     Comment: Used to drink    Drug use: No    Sexual activity: Yes     Partners: Female   Lifestyle    Physical activity:     Days per week: Not on file     Minutes per session: Not on file    Stress: Not on file   Relationships    Social connections:     Talks on phone: Not on file     Gets together: Not on file     Attends Taoist service: Not on file     Active member of club or organization: Not on file     Attends meetings of  clubs or organizations: Not on file     Relationship status: Not on file   Other Topics Concern    Not on file   Social History Narrative     with 1 daughter (42 as of 2019).    Work in [email or web address]       MEDICATIONS & ALLERGIES:     Current Outpatient Medications on File Prior to Visit   Medication Sig Dispense Refill    blood sugar diagnostic Strp Use 2 (two) times daily. 100 each 11    cyanocobalamin 1,000 mcg/mL injection Inject 1 mL (1,000 mcg total) into the muscle every 30 days. 10 mL 3    lancets Misc Use twice daily as directed 100 each 11    latanoprost (XALATAN) 0.005 % ophthalmic solution Place 1 drop into both eyes every evening. 2.5 mL 3    pravastatin (PRAVACHOL) 10 MG tablet Take 1 tablet (10 mg total) by mouth once daily. 90 tablet 3    silodosin (RAPAFLO) 4 mg Cap capsule Take 1 capsule (4 mg total) by mouth once daily. 90 capsule 3    SITagliptin (JANUVIA) 25 MG Tab Take 1 tablet (25 mg total) by mouth once daily. 90 tablet 3     levETIRAcetam (KEPPRA) 500 MG Tab Take 1 tablet (500 mg total) by mouth 2 (two) times daily. 180 tablet 3    tadalafil (CIALIS) 20 MG Tab Take 1 tablet (20 mg total) by mouth daily as needed for Erectile Dysfunction. 20 tablet 5       Review of patient's allergies indicates:  No Known Allergies    Medications Reconciliation:   I have reconciled the patient's home medications and discharge medications with the patient/family. I have updated all changes.  Refer to After-Visit Medication List.    OBJECTIVE:     Vital Signs:  Vitals:    05/29/20 1104   BP: 124/80   Pulse: 89     Wt Readings from Last 1 Encounters:   05/29/20 1104 83.6 kg (184 lb 4.9 oz)     Body mass index is 28.87 kg/m².     Physical Exam:  General: Well developed, well nourished. No distress.  HEENT: Head is normocephalic, atraumatic; ears are normal.    Eyes: Clear conjunctiva.  Neck: Supple, symmetrical neck; trachea midline.  Lungs: Clear to auscultation bilaterally and normal  respiratory effort.  Cardiovascular: Heart with regular rate and rhythm.    Extremities: No LE edema. Pulses 2+ and symmetric.   Abdomen: Abdomen is soft, non-tender non-distended with normal bowel sounds.  Skin: Skin color, texture, turgor normal. No rashes.  Musculoskeletal: Normal gait.   Lymph Nodes: No cervical or supraclavicular adenopathy.  Neurologic: Normal strength and tone. No focal numbness or weakness.   Psychiatric: Normal affect. Alert.      Laboratory  Lab Results   Component Value Date    WBC 10.65 05/06/2020    HGB 14.1 05/06/2020    HCT 45.5 05/06/2020     05/06/2020    CHOL 177 03/05/2020    TRIG 225 (H) 03/05/2020    HDL 45 03/05/2020    ALT 9 (L) 05/06/2020    AST 12 05/06/2020     05/06/2020    K 3.6 05/06/2020     05/06/2020    CREATININE 1.2 05/06/2020    BUN 10 05/06/2020    CO2 16 (L) 05/06/2020    TSH 2.887 01/26/2019    PSA 0.16 01/31/2019    INR 1.0 03/14/2019    HGBA1C 6.6 (H) 03/05/2020       ASSESSMENT & PLAN:     Generalized tonic-clonic seizure    5-2020: Seizure X 2    7-19-19 Neurology:              - Advised him to go to the ED in case of any seizures              - f/u in clinic in 6-9 months for monitoring recurrence of events and prescriptions              -  if remains seizure-free for up to 2 years, will consider weaning off of AEDs     - Increase Keppra from 500 mg BID to 750 mg BID.     -     levETIRAcetam (KEPPRA) 500 MG Tab; Take 1.5 tablets (750 mg total) by mouth 2 (two) times daily.  Dispense: 270 tablet; Refill: 3    Mixed COPD  - History of heavy smoking.     8-2019 PFT:  Normal airflow. Airflow not improved after bronchodilator. Moderate restriction.  Diffusion capacity is mildly decreased. Oximetry is normal.     - Did not get his echo.  - POLLACK and weakness have resolved after B12 injection.  - No inhaler necessary at present.     B12 Deficiency  Poor memory  - On B12 injection monthly.  - Improved with B12 injection.     Hepatitis C antibody  test positive  -    Hepatology: Epclusi x 12 weeks.     Type 2 diabetes mellitus without complication, without long-term current use of insulin  - A1c 6.6  -     SITagliptin (JANUVIA) 25 MG Tab; Take 1 tablet (25 mg total) by mouth once daily.       On low dose statin:   -     pravastatin (PRAVACHOL) 10 MG tablet; Take 1 tablet (10 mg total) by mouth once daily.       -     Ambulatory referral/consult to Podiatry; Future; Expected date: 06/05/2020    Personal history of nicotine dependence   Ex-very heavy cigarette smoker (more than 40 per day)  Annual CT Chest Lung Screening Low Dose 3-: Negative     Benign prostatic hyperplasia with urinary frequency  On silodosin (RAPAFLO) 4 mg Cap capsule; Take 1 capsule (4 mg total) by mouth once daily.    (Has no ejaculation from this, but he is okay with it).     Corporo-venous occlusive erectile dysfunction  - On:   -     tadalafiL (CIALIS) 20 MG Tab; Take 1 tablet (20 mg total) by mouth daily as needed.  Dispense: 30 tablet; Refill: 5     ([facility]).    Preventive Health Maintenance:  Up to date.     Ref. Range 3/11/2020 13:14   Fecal Immunochemical Test (iFOBT) Latest Ref Range: Negative  Negative     Return to Clinic for Follow Up with me:   6 months.     Scheduled Follow-up :  Future Appointments   Date Time Provider Department Center   6/2/2020  1:30 PM Dalia Marrufo DPM [facility] [place]   6/29/2020  8:00 AM Daryl Calloway MD [facility] [place]       After Visit Medication List :     Medication List           Accurate as of May 29, 2020 11:56 AM. If you have any questions, ask your nurse or doctor.               CHANGE how you take these medications    levETIRAcetam 500 MG Tab  Commonly known as:  KEPPRA  Take 1.5 tablets (750 mg total) by mouth 2 (two) times daily.  What changed:  how much to take  Changed by:  Srikanth Son MD     tadalafiL 20 MG Tab  Commonly known as:  CIALIS  Take 1 tablet (20 mg total) by mouth daily as  needed.  What changed:  reasons to take this  Changed by:  Srikanth Son MD        CONTINUE taking these medications    blood sugar diagnostic Strp  Use 2 (two) times daily.     cyanocobalamin 1,000 mcg/mL injection  Inject 1 mL (1,000 mcg total) into the muscle every 30 days.     lancets Misc  Use twice daily as directed     latanoprost 0.005 % ophthalmic solution  Commonly known as:  XALATAN  Place 1 drop into both eyes every evening.     pravastatin 10 MG tablet  Commonly known as:  PRAVACHOL  Take 1 tablet (10 mg total) by mouth once daily.     silodosin 4 mg Cap capsule  Commonly known as:  RAPAFLO  Take 1 capsule (4 mg total) by mouth once daily.     SITagliptin 25 MG Tab  Commonly known as:  JANUVIA  Take 1 tablet (25 mg total) by mouth once daily.        STOP taking these medications    tiZANidine 4 MG tablet  Commonly known as:  ZANAFLEX  Stopped by:  Srikanth Son MD           Where to Get Your Medications      These medications were sent to [email or web address] - [place] - [address]  [address], [place] [address]    Phone:  [phone]   · tadalafiL 20 MG Tab     These medications were sent to [id number] - [place] - [address] AT [facility] [address]  [facility] [address] [address] [address]    Phone:  [phone]   · levETIRAcetam 500 MG Tab         Signing Physician:  Srikanth Son MD

## 2020-06-01 ENCOUNTER — TELEPHONE (OUTPATIENT)
Dept: NEUROLOGY | Facility: CLINIC | Age: 65
End: 2020-06-01

## 2020-06-01 NOTE — TELEPHONE ENCOUNTER
----- Message from Brooklyn Abarca sent at 5/28/2020  4:21 PM CDT -----  Contact: [phone]  Pt needs hospital follow up within 2 weeks.

## 2020-06-01 NOTE — TELEPHONE ENCOUNTER
Call placed to pt. Had to leave a vm. Instructed appt made with Dr. Bonilla for 06/15/20 at 0800. Left vm to return call to verify appt.

## 2020-06-02 ENCOUNTER — OFFICE VISIT (OUTPATIENT)
Dept: PODIATRY | Facility: CLINIC | Age: 65
End: 2020-06-02
Payer: MEDICARE

## 2020-06-02 VITALS — DIASTOLIC BLOOD PRESSURE: 87 MMHG | HEART RATE: 69 BPM | SYSTOLIC BLOOD PRESSURE: 139 MMHG

## 2020-06-02 DIAGNOSIS — E11.9 ENCOUNTER FOR COMPREHENSIVE DIABETIC FOOT EXAMINATION, TYPE 2 DIABETES MELLITUS: Primary | ICD-10-CM

## 2020-06-02 DIAGNOSIS — E11.29 TYPE 2 DIABETES MELLITUS WITH MICROALBUMINURIA, WITHOUT LONG-TERM CURRENT USE OF INSULIN: Chronic | ICD-10-CM

## 2020-06-02 DIAGNOSIS — R80.9 TYPE 2 DIABETES MELLITUS WITH MICROALBUMINURIA, WITHOUT LONG-TERM CURRENT USE OF INSULIN: Chronic | ICD-10-CM

## 2020-06-02 PROCEDURE — 1159F MED LIST DOCD IN RCRD: CPT | Mod: S$GLB,,, | Performed by: PODIATRIST

## 2020-06-02 PROCEDURE — 3044F PR MOST RECENT HEMOGLOBIN A1C LEVEL <7.0%: ICD-10-PCS | Mod: CPTII,S$GLB,, | Performed by: PODIATRIST

## 2020-06-02 PROCEDURE — 99203 PR OFFICE/OUTPT VISIT, NEW, LEVL III, 30-44 MIN: ICD-10-PCS | Mod: S$GLB,,, | Performed by: PODIATRIST

## 2020-06-02 PROCEDURE — 1159F PR MEDICATION LIST DOCUMENTED IN MEDICAL RECORD: ICD-10-PCS | Mod: S$GLB,,, | Performed by: PODIATRIST

## 2020-06-02 PROCEDURE — 99203 OFFICE O/P NEW LOW 30 MIN: CPT | Mod: S$GLB,,, | Performed by: PODIATRIST

## 2020-06-02 PROCEDURE — 99999 PR PBB SHADOW E&M-EST. PATIENT-LVL III: ICD-10-PCS | Mod: PBBFAC,,, | Performed by: PODIATRIST

## 2020-06-02 PROCEDURE — 1126F PR PAIN SEVERITY QUANTIFIED, NO PAIN PRESENT: ICD-10-PCS | Mod: S$GLB,,, | Performed by: PODIATRIST

## 2020-06-02 PROCEDURE — 1101F PT FALLS ASSESS-DOCD LE1/YR: CPT | Mod: CPTII,S$GLB,, | Performed by: PODIATRIST

## 2020-06-02 PROCEDURE — 1126F AMNT PAIN NOTED NONE PRSNT: CPT | Mod: S$GLB,,, | Performed by: PODIATRIST

## 2020-06-02 PROCEDURE — 3044F HG A1C LEVEL LT 7.0%: CPT | Mod: CPTII,S$GLB,, | Performed by: PODIATRIST

## 2020-06-02 PROCEDURE — 1101F PR PT FALLS ASSESS DOC 0-1 FALLS W/OUT INJ PAST YR: ICD-10-PCS | Mod: CPTII,S$GLB,, | Performed by: PODIATRIST

## 2020-06-02 PROCEDURE — 99999 PR PBB SHADOW E&M-EST. PATIENT-LVL III: CPT | Mod: PBBFAC,,, | Performed by: PODIATRIST

## 2020-06-02 NOTE — LETTER
June 8, 2020      Srikanth Son MD  [address]  [facility] [address]           [facility]iatry  [address]  [facility] [address]  Phone: [phone]          Patient: Cristiano Cruz   MR Number: [id number]   YOB: 1955   Date of Visit: 6/2/2020       Dear Dr. Srikanth Son:    Thank you for referring Cristiano Cruz to me for evaluation. Attached you will find relevant portions of my assessment and plan of care.    If you have questions, please do not hesitate to call me. I look forward to following Cristiano Cruz along with you.    Sincerely,    Dalia Marrufo, JULIA    Enclosure  CC:  No Recipients    If you would like to receive this communication electronically, please contact [email or web address] or [phone] to request more information on [facility] access.    For providers and/or their staff who would like to refer a patient to [facility], please contact us through our one-stop-shop provider referral line, [facility], at [phone].    If you feel you have received this communication in error or would no longer like to receive these types of communications, please e-mail [email or web address]

## 2020-06-08 NOTE — PROGRESS NOTES
Subjective:      Patient ID: Cristiano Cruz is a 65 y.o. male.    Chief Complaint: Diabetic Foot Exam (pcp Dr. Son 5/29/20)    Cristiano is a 65 y.o. male who presents to the clinic upon referral from Dr. Son  for evaluation and treatment of diabetic feet. Cristiano has a past medical history of B12 nutritional deficiency (8/7/2019), E. coli UTI (01/2019), Generalized tonic-clonic seizure (1/26/2019), History of hepatitis C, s/p successful Rx w/ SVR12 (cure) - 11/2019 (2/8/2019), Kidney stone on left side (6/28/2019), Mixed type COPD (chronic obstructive pulmonary disease) (8/15/2019), Pterygium, bilateral (3/12/2019), and Type 2 diabetes mellitus with microalbuminuria, without long-term current use of insulin (2/11/2019). Patient relates no major problem with feet. Only complaints today consist of diabetic foot exam.    PCP: Srikanth Son MD    Date Last Seen by PCP: pcp Dr. Son 5/29/20    Current shoe gear: Tennis shoes    Hemoglobin A1C   Date Value Ref Range Status   03/05/2020 6.6 (H) 4.0 - 5.6 % Final     Comment:     ADA Screening Guidelines:  5.7-6.4%  Consistent with prediabetes  >or=6.5%  Consistent with diabetes  High levels of fetal hemoglobin interfere with the HbA1C  assay. Heterozygous hemoglobin variants (HbS, HgC, etc)do  not significantly interfere with this assay.   However, presence of multiple variants may affect accuracy.     06/29/2019 6.5 (H) 4.0 - 5.6 % Final     Comment:     ADA Screening Guidelines:  5.7-6.4%  Consistent with prediabetes  >or=6.5%  Consistent with diabetes  High levels of fetal hemoglobin interfere with the HbA1C  assay. Heterozygous hemoglobin variants (HbS, HgC, etc)do  not significantly interfere with this assay.   However, presence of multiple variants may affect accuracy.     01/26/2019 6.5 (H) 4.0 - 5.6 % Final     Comment:     ADA Screening Guidelines:  5.7-6.4%  Consistent with prediabetes  >or=6.5%  Consistent with diabetes  High levels of fetal hemoglobin interfere with the  HbA1C  assay. Heterozygous hemoglobin variants (HbS, HgC, etc)do  not significantly interfere with this assay.   However, presence of multiple variants may affect accuracy.             Review of Systems   Constitution: Negative for chills, fever and malaise/fatigue.   HENT: Negative for hearing loss.    Cardiovascular: Negative for claudication.   Respiratory: Negative for shortness of breath.    Skin: Positive for dry skin. Negative for flushing and rash.   Musculoskeletal: Negative for joint pain and myalgias.   Neurological: Negative for loss of balance, numbness, paresthesias and sensory change.   Psychiatric/Behavioral: Negative for altered mental status.           Objective:      Physical Exam   Cardiovascular:   Pulses:       Dorsalis pedis pulses are 2+ on the right side, and 2+ on the left side.        Posterior tibial pulses are 2+ on the right side, and 2+ on the left side.   No edema noted to b/L LEs   Musculoskeletal:   Adequate joint ROM noted to all lower extremity muscle groups with no pain or crepitation noted. Muscle strength is 5/5 in all groups bilaterally.     Feet:   Right Foot:   Protective Sensation: 5 sites tested. 5 sites sensed.   Left Foot:   Protective Sensation: 5 sites tested. 5 sites sensed.   Neurological:   Intact gross sensation noted to b/L LEs   Skin:   Normal skin tugor noted.   No open lesion noted b/L  Skin temp is warm to warm from proximal to distal b/L.  Webspaces clean, dry, and intact  Nails x10 short             Assessment:       Encounter Diagnoses   Name Primary?    Type 2 diabetes mellitus with microalbuminuria, without long-term current use of insulin     Encounter for comprehensive diabetic foot examination, type 2 diabetes mellitus Yes         Plan:       Cristiano was seen today for diabetic foot exam.    Diagnoses and all orders for this visit:    Encounter for comprehensive diabetic foot examination, type 2 diabetes mellitus    Type 2 diabetes mellitus with  microalbuminuria, without long-term current use of insulin  -     Ambulatory referral/consult to Podiatry      I counseled the patient on his conditions, their implications and medical management.        - Shoe inspection. Diabetic Foot Education. Patient reminded of the importance of good nutrition and blood sugar control to help prevent podiatric complications of diabetes. Patient instructed on proper foot hygeine. We discussed wearing proper shoe gear, daily foot inspections, never walking without protective shoe gear,   RTC in 1 year or sooner if any new pedal problems should arise.

## 2020-06-11 ENCOUNTER — HOSPITAL ENCOUNTER (EMERGENCY)
Facility: HOSPITAL | Age: 65
Discharge: HOME | End: 2020-06-11
Attending: EMERGENCY MEDICINE
Payer: MEDICARE

## 2020-06-11 VITALS
DIASTOLIC BLOOD PRESSURE: 85 MMHG | HEART RATE: 74 BPM | SYSTOLIC BLOOD PRESSURE: 154 MMHG | RESPIRATION RATE: 17 BRPM | HEIGHT: 67 IN | OXYGEN SATURATION: 96 % | WEIGHT: 184 LBS | BODY MASS INDEX: 28.88 KG/M2 | TEMPERATURE: 99 F

## 2020-06-11 DIAGNOSIS — R51.9 NONINTRACTABLE HEADACHE, UNSPECIFIED CHRONICITY PATTERN, UNSPECIFIED HEADACHE TYPE: Primary | ICD-10-CM

## 2020-06-11 LAB
ALBUMIN SERPL BCP-MCNC: 4.2 G/DL (ref 3.5–5.2)
ALP SERPL-CCNC: 60 U/L (ref 55–135)
ALT SERPL W/O P-5'-P-CCNC: 10 U/L (ref 10–44)
ANION GAP SERPL CALC-SCNC: 6 MMOL/L (ref 8–16)
AST SERPL-CCNC: 12 U/L (ref 10–40)
BASOPHILS # BLD AUTO: 0.04 K/UL (ref 0–0.2)
BASOPHILS NFR BLD: 0.4 % (ref 0–1.9)
BILIRUB SERPL-MCNC: 0.6 MG/DL (ref 0.1–1)
BUN SERPL-MCNC: 8 MG/DL (ref 8–23)
CALCIUM SERPL-MCNC: 9.7 MG/DL (ref 8.7–10.5)
CHLORIDE SERPL-SCNC: 106 MMOL/L (ref 95–110)
CO2 SERPL-SCNC: 28 MMOL/L (ref 23–29)
CREAT SERPL-MCNC: 1.1 MG/DL (ref 0.5–1.4)
DIFFERENTIAL METHOD: ABNORMAL
EOSINOPHIL # BLD AUTO: 0.1 K/UL (ref 0–0.5)
EOSINOPHIL NFR BLD: 1.2 % (ref 0–8)
ERYTHROCYTE [DISTWIDTH] IN BLOOD BY AUTOMATED COUNT: 14.5 % (ref 11.5–14.5)
EST. GFR  (AFRICAN AMERICAN): >60 ML/MIN/1.73 M^2
EST. GFR  (NON AFRICAN AMERICAN): >60 ML/MIN/1.73 M^2
GLUCOSE SERPL-MCNC: 116 MG/DL (ref 70–110)
HCT VFR BLD AUTO: 47 % (ref 40–54)
HGB BLD-MCNC: 14.2 G/DL (ref 14–18)
IMM GRANULOCYTES # BLD AUTO: 0.02 K/UL (ref 0–0.04)
IMM GRANULOCYTES NFR BLD AUTO: 0.2 % (ref 0–0.5)
LYMPHOCYTES # BLD AUTO: 2.2 K/UL (ref 1–4.8)
LYMPHOCYTES NFR BLD: 23.5 % (ref 18–48)
MCH RBC QN AUTO: 27 PG (ref 27–31)
MCHC RBC AUTO-ENTMCNC: 30.2 G/DL (ref 32–36)
MCV RBC AUTO: 90 FL (ref 82–98)
MONOCYTES # BLD AUTO: 0.5 K/UL (ref 0.3–1)
MONOCYTES NFR BLD: 5.5 % (ref 4–15)
NEUTROPHILS # BLD AUTO: 6.4 K/UL (ref 1.8–7.7)
NEUTROPHILS NFR BLD: 69.2 % (ref 38–73)
NRBC BLD-RTO: 0 /100 WBC
PLATELET # BLD AUTO: 162 K/UL (ref 150–350)
PMV BLD AUTO: 12.2 FL (ref 9.2–12.9)
POTASSIUM SERPL-SCNC: 4.6 MMOL/L (ref 3.5–5.1)
PROT SERPL-MCNC: 8.3 G/DL (ref 6–8.4)
RBC # BLD AUTO: 5.25 M/UL (ref 4.6–6.2)
SODIUM SERPL-SCNC: 140 MMOL/L (ref 136–145)
WBC # BLD AUTO: 9.29 K/UL (ref 3.9–12.7)

## 2020-06-11 PROCEDURE — 85025 COMPLETE CBC W/AUTO DIFF WBC: CPT

## 2020-06-11 PROCEDURE — 96374 THER/PROPH/DIAG INJ IV PUSH: CPT

## 2020-06-11 PROCEDURE — 99284 EMERGENCY DEPT VISIT MOD MDM: CPT | Mod: ,,, | Performed by: EMERGENCY MEDICINE

## 2020-06-11 PROCEDURE — 25500020 PHARM REV CODE 255: Performed by: EMERGENCY MEDICINE

## 2020-06-11 PROCEDURE — 99284 EMERGENCY DEPT VISIT MOD MDM: CPT | Mod: 25

## 2020-06-11 PROCEDURE — 25000003 PHARM REV CODE 250: Performed by: EMERGENCY MEDICINE

## 2020-06-11 PROCEDURE — A9585 GADOBUTROL INJECTION: HCPCS | Performed by: EMERGENCY MEDICINE

## 2020-06-11 PROCEDURE — 99284 PR EMERGENCY DEPT VISIT,LEVEL IV: ICD-10-PCS | Mod: ,,, | Performed by: EMERGENCY MEDICINE

## 2020-06-11 PROCEDURE — 80053 COMPREHEN METABOLIC PANEL: CPT

## 2020-06-11 RX ORDER — GADOBUTROL 604.72 MG/ML
9 INJECTION INTRAVENOUS
Status: COMPLETED | OUTPATIENT
Start: 2020-06-11 | End: 2020-06-11

## 2020-06-11 RX ORDER — METHOCARBAMOL 500 MG/1
500 TABLET, FILM COATED ORAL
Status: COMPLETED | OUTPATIENT
Start: 2020-06-11 | End: 2020-06-11

## 2020-06-11 RX ORDER — METHOCARBAMOL 500 MG/1
TABLET, FILM COATED ORAL
Qty: 30 TABLET | Refills: 0 | Status: SHIPPED | OUTPATIENT
Start: 2020-06-11 | End: 2021-07-28

## 2020-06-11 RX ADMIN — METHOCARBAMOL TABLETS 500 MG: 500 TABLET, COATED ORAL at 11:06

## 2020-06-11 RX ADMIN — GADOBUTROL 9 ML: 604.72 INJECTION INTRAVENOUS at 01:06

## 2020-06-11 NOTE — ED TRIAGE NOTES
To the ED with c/o intermittent head neck and shoulder pain.  Pain is described as aching.  No relief with motrin.

## 2020-06-11 NOTE — DISCHARGE INSTRUCTIONS
Our goal in the emergency department is to always give you outstanding care and exceptional service. You may receive a survey by mail or e-mail in the next week regarding your experience in our ED. We would greatly appreciate your completing and returning the survey. Your feedback provides us with a way to recognize our staff who give very good care and it helps us learn how to improve when your experience was below our aspiration of excellence.     You may continue to take tylenol as needed for headaches with the methocarbamol.  Keep your appointment on Monday.

## 2020-06-11 NOTE — ED PROVIDER NOTES
Encounter Date: 6/11/2020    SCRIBE #1 NOTE: I, Vijay Lee, eliane scribing for, and in the presence of,  Gina Bowden MD. I have scribed the following portions of the note - Other sections scribed: ROS, PE, MDM.       History     Chief Complaint   Patient presents with    Headache     intermittent for a month     The patient is a 65-year-old male who complains of headaches that have been intermittent for the past month.  He states that prior to this past month he has had headaches only occasionally but now he is having them every day.  He believes that they began after he presented to our ED on May 6 for breakthrough seizures.  They are located on the top of his head, posteriorly, and radiated down into his neck.  They are occasionally relieved by aspirin.  He came in today because last night when he woke up to go the bathroom around midnight he felt a headache.  He took an ibuprofen which helped somewhat.  His headache persisted into the morning.  He denies any numbness, tingling, weakness in his arms or his legs.  He denies any new visual problems although he states his vision has been worsening over the past several months and he is scheduled to see an eye doctor in the next couple of weeks.    The history is provided by the patient and medical records.     Review of patient's allergies indicates:  No Known Allergies  Past Medical History:   Diagnosis Date    B12 nutritional deficiency 8/7/2019    E. coli UTI 01/2019    During hospitalization for seizure    Generalized tonic-clonic seizure 1/26/2019 1-2019 admitted for new-onset seizures.Normal CT head.  His mental status normalized, and he had no recurrent seizures following keppra loading in the ED and twice-daily maintenance upon arrival to the floor. Workup into the etiology of his seizures remained negative. EEG was performed, with the preliminary interpretation being no epileptiform activity with normal background.     History of  hepatitis C, s/p successful Rx w/ SVR12 (cure) - 2019    S/p epclusa    Kidney stone on left side 2019    Mixed type COPD (chronic obstructive pulmonary disease) 8/15/2019    8-2019 PFT: Normal airflow. Airflow not improved after bronchodilator. Moderate restriction. Diffusion capacity is mildly decreased. Oximetry is normal.    Pterygium, bilateral 3/12/2019    Type 2 diabetes mellitus with microalbuminuria, without long-term current use of insulin 2019     Past Surgical History:   Procedure Laterality Date    HERNIA REPAIR Right     [facility]     Family History   Problem Relation Age of Onset    Hypertension Mother      Social History     Tobacco Use    Smoking status: Former Smoker     Packs/day: 3.00     Years: 25.00     Pack years: 75.00     Last attempt to quit: 2012     Years since quittin.3    Smokeless tobacco: Never Used   Substance Use Topics    Alcohol use: No     Comment: Used to drink    Drug use: No     Review of Systems   Constitutional: Negative for appetite change and fever.   HENT: Negative for congestion.         No recent URI symptoms.   Respiratory: Positive for cough (occassional). Negative for shortness of breath.    Cardiovascular: Negative for chest pain.   Gastrointestinal: Negative for abdominal pain, nausea and vomiting.        Excessive belching. He had to stand up while eating two days ago because he had difficulty swallowing food.   Genitourinary:        No changes in urination.   Musculoskeletal: Positive for back pain (upper back) and neck pain.   Skin: Negative for rash.   Neurological: Positive for headaches (intermittent). Negative for weakness and numbness.       Physical Exam     Initial Vitals [20 1009]   BP Pulse Resp Temp SpO2   (!) 156/86 78 17 99.3 °F (37.4 °C) 96 %      MAP       --         Physical Exam    Constitutional: He appears well-developed and well-nourished.   HENT:   Head: Normocephalic and atraumatic.    Right Ear: External ear normal.   Left Ear: External ear normal.   Wearing a mask.   Eyes: EOM are normal. Pupils are equal, round, and reactive to light.   Neck: Neck supple. No JVD present.   Cardiovascular: Normal rate, regular rhythm and normal heart sounds. Exam reveals no gallop and no friction rub.    No murmur heard.  Pulmonary/Chest: Breath sounds normal. No respiratory distress. He has no wheezes. He has no rhonchi. He has no rales.   Abdominal: Soft. Bowel sounds are normal. There is tenderness (mild diffuse tenderness). There is no rebound and no guarding.   Musculoskeletal: Normal range of motion. He exhibits tenderness. He exhibits no edema.   Tenderness of upper trapezius muscles bilaterally.   Neurological: He is alert and oriented to person, place, and time. He has normal strength. No cranial nerve deficit or sensory deficit.   No meningismus.   Skin: Skin is warm and dry. Capillary refill takes less than 2 seconds. No rash noted.   Psychiatric: He has a normal mood and affect.         ED Course   Procedures  Labs Reviewed   COMPREHENSIVE METABOLIC PANEL - Abnormal; Notable for the following components:       Result Value    Glucose 116 (*)     Anion Gap 6 (*)     All other components within normal limits   CBC W/ AUTO DIFFERENTIAL - Abnormal; Notable for the following components:    Mean Corpuscular Hemoglobin Conc 30.2 (*)     All other components within normal limits          Imaging Results          MRI Brain W WO Contrast (Final result)  Result time 06/11/20 13:48:18    Final result by Kiko Del Cid DO (06/11/20 13:48:18)                 Impression:      Unremarkable MRI brain as detailed above specifically without evidence for hydrocephalus or intracranial enhancing lesion.    Small right maxillary probable mucous retention cyst.    Electronically signed by resident: Francis Douglass  Date:    06/11/2020  Time:    13:33    Electronically signed by: Kiko Del Cid  DO  Date:    06/11/2020  Time:    13:48             Narrative:    EXAMINATION:  MRI BRAIN W WO CONTRAST    CLINICAL HISTORY:  Headache, chronic, new features or neuro deficit;.    TECHNIQUE:  Multiplanar multisequence MR imaging of the brain was performed before and after the administration of 9 mL Gadavist  intravenous contrast.    COMPARISON:  None    FINDINGS:  Brain parenchyma is normal in contour.  There is subtle T2 FLAIR signal hyperintensity in the supratentorial periventricular white matter most pronounced in the parietal lobes without corresponding diffusion signal abnormality or enhancement.  This is nonspecific and of uncertain clinical significance.  There is no restricted diffusion to suggest acute infarction.  There is no midline shift or significant mass effect.  No abnormal parenchymal susceptibility to suggest parenchymal hemorrhage.  Major intracranial T2 flow voids are present.  There is a lobular fluid signal focus right maxillary antra suggestive for mucous retention cyst.                                 Medical Decision Making:   History:   Old Medical Records: I decided to obtain old medical records.  Old Records Summarized: records from clinic visits and other records.       <> Summary of Records: He was seen in the ED on May 6 for seizures and was later discharged. He followed up with the internal medicine clinic. His Keppra was increased at that time.  Initial Assessment:   65-year-old man with a seizure disorder presents to the ED complaining of persistent headaches over the past month.  Differential diagnosis includes brain mass, intracranial bleed, medication side effect (patient recently increased his dose of Keppra), electrolyte abnormality, dehydration.  Will check labs, MRI with and without contrast.  I also suspect there may be a tension component as his headache seems to radiate into his neck and upper back.  Independently Interpreted Test(s):   I have ordered and independently  interpreted X-rays - see prior notes.  Clinical Tests:   Lab Tests: Ordered and Reviewed  Radiological Study: Ordered and Reviewed            Scribe Attestation:   Scribe #1: I performed the above scribed service and the documentation accurately describes the services I performed. I attest to the accuracy of the note.    Attending Attestation:           Physician Attestation for Scribe:      Comments: I, Dr. Gina Bowden, personally performed the services described in this documentation. All medical record entries made by the scribe were at my direction and in my presence.  I have reviewed the chart and agree that the record reflects my personal performance and is accurate and complete. Gina Bowden MD.        Attending ED Notes:   2:52 PM   MRI and labs unremarkable.  Pt feels better after robaxin.  Will discharge on same.  Pt has follow up with neuro in 5 days. I suspect a tension component.                        Clinical Impression:       ICD-10-CM ICD-9-CM   1. Nonintractable headache, unspecified chronicity pattern, unspecified headache type R51 784.0             ED Disposition Condition    Discharge Stable        ED Prescriptions     Medication Sig Dispense Start Date End Date Auth. Provider    methocarbamoL (ROBAXIN) 500 MG Tab Take 1-2 tablets every 8 hours as needed for muscle pain and headaches. 30 tablet 6/11/2020  Gina Bowden MD        Follow-up Information    None                                    Gina Bowden MD  06/12/20 [id number]

## 2020-06-11 NOTE — ED NOTES
LOC: The patient is awake, alert, and oriented to place, time, situation. Affect is appropriate.  Speech is appropriate and clear.     APPEARANCE: Patient resting uncomfortably, reporting head, neck amd shoulder pain for the past month, intermittent dull,  in no acute distress.  Patient is clean and well groomed.    SKIN: The skin is warm and dry; color consistent with ethnicity.  Patient has normal skin turgor and moist mucus membranes.  Skin intact; no breakdown or bruising noted.     MUSCULOSKELETAL: Patient moving upper and lower extremities without difficulty.  Denies weakness.     RESPIRATORY: Airway is open and patent. Respirations spontaneous, even, easy, and non-labored.  Patient has a normal effort and rate.  No accessory muscle use noted. Denies cough.     CARDIAC:   No peripheral edema noted. No complaints of chest pain.      ABDOMEN: Soft and non tender to palpation.  No distention noted.     NEUROLOGIC: Eyes open spontaneously.  Behavior appropriate to situation.  Follows commands; facial expression symmetrical.  Purposeful motor response noted; normal sensation in all extremities.

## 2020-06-15 ENCOUNTER — OFFICE VISIT (OUTPATIENT)
Dept: NEUROLOGY | Facility: CLINIC | Age: 65
End: 2020-06-15
Payer: MEDICARE

## 2020-06-15 VITALS
BODY MASS INDEX: 28.98 KG/M2 | HEIGHT: 67 IN | WEIGHT: 184.63 LBS | HEART RATE: 74 BPM | SYSTOLIC BLOOD PRESSURE: 137 MMHG | DIASTOLIC BLOOD PRESSURE: 83 MMHG

## 2020-06-15 DIAGNOSIS — G40.009 PARTIAL IDIOPATHIC EPILEPSY WITH SEIZURES OF LOCALIZED ONSET, NOT INTRACTABLE, WITHOUT STATUS EPILEPTICUS: Primary | ICD-10-CM

## 2020-06-15 PROCEDURE — 1126F AMNT PAIN NOTED NONE PRSNT: CPT | Mod: S$GLB,,, | Performed by: PSYCHIATRY & NEUROLOGY

## 2020-06-15 PROCEDURE — 99499 RISK ADDL DX/OHS AUDIT: ICD-10-PCS | Mod: S$GLB,,, | Performed by: PSYCHIATRY & NEUROLOGY

## 2020-06-15 PROCEDURE — 99999 PR PBB SHADOW E&M-EST. PATIENT-LVL IV: ICD-10-PCS | Mod: PBBFAC,,, | Performed by: PSYCHIATRY & NEUROLOGY

## 2020-06-15 PROCEDURE — 99999 PR PBB SHADOW E&M-EST. PATIENT-LVL IV: CPT | Mod: PBBFAC,,, | Performed by: PSYCHIATRY & NEUROLOGY

## 2020-06-15 PROCEDURE — 1101F PT FALLS ASSESS-DOCD LE1/YR: CPT | Mod: CPTII,S$GLB,, | Performed by: PSYCHIATRY & NEUROLOGY

## 2020-06-15 PROCEDURE — 1159F PR MEDICATION LIST DOCUMENTED IN MEDICAL RECORD: ICD-10-PCS | Mod: S$GLB,,, | Performed by: PSYCHIATRY & NEUROLOGY

## 2020-06-15 PROCEDURE — 1101F PR PT FALLS ASSESS DOC 0-1 FALLS W/OUT INJ PAST YR: ICD-10-PCS | Mod: CPTII,S$GLB,, | Performed by: PSYCHIATRY & NEUROLOGY

## 2020-06-15 PROCEDURE — 1126F PR PAIN SEVERITY QUANTIFIED, NO PAIN PRESENT: ICD-10-PCS | Mod: S$GLB,,, | Performed by: PSYCHIATRY & NEUROLOGY

## 2020-06-15 PROCEDURE — 99214 OFFICE O/P EST MOD 30 MIN: CPT | Mod: S$GLB,,, | Performed by: PSYCHIATRY & NEUROLOGY

## 2020-06-15 PROCEDURE — 1159F MED LIST DOCD IN RCRD: CPT | Mod: S$GLB,,, | Performed by: PSYCHIATRY & NEUROLOGY

## 2020-06-15 PROCEDURE — 99499 UNLISTED E&M SERVICE: CPT | Mod: S$GLB,,, | Performed by: PSYCHIATRY & NEUROLOGY

## 2020-06-15 PROCEDURE — 99214 PR OFFICE/OUTPT VISIT, EST, LEVL IV, 30-39 MIN: ICD-10-PCS | Mod: S$GLB,,, | Performed by: PSYCHIATRY & NEUROLOGY

## 2020-06-15 RX ORDER — ZONISAMIDE 100 MG/1
CAPSULE ORAL
Qty: 270 CAPSULE | Refills: 3 | Status: SHIPPED | OUTPATIENT
Start: 2020-06-15 | End: 2020-07-21

## 2020-06-15 NOTE — PATIENT INSTRUCTIONS
Take ZONISAMIDE 1 tab at bed time for 1 week then 2 tabs at bed time for 1 week then 3 tabs at bed time.    Stop Keppra once you start Zonisamide

## 2020-06-15 NOTE — PROGRESS NOTES
Name: Cristiano Cruz  MRN: [id number]   CSN: [id number]      Date: 06/15/2020    HISTORY OF PRESENT ILLNESS (HPI)  The patient is a 65 y.o. man presents for follow up for epilepsy    Initial seizure was generalized convulsion from sleep witnessed by wife January 2019 with no focal findings on MRI Brain and EEG although recurrent convulsion in ED was described as involving right eye deviation.  He is a prior smoker and heavy drinker but stopped EtOH about age 58.  He had recurrent convulsion from sleep with post ictal confusion/agitation May 2020, unsure if he had missed doses leading up to this.  He increased LEV to 750mg bid on the advice of PCP and has continued to tolerate.    He describes an illness involving rash as well as renal and heart failure requiring respiratory support at age 9 with two week hospitalization.    Interim History    Seizure Seminology  Seizure Type 1  Classification:   Aura -   Ictus  - Nonconv -  - Conv -  - Duration -   Post-ictal  - Symptoms  - Duration  Age of onset   Current Seizure Frequency -    Last Seizure    sz per month 2019 2020   Brooks 1    Feb     Mar     Apr     May  1   Salvatore     Jul     Aug     Sep     Oct     Nov     Dec     Tot       Seizure Triggers  Sleep Deprivation -  None  Other medications -  None   Benadral   Tramadol   Other  Psych/stress -  None  Photic stimulation -  None  Hyperventilation -  None  Medical Problems -  None  Menses -   No  Sensory Stimulation    Light  No   Sound  No   Problem Solv No   Other  No  Missed dose of Rx None    AED Treatments  Present regimen  levetiracetam (Keppra, LEV) 750mg bid    Prior treatments    Not tried  acetazolamide (Diamox, AZM)  Amantadine  brivitiracetam (Briviact, BRV)  carbamazepine (Tegretol, CBZ)  clobazam (Onfi or Frizium, CLB)  ethosuximide (Zarontin, ESM)  eslicarbazine (Aptiom, ESL)  felbamate (felbatol, FBM)  gabapentin (Neurontin, GPN)  lacosamide (Vimpat, LCS)   lamotrigine (Lamictal, LTG)   methsuximide (Celontin,  MSM)  oxcarbazepine (Trileptal OXC)  perampanel (Fycompa, FCP)   phenobarbital (Pb)  phenytoin (Dilantin, PHT)  pregabalin (Lyrica, PGB)  primidone (Mysoline, PRM)  rufinamide (Banzel, RUF)  tiagabine (Gabatril,  TGB)  topiramate (Topamax, TPM)  viagabatrin, (Sabril, VGB)  vagal nerve stimulator (VNS)  valproic acid (Depakote, VPA)  zonisamide (Zonegran, ZNA)  Benzodiazepines  diazepam - rectal (Diastatl)  diazepam - oral (Valium, DZ)  clonazepam (Klonopin, CZP)  clorazepate (Tranxene, CLZ)  Ativan  Brain Stimulation  Vagal Nerve Stimulation-n/a  DBS- n/a    Adherence/Compliance method  Memory - yes  Mom or Spouse - Yes  Pill Box - no  Shade calendar - no  Turn over medication bottle - no  Phone alarm - no    Seizure Evaluation  EEG Routine -   EEG Ambulatory -   EEG\Video Monitoring -   MRI/MRA -   CT/CTA Scan -   PET Scan -   Neuropsychological evaluation -   DEXA Scan    Potential Epilepsy Risk Factors:   Pregnancy/Labor/Delivery - full term uncomplicated pregnancy labor and vaginal delivery  Febrile seizures - none  Head injury  - none  CNS infection - none     Stroke - none  Family Hx of Sz - none    PAST MEDICAL HISTORY:   Active Ambulatory Problems     Diagnosis Date Noted    Partial idiopathic epilepsy with seizures of localized onset, not intractable, without status epilepticus 01/26/2019    History of hepatitis C, s/p successful Rx w/ SVR12 (cure) - 11/2019 02/08/2019    Type 2 diabetes mellitus with microalbuminuria, without long-term current use of insulin 02/11/2019    Bilateral dry eyes 03/12/2019    Nuclear sclerotic cataract of both eyes 03/12/2019    OAG (open angle glaucoma) suspect, low risk, bilateral 03/12/2019    Poor memory 04/02/2019    Ex-very heavy cigarette smoker (more than 40 per day) 08/06/2019    B12 nutritional deficiency 08/07/2019    Mixed type COPD (chronic obstructive pulmonary disease) 08/15/2019    Benign prostatic hyperplasia with urinary frequency 12/20/2019     Erectile dysfunction 2020     Resolved Ambulatory Problems     Diagnosis Date Noted    Type 2 diabetes mellitus without complication, without long-term current use of insulin 2019    Lactic acidosis 2019    Elevated CPK 2019    Right inguinal hernia 2019    Pterygium, bilateral 2019    Kidney stone on left side 2019     Past Medical History:   Diagnosis Date    E. coli UTI 2019    Generalized tonic-clonic seizure 2019        PAST SURGICAL HISTORY:   Past Surgical History:   Procedure Laterality Date    HERNIA REPAIR Right     [facility]        FAMILY HISTORY:   Family History   Problem Relation Age of Onset    Hypertension Mother          SOCIAL HISTORY:   Social History     Socioeconomic History    Marital status:      Spouse name: Not on file    Number of children: 1    Years of education: Not on file    Highest education level: Not on file   Occupational History    Not on file   Social Needs    Financial resource strain: Not on file    Food insecurity     Worry: Not on file     Inability: Not on file    Transportation needs     Medical: Not on file     Non-medical: Not on file   Tobacco Use    Smoking status: Former Smoker     Packs/day: 3.00     Years: 25.00     Pack years: 75.00     Quit date: 2012     Years since quittin.3    Smokeless tobacco: Never Used   Substance and Sexual Activity    Alcohol use: No     Comment: Used to drink    Drug use: No    Sexual activity: Yes     Partners: Female   Lifestyle    Physical activity     Days per week: Not on file     Minutes per session: Not on file    Stress: Not on file   Relationships    Social connections     Talks on phone: Not on file     Gets together: Not on file     Attends Religion service: Not on file     Active member of club or organization: Not on file     Attends meetings of clubs or organizations: Not on file     Relationship status: Not on file  "  Other Topics Concern    Not on file   Social History Narrative     with 1 daughter (42 as of 2019).    Work in [employer]        SUBSTANCE USE:  Social History     Tobacco Use    Smoking status: Former Smoker     Packs/day: 3.00     Years: 25.00     Pack years: 75.00     Quit date: 2012     Years since quittin.3    Smokeless tobacco: Never Used   Substance and Sexual Activity    Alcohol use: No     Comment: Used to drink    Drug use: No    Sexual activity: Yes     Partners: Female      Social History     Tobacco Use    Smoking status: Former Smoker     Packs/day: 3.00     Years: 25.00     Pack years: 75.00     Quit date: 2012     Years since quittin.3    Smokeless tobacco: Never Used   Substance Use Topics    Alcohol use: No     Comment: Used to drink        ALLERGIES: Patient has no known allergies.     /83   Pulse 74   Ht 5' 7" (1.702 m)   Wt 83.7 kg (184 lb 10.2 oz)   BMI 28.92 kg/m²     Higher Cortical Function:    Patient is a well developed, pleasant, well groomed individual appearing their stated age  Oriented - intact to person, place and time and followed two step instruction correctly.    Fund of knowledge was appropriate.    R-L Orientation - Intact  Language - Speech was fluent without evidence for an aphasia.  Cranial Nerves II - XII:    EOMs were intact with normal smooth and no nystagmus.    PERRLA. D/C   Visual fields were full to confrontation.    Motor - facial movement was symmetrical and normal.    Facial sensory - Light touch and pin prick sensations were normal.    Hearing was normal to finger rub.  Palate moved well and was symmetrical with normal palatal and oral sensation.    Tongue movement was full  Normal power and bulk was found in the massiter and rotator muscles of the neck.  Motor: Power, bulk and tone were normal in all extremities.  Sensory: Light touch position senses were normal in all extremities.    Coordination:       Rapid " alternating movements and rapid finger tapping - normal.       Finger to nose - nl.       Arm roll - symmetrical.    Gait:  Gait steady, unable to tandem walk and Romberg was negative.    Deep tendon reflexes:    Reflex L R   Bicpets 2+ 2+   Tricepts 2+ 2+   Brachio-radialis 2+ 2+   Knee 2+ 2+   Ankle mute mute          Tremor: resting, postural, intentional - none    Pulses    Carotids - strong without bruits    Peripheral - strong and symmetrical      IMPRESSION  1. CLRE  2. B12 deficiency    DISPOSITION:   Change LEV to ZNS due to longer t1/2 and less risk of break through  2. Continue IM supplement goal >300    Follow up 6 weeks

## 2020-06-15 NOTE — LETTER
Yeimi 15, 2020      Srikanth Son MD  [address]  [facility] [address]           [facility] - Neurology Epilepsy  [address]  [facility] [address]  Phone: [phone]  Fax: [phone]          Patient: Cristiano Cruz   MR Number: [id number]   YOB: 1955   Date of Visit: 6/15/2020       Dear Dr. Srikanth Son:    Thank you for referring Cristiano Cruz to me for evaluation. Attached you will find relevant portions of my assessment and plan of care.    If you have questions, please do not hesitate to call me. I look forward to following Cristiano Cruz along with you.    Sincerely,    Nikita Bonilla MD    Enclosure  CC:  No Recipients    If you would like to receive this communication electronically, please contact [email or web address] or [phone] to request more information on [employer] access.    For providers and/or their staff who would like to refer a patient to [facility], please contact us through our one-stop-shop provider referral line, [facility], at [phone].    If you feel you have received this communication in error or would no longer like to receive these types of communications, please e-mail [email or web address]

## 2020-06-29 ENCOUNTER — OFFICE VISIT (OUTPATIENT)
Dept: OPHTHALMOLOGY | Facility: CLINIC | Age: 65
End: 2020-06-29
Payer: MEDICARE

## 2020-06-29 ENCOUNTER — HOSPITAL ENCOUNTER (EMERGENCY)
Facility: HOSPITAL | Age: 65
Discharge: HOME | End: 2020-06-29
Attending: EMERGENCY MEDICINE
Payer: MEDICARE

## 2020-06-29 VITALS
SYSTOLIC BLOOD PRESSURE: 161 MMHG | BODY MASS INDEX: 28.82 KG/M2 | OXYGEN SATURATION: 97 % | DIASTOLIC BLOOD PRESSURE: 89 MMHG | TEMPERATURE: 98 F | WEIGHT: 184 LBS | HEART RATE: 70 BPM

## 2020-06-29 DIAGNOSIS — H52.7 REFRACTIVE ERROR: ICD-10-CM

## 2020-06-29 DIAGNOSIS — H25.13 NUCLEAR SCLEROSIS OF BOTH EYES: Primary | ICD-10-CM

## 2020-06-29 DIAGNOSIS — H40.013 OAG (OPEN ANGLE GLAUCOMA) SUSPECT, LOW RISK, BILATERAL: ICD-10-CM

## 2020-06-29 DIAGNOSIS — H11.003 PTERYGIUM OF BOTH EYES: ICD-10-CM

## 2020-06-29 DIAGNOSIS — H04.123 BILATERAL DRY EYES: ICD-10-CM

## 2020-06-29 DIAGNOSIS — E11.9 DM TYPE 2 WITHOUT RETINOPATHY: ICD-10-CM

## 2020-06-29 DIAGNOSIS — R10.9 ABDOMINAL PAIN, UNSPECIFIED ABDOMINAL LOCATION: Primary | ICD-10-CM

## 2020-06-29 DIAGNOSIS — N20.0 NEPHROLITHIASIS: ICD-10-CM

## 2020-06-29 DIAGNOSIS — K40.90 RIGHT INGUINAL HERNIA: ICD-10-CM

## 2020-06-29 LAB
ALBUMIN SERPL BCP-MCNC: 4.4 G/DL (ref 3.5–5.2)
ALP SERPL-CCNC: 63 U/L (ref 55–135)
ALT SERPL W/O P-5'-P-CCNC: 10 U/L (ref 10–44)
ANION GAP SERPL CALC-SCNC: 7 MMOL/L (ref 8–16)
AST SERPL-CCNC: 14 U/L (ref 10–40)
BASOPHILS # BLD AUTO: 0.05 K/UL (ref 0–0.2)
BASOPHILS NFR BLD: 0.5 % (ref 0–1.9)
BILIRUB SERPL-MCNC: 0.4 MG/DL (ref 0.1–1)
BILIRUB UR QL STRIP: NEGATIVE
BUN SERPL-MCNC: 14 MG/DL (ref 8–23)
CALCIUM SERPL-MCNC: 9.3 MG/DL (ref 8.7–10.5)
CHLORIDE SERPL-SCNC: 108 MMOL/L (ref 95–110)
CLARITY UR REFRACT.AUTO: CLEAR
CO2 SERPL-SCNC: 23 MMOL/L (ref 23–29)
COLOR UR AUTO: YELLOW
CREAT SERPL-MCNC: 1.3 MG/DL (ref 0.5–1.4)
DIFFERENTIAL METHOD: ABNORMAL
EOSINOPHIL # BLD AUTO: 0.1 K/UL (ref 0–0.5)
EOSINOPHIL NFR BLD: 0.5 % (ref 0–8)
ERYTHROCYTE [DISTWIDTH] IN BLOOD BY AUTOMATED COUNT: 14.5 % (ref 11.5–14.5)
EST. GFR  (AFRICAN AMERICAN): >60 ML/MIN/1.73 M^2
EST. GFR  (NON AFRICAN AMERICAN): 56.5 ML/MIN/1.73 M^2
GLUCOSE SERPL-MCNC: 121 MG/DL (ref 70–110)
GLUCOSE UR QL STRIP: NEGATIVE
HCT VFR BLD AUTO: 43.9 % (ref 40–54)
HGB BLD-MCNC: 13.6 G/DL (ref 14–18)
HGB UR QL STRIP: NEGATIVE
IMM GRANULOCYTES # BLD AUTO: 0.02 K/UL (ref 0–0.04)
IMM GRANULOCYTES NFR BLD AUTO: 0.2 % (ref 0–0.5)
KETONES UR QL STRIP: NEGATIVE
LEUKOCYTE ESTERASE UR QL STRIP: NEGATIVE
LIPASE SERPL-CCNC: 66 U/L (ref 4–60)
LYMPHOCYTES # BLD AUTO: 1.6 K/UL (ref 1–4.8)
LYMPHOCYTES NFR BLD: 16.6 % (ref 18–48)
MCH RBC QN AUTO: 27.8 PG (ref 27–31)
MCHC RBC AUTO-ENTMCNC: 31 G/DL (ref 32–36)
MCV RBC AUTO: 90 FL (ref 82–98)
MONOCYTES # BLD AUTO: 0.4 K/UL (ref 0.3–1)
MONOCYTES NFR BLD: 4.5 % (ref 4–15)
NEUTROPHILS # BLD AUTO: 7.6 K/UL (ref 1.8–7.7)
NEUTROPHILS NFR BLD: 77.7 % (ref 38–73)
NITRITE UR QL STRIP: NEGATIVE
NRBC BLD-RTO: 0 /100 WBC
PH UR STRIP: 6 [PH] (ref 5–8)
PLATELET # BLD AUTO: 173 K/UL (ref 150–350)
PMV BLD AUTO: 11.7 FL (ref 9.2–12.9)
POTASSIUM SERPL-SCNC: 4.4 MMOL/L (ref 3.5–5.1)
PROT SERPL-MCNC: 8.4 G/DL (ref 6–8.4)
PROT UR QL STRIP: NEGATIVE
RBC # BLD AUTO: 4.89 M/UL (ref 4.6–6.2)
SODIUM SERPL-SCNC: 138 MMOL/L (ref 136–145)
SP GR UR STRIP: 1.02 (ref 1–1.03)
URN SPEC COLLECT METH UR: NORMAL
WBC # BLD AUTO: 9.75 K/UL (ref 3.9–12.7)

## 2020-06-29 PROCEDURE — 99284 EMERGENCY DEPT VISIT MOD MDM: CPT | Mod: ,,, | Performed by: EMERGENCY MEDICINE

## 2020-06-29 PROCEDURE — 92014 COMPRE OPH EXAM EST PT 1/>: CPT | Mod: S$GLB,,, | Performed by: OPHTHALMOLOGY

## 2020-06-29 PROCEDURE — 99284 EMERGENCY DEPT VISIT MOD MDM: CPT | Mod: 25

## 2020-06-29 PROCEDURE — 92014 PR EYE EXAM, EST PATIENT,COMPREHESV: ICD-10-PCS | Mod: S$GLB,,, | Performed by: OPHTHALMOLOGY

## 2020-06-29 PROCEDURE — 80053 COMPREHEN METABOLIC PANEL: CPT

## 2020-06-29 PROCEDURE — 25000003 PHARM REV CODE 250: Performed by: EMERGENCY MEDICINE

## 2020-06-29 PROCEDURE — 92136 OPHTHALMIC BIOMETRY: CPT | Mod: RT,S$GLB,, | Performed by: OPHTHALMOLOGY

## 2020-06-29 PROCEDURE — 83690 ASSAY OF LIPASE: CPT

## 2020-06-29 PROCEDURE — 81003 URINALYSIS AUTO W/O SCOPE: CPT

## 2020-06-29 PROCEDURE — 51798 US URINE CAPACITY MEASURE: CPT

## 2020-06-29 PROCEDURE — 99999 PR PBB SHADOW E&M-EST. PATIENT-LVL III: CPT | Mod: PBBFAC,,, | Performed by: OPHTHALMOLOGY

## 2020-06-29 PROCEDURE — 85025 COMPLETE CBC W/AUTO DIFF WBC: CPT

## 2020-06-29 PROCEDURE — 99999 PR PBB SHADOW E&M-EST. PATIENT-LVL III: ICD-10-PCS | Mod: PBBFAC,,, | Performed by: OPHTHALMOLOGY

## 2020-06-29 PROCEDURE — 99284 PR EMERGENCY DEPT VISIT,LEVEL IV: ICD-10-PCS | Mod: ,,, | Performed by: EMERGENCY MEDICINE

## 2020-06-29 PROCEDURE — 92136 BIOMETRY: ICD-10-PCS | Mod: RT,S$GLB,, | Performed by: OPHTHALMOLOGY

## 2020-06-29 RX ORDER — DUREZOL 0.5 MG/ML
1 EMULSION OPHTHALMIC 4 TIMES DAILY
Qty: 5 ML | Refills: 1 | Status: SHIPPED | OUTPATIENT
Start: 2020-07-28 | End: 2020-08-27

## 2020-06-29 RX ORDER — NEPAFENAC 3 MG/ML
1 SUSPENSION/ DROPS OPHTHALMIC DAILY
Qty: 3 ML | Refills: 1 | Status: SHIPPED | OUTPATIENT
Start: 2020-07-25 | End: 2020-08-24

## 2020-06-29 RX ORDER — OFLOXACIN 3 MG/ML
1 SOLUTION/ DROPS OPHTHALMIC 4 TIMES DAILY
Qty: 5 ML | Refills: 1 | Status: SHIPPED | OUTPATIENT
Start: 2020-07-25 | End: 2020-08-04

## 2020-06-29 RX ORDER — IBUPROFEN 400 MG/1
400 TABLET ORAL
Status: COMPLETED | OUTPATIENT
Start: 2020-06-29 | End: 2020-06-29

## 2020-06-29 RX ADMIN — IBUPROFEN 400 MG: 400 TABLET, FILM COATED ORAL at 05:06

## 2020-06-29 NOTE — ED PROVIDER NOTES
Encounter Date: 6/29/2020    SCRIBE #1 NOTE: I, Eli Amaya, am scribing for, and in the presence of,  Chelsey Owusu MD. I have scribed the following portions of the note - Other sections scribed: HPI ROS PE.       History     Chief Complaint   Patient presents with    Abdominal Pain     Right side abd pain sharp constan-- starting today, 10/10. Pt reports normal bowel movement and decreased urination.      Cristiano Cruz is a 65 y.o. male with a past medical history of diabetes mellitus type 2 who presents with a complaint of abdominal pain onset 12:00PM today. The patient reports pain is mostly on the right side of his abdomen. He describes pain as sharp in nature that has been constant throughout the day. Rates pain 10/10 in severity. He notes of decreased urine output. Denies fever, chills, nausea, vomiting, diarrhea, hematuria, dysuria. No history of kidney stones. No history of abdominal surgeries. He had a hernia repair in 2000.     The history is provided by the patient and medical records.     Review of patient's allergies indicates:  No Known Allergies  Past Medical History:   Diagnosis Date    B12 nutritional deficiency 8/7/2019    E. coli UTI 01/2019    During hospitalization for seizure    Generalized tonic-clonic seizure 1/26/2019 1-2019 admitted for new-onset seizures.Normal CT head.  His mental status normalized, and he had no recurrent seizures following keppra loading in the ED and twice-daily maintenance upon arrival to the floor. Workup into the etiology of his seizures remained negative. EEG was performed, with the preliminary interpretation being no epileptiform activity with normal background.     History of hepatitis C, s/p successful Rx w/ SVR12 (cure) - 11/2019 2/8/2019    S/p epclusa    Kidney stone on left side 6/28/2019    Mixed type COPD (chronic obstructive pulmonary disease) 8/15/2019    8-2019 PFT: Normal airflow. Airflow not improved after bronchodilator. Moderate  restriction. Diffusion capacity is mildly decreased. Oximetry is normal.    Pterygium, bilateral 3/12/2019    Type 2 diabetes mellitus with microalbuminuria, without long-term current use of insulin 2019     Past Surgical History:   Procedure Laterality Date    HERNIA REPAIR Right     [facility]     Family History   Problem Relation Age of Onset    Hypertension Mother      Social History     Tobacco Use    Smoking status: Former Smoker     Packs/day: 3.00     Years: 25.00     Pack years: 75.00     Quit date: 2012     Years since quittin.4    Smokeless tobacco: Never Used   Substance Use Topics    Alcohol use: No     Comment: Used to drink    Drug use: No     Review of Systems   Constitutional: Negative for chills and fever.   HENT: Negative for sore throat.    Respiratory: Negative for shortness of breath.    Cardiovascular: Negative for chest pain.   Gastrointestinal: Positive for abdominal pain. Negative for diarrhea, nausea and vomiting.   Genitourinary: Positive for decreased urine volume. Negative for dysuria and hematuria.   Musculoskeletal: Negative for back pain.   Skin: Negative for rash.   Neurological: Negative for weakness.   Hematological: Does not bruise/bleed easily.       Physical Exam     Initial Vitals   BP Pulse Resp Temp SpO2   20 1309 20 1309 -- 20 1701 20 1309   (!) 160/92 73  98.2 °F (36.8 °C) 95 %      MAP       --                Physical Exam    Nursing note and vitals reviewed.  Constitutional: He appears well-developed and well-nourished. He is not diaphoretic. No distress.   HENT:   Head: Normocephalic and atraumatic.   Mouth/Throat: Oropharynx is clear and moist.   Eyes: No scleral icterus.   Neck: Normal range of motion. Neck supple. No JVD present.   Cardiovascular: Normal rate, regular rhythm, normal heart sounds and intact distal pulses.   Pulmonary/Chest: Breath sounds normal. No respiratory distress. He has no wheezes. He  has no rhonchi. He has no rales.   Abdominal: Soft. He exhibits no distension. There is abdominal tenderness in the right lower quadrant. There is no rebound, no guarding and no CVA tenderness.   Musculoskeletal: Normal range of motion. No tenderness or edema.   Neurological: He is alert and oriented to person, place, and time. He has normal strength. No cranial nerve deficit or sensory deficit.   Skin: Skin is warm and dry.         ED Course   Procedures  Labs Reviewed   CBC W/ AUTO DIFFERENTIAL - Abnormal; Notable for the following components:       Result Value    Hemoglobin 13.6 (*)     Mean Corpuscular Hemoglobin Conc 31.0 (*)     Gran% 77.7 (*)     Lymph% 16.6 (*)     All other components within normal limits   COMPREHENSIVE METABOLIC PANEL - Abnormal; Notable for the following components:    Glucose 121 (*)     Anion Gap 7 (*)     eGFR if non  56.5 (*)     All other components within normal limits   LIPASE - Abnormal; Notable for the following components:    Lipase 66 (*)     All other components within normal limits   URINALYSIS, REFLEX TO URINE CULTURE    Narrative:     Preferred Collection Type->Urine, Clean Catch  Specimen Source->Urine          Imaging Results          CT Renal Stone Study ABD Pelvis WO (Final result)  Result time 06/29/20 16:46:50    Final result by Kit Murry MD (06/29/20 16:46:50)                 Impression:      Small 3 mm right non-obstructing renal stone with no perinephric stranding, hydronephrosis, or hydroureter.    Small fat containing right inguinal hernia.    Other stable findings as above.    Electronically signed by resident: Brijesh Cueto  Date:    06/29/2020  Time:    16:38    Electronically signed by: Kit Murry MD  Date:    06/29/2020  Time:    16:46             Narrative:    EXAMINATION:  CT RENAL STONE STUDY ABD PELVIS WO    CLINICAL HISTORY:  Flank pain, kidney stone suspected;    TECHNIQUE:  Routine axial CT images of the abdomen and pelvis were  obtained without the use of IV contrast.  Sagittal and coronal reformatted images were also acquired.    COMPARISON:  CT abdomen pelvis without contrast 06/28/2019.    FINDINGS:  Heart: The heart is normal in size with no pericardial effusion or calcification.    Lungs: The lungs are clear with no consolidation, pleural effusion, or pneumothorax.    Liver: Normal in size and attenuation, with no focal hepatic lesions.    Gallbladder: No calcified gallstones, gallbladder wall thickening, or pericholecystic fluid.    Bile ducts: No evidence of dilated ducts.    Pancreas: No mass or peripancreatic fat stranding.    Spleen: Normal.    Adrenals: Normal.    GI Tract/ Mesentery: No evidence of bowel obstruction or inflammation.  Multiple colonic diverticula without evidence of diverticulitis.    Kidneys/ Ureters: The kidneys are normal in size and location. There is a punctate nonobstructing right renal stone measuring approximately 3 mm (axial series 2, image 28).  No hydronephrosis.  No ureteral dilatation.  Although there are multiple phleboliths within the pelvis, there are no definite identifiable ureteral stones or ureteral dilatation.    Bladder: No evidence of wall thickening.    Reproductive organs: Normal.    Retroperitoneum: No significant adenopathy.    Peritoneal space: No ascites. No free air.    Abdominal wall: Diastasis of the rectus abdominal musculature.  Small fat containing right inguinal hernia.    Vasculature: The visualized aorta is normal in course and caliber with mild calcific atherosclerosis.  No aneurysm.    Bones: No acute fracture. Age-appropriate degenerative changes.                                 Medical Decision Making:   History:   Old Medical Records: I decided to obtain old medical records.  Initial Assessment:   65-year-old male presenting with right lower quadrant abdominal pain.  Vital signs stable.  He is well-appearing with mild right lower quadrant tenderness, no palpable  hernia.  Differential Diagnosis:   Nephrolithiasis, diverticulitis, appendicitis, hernia  Independently Interpreted Test(s):   I have ordered and independently interpreted X-rays - see prior notes.  Clinical Tests:   Lab Tests: Ordered and Reviewed  Radiological Study: Ordered and Reviewed            Scribe Attestation:   Scribe #1: I performed the above scribed service and the documentation accurately describes the services I performed. I attest to the accuracy of the note.            ED Course as of Jun 29 1711   Mon Jun 29, 2020   1558 WBC: 9.75 [GM]   1641 Urinalysis, Reflex to Urine Culture Urine, Clean Catch [GM]   1641 Lipase(!): 66 [GM]   1641 CT pending      [GM]   1702 CT scan reviewed, there is a 3 mm nonobstructing kidney stones as well as a small right inguinal hernia.      Discussed these findings with patient, recommending follow-up with General surgery if symptoms continue.  Patient stable for discharge    [GM]      ED Course User Index  [GM] Chelsey Owusu MD                Clinical Impression:       ICD-10-CM ICD-9-CM   1. Abdominal pain, unspecified abdominal location  R10.9 789.00   2. Nephrolithiasis  N20.0 592.0   3. Right inguinal hernia  K40.90 550.90         Disposition:   Disposition: Discharged  Condition: Stable     ED Disposition Condition    Discharge Stable        ED Prescriptions     None        Follow-up Information     Follow up With Specialties Details Why Contact Info    Srikanth Son MD Internal Medicine, Hospitalist Schedule an appointment as soon as possible for a visit   [address]  [place] [address]  [phone]      [facility] GENERAL SURGERY General Surgery Schedule an appointment as soon as possible for a visit  Please follow-up for right inguinal hernia [address] [facility] [address]  [phone]                                     Chelsey Owusu MD  06/30/20 1524

## 2020-06-29 NOTE — PROVIDER PROGRESS NOTES - EMERGENCY DEPT.
Emergency Department TeleTRIAGE Encounter Note      CHIEF COMPLAINT    Chief Complaint   Patient presents with    Abdominal Pain     Right side abd pain sharp constan-- starting today, 10/10. Pt reports normal bowel movement and decreased urination.        VITAL SIGNS   Initial Vitals [06/29/20 1309]   BP Pulse Resp Temp SpO2   (!) 160/92 73 -- -- 95 %      MAP       --            ALLERGIES    Review of patient's allergies indicates:  No Known Allergies    PROVIDER TRIAGE NOTE  Patient with past medical history COPD, diabetes, kidney stone presents to the ED for evaluation of right lower quadrant abdominal pain.  Patient states pain started around noon today.  He has had a normal bowel movement today.  He reports nausea, decreased urination.  He denies vomiting, diarrhea, hematuria, fever.  He has not taken anything for the pain.      ORDERS  Labs Reviewed - No data to display    ED Orders (720h ago, onward)    Start Ordered     Status Ordering Provider    06/29/20 1347 06/29/20 1346  Vital signs  Every 2 hours      Ordered MANI, NOLAN G.    06/29/20 1346 06/29/20 1346  Diet NPO  Diet effective now      Ordered MANI, NOLAN G.    06/29/20 1346 06/29/20 1346  Insert peripheral IV  Once      Ordered MANI, NOLAN G.    06/29/20 1346 06/29/20 1346  CBC W/ AUTO DIFFERENTIAL  STAT  Collect    Ordered MANI, NOLAN G.    06/29/20 1346 06/29/20 1346  Comp. Metabolic Panel  STAT  Collect    Ordered MANI, NOLAN G.    06/29/20 1346 06/29/20 1346  Lipase  STAT  Collect    Ordered MANI, NOLAN G.    06/29/20 1346 06/29/20 1346  Urinalysis, Reflex to Urine Culture Urine, Clean Catch  STAT      Ordered MANI, NOLAN G.            Virtual Visit Note: The provider triage portion of this emergency department evaluation and documentation was performed via iCAD, a HIPAA-compliant telemedicine application, in concert with a tele-presenter in the room. A face to face patient evaluation with one of my colleagues will occur once  the patient is placed in an emergency department room.      DISCLAIMER: This note was prepared with TrueView voice recognition transcription software. Garbled syntax, mangled pronouns, and other bizarre constructions may be attributed to that software system.

## 2020-06-29 NOTE — LETTER
June 29, 2020      Allegra Scanlon, OD  [address]  [facility] [address]           [facility]  [address] [facility]  [place] [address]  Phone: [phone]  Fax: [phone]          Patient: Cristiano Cruz   MR Number: [id number]   YOB: 1955   Date of Visit: 6/29/2020       Dear Dr. Allegra Scanlon:    Thank you for referring Cristiano Cruz to me for evaluation. Attached you will find relevant portions of my assessment and plan of care.    If you have questions, please do not hesitate to call me. I look forward to following Cristiano Cruz along with you.    Sincerely,    Daryl Calloway MD    Enclosure  CC:  No Recipients    If you would like to receive this communication electronically, please contact [email or web address] or [phone] to request more information on [employer] Link access.    For providers and/or their staff who would like to refer a patient to [facility], please contact us through our one-stop-shop provider referral line, [facility] [place], at [phone].    If you feel you have received this communication in error or would no longer like to receive these types of communications, please e-mail [email or web address]

## 2020-06-29 NOTE — ED TRIAGE NOTES
Cristiano Cruz, a 65 y.o. male presents to the ED w/ complaint of abdominal pain in the RLQ.    Triage note:  Chief Complaint   Patient presents with    Abdominal Pain     Right side abd pain sharp constan-- starting today, 10/10. Pt reports normal bowel movement and decreased urination.      Review of patient's allergies indicates:  No Known Allergies  Past Medical History:   Diagnosis Date    B12 nutritional deficiency 8/7/2019    E. coli UTI 01/2019    During hospitalization for seizure    Generalized tonic-clonic seizure 1/26/2019 1-2019 admitted for new-onset seizures.Normal CT head.  His mental status normalized, and he had no recurrent seizures following keppra loading in the ED and twice-daily maintenance upon arrival to the floor. Workup into the etiology of his seizures remained negative. EEG was performed, with the preliminary interpretation being no epileptiform activity with normal background.     History of hepatitis C, s/p successful Rx w/ SVR12 (cure) - 11/2019 2/8/2019    S/p epclusa    Kidney stone on left side 6/28/2019    Mixed type COPD (chronic obstructive pulmonary disease) 8/15/2019    8-2019 PFT: Normal airflow. Airflow not improved after bronchodilator. Moderate restriction. Diffusion capacity is mildly decreased. Oximetry is normal.    Pterygium, bilateral 3/12/2019    Type 2 diabetes mellitus with microalbuminuria, without long-term current use of insulin 2/11/2019     LOC: Patient name and date of birth verified. The patient is awake, alert and aware of environment with an appropriate affect, the patient is oriented x 3 and speaking appropriately.   APPEARANCE: Patient resting comfortably, patient is clean and well groomed, patient's clothing is properly fastened.  SKIN: The skin is warm and dry, color consistent with ethnicity, patient has normal skin turgor and moist mucus membranes, skin intact, no breakdown or bruising noted.  MUSCULOSKELETAL: Patient moving all extremities  well, no obvious swelling or deformities noted.   RESPIRATORY: Respirations are spontaneous, patient has a normal effort and rate, no accessory muscle use noted.  CARDIAC: Patient has a normal rate and rhythm, no periphreal edema noted, capillary refill < 3 seconds.  ABDOMEN: Soft and non tender to palpation, no distention noted. Bowel sounds present in all four quadrants. Acute onset RLQ pain.  NEUROLOGIC: Eyes open spontaneously, behavior appropriate to situation, follows commands, facial expression symmetrical, bilateral hand grasp equal and even, purposeful motor response noted, normal sensation in all extremities when touched with a finger.

## 2020-06-29 NOTE — PROGRESS NOTES
Subjective:       Patient ID: Cristiano Cruz is a 65 y.o. male.    Chief Complaint: Cataract (eval )    HPI     Cataract      Additional comments: eval               Comments     Referred:     64 y/o male is here for Cataract Evaluation. H/o of POAG and Nuclear   Sclerosis of both eyes. Pt present with a c/o of blurry Va, bilateral and   excessive tearing. Pt is doing Latanoprost as directed. Denies floaters   and flashes.     Eyemeds  Latanoprost OU QHS     Hemoglobin A1C       Date                     Value               Ref Range             Status                03/05/2020               6.6 (H)             4.0 - 5.6 %           Final              Comment:    ADA Screening Guidelines:  5.7-6.4%  Consistent with   prediabetes  >or=6.5%  Consistent with diabetes  High levels of fetal   hemoglobin interfere with the HbA1C  assay. Heterozygous hemoglobin   variants (HbS, HgC, etc)do  not significantly interfere with this assay.     However, presence of multiple variants may affect accuracy.         06/29/2019               6.5 (H)             4.0 - 5.6 %           Final                        Last edited by Abena Lopez on 6/29/2020  8:11 AM. (History)             Assessment:       1. Nuclear sclerosis of both eyes    2. OAG (open angle glaucoma) suspect, low risk, bilateral    3. Bilateral dry eyes    4. Pterygium of both eyes    5. DM type 2 without retinopathy    6. Refractive error        Plan:       Visually significant cataract OU -Pt. Wants Sx.     Glaucoma suspect OU-IOP's much better OU on latanoprost. ON's appear healthy OU.  MCKENZIE-Needs AT's.  Pterygium OU-NVS.  DM-No NPDR OU.  RE      Cataract Surgery Consent: Patient with a visually significant cataract with difficulties of ADLs, reading, driving, night vision, glare (any and all).  Discussed with Patient/Family/Caregiver: options, risks and benefits, expectations of cataract surgery, utilized an eye model with questions and answers to  facilitate discussion.  Discussed lens options and patient understands that glasses may be required for optimal vision for distance and/or near vision after cataract surgery.  The Patient/Family/Caregiver  voice good understanding and patient wishes to proceed with surgery.  The patient will likely benefit from surgery and patient signed consent for Right Eye.  CE OD 7/28/2020 SN60WF 20.0,        OS 8/11/2020 SN60WF 22.5.  Cont latanoprost OU.  AT's.  Control DM.  Will need HVF's & OCT's after CE OU.

## 2020-06-29 NOTE — DISCHARGE INSTRUCTIONS
Please follow-up with general surgery for evaluation of your right inguinal hernia  Return to ER symptoms get worse

## 2020-06-30 ENCOUNTER — TELEPHONE (OUTPATIENT)
Dept: OPHTHALMOLOGY | Facility: CLINIC | Age: 65
End: 2020-06-30

## 2020-06-30 DIAGNOSIS — H25.11 NUCLEAR SENILE CATARACT OF RIGHT EYE: ICD-10-CM

## 2020-06-30 DIAGNOSIS — H25.11 NS (NUCLEAR SCLEROSIS), RIGHT: ICD-10-CM

## 2020-06-30 DIAGNOSIS — Z13.9 SCREENING PROCEDURE: Primary | ICD-10-CM

## 2020-07-08 ENCOUNTER — LAB VISIT (OUTPATIENT)
Dept: LAB | Facility: HOSPITAL | Age: 65
End: 2020-07-08
Attending: INTERNAL MEDICINE
Payer: MEDICARE

## 2020-07-08 ENCOUNTER — OFFICE VISIT (OUTPATIENT)
Dept: NEPHROLOGY | Facility: CLINIC | Age: 65
End: 2020-07-08
Payer: MEDICARE

## 2020-07-08 ENCOUNTER — TELEPHONE (OUTPATIENT)
Dept: NEPHROLOGY | Facility: CLINIC | Age: 65
End: 2020-07-08

## 2020-07-08 VITALS
BODY MASS INDEX: 27.78 KG/M2 | OXYGEN SATURATION: 98 % | HEART RATE: 61 BPM | DIASTOLIC BLOOD PRESSURE: 84 MMHG | WEIGHT: 177 LBS | SYSTOLIC BLOOD PRESSURE: 132 MMHG | HEIGHT: 67 IN

## 2020-07-08 DIAGNOSIS — N20.0 KIDNEY STONE: ICD-10-CM

## 2020-07-08 DIAGNOSIS — R39.11 URINARY HESITANCY: ICD-10-CM

## 2020-07-08 DIAGNOSIS — N20.0 KIDNEY STONE: Primary | ICD-10-CM

## 2020-07-08 DIAGNOSIS — N18.2 CHRONIC KIDNEY DISEASE, STAGE II (MILD): ICD-10-CM

## 2020-07-08 LAB
ALBUMIN SERPL BCP-MCNC: 4.2 G/DL (ref 3.5–5.2)
ANION GAP SERPL CALC-SCNC: 5 MMOL/L (ref 8–16)
BUN SERPL-MCNC: 10 MG/DL (ref 8–23)
CALCIUM SERPL-MCNC: 9.7 MG/DL (ref 8.7–10.5)
CHLORIDE SERPL-SCNC: 110 MMOL/L (ref 95–110)
CK SERPL-CCNC: 84 U/L (ref 20–200)
CO2 SERPL-SCNC: 27 MMOL/L (ref 23–29)
CREAT SERPL-MCNC: 1.2 MG/DL (ref 0.5–1.4)
EST. GFR  (AFRICAN AMERICAN): >60 ML/MIN/1.73 M^2
EST. GFR  (NON AFRICAN AMERICAN): >60 ML/MIN/1.73 M^2
GLUCOSE SERPL-MCNC: 119 MG/DL (ref 70–110)
PHOSPHATE SERPL-MCNC: 3 MG/DL (ref 2.7–4.5)
POTASSIUM SERPL-SCNC: 5 MMOL/L (ref 3.5–5.1)
PTH-INTACT SERPL-MCNC: 45 PG/ML (ref 9–77)
SODIUM SERPL-SCNC: 142 MMOL/L (ref 136–145)
URATE SERPL-MCNC: 4.4 MG/DL (ref 3.4–7)

## 2020-07-08 PROCEDURE — 3008F PR BODY MASS INDEX (BMI) DOCUMENTED: ICD-10-PCS | Mod: CPTII,S$GLB,, | Performed by: INTERNAL MEDICINE

## 2020-07-08 PROCEDURE — 1159F MED LIST DOCD IN RCRD: CPT | Mod: S$GLB,,, | Performed by: INTERNAL MEDICINE

## 2020-07-08 PROCEDURE — 80069 RENAL FUNCTION PANEL: CPT

## 2020-07-08 PROCEDURE — 84550 ASSAY OF BLOOD/URIC ACID: CPT

## 2020-07-08 PROCEDURE — 1101F PR PT FALLS ASSESS DOC 0-1 FALLS W/OUT INJ PAST YR: ICD-10-PCS | Mod: CPTII,S$GLB,, | Performed by: INTERNAL MEDICINE

## 2020-07-08 PROCEDURE — 3008F BODY MASS INDEX DOCD: CPT | Mod: CPTII,S$GLB,, | Performed by: INTERNAL MEDICINE

## 2020-07-08 PROCEDURE — 83970 ASSAY OF PARATHORMONE: CPT

## 2020-07-08 PROCEDURE — 82550 ASSAY OF CK (CPK): CPT

## 2020-07-08 PROCEDURE — 1101F PT FALLS ASSESS-DOCD LE1/YR: CPT | Mod: CPTII,S$GLB,, | Performed by: INTERNAL MEDICINE

## 2020-07-08 PROCEDURE — 1159F PR MEDICATION LIST DOCUMENTED IN MEDICAL RECORD: ICD-10-PCS | Mod: S$GLB,,, | Performed by: INTERNAL MEDICINE

## 2020-07-08 PROCEDURE — 99204 OFFICE O/P NEW MOD 45 MIN: CPT | Mod: S$GLB,,, | Performed by: INTERNAL MEDICINE

## 2020-07-08 PROCEDURE — 99204 PR OFFICE/OUTPT VISIT, NEW, LEVL IV, 45-59 MIN: ICD-10-PCS | Mod: S$GLB,,, | Performed by: INTERNAL MEDICINE

## 2020-07-08 PROCEDURE — 1126F PR PAIN SEVERITY QUANTIFIED, NO PAIN PRESENT: ICD-10-PCS | Mod: S$GLB,,, | Performed by: INTERNAL MEDICINE

## 2020-07-08 PROCEDURE — 99999 PR PBB SHADOW E&M-EST. PATIENT-LVL IV: CPT | Mod: PBBFAC,,, | Performed by: INTERNAL MEDICINE

## 2020-07-08 PROCEDURE — 1126F AMNT PAIN NOTED NONE PRSNT: CPT | Mod: S$GLB,,, | Performed by: INTERNAL MEDICINE

## 2020-07-08 PROCEDURE — 36415 COLL VENOUS BLD VENIPUNCTURE: CPT

## 2020-07-08 PROCEDURE — 99999 PR PBB SHADOW E&M-EST. PATIENT-LVL IV: ICD-10-PCS | Mod: PBBFAC,,, | Performed by: INTERNAL MEDICINE

## 2020-07-08 NOTE — PROGRESS NOTES
REASON FOR CONSULT: Kidney Stones    REFERRING PHYSICIAN: Srikanth Son MD      HISTORY OF PRESENT ILLNESS: 65 y.o. male who is new to me  has a past medical history of B12 nutritional deficiency (8/7/2019), E. coli UTI (01/2019), Generalized tonic-clonic seizure (1/26/2019), History of hepatitis C, s/p successful Rx w/ SVR12 (cure) - 11/2019 (2/8/2019), Kidney stone on left side (6/28/2019), Mixed type COPD (chronic obstructive pulmonary disease) (8/15/2019), Pterygium, bilateral (3/12/2019), and Type 2 diabetes mellitus with microalbuminuria, without long-term current use of insulin (2/11/2019). patient was referred here for kidney stones.  Denied taking chronic NSAID's, Lithium or Lead in the past. He was diagnosed with DM in 2018, had not been on insulin.   Patient reports that he drinks half liter of water day. He never had a stone analysis done. He never had a fracture before and was never diagnosed with osteoporosis. His typical diet consists of eating meat twice a day, less quantity of vegetables. He reports having urinary hesitancy, stream is not continuous, denied any incomplete emptying.      ROS:  General: negative for chills, or fatigue  ENT: No epistaxis or headaches  Hematological and Lymphatic: No bleeding problems or blood clots.  Endocrine: No skin changes or temperature intolerance  Respiratory: No cough, shortness of breath, or wheezing  Cardiovascular: No chest pain or dyspnea   Gastrointestinal: No abdominal pain, change in bowel habits  Genito-Urinary: No dysuria, trouble voiding, or hematuria  Musculoskeletal: ROS: negative for - joint pain, joint stiffness, joint swelling, muscle pain or muscular weakness  Neurological: No focal weakness, no numbness  Dermatological: No rash or ulcers.    PAST MEDICAL HISTORY:  Past Medical History:   Diagnosis Date    B12 nutritional deficiency 8/7/2019    E. coli UTI 01/2019    During hospitalization for seizure    Generalized tonic-clonic seizure  2019 admitted for new-onset seizures.Normal CT head.  His mental status normalized, and he had no recurrent seizures following keppra loading in the ED and twice-daily maintenance upon arrival to the floor. Workup into the etiology of his seizures remained negative. EEG was performed, with the preliminary interpretation being no epileptiform activity with normal background.     History of hepatitis C, s/p successful Rx w/ SVR12 (cure) - 2019    S/p epclusa    Kidney stone on left side 2019    Mixed type COPD (chronic obstructive pulmonary disease) 8/15/2019    8-2019 PFT: Normal airflow. Airflow not improved after bronchodilator. Moderate restriction. Diffusion capacity is mildly decreased. Oximetry is normal.    Pterygium, bilateral 3/12/2019    Type 2 diabetes mellitus with microalbuminuria, without long-term current use of insulin 2019       PAST SURGICAL HISTORY:  Past Surgical History:   Procedure Laterality Date    [facility]ERN[facility] REPAIR Right     [facility]       FAMILY HISTORY:   Family History   Problem Relation Age of Onset    Hypertension Mother        SOCIAL HISTORY:  Social History     Socioeconomic History    Marital status:      Spouse name: Not on file    Number of children: 1    Years of education: Not on file    Highest education level: Not on file   Occupational History    Not on file   Social Needs    Financial resource strain: Not on file    Food insecurity     Worry: Not on file     Inability: Not on file    Transportation needs     Medical: Not on file     Non-medical: Not on file   Tobacco Use    Smoking status: Former Smoker     Packs/day: 3.00     Years: 25.00     Pack years: 75.00     Quit date: 2012     Years since quittin.4    Smokeless tobacco: Never Used   Substance and Sexual Activity    Alcohol use: No     Comment: Used to drink    Drug use: No    Sexual activity: Yes     Partners: Female   Lifestyle     Physical activity     Days per week: Not on file     Minutes per session: Not on file    Stress: Not on file   Relationships    Social connections     Talks on phone: Not on file     Gets together: Not on file     Attends Jewish service: Not on file     Active member of club or organization: Not on file     Attends meetings of clubs or organizations: Not on file     Relationship status: Not on file   Other Topics Concern    Not on file   Social History Narrative     with 1 daughter (42 as of 2019).    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 For 30 days, Disp: 5 mL, Rfl: 1    [START ON 7/25/2020] ILEVRO 0.3 % DrpS, Place 1 drop into both eyes once daily. For 30 days, Disp: 3 mL, Rfl: 1    lancets Misc, Use twice daily as directed, Disp: 100 each, Rfl: 11    latanoprost (XALATAN) 0.005 % ophthalmic solution, Place 1 drop into both eyes every evening., Disp: 2.5 mL, Rfl: 3    methocarbamoL (ROBAXIN) 500 MG Tab, Take 1-2 tablets every 8 hours as needed for muscle pain and headaches., Disp: 30 tablet, Rfl: 0    [START ON 7/25/2020] ofloxacin (OCUFLOX) 0.3 % ophthalmic solution, Place 1 drop into the right eye 4 (four) times daily. for 10 days, Disp: 5 mL, Rfl: 1    pravastatin (PRAVACHOL) 10 MG tablet, Take 1 tablet (10 mg total) by mouth once daily., Disp: 90 tablet, Rfl: 3    silodosin (RAPAFLO) 4 mg Cap capsule, Take 1 capsule (4 mg total) by mouth once daily., Disp: 90 capsule, Rfl: 3    SITagliptin (JANUVIA) 25 MG Tab, Take 1 tablet (25 mg  total) by mouth once daily., Disp: 90 tablet, Rfl: 3    tadalafiL (CIALIS) 20 MG Tab, Take 1 tablet (20 mg total) by mouth daily as needed., Disp: 30 tablet, Rfl: 5    zonisamide (ZONEGRAN) 100 MG Cap, Take 1 tab at bed time for 1 week then 2 tabs at bed time for 1 week then 3 tabs at bed time., Disp: 270 capsule, Rfl: 3   Medication List with Changes/Refills   Current Medications    BLOOD SUGAR DIAGNOSTIC STRP    Use 2 (two) times daily.    CYANOCOBALAMIN 1,000 MCG/ML INJECTION    Inject 1 mL (1,000 mcg total) into the muscle every 30 days.    DIFLUPREDNATE (DUREZOL) 0.05 % DROP OPHTHALMIC SOLUTION    Place 1 drop into the right eye 4 (four) times daily. For 30 days    ILEVRO 0.3 % DRPS    Place 1 drop into both eyes once daily. For 30 days    LANCETS MISC    Use twice daily as directed    LATANOPROST (XALATAN) 0.005 % OPHTHALMIC SOLUTION    Place 1 drop into both eyes every evening.    METHOCARBAMOL (ROBAXIN) 500 MG TAB    Take 1-2 tablets every 8 hours as needed for muscle pain and headaches.    OFLOXACIN (OCUFLOX) 0.3 % OPHTHALMIC SOLUTION    Place 1 drop into the right eye 4 (four) times daily. for 10 days    PRAVASTATIN (PRAVACHOL) 10 MG TABLET    Take 1 tablet (10 mg total) by mouth once daily.    SILODOSIN (RAPAFLO) 4 MG CAP CAPSULE    Take 1 capsule (4 mg total) by mouth once daily.    SITAGLIPTIN (JANUVIA) 25 MG TAB    Take 1 tablet (25 mg total) by mouth once daily.    TADALAFIL (CIALIS) 20 MG TAB    Take 1 tablet (20 mg total) by mouth daily as needed.    ZONISAMIDE (ZONEGRAN) 100 MG CAP    Take 1 tab at bed time for 1 week then 2 tabs at bed time for 1 week then 3 tabs at bed time.        PHYSICAL EXAM:  There were no vitals taken for this visit.    General: No distress, No fever or chills  Head: Normocephalic,atraumatic  Eyes: conjunctivae/corneas clear. PERRL, EOM's intact.  Nose: Nares normal. Mucosa normal. No drainage or sinus tenderness.  Neck: No adenopathy,no carotid bruit,no JVD  Lungs:Clear  to auscultation bilaterally, No Crackles  Heart: Regular rate and rhythm, no murmur, gallops or rubs  Abdomen: Soft, no tenderness, bowel sounds normal  Extremities: Atraumatic, no edema in LE  Skin: Turgor normal. No rashes or ulcers  Neurologic: No focal weakness, oriented.  Dialysis Access: Non applicable         LABS:  Lab Results   Component Value Date    CREATININE 1.3 06/29/2020       No results found for: UTPCR    Lab Results   Component Value Date     06/29/2020    K 4.4 06/29/2020    CO2 23 06/29/2020       last PTH   Lab Results   Component Value Date    CALCIUM 9.3 06/29/2020    PHOS 4.1 06/29/2019       Lab Results   Component Value Date    HGB 13.6 (L) 06/29/2020        Lab Results   Component Value Date    HGBA1C 6.6 (H) 03/05/2020       Lab Results   Component Value Date    LDLCALC 87.0 03/05/2020           ASSESSMENT:    1) Recurrent Kidney stone  Patient has been long standing diabetic, his urine analysis is showing pH of 5.0 most of the time in the past. He had kidney stone in 2016 and 2019. Most likely patient has calcium oxalate vs uric acid stone. Patient was started on Zonisamide 3 weeks ago which has some carbonic anhydrase inhibiting properties and can increase stone formation by causing hypocitraturia (this explains the transient increase in pH to 6.0 on 6/29/20). some Will look for secondary causes of stone formation. Since we don't a stone analysis to go buy will do blanket therapy for now.    - 2.5-3 Liters of free water intake/day.  - Low sodium and low protein diet.  - Check uric acid, PTH, renal function panel and urine studies.  - We might have to find different anti-epileptic for him.    2) ROMMEL vs CKD stage 2  Patients last creatinine is 1.3, It has been above 1 since Nov 2019. His urine analysis did not show significant proteinuria in the past, has few RBC which could be secondary to kidney stones. Abdomen ultrasound from 2019 is showing 11.3 kidneys bilaterally. This could  be underlying CKD from his DM, but will get repeat labs today to make sure it is not trending up. He is already  on Silodosin for his urinary hesitancy.      RTC in 8 weeks    Diagnosis and plan of care explained to the patient.  Pt Verbalized understanding. Answered all questions.  Thanks for allowing me to participate in the care of this patient.     9:50 PM    Douglas Sue MD  Nephrology & Critical Care

## 2020-07-08 NOTE — PATIENT INSTRUCTIONS
Please drink 3 Liters of water per day.  Please eat low salt diet  Please limit your meat/protein intake.

## 2020-07-10 ENCOUNTER — LAB VISIT (OUTPATIENT)
Dept: LAB | Facility: HOSPITAL | Age: 65
End: 2020-07-10
Attending: INTERNAL MEDICINE
Payer: MEDICARE

## 2020-07-10 DIAGNOSIS — N20.0 KIDNEY STONE: ICD-10-CM

## 2020-07-10 PROCEDURE — 84300 ASSAY OF URINE SODIUM: CPT

## 2020-07-10 PROCEDURE — 82140 ASSAY OF AMMONIA: CPT

## 2020-07-14 ENCOUNTER — TELEPHONE (OUTPATIENT)
Dept: OPHTHALMOLOGY | Facility: CLINIC | Age: 65
End: 2020-07-14

## 2020-07-14 DIAGNOSIS — H25.12 NS (NUCLEAR SCLEROSIS), LEFT: Primary | ICD-10-CM

## 2020-07-14 DIAGNOSIS — Z13.9 SCREENING PROCEDURE: ICD-10-CM

## 2020-07-14 DIAGNOSIS — H25.12 NUCLEAR SCLEROTIC CATARACT OF LEFT EYE: ICD-10-CM

## 2020-07-15 ENCOUNTER — TELEPHONE (OUTPATIENT)
Dept: OPHTHALMOLOGY | Facility: CLINIC | Age: 65
End: 2020-07-15

## 2020-07-18 LAB
AMMONIA 24H UR-SRATE: 31 MEQ/DAY (ref 14–62)
CALCIUM 24H UR-MRATE: 151 MG/DAY
CAOX INDEX 24H UR-RTO: 1.02
CAOX INDEX 24H UR-RTO: 1.59
CITRATE 24H UR-MRATE: 91 MG/DAY
CREAT 24H UR-MRATE: 1172 MG/DAY (ref 800–2000)
MAGNESIUM 24H UR-MRATE: 63 MG/DAY
NA URATE 24H SATFR UR: 0.7
OXALATE 24H UR-MRATE: 30 MG/DAY
PATIENT HX: ABNORMAL
PH 24H UR: 7.1 [PH] (ref 5.5–7)
PHOSPHATE 24H UR-MRATE: 531 MG/DAY
POTASSIUM 24H UR-SRATE: 21 MEQ/DAY (ref 19–135)
SODIUM 24H UR-SRATE: 155 MEQ/DAY
SPECIMEN VOL 24H UR: 2.84 L/DAY
SULFATE 24H UR-SRATE: 10 MMOL/DAY
SUSPECTED PROBLEM IS: ABNORMAL
TRI-PHOS 24H SATFR UR: 3.64
URATE 24H SATFR UR: 0.06
URATE 24H UR-MRATE: 386 MG/DAY

## 2020-07-21 ENCOUNTER — TELEPHONE (OUTPATIENT)
Dept: NEPHROLOGY | Facility: CLINIC | Age: 65
End: 2020-07-21

## 2020-07-21 DIAGNOSIS — G40.009 PARTIAL IDIOPATHIC EPILEPSY WITH SEIZURES OF LOCALIZED ONSET, NOT INTRACTABLE, WITHOUT STATUS EPILEPTICUS: Primary | ICD-10-CM

## 2020-07-21 DIAGNOSIS — G40.009 PARTIAL IDIOPATHIC EPILEPSY WITH SEIZURES OF LOCALIZED ONSET, NOT INTRACTABLE, WITHOUT STATUS EPILEPTICUS: ICD-10-CM

## 2020-07-21 RX ORDER — LAMOTRIGINE 150 MG/1
TABLET ORAL
Qty: 180 TABLET | Refills: 3 | Status: SHIPPED | OUTPATIENT
Start: 2020-07-21 | End: 2021-07-28 | Stop reason: SDUPTHER

## 2020-07-21 RX ORDER — LAMOTRIGINE 25 MG/1
TABLET ORAL
Qty: 42 TABLET | Refills: 0 | Status: SHIPPED | OUTPATIENT
Start: 2020-07-21 | End: 2020-09-25

## 2020-07-21 RX ORDER — LAMOTRIGINE 25 MG/1
TABLET ORAL
Qty: 135 TABLET | OUTPATIENT
Start: 2020-07-21

## 2020-07-21 NOTE — TELEPHONE ENCOUNTER
Spoke with pt and instructed to stop the zonisamide medication and to start lamictal to decrease the risk of further kidney stones. Pt will  rx from pharmacy and follow instructions given and on rx bottle. Will call black with any  Questions or concerns.

## 2020-07-21 NOTE — TELEPHONE ENCOUNTER
Called patient and his wife to update appraise them about the urine studies. Did not get an answer.

## 2020-07-21 NOTE — TELEPHONE ENCOUNTER
----- Message from Nikita Bonilla MD sent at 7/21/2020  3:06 PM CDT -----  Certainly.  Lolly, could you contact the patient and let him know I would like him to stop zonisamide and start lamictal to minimize his risk of further kidney stones?  Prescription has been sent to his pharmacy, he will need to use 25mg tabs to slowly increase dose, instructions will be on the bottle.  CV  ----- Message -----  From: Douglas Sue MD  Sent: 7/21/2020  12:05 PM CDT  To: Nikita Bonilla MD    Hi,    I am Vikram with Nephrology clinic. Mr Anthony saw me for kidney stones, wondering if his antiepileptic can be changed to some thing with low stoke formation risk.    Thank you.

## 2020-07-24 ENCOUNTER — TELEPHONE (OUTPATIENT)
Dept: OPHTHALMOLOGY | Facility: CLINIC | Age: 65
End: 2020-07-24

## 2020-07-25 ENCOUNTER — LAB VISIT (OUTPATIENT)
Dept: URGENT CARE | Facility: CLINIC | Age: 65
End: 2020-07-25
Payer: MEDICARE

## 2020-07-25 DIAGNOSIS — H25.12 NUCLEAR SCLEROTIC CATARACT OF LEFT EYE: ICD-10-CM

## 2020-07-25 DIAGNOSIS — Z13.9 SCREENING PROCEDURE: ICD-10-CM

## 2020-07-25 PROCEDURE — 99211 PR OFFICE/OUTPT VISIT, EST, LEVL I: ICD-10-PCS | Mod: S$GLB,,, | Performed by: NURSE PRACTITIONER

## 2020-07-25 PROCEDURE — 99211 OFF/OP EST MAY X REQ PHY/QHP: CPT | Mod: S$GLB,,, | Performed by: NURSE PRACTITIONER

## 2020-07-25 PROCEDURE — U0003 INFECTIOUS AGENT DETECTION BY NUCLEIC ACID (DNA OR RNA); SEVERE ACUTE RESPIRATORY SYNDROME CORONAVIRUS 2 (SARS-COV-2) (CORONAVIRUS DISEASE [COVID-19]), AMPLIFIED PROBE TECHNIQUE, MAKING USE OF HIGH THROUGHPUT TECHNOLOGIES AS DESCRIBED BY CMS-2020-01-R: HCPCS

## 2020-07-25 NOTE — H&P
[facility]  History & Physical    Subjective:      Chief Complaint/Reason for Admission:     Cristiano Cruz is a 65 y.o. male.    Past Medical History:   Diagnosis Date    B12 nutritional deficiency 2019    E. coli UTI 2019    During hospitalization for seizure    Generalized tonic-clonic seizure 2019 admitted for new-onset seizures.Normal CT head.  His mental status normalized, and he had no recurrent seizures following keppra loading in the ED and twice-daily maintenance upon arrival to the floor. Workup into the etiology of his seizures remained negative. EEG was performed, with the preliminary interpretation being no epileptiform activity with normal background.     History of hepatitis C, s/p successful Rx w/ SVR12 (cure) - 2019    S/p epclusa    Kidney stone on left side 2019    Mixed type COPD (chronic obstructive pulmonary disease) 8/15/2019    8-2019 PFT: Normal airflow. Airflow not improved after bronchodilator. Moderate restriction. Diffusion capacity is mildly decreased. Oximetry is normal.    Pterygium, bilateral 3/12/2019    Type 2 diabetes mellitus with microalbuminuria, without long-term current use of insulin 2019     Past Surgical History:   Procedure Laterality Date    HERNIA REPAIR Right     [place]     Family History   Problem Relation Age of Onset    Hypertension Mother      Social History     Tobacco Use    Smoking status: Former Smoker     Packs/day: 3.00     Years: 25.00     Pack years: 75.00     Quit date: 2012     Years since quittin.4    Smokeless tobacco: Never Used   Substance Use Topics    Alcohol use: No     Comment: Used to drink    Drug use: No       No medications prior to admission.     Review of patient's allergies indicates:  No Known Allergies     Review of Systems   Eyes: Positive for blurred vision.   All other systems reviewed and are negative.      Objective:      Vital Signs  (Most Recent)       Vital Signs Range (Last 24H):       Physical Exam  Constitutional:       Appearance: He is well-developed.   HENT:      Head: Normocephalic.   Eyes:      Conjunctiva/sclera: Conjunctivae normal.      Pupils: Pupils are equal, round, and reactive to light.   Neck:      Musculoskeletal: Normal range of motion and neck supple.   Cardiovascular:      Rate and Rhythm: Normal rate.   Pulmonary:      Effort: Pulmonary effort is normal.      Breath sounds: Normal breath sounds.   Abdominal:      General: Bowel sounds are normal.      Palpations: Abdomen is soft.   Musculoskeletal: Normal range of motion.   Skin:     General: Skin is warm.   Neurological:      Mental Status: He is alert and oriented to person, place, and time.         Data Review:    ECG:     Assessment:      Cataract OD.    Plan:    CE OD.

## 2020-07-26 LAB — SARS-COV-2 RNA RESP QL NAA+PROBE: NOT DETECTED

## 2020-07-27 ENCOUNTER — TELEPHONE (OUTPATIENT)
Dept: OPHTHALMOLOGY | Facility: CLINIC | Age: 65
End: 2020-07-27

## 2020-07-28 ENCOUNTER — ANESTHESIA EVENT (OUTPATIENT)
Dept: SURGERY | Facility: OTHER | Age: 65
End: 2020-07-28
Payer: MEDICARE

## 2020-07-28 ENCOUNTER — TELEPHONE (OUTPATIENT)
Dept: NEPHROLOGY | Facility: CLINIC | Age: 65
End: 2020-07-28

## 2020-07-28 ENCOUNTER — HOSPITAL ENCOUNTER (OUTPATIENT)
Facility: OTHER | Age: 65
Discharge: HOME | End: 2020-07-28
Attending: OPHTHALMOLOGY | Admitting: OPHTHALMOLOGY
Payer: MEDICARE

## 2020-07-28 ENCOUNTER — ANESTHESIA (OUTPATIENT)
Dept: SURGERY | Facility: OTHER | Age: 65
End: 2020-07-28
Payer: MEDICARE

## 2020-07-28 VITALS
TEMPERATURE: 99 F | SYSTOLIC BLOOD PRESSURE: 138 MMHG | HEART RATE: 60 BPM | HEIGHT: 67 IN | BODY MASS INDEX: 27.78 KG/M2 | DIASTOLIC BLOOD PRESSURE: 82 MMHG | WEIGHT: 177 LBS | RESPIRATION RATE: 18 BRPM | OXYGEN SATURATION: 96 %

## 2020-07-28 DIAGNOSIS — H25.11 NUCLEAR SCLEROTIC CATARACT OF RIGHT EYE: Primary | ICD-10-CM

## 2020-07-28 LAB — POCT GLUCOSE: 111 MG/DL (ref 70–110)

## 2020-07-28 PROCEDURE — 63600175 PHARM REV CODE 636 W HCPCS: Performed by: NURSE ANESTHETIST, CERTIFIED REGISTERED

## 2020-07-28 PROCEDURE — 37000008 HC ANESTHESIA 1ST 15 MINUTES: Performed by: OPHTHALMOLOGY

## 2020-07-28 PROCEDURE — V2632 POST CHMBR INTRAOCULAR LENS: HCPCS | Performed by: OPHTHALMOLOGY

## 2020-07-28 PROCEDURE — 36000706: Performed by: OPHTHALMOLOGY

## 2020-07-28 PROCEDURE — 66984 PR REMOVAL, CATARACT, W/INSRT INTRAOC LENS, W/O ENDO CYCLO: ICD-10-PCS | Mod: RT,,, | Performed by: OPHTHALMOLOGY

## 2020-07-28 PROCEDURE — 71000015 HC POSTOP RECOV 1ST HR: Performed by: OPHTHALMOLOGY

## 2020-07-28 PROCEDURE — 25000003 PHARM REV CODE 250: Performed by: OPHTHALMOLOGY

## 2020-07-28 PROCEDURE — 36000707: Performed by: OPHTHALMOLOGY

## 2020-07-28 PROCEDURE — 66984 XCAPSL CTRC RMVL W/O ECP: CPT | Mod: RT,,, | Performed by: OPHTHALMOLOGY

## 2020-07-28 PROCEDURE — 37000009 HC ANESTHESIA EA ADD 15 MINS: Performed by: OPHTHALMOLOGY

## 2020-07-28 PROCEDURE — 63600175 PHARM REV CODE 636 W HCPCS: Performed by: OPHTHALMOLOGY

## 2020-07-28 DEVICE — LENS 20.0: Type: IMPLANTABLE DEVICE | Site: EYE | Status: FUNCTIONAL

## 2020-07-28 RX ORDER — HYDROCODONE BITARTRATE AND ACETAMINOPHEN 5; 325 MG/1; MG/1
1 TABLET ORAL EVERY 4 HOURS PRN
Status: DISCONTINUED | OUTPATIENT
Start: 2020-07-28 | End: 2020-07-28 | Stop reason: HOSPADM

## 2020-07-28 RX ORDER — HYDROMORPHONE HYDROCHLORIDE 2 MG/ML
0.2 INJECTION, SOLUTION INTRAMUSCULAR; INTRAVENOUS; SUBCUTANEOUS EVERY 5 MIN PRN
Status: DISCONTINUED | OUTPATIENT
Start: 2020-07-28 | End: 2020-07-28 | Stop reason: HOSPADM

## 2020-07-28 RX ORDER — LIDOCAINE HYDROCHLORIDE 40 MG/ML
INJECTION, SOLUTION RETROBULBAR
Status: DISCONTINUED | OUTPATIENT
Start: 2020-07-28 | End: 2020-07-28 | Stop reason: HOSPADM

## 2020-07-28 RX ORDER — OXYCODONE HYDROCHLORIDE 5 MG/1
5 TABLET ORAL
Status: DISCONTINUED | OUTPATIENT
Start: 2020-07-28 | End: 2020-07-28 | Stop reason: HOSPADM

## 2020-07-28 RX ORDER — TETRACAINE HYDROCHLORIDE 5 MG/ML
1 SOLUTION OPHTHALMIC
Status: DISCONTINUED | OUTPATIENT
Start: 2020-07-28 | End: 2021-07-28

## 2020-07-28 RX ORDER — TROPICAMIDE 10 MG/ML
1 SOLUTION/ DROPS OPHTHALMIC
Status: COMPLETED | OUTPATIENT
Start: 2020-07-28 | End: 2020-07-28

## 2020-07-28 RX ORDER — CYCLOPENTOLATE HYDROCHLORIDE 10 MG/ML
1 SOLUTION/ DROPS OPHTHALMIC
Status: DISCONTINUED | OUTPATIENT
Start: 2020-07-28 | End: 2021-07-28

## 2020-07-28 RX ORDER — EPINEPHRINE 1 MG/ML
INJECTION, SOLUTION INTRACARDIAC; INTRAMUSCULAR; INTRAVENOUS; SUBCUTANEOUS
Status: DISCONTINUED | OUTPATIENT
Start: 2020-07-28 | End: 2020-07-28 | Stop reason: HOSPADM

## 2020-07-28 RX ORDER — SODIUM CHLORIDE 0.9 % (FLUSH) 0.9 %
10 SYRINGE (ML) INJECTION
Status: DISCONTINUED | OUTPATIENT
Start: 2020-07-28 | End: 2021-07-28

## 2020-07-28 RX ORDER — PREDNISOLONE ACETATE 10 MG/ML
SUSPENSION/ DROPS OPHTHALMIC
Status: DISCONTINUED | OUTPATIENT
Start: 2020-07-28 | End: 2020-07-28 | Stop reason: HOSPADM

## 2020-07-28 RX ORDER — ACETAMINOPHEN 325 MG/1
650 TABLET ORAL EVERY 4 HOURS PRN
Status: DISCONTINUED | OUTPATIENT
Start: 2020-07-28 | End: 2020-07-28 | Stop reason: HOSPADM

## 2020-07-28 RX ORDER — SODIUM CHLORIDE 0.9 % (FLUSH) 0.9 %
3 SYRINGE (ML) INJECTION
Status: DISCONTINUED | OUTPATIENT
Start: 2020-07-28 | End: 2020-07-28 | Stop reason: HOSPADM

## 2020-07-28 RX ORDER — OFLOXACIN 3 MG/ML
1 SOLUTION/ DROPS OPHTHALMIC
Status: DISCONTINUED | OUTPATIENT
Start: 2020-07-28 | End: 2021-07-28

## 2020-07-28 RX ORDER — PHENYLEPHRINE HYDROCHLORIDE 25 MG/ML
1 SOLUTION/ DROPS OPHTHALMIC
Status: COMPLETED | OUTPATIENT
Start: 2020-07-28 | End: 2020-07-28

## 2020-07-28 RX ORDER — MIDAZOLAM HYDROCHLORIDE 1 MG/ML
INJECTION INTRAMUSCULAR; INTRAVENOUS
Status: DISCONTINUED | OUTPATIENT
Start: 2020-07-28 | End: 2020-07-28

## 2020-07-28 RX ADMIN — OFLOXACIN 1 DROP: 3 SOLUTION OPHTHALMIC at 10:07

## 2020-07-28 RX ADMIN — PHENYLEPHRINE HYDROCHLORIDE 1 DROP: 25 SOLUTION/ DROPS OPHTHALMIC at 10:07

## 2020-07-28 RX ADMIN — TROPICAMIDE 1 DROP: 10 SOLUTION/ DROPS OPHTHALMIC at 10:07

## 2020-07-28 RX ADMIN — MIDAZOLAM HYDROCHLORIDE 2 MG: 1 INJECTION, SOLUTION INTRAMUSCULAR; INTRAVENOUS at 11:07

## 2020-07-28 RX ADMIN — TETRACAINE HYDROCHLORIDE 1 DROP: 5 SOLUTION OPHTHALMIC at 10:07

## 2020-07-28 RX ADMIN — CYCLOPENTOLATE HYDROCHLORIDE 1 DROP: 10 SOLUTION/ DROPS OPHTHALMIC at 10:07

## 2020-07-28 NOTE — ANESTHESIA PREPROCEDURE EVALUATION
07/28/2020  Cristiano Cruz is a 65 y.o., male.    Anesthesia Evaluation    I have reviewed the Patient Summary Reports.    I have reviewed the Nursing Notes. I have reviewed the NPO Status.   I have reviewed the Medications.     Review of Systems  Anesthesia Hx:  No problems with previous Anesthesia  History of prior surgery of interest to airway management or planning: Previous anesthesia: General 2019 hernia with general anesthesia.  Airway issues documented on chart review include mask, easy, GETA, easy direct laryngoscopy , view on direct laryngoscopy Grade I  Denies Family Hx of Anesthesia complications.   Denies Personal Hx of Anesthesia complications.   Social:  Former Smoker      Hematology/Oncology:  Hematology Normal   Oncology Normal     EENT/Dental:   Glaucoma     Cardiovascular:  Cardiovascular Normal Exercise tolerance: good     Pulmonary:   COPD    Renal/:   renal calculi    Hepatic/GI:   GERD Hepatitis (treated), C    Musculoskeletal:  Musculoskeletal Normal    Neurological:   Seizures Well controlled on medication   Endocrine:   Diabetes, type 2    Dermatological:  Skin Normal    Psych:  Psychiatric Normal           Physical Exam  General:  Well nourished, Obesity    Airway/Jaw/Neck:  Airway Findings: Mouth Opening: Normal Tongue: Large  General Airway Assessment: Adult, Average  Mallampati: III  Improves to II with phonation.  TM Distance: 4 - 6 cm         Dental:  Dental Findings: Edentulous        Mental Status:  Mental Status Findings:  Cooperative, Alert and Oriented         Anesthesia Plan  Type of Anesthesia, risks & benefits discussed:  Anesthesia Type:  MAC  Patient's Preference:   Intra-op Monitoring Plan: standard ASA monitors  Intra-op Monitoring Plan Comments:   Post Op Pain Control Plan: per primary service following discharge from PACU  Post Op Pain Control Plan Comments:    Induction:    Beta Blocker:         Informed Consent: Patient understands risks and agrees with Anesthesia plan.  Questions answered. Anesthesia consent signed with patient.  ASA Score: 3     Day of Surgery Review of History & Physical:    H&P update referred to the surgeon.         Ready For Surgery From Anesthesia Perspective.

## 2020-07-28 NOTE — OP NOTE
Operative Date:  07/28/2020    Discharge Date:  07/28/2020    Discharge Patient Home    Report Title: Operative Note      SURGEON: Daryl Calloway MD     ASSISTANT:     PREOPERATIVE DIAGNOSIS: Visually significant NSC cataract,  Right Eye.    POSTOPERATIVE DIAGNOSIS: Visually-significant NSC cataract,  Right Eye.    PROCEDURE PERFORMED: Phacoemulsification of the cataract with posterior chamber intraocular lens Right Eye.    ANESTHESIA: Topical with MAC     COMPLICATIONS: None    ESTIMATED BLOOD LOSS: Minimal    INDICATIONS FOR PROCEDURE:   The patient is a pleasant 65 year old gentleman with increasing difficulties with activities of daily living secondary to a dense visually significant cataract in the Right Eye.  Discussions have been carried out with this patient concerning the options to surgery, risks, benefits and expectations.  The patient voiced good understanding and wished to proceed with the above procedure.    PROCEDURE IN DETAIL: The patient was brought to the operating room and received topical anesthetic to the eye and then was prepped and draped in the usual sterile fashion.  Using the Contreras ring and the guarded yonatan blade set at 0.37 mm, a partial thickness clear cornea incision was made temporally.  The paracentesis site was made at the twelve o'clock position.  Omidria was injected into the anterior chamber through the paracentesis.  Viscoat was then injected into the anterior chamber.  The eye was then reentered at the primary surgical site with a 2.4 mm keratome followed by continuous capsulotomy, hydrodissection, hydrodelineation and phacoemulsification of the cataract.  Residual cortical material was removed using automated irrigation-aspiration technique.  Healon was injected into the posterior chamber and an SN60WF 20.0 diopter lens was placed in the bag without difficulty. Residual viscoelastic was removed using automated irrigation-aspiration technique. The eye was  re-pressurized using BSS solution and both the paracentesis site and the primary surgical site were demonstrated to be watertight at the end of the case with Weck--Deloris manipulation.  One drop of Ofloxacin and one drop of Pred acetate 1% was applied to the Right Eye .The eye was closed, patched and a Welsh shield placed.  The patient was taken to the recovery room in good and stable condition.  The patient tolerated the procedure well.  The patient was instructed to refrain from any heavy lifting, bending, stooping or straining activities, discharged home  and to follow-up in the morning for routine postoperative care with Daryl Calloway MD.

## 2020-07-28 NOTE — ANESTHESIA POSTPROCEDURE EVALUATION
Anesthesia Post Evaluation    Patient: Cristiano Cruz    Procedure(s) Performed: Procedure(s) (LRB):  EXTRACTION, CATARACT, WITH IOL INSERTION (Right)    Final Anesthesia Type: MAC    Patient location during evaluation: [facility]  Patient participation: Yes- Able to Participate  Level of consciousness: awake and alert  Post-procedure vital signs: reviewed and stable  Pain management: adequate  Airway patency: patent    PONV status at discharge: No PONV  Anesthetic complications: no      Cardiovascular status: blood pressure returned to baseline  Respiratory status: unassisted  Hydration status: euvolemic  Follow-up not needed.          Vitals Value Taken Time   /87 07/28/20 1032   Temp 37.1 °C (98.8 °F) 07/28/20 1032   Pulse 74 07/28/20 1032   Resp 18 07/28/20 1032   SpO2 98 % 07/28/20 1032         No case tracking events are documented in the log.      Pain/Soledad Score: No data recorded

## 2020-07-28 NOTE — TRANSFER OF CARE
"Anesthesia Transfer of Care Note    Patient: Cristiano Cruz    Procedure(s) Performed: Procedure(s) (LRB):  EXTRACTION, CATARACT, WITH IOL INSERTION (Right)    Patient location: [facility]    Anesthesia Type: MAC    Transport from OR: Transported from OR on room air with adequate spontaneous ventilation    Post pain: adequate analgesia    Post assessment: no apparent anesthetic complications    Post vital signs: stable    Level of consciousness: awake    Nausea/Vomiting: no nausea/vomiting    Complications: none    Transfer of care protocol was followed      Last vitals:   Visit Vitals  /87 (BP Location: Left arm, Patient Position: Lying)   Pulse 74   Temp 37.1 °C (98.8 °F) (Oral)   Resp 18   Ht 5' 7" (1.702 m)   Wt 80.3 kg (177 lb)   SpO2 98%   BMI 27.72 kg/m²     "

## 2020-07-28 NOTE — BRIEF OP NOTE
[facility]  Brief Operative Note    Surgery Date: 7/28/2020     Surgeon(s) and Role:     * Daryl Calloway MD - Primary    Assisting Surgeon: None    Pre-op Diagnosis:  NS (nuclear sclerosis), right [H25.11]    Post-op Diagnosis:  Post-Op Diagnosis Codes:     * NS (nuclear sclerosis), right [H25.11]    Procedure(s) (LRB):  EXTRACTION, CATARACT, WITH IOL INSERTION (Right)    Anesthesia: Local MAC    Description of the findings of the procedure(s):     Estimated Blood Loss: * No values recorded between 7/28/2020 11:45 AM and 7/28/2020 12:28 PM *         Specimens:   Specimen (12h ago, onward)    None            Discharge Note    OUTCOME: Patient tolerated treatment/procedure well without complication and is now ready for discharge.    DISPOSITION: Home or Self Care    FINAL DIAGNOSIS:  Nuclear sclerotic cataract of right eye    FOLLOWUP: In clinic    DISCHARGE INSTRUCTIONS:    Discharge Procedure Orders   Other restrictions (specify):   Order Comments: No heavy lifting or bending for 1 week.

## 2020-07-28 NOTE — DISCHARGE INSTRUCTIONS
Home Care Instructions for Eye Surgeries    1. ACTIVITY:   Limit your activity today. Increase activity gradually. You may return to work or school as directed by your physician.    2. DIET:   Drink plenty of fluids. Resume your normal diet unless instructed otherwise.  3. PAIN:   Expect a moderate amount of pain. If a prescription for pain is not sent home with you, you may take your commonly used pain reliever as directed. If this is not sufficient, call your physician. You may resume any other prescription medication unless otherwise directed by your physician.     4. DRESSING:   Keep your dressing dry and intact.  5. COMMENTS:   No driving, operating heavy machinery or signing legal documents for 24 hours.    No bending forward at the waist.   See attached schedule of eye drops.    Discuss any problem with your physician as soon as it arises. Do not Delay.      EMERGENCY- If you are unable to contact your physician, please go to the nearest Emergency Room.

## 2020-07-28 NOTE — PLAN OF CARE
Cristiano Cruz has met all discharge criteria from Phase II. Vital Signs are stable, ambulating  without difficulty. Discharge instructions given, patient verbalized understanding. Discharged from facility via wheelchair in stable condition.

## 2020-07-28 NOTE — INTERVAL H&P NOTE
The patient has been examined and the H&P has been reviewed:    I concur with the findings and no changes have occurred since H&P was written.    Anesthesia/Surgery risks, benefits and alternative options discussed and understood by patient/family.          Active Hospital Problems    Diagnosis  POA    Nuclear sclerotic cataract of right eye [H25.11]  Yes      Resolved Hospital Problems   No resolved problems to display.

## 2020-07-29 ENCOUNTER — OFFICE VISIT (OUTPATIENT)
Dept: OPHTHALMOLOGY | Facility: CLINIC | Age: 65
End: 2020-07-29
Payer: MEDICARE

## 2020-07-29 VITALS — DIASTOLIC BLOOD PRESSURE: 90 MMHG | HEART RATE: 69 BPM | SYSTOLIC BLOOD PRESSURE: 139 MMHG

## 2020-07-29 DIAGNOSIS — Z98.890 POST-OPERATIVE STATE: Primary | ICD-10-CM

## 2020-07-29 PROCEDURE — 99024 PR POST-OP FOLLOW-UP VISIT: ICD-10-PCS | Mod: S$GLB,,, | Performed by: OPHTHALMOLOGY

## 2020-07-29 PROCEDURE — 99024 POSTOP FOLLOW-UP VISIT: CPT | Mod: S$GLB,,, | Performed by: OPHTHALMOLOGY

## 2020-07-29 PROCEDURE — 99999 PR PBB SHADOW E&M-EST. PATIENT-LVL III: CPT | Mod: PBBFAC,,, | Performed by: OPHTHALMOLOGY

## 2020-07-29 PROCEDURE — 99999 PR PBB SHADOW E&M-EST. PATIENT-LVL III: ICD-10-PCS | Mod: PBBFAC,,, | Performed by: OPHTHALMOLOGY

## 2020-07-29 NOTE — PROGRESS NOTES
Subjective:       Patient ID: Cristiano Cruz is a 65 y.o. male.    Chief Complaint: Post-op Evaluation (1 day po od)    HPI     Post-op Evaluation      Additional comments: 1 day po od              Comments     S/p Phaco w/IOL OD- 7/28/2020     64 y/o male is here for 1 day post op of the RT eye. Pt states sx went   well. Pt denies pain and discomfort.     Eyemeds  Ofloxacin QID OD  Ilevro QD OD  Durezol QID OD           Last edited by Abena Lopez on 7/29/2020  8:08 AM. (History)             Assessment:       1. Post-operative state        Plan:       S/p CE OD- Doing well.           CPM OD.  RTC 1 wk.

## 2020-08-04 ENCOUNTER — TELEPHONE (OUTPATIENT)
Dept: NEPHROLOGY | Facility: CLINIC | Age: 65
End: 2020-08-04

## 2020-08-04 DIAGNOSIS — N20.0 KIDNEY STONE: Primary | ICD-10-CM

## 2020-08-04 NOTE — TELEPHONE ENCOUNTER
Called patient and informed him about his 24 hour urine test results. It showed alkaline pH with low citrate however patient was taking a carbonic anhydrase inhibitor (antiepileptic). Discussed with his neurologist who changed it. Otherwise the oxalate, sodium and sulphate in acceptable range. His low citrate could be due to the RTA from the CA inhibitor.   For now continue drinking lot of water, low sodium and animal protein diet should help. If he has stones again will consider citrate vs low oxalate diet based on urine pH (we don't know the composition of stones).

## 2020-08-04 NOTE — TELEPHONE ENCOUNTER
Spoke to pt, scheduled F/u and lab appt       ----- Message from Douglas Sue MD sent at 8/4/2020 10:25 AM CDT -----  Hi,    Can we get him an appointment with me in October/November, preferably friday afternoon. Thank you.

## 2020-08-05 ENCOUNTER — TELEPHONE (OUTPATIENT)
Dept: OPHTHALMOLOGY | Facility: CLINIC | Age: 65
End: 2020-08-05

## 2020-08-05 ENCOUNTER — OFFICE VISIT (OUTPATIENT)
Dept: OPHTHALMOLOGY | Facility: CLINIC | Age: 65
End: 2020-08-05
Payer: MEDICARE

## 2020-08-05 DIAGNOSIS — H40.013 OAG (OPEN ANGLE GLAUCOMA) SUSPECT, LOW RISK, BILATERAL: ICD-10-CM

## 2020-08-05 DIAGNOSIS — H25.12 NS (NUCLEAR SCLEROSIS), LEFT: ICD-10-CM

## 2020-08-05 DIAGNOSIS — Z98.890 POST-OPERATIVE STATE: Primary | ICD-10-CM

## 2020-08-05 PROCEDURE — 92136 OPHTHALMIC BIOMETRY: CPT | Mod: 26,LT,S$GLB, | Performed by: OPHTHALMOLOGY

## 2020-08-05 PROCEDURE — 99999 PR PBB SHADOW E&M-EST. PATIENT-LVL III: CPT | Mod: PBBFAC,,, | Performed by: OPHTHALMOLOGY

## 2020-08-05 PROCEDURE — 99024 PR POST-OP FOLLOW-UP VISIT: ICD-10-PCS | Mod: S$GLB,,, | Performed by: OPHTHALMOLOGY

## 2020-08-05 PROCEDURE — 92136 PR OPHTHAL BIOMETRY,INTRAOC LENS POW CALC: ICD-10-PCS | Mod: 26,LT,S$GLB, | Performed by: OPHTHALMOLOGY

## 2020-08-05 PROCEDURE — 99999 PR PBB SHADOW E&M-EST. PATIENT-LVL III: ICD-10-PCS | Mod: PBBFAC,,, | Performed by: OPHTHALMOLOGY

## 2020-08-05 PROCEDURE — 99024 POSTOP FOLLOW-UP VISIT: CPT | Mod: S$GLB,,, | Performed by: OPHTHALMOLOGY

## 2020-08-05 NOTE — TELEPHONE ENCOUNTER
----- Message from Jessica Berg sent at 8/5/2020 11:16 AM CDT -----  Regarding: confirmation of arrival time  Contact: Cristiano @ [phone]  Pt needs confirmation for arrival time so he can notify transportation

## 2020-08-05 NOTE — PROGRESS NOTES
Subjective:       Patient ID: Cristiano Cruz is a 65 y.o. male.    Chief Complaint: Post-op Evaluation    HPI     S/p Phaco w/IOL OD- 7/28/2020     64 y/o male is here for 1 week post op of the RT eye. Pt states vision   improves daily    Eyemeds    Ilevro QD OD  Durezol BID OD     Last edited by Liz Menchaca on 8/5/2020  8:15 AM. (History)             Assessment:       1. Post-operative state    2. NS (nuclear sclerosis), left    3. OAG (open angle glaucoma) suspect, low risk, bilateral        Plan:       S/p CE OD- Doing well.     Visually significant cataract OS -Pt. Wants Sx.  POAG suspect-IOP elevated OD but Pt stopped latanoprost OU.      Taper gtts OD.  Cataract Surgery Consent: Patient with a visually significant cataract with difficulties of ADLs, reading, driving, night vision, glare (any and all).  Discussed with Patient/Family/Caregiver: options, risks and benefits, expectations of cataract surgery, utilized an eye model with questions and answers to facilitate discussion.  Discussed lens options and patient understands that glasses may be required for optimal vision for distance and/or near vision after cataract surgery.  The Patient/Family/Caregiver  voice good understanding and patient wishes to proceed with surgery.  The patient will likely benefit from surgery and patient signed consent for Left Eye.  CE OS 8/11/2020.  Restart latanoprost OU.

## 2020-08-07 ENCOUNTER — TELEPHONE (OUTPATIENT)
Dept: OPHTHALMOLOGY | Facility: CLINIC | Age: 65
End: 2020-08-07

## 2020-08-08 ENCOUNTER — LAB VISIT (OUTPATIENT)
Dept: URGENT CARE | Facility: CLINIC | Age: 65
End: 2020-08-08
Payer: MEDICARE

## 2020-08-08 VITALS — OXYGEN SATURATION: 97 % | TEMPERATURE: 98 F | HEART RATE: 83 BPM

## 2020-08-08 DIAGNOSIS — Z13.9 SCREENING PROCEDURE: ICD-10-CM

## 2020-08-08 DIAGNOSIS — H25.11 NUCLEAR SENILE CATARACT OF RIGHT EYE: ICD-10-CM

## 2020-08-08 PROCEDURE — U0003 INFECTIOUS AGENT DETECTION BY NUCLEIC ACID (DNA OR RNA); SEVERE ACUTE RESPIRATORY SYNDROME CORONAVIRUS 2 (SARS-COV-2) (CORONAVIRUS DISEASE [COVID-19]), AMPLIFIED PROBE TECHNIQUE, MAKING USE OF HIGH THROUGHPUT TECHNOLOGIES AS DESCRIBED BY CMS-2020-01-R: HCPCS

## 2020-08-09 LAB — SARS-COV-2 RNA RESP QL NAA+PROBE: NOT DETECTED

## 2020-08-09 NOTE — H&P
[facility]  History & Physical    Subjective:      Chief Complaint/Reason for Admission:     Cristiano Cruz is a 65 y.o. male.    Past Medical History:   Diagnosis Date    B12 nutritional deficiency 2019    E. coli UTI 2019    During hospitalization for seizure    Generalized tonic-clonic seizure 2019 admitted for new-onset seizures.Normal CT head.  His mental status normalized, and he had no recurrent seizures following keppra loading in the ED and twice-daily maintenance upon arrival to the floor. Workup into the etiology of his seizures remained negative. EEG was performed, with the preliminary interpretation being no epileptiform activity with normal background.     History of hepatitis C, s/p successful Rx w/ SVR12 (cure) - 2019    S/p epclusa    Kidney stone on left side 2019    Mixed type COPD (chronic obstructive pulmonary disease) 8/15/2019    8-2019 PFT: Normal airflow. Airflow not improved after bronchodilator. Moderate restriction. Diffusion capacity is mildly decreased. Oximetry is normal.    Pterygium, bilateral 3/12/2019    Type 2 diabetes mellitus with microalbuminuria, without long-term current use of insulin 2019     Past Surgical History:   Procedure Laterality Date    CATARACT EXTRACTION W/  INTRAOCULAR LENS IMPLANT Right 2020    Procedure: EXTRACTION, CATARACT, WITH IOL INSERTION;  Surgeon: Daryl Calloway MD;  Location: [facility];  Service: Ophthalmology;  Laterality: Right;    HERNIA REPAIR Right     [facility]     Family History   Problem Relation Age of Onset    Hypertension Mother      Social History     Tobacco Use    Smoking status: Former Smoker     Packs/day: 3.00     Years: 25.00     Pack years: 75.00     Quit date: 2012     Years since quittin.5    Smokeless tobacco: Never Used   Substance Use Topics    Alcohol use: No     Comment: Used to drink    Drug use: No       No  medications prior to admission.     Review of patient's allergies indicates:  No Known Allergies     Review of Systems   Eyes: Positive for blurred vision.   All other systems reviewed and are negative.      Objective:      Vital Signs (Most Recent)       Vital Signs Range (Last 24H):       Physical Exam  Constitutional:       Appearance: He is well-developed.   HENT:      Head: Normocephalic.   Eyes:      Conjunctiva/sclera: Conjunctivae normal.      Pupils: Pupils are equal, round, and reactive to light.   Neck:      Musculoskeletal: Normal range of motion and neck supple.   Cardiovascular:      Rate and Rhythm: Normal rate.   Pulmonary:      Effort: Pulmonary effort is normal.      Breath sounds: Normal breath sounds.   Abdominal:      General: Bowel sounds are normal.      Palpations: Abdomen is soft.   Musculoskeletal: Normal range of motion.   Skin:     General: Skin is warm.   Neurological:      Mental Status: He is alert and oriented to person, place, and time.         Data Review:    ECG:     Assessment:      Cataract OS.    Plan:    CE OS.

## 2020-08-10 ENCOUNTER — TELEPHONE (OUTPATIENT)
Dept: OPHTHALMOLOGY | Facility: CLINIC | Age: 65
End: 2020-08-10

## 2020-08-11 ENCOUNTER — ANESTHESIA (OUTPATIENT)
Dept: SURGERY | Facility: OTHER | Age: 65
End: 2020-08-11
Payer: MEDICARE

## 2020-08-11 ENCOUNTER — HOSPITAL ENCOUNTER (OUTPATIENT)
Facility: OTHER | Age: 65
Discharge: HOME | End: 2020-08-11
Attending: OPHTHALMOLOGY | Admitting: OPHTHALMOLOGY
Payer: MEDICARE

## 2020-08-11 ENCOUNTER — ANESTHESIA EVENT (OUTPATIENT)
Dept: SURGERY | Facility: OTHER | Age: 65
End: 2020-08-11
Payer: MEDICARE

## 2020-08-11 VITALS
HEART RATE: 63 BPM | WEIGHT: 177 LBS | BODY MASS INDEX: 27.78 KG/M2 | TEMPERATURE: 97 F | RESPIRATION RATE: 18 BRPM | SYSTOLIC BLOOD PRESSURE: 156 MMHG | DIASTOLIC BLOOD PRESSURE: 104 MMHG | OXYGEN SATURATION: 99 % | HEIGHT: 67 IN

## 2020-08-11 DIAGNOSIS — H25.12 NUCLEAR SCLEROTIC CATARACT OF LEFT EYE: Primary | ICD-10-CM

## 2020-08-11 PROCEDURE — 36000707: Performed by: OPHTHALMOLOGY

## 2020-08-11 PROCEDURE — 63600175 PHARM REV CODE 636 W HCPCS: Performed by: NURSE ANESTHETIST, CERTIFIED REGISTERED

## 2020-08-11 PROCEDURE — V2632 POST CHMBR INTRAOCULAR LENS: HCPCS | Performed by: OPHTHALMOLOGY

## 2020-08-11 PROCEDURE — 71000015 HC POSTOP RECOV 1ST HR: Performed by: OPHTHALMOLOGY

## 2020-08-11 PROCEDURE — 66984 PR REMOVAL, CATARACT, W/INSRT INTRAOC LENS, W/O ENDO CYCLO: ICD-10-PCS | Mod: 79,LT,, | Performed by: OPHTHALMOLOGY

## 2020-08-11 PROCEDURE — 63600175 PHARM REV CODE 636 W HCPCS: Performed by: OPHTHALMOLOGY

## 2020-08-11 PROCEDURE — 37000008 HC ANESTHESIA 1ST 15 MINUTES: Performed by: OPHTHALMOLOGY

## 2020-08-11 PROCEDURE — 36000706: Performed by: OPHTHALMOLOGY

## 2020-08-11 PROCEDURE — 25000003 PHARM REV CODE 250: Performed by: OPHTHALMOLOGY

## 2020-08-11 PROCEDURE — 25000003 PHARM REV CODE 250: Performed by: NURSE ANESTHETIST, CERTIFIED REGISTERED

## 2020-08-11 PROCEDURE — 66984 XCAPSL CTRC RMVL W/O ECP: CPT | Mod: 79,LT,, | Performed by: OPHTHALMOLOGY

## 2020-08-11 PROCEDURE — 37000009 HC ANESTHESIA EA ADD 15 MINS: Performed by: OPHTHALMOLOGY

## 2020-08-11 DEVICE — LENS 22.5 ACRYSOF: Type: IMPLANTABLE DEVICE | Site: EYE | Status: FUNCTIONAL

## 2020-08-11 RX ORDER — SODIUM CHLORIDE 0.9 % (FLUSH) 0.9 %
10 SYRINGE (ML) INJECTION
Status: DISCONTINUED | OUTPATIENT
Start: 2020-08-11 | End: 2021-07-28

## 2020-08-11 RX ORDER — ACETAMINOPHEN 325 MG/1
650 TABLET ORAL EVERY 4 HOURS PRN
Status: DISCONTINUED | OUTPATIENT
Start: 2020-08-11 | End: 2020-08-11 | Stop reason: HOSPADM

## 2020-08-11 RX ORDER — CYCLOPENTOLATE HYDROCHLORIDE 10 MG/ML
1 SOLUTION/ DROPS OPHTHALMIC
Status: DISCONTINUED | OUTPATIENT
Start: 2020-08-11 | End: 2021-07-28

## 2020-08-11 RX ORDER — PHENYLEPHRINE HYDROCHLORIDE 25 MG/ML
1 SOLUTION/ DROPS OPHTHALMIC
Status: COMPLETED | OUTPATIENT
Start: 2020-08-11 | End: 2020-08-11

## 2020-08-11 RX ORDER — HYDROCODONE BITARTRATE AND ACETAMINOPHEN 5; 325 MG/1; MG/1
1 TABLET ORAL EVERY 4 HOURS PRN
Status: DISCONTINUED | OUTPATIENT
Start: 2020-08-11 | End: 2020-08-11 | Stop reason: HOSPADM

## 2020-08-11 RX ORDER — TETRACAINE HYDROCHLORIDE 5 MG/ML
1 SOLUTION OPHTHALMIC
Status: COMPLETED | OUTPATIENT
Start: 2020-08-11 | End: 2020-08-11

## 2020-08-11 RX ORDER — EPINEPHRINE 1 MG/ML
INJECTION, SOLUTION INTRACARDIAC; INTRAMUSCULAR; INTRAVENOUS; SUBCUTANEOUS
Status: DISCONTINUED | OUTPATIENT
Start: 2020-08-11 | End: 2020-08-11 | Stop reason: HOSPADM

## 2020-08-11 RX ORDER — OFLOXACIN 3 MG/ML
1 SOLUTION/ DROPS OPHTHALMIC
Status: DISCONTINUED | OUTPATIENT
Start: 2020-08-11 | End: 2021-07-28

## 2020-08-11 RX ORDER — FLUMAZENIL 0.1 MG/ML
INJECTION INTRAVENOUS
Status: DISCONTINUED | OUTPATIENT
Start: 2020-08-11 | End: 2020-08-11

## 2020-08-11 RX ORDER — TROPICAMIDE 10 MG/ML
1 SOLUTION/ DROPS OPHTHALMIC
Status: COMPLETED | OUTPATIENT
Start: 2020-08-11 | End: 2020-08-11

## 2020-08-11 RX ORDER — MIDAZOLAM HYDROCHLORIDE 1 MG/ML
INJECTION INTRAMUSCULAR; INTRAVENOUS
Status: DISCONTINUED | OUTPATIENT
Start: 2020-08-11 | End: 2020-08-11

## 2020-08-11 RX ORDER — LIDOCAINE HYDROCHLORIDE 40 MG/ML
INJECTION, SOLUTION RETROBULBAR
Status: DISCONTINUED | OUTPATIENT
Start: 2020-08-11 | End: 2020-08-11 | Stop reason: HOSPADM

## 2020-08-11 RX ORDER — PREDNISOLONE ACETATE 10 MG/ML
SUSPENSION/ DROPS OPHTHALMIC
Status: DISCONTINUED | OUTPATIENT
Start: 2020-08-11 | End: 2020-08-11 | Stop reason: HOSPADM

## 2020-08-11 RX ORDER — TETRACAINE HYDROCHLORIDE 5 MG/ML
SOLUTION OPHTHALMIC
Status: DISCONTINUED | OUTPATIENT
Start: 2020-08-11 | End: 2020-08-11 | Stop reason: HOSPADM

## 2020-08-11 RX ADMIN — OFLOXACIN 1 DROP: 3 SOLUTION OPHTHALMIC at 07:08

## 2020-08-11 RX ADMIN — PHENYLEPHRINE HYDROCHLORIDE 1 DROP: 25 SOLUTION/ DROPS OPHTHALMIC at 07:08

## 2020-08-11 RX ADMIN — CYCLOPENTOLATE HYDROCHLORIDE 1 DROP: 10 SOLUTION/ DROPS OPHTHALMIC at 07:08

## 2020-08-11 RX ADMIN — TETRACAINE HYDROCHLORIDE 1 DROP: 5 SOLUTION OPHTHALMIC at 07:08

## 2020-08-11 RX ADMIN — TROPICAMIDE 1 DROP: 10 SOLUTION/ DROPS OPHTHALMIC at 07:08

## 2020-08-11 RX ADMIN — MIDAZOLAM HYDROCHLORIDE 2 MG: 1 INJECTION, SOLUTION INTRAMUSCULAR; INTRAVENOUS at 08:08

## 2020-08-11 RX ADMIN — FLUMAZENIL 0.5 MG: 0.1 INJECTION, SOLUTION INTRAVENOUS at 09:08

## 2020-08-11 NOTE — BRIEF OP NOTE
[facility]  Brief Operative Note    Surgery Date: 8/11/2020     Surgeon(s) and Role:     * Daryl Calloway MD - Primary    Assisting Surgeon: None    Pre-op Diagnosis:  NS (nuclear sclerosis), left [H25.12]    Post-op Diagnosis:  Post-Op Diagnosis Codes:     * NS (nuclear sclerosis), left [H25.12]    Procedure(s) (LRB):  EXTRACTION, CATARACT, WITH IOL INSERTION (Left)    Anesthesia: Local MAC    Description of the findings of the procedure(s):     Estimated Blood Loss: * No values recorded between 8/11/2020  8:58 AM and 8/11/2020  9:38 AM *         Specimens:   Specimen (12h ago, onward)    None            Discharge Note    OUTCOME: Patient tolerated treatment/procedure well without complication and is now ready for discharge.    DISPOSITION: Home or Self Care    FINAL DIAGNOSIS:  Nuclear sclerotic cataract of left eye    FOLLOWUP: In clinic    DISCHARGE INSTRUCTIONS:    Discharge Procedure Orders   Other restrictions (specify):   Order Comments: No heavy lifting or bending for 1 week.

## 2020-08-11 NOTE — OP NOTE
Operative Date:  08/11/2020    Discharge Date:  08/11/2020    Discharge Patient Home    Report Title: Operative Note      SURGEON: Daryl Calloway MD     ASSISTANT:     PREOPERATIVE DIAGNOSIS: Visually significant NSC cataract,  Left Eye.    POSTOPERATIVE DIAGNOSIS: Visually-significant NSC cataract,  Left Eye.    PROCEDURE PERFORMED: Phacoemulsification of the cataract with posterior chamber intraocular lens Left Eye.    ANESTHESIA: Topical with MAC    COMPLICATIONS: None    ESTIMATED BLOOD LOSS: Minimal    INDICATIONS FOR PROCEDURE:   The patient is a pleasant 65 year old gentleman  with increasing difficulties with activities of daily living secondary to a dense visually significant cataract in the Left Eye.  Discussions have been carried out with this patient concerning the options to surgery, risks, benefits and expectations.  The patient voiced good understanding and wished to proceed with the above procedure.    PROCEDURE IN DETAIL: The patient was brought to the operating room and received topical anesthetic to the eye and then was prepped and draped in the usual sterile fashion.  Using the Contreras ring and the guarded yonatan blade set at 0.37 mm, a partial thickness clear cornea incision was made temporally.  The paracentesis site was made at the six o'clock position.  Omidria was injected into the anterior chamber through the paracentesis.  Viscoat was then injected into the anterior chamber.  The eye was then reentered at the primary surgical site with a 2.4 mm keratome followed by continuous capsulotomy, hydrodissection, hydrodelineation and phacoemulsification of the cataract.  Residual cortical material was removed using automated irrigation-aspiration technique.  Healon was injected into the posterior chamber and an SN60WF 22.5 diopter lens was placed in the bag without difficulty. Residual viscoelastic was removed using automated irrigation-aspiration technique. The eye was re-pressurized  using BSS solution and both the paracentesis site and the primary surgical site were demonstrated to be watertight at the end of the case with Weck--Deloris manipulation.  One drop of Ofloxacin and one drop of Pred acetate 1% was applied to the Left Eye .The eye was closed, patched and a Welsh shield placed.  The patient was taken to the recovery room in good and stable condition.  The patient tolerated the procedure well.  The patient was instructed to refrain from any heavy lifting, bending, stooping or straining activities, discharged home  and to follow-up in the morning for routine postoperative care with Daryl Calloway MD.

## 2020-08-11 NOTE — ANESTHESIA PREPROCEDURE EVALUATION
08/11/2020  Cristiano Cruz is a 65 y.o., male.    Pre-op Assessment    I have reviewed the Patient Summary Reports.     I have reviewed the Nursing Notes. I have reviewed the NPO Status.   I have reviewed the Medications.     Review of Systems  Anesthesia Hx:  No problems with previous Anesthesia  History of prior surgery of interest to airway management or planning: Previous anesthesia: General 2019 hernia with general anesthesia.  Airway issues documented on chart review include mask, easy, GETA, easy direct laryngoscopy , view on direct laryngoscopy Grade I  Denies Family Hx of Anesthesia complications.   Denies Personal Hx of Anesthesia complications.   Social:  Former Smoker      Hematology/Oncology:  Hematology Normal   Oncology Normal     EENT/Dental:   Glaucoma     Cardiovascular:  Cardiovascular Normal Exercise tolerance: good     Pulmonary:   COPD    Renal/:   renal calculi    Hepatic/GI:   GERD Hepatitis (treated), C    Musculoskeletal:  Musculoskeletal Normal    Neurological:   Seizures Well controlled on medication   Endocrine:   Diabetes, type 2    Dermatological:  Skin Normal    Psych:  Psychiatric Normal           Physical Exam  General:  Well nourished, Obesity    Airway/Jaw/Neck:  Airway Findings: Mouth Opening: Normal Tongue: Large  General Airway Assessment: Adult, Average  Mallampati: III  Improves to II with phonation.  TM Distance: 4 - 6 cm         Dental:  Dental Findings: Edentulous        Mental Status:  Mental Status Findings:  Cooperative, Alert and Oriented         Anesthesia Plan  Type of Anesthesia, risks & benefits discussed:  Anesthesia Type:  MAC  Patient's Preference:   Intra-op Monitoring Plan: standard ASA monitors  Intra-op Monitoring Plan Comments:   Post Op Pain Control Plan: per primary service following discharge from PACU  Post Op Pain Control Plan Comments:    Induction:    Beta Blocker:         Informed Consent: Patient understands risks and agrees with Anesthesia plan.  Questions answered. Anesthesia consent signed with patient.  ASA Score: 3     Day of Surgery Review of History & Physical:    H&P update referred to the surgeon.         Ready For Surgery From Anesthesia Perspective.

## 2020-08-11 NOTE — ANESTHESIA POSTPROCEDURE EVALUATION
Anesthesia Post Evaluation    Patient: Cristiano Cruz    Procedure(s) Performed: Procedure(s) (LRB):  EXTRACTION, CATARACT, WITH IOL INSERTION (Left)    Final Anesthesia Type: MAC    Patient location during evaluation: [facility]  Patient participation: Yes- Able to Participate  Level of consciousness: awake and alert  Post-procedure vital signs: reviewed and stable  Pain management: adequate  Airway patency: patent    PONV status at discharge: No PONV  Anesthetic complications: no      Cardiovascular status: blood pressure returned to baseline  Respiratory status: unassisted, room air and spontaneous ventilation  Hydration status: euvolemic  Follow-up not needed.          Vitals Value Taken Time   /89 08/11/20 0718   Temp 36.3 °C (97.4 °F) 08/11/20 0718   Pulse 66 08/11/20 0718   Resp 18 08/11/20 0718   SpO2 95 % 08/11/20 0718         No case tracking events are documented in the log.      Pain/Soledad Score: No data recorded

## 2020-08-12 ENCOUNTER — OFFICE VISIT (OUTPATIENT)
Dept: OPHTHALMOLOGY | Facility: CLINIC | Age: 65
End: 2020-08-12
Payer: MEDICARE

## 2020-08-12 VITALS — HEART RATE: 64 BPM | DIASTOLIC BLOOD PRESSURE: 91 MMHG | SYSTOLIC BLOOD PRESSURE: 148 MMHG

## 2020-08-12 DIAGNOSIS — Z98.890 POST-OPERATIVE STATE: Primary | ICD-10-CM

## 2020-08-12 DIAGNOSIS — H40.013 OAG (OPEN ANGLE GLAUCOMA) SUSPECT, LOW RISK, BILATERAL: ICD-10-CM

## 2020-08-12 PROCEDURE — 99024 POSTOP FOLLOW-UP VISIT: CPT | Mod: S$GLB,,, | Performed by: OPHTHALMOLOGY

## 2020-08-12 PROCEDURE — 99999 PR PBB SHADOW E&M-EST. PATIENT-LVL III: ICD-10-PCS | Mod: PBBFAC,,, | Performed by: OPHTHALMOLOGY

## 2020-08-12 PROCEDURE — 99024 PR POST-OP FOLLOW-UP VISIT: ICD-10-PCS | Mod: S$GLB,,, | Performed by: OPHTHALMOLOGY

## 2020-08-12 PROCEDURE — 99999 PR PBB SHADOW E&M-EST. PATIENT-LVL III: CPT | Mod: PBBFAC,,, | Performed by: OPHTHALMOLOGY

## 2020-08-12 NOTE — PROGRESS NOTES
Subjective:       Patient ID: Cristiano Cruz is a 65 y.o. male.    Chief Complaint: Post-op Evaluation    HPI     S/p Phaco w/IOL OS 8/11/2020    Pt here today for 1 day PO OS. Pt states sx went well. Denies pain     Ofloxacin OS QID   Durezol OS QID   Ilevro OS QD    Last edited by Liz Menchaca on 8/12/2020  8:11 AM. (History)             Assessment:       1. Post-operative state    2. OAG (open angle glaucoma) suspect, low risk, bilateral        Plan:       S/p CE OU- Doing well.  Glaucoma suspect OU-IOP is acceptable OS today.        CPM OS.  Taper gtts OD.  Cont latanoprost OU.  RTC 1 wk.

## 2020-08-24 ENCOUNTER — OFFICE VISIT (OUTPATIENT)
Dept: OPHTHALMOLOGY | Facility: CLINIC | Age: 65
End: 2020-08-24
Payer: MEDICARE

## 2020-08-24 DIAGNOSIS — Z98.890 POST-OPERATIVE STATE: Primary | ICD-10-CM

## 2020-08-24 DIAGNOSIS — H40.013 OAG (OPEN ANGLE GLAUCOMA) SUSPECT, LOW RISK, BILATERAL: ICD-10-CM

## 2020-08-24 PROCEDURE — 99999 PR PBB SHADOW E&M-EST. PATIENT-LVL III: ICD-10-PCS | Mod: PBBFAC,,, | Performed by: OPHTHALMOLOGY

## 2020-08-24 PROCEDURE — 99999 PR PBB SHADOW E&M-EST. PATIENT-LVL III: CPT | Mod: PBBFAC,,, | Performed by: OPHTHALMOLOGY

## 2020-08-24 PROCEDURE — 99024 PR POST-OP FOLLOW-UP VISIT: ICD-10-PCS | Mod: S$GLB,,, | Performed by: OPHTHALMOLOGY

## 2020-08-24 PROCEDURE — 99024 POSTOP FOLLOW-UP VISIT: CPT | Mod: S$GLB,,, | Performed by: OPHTHALMOLOGY

## 2020-08-24 NOTE — PROGRESS NOTES
Subjective:       Patient ID: Cristiano Cruz is a 65 y.o. male.    Chief Complaint: Post-op Evaluation    HPI     S/p Phaco w/IOL OS- 8/11/2020    67 y/o male is here for 1 week post op of the LT eye. Pt states vision is   well. Denies pain and discomfort.     Eyemeds  Durezol BID OS  Ilevro QD OS   Latanoprost OU QHS     Last edited by Abena Lopez on 8/24/2020  8:29 AM. (History)             Assessment:       1. Post-operative state    2. OAG (open angle glaucoma) suspect, low risk, bilateral        Plan:       S/p CE OU- Doing well.  Glaucoma suspect-IOP is acceptable OS today.      Taper gtts OU.  Cont latanoprost OU.  RTC 2 wks.

## 2020-09-09 ENCOUNTER — OFFICE VISIT (OUTPATIENT)
Dept: OPHTHALMOLOGY | Facility: CLINIC | Age: 65
End: 2020-09-09
Payer: MEDICARE

## 2020-09-09 DIAGNOSIS — H40.013 OAG (OPEN ANGLE GLAUCOMA) SUSPECT, LOW RISK, BILATERAL: ICD-10-CM

## 2020-09-09 DIAGNOSIS — Z98.890 POST-OPERATIVE STATE: Primary | ICD-10-CM

## 2020-09-09 DIAGNOSIS — E53.8 B12 NUTRITIONAL DEFICIENCY: ICD-10-CM

## 2020-09-09 DIAGNOSIS — H52.7 REFRACTIVE ERROR: ICD-10-CM

## 2020-09-09 DIAGNOSIS — H11.003 PTERYGIUM OF BOTH EYES: ICD-10-CM

## 2020-09-09 PROCEDURE — 99999 PR PBB SHADOW E&M-EST. PATIENT-LVL III: CPT | Mod: PBBFAC,,, | Performed by: OPHTHALMOLOGY

## 2020-09-09 PROCEDURE — 99999 PR PBB SHADOW E&M-EST. PATIENT-LVL III: ICD-10-PCS | Mod: PBBFAC,,, | Performed by: OPHTHALMOLOGY

## 2020-09-09 PROCEDURE — 99024 POSTOP FOLLOW-UP VISIT: CPT | Mod: S$GLB,,, | Performed by: OPHTHALMOLOGY

## 2020-09-09 PROCEDURE — 99024 PR POST-OP FOLLOW-UP VISIT: ICD-10-PCS | Mod: S$GLB,,, | Performed by: OPHTHALMOLOGY

## 2020-09-09 RX ORDER — CYANOCOBALAMIN 1000 UG/ML
1000 INJECTION, SOLUTION INTRAMUSCULAR; SUBCUTANEOUS
Qty: 10 ML | Refills: 3 | Status: SHIPPED | OUTPATIENT
Start: 2020-09-09 | End: 2020-10-23 | Stop reason: SDUPTHER

## 2020-09-09 NOTE — PROGRESS NOTES
Subjective:       Patient ID: Cristiano Cruz is a 65 y.o. male.    Chief Complaint: Post-op Evaluation    HPI     S/p Phaco w/IOL OD -7/28/2020  S/p Phaco w/IOL OS- 8/11/2020    66 y/o male is here for 3 weeks post op of both eyes. Pt states vision is   well. Denies pain and discomfort.     Eyemeds  No gtts    Last edited by Abena Lopez on 9/9/2020  2:50 PM. (History)             Assessment:       1. Post-operative state    2. OAG (open angle glaucoma) suspect, low risk, bilateral    3. Pterygium of both eyes    4. Refractive error        Plan:       S/p CE OU- Doing well.  Glaucoma suspect OU-Needs HVF's & OCT's. IOP's are stable OU off gtts now.  Pterygium OU-NVS per Pt.  RE-Pt wants MRx.      Give MRx.  RTC dr Scanlon in 4 mos for IOP's, HVF's & OCT's.

## 2020-09-09 NOTE — TELEPHONE ENCOUNTER
----- Message from Kathleen Lemos sent at 2020 11:16 AM CDT -----  Regarding: b12 shot  Contact: self [phone]  Pt states he went to go do his b-12 shot and was told it  in august. Pt would like new orders for him to go get this shot. Please call and advise. Thank you

## 2020-09-24 ENCOUNTER — PATIENT OUTREACH (OUTPATIENT)
Dept: ADMINISTRATIVE | Facility: OTHER | Age: 65
End: 2020-09-24

## 2020-09-25 ENCOUNTER — OFFICE VISIT (OUTPATIENT)
Dept: NEUROLOGY | Facility: CLINIC | Age: 65
End: 2020-09-25
Payer: MEDICARE

## 2020-09-25 VITALS
WEIGHT: 180.88 LBS | HEART RATE: 83 BPM | BODY MASS INDEX: 27.41 KG/M2 | SYSTOLIC BLOOD PRESSURE: 138 MMHG | DIASTOLIC BLOOD PRESSURE: 82 MMHG | HEIGHT: 68 IN

## 2020-09-25 DIAGNOSIS — Z51.81 MEDICATION MONITORING ENCOUNTER: ICD-10-CM

## 2020-09-25 DIAGNOSIS — Z74.1 DEPENDENT FOR MEDICATION ADMINISTRATION: ICD-10-CM

## 2020-09-25 DIAGNOSIS — E53.8 B12 NUTRITIONAL DEFICIENCY: ICD-10-CM

## 2020-09-25 DIAGNOSIS — G40.009 PARTIAL IDIOPATHIC EPILEPSY WITH SEIZURES OF LOCALIZED ONSET, NOT INTRACTABLE, WITHOUT STATUS EPILEPTICUS: Primary | ICD-10-CM

## 2020-09-25 PROCEDURE — 1159F PR MEDICATION LIST DOCUMENTED IN MEDICAL RECORD: ICD-10-PCS | Mod: S$GLB,,, | Performed by: PSYCHIATRY & NEUROLOGY

## 2020-09-25 PROCEDURE — 3008F BODY MASS INDEX DOCD: CPT | Mod: CPTII,S$GLB,, | Performed by: PSYCHIATRY & NEUROLOGY

## 2020-09-25 PROCEDURE — 99999 PR PBB SHADOW E&M-EST. PATIENT-LVL V: CPT | Mod: PBBFAC,,, | Performed by: PSYCHIATRY & NEUROLOGY

## 2020-09-25 PROCEDURE — 1159F MED LIST DOCD IN RCRD: CPT | Mod: S$GLB,,, | Performed by: PSYCHIATRY & NEUROLOGY

## 2020-09-25 PROCEDURE — 1126F PR PAIN SEVERITY QUANTIFIED, NO PAIN PRESENT: ICD-10-PCS | Mod: S$GLB,,, | Performed by: PSYCHIATRY & NEUROLOGY

## 2020-09-25 PROCEDURE — 99214 PR OFFICE/OUTPT VISIT, EST, LEVL IV, 30-39 MIN: ICD-10-PCS | Mod: S$GLB,,, | Performed by: PSYCHIATRY & NEUROLOGY

## 2020-09-25 PROCEDURE — 99214 OFFICE O/P EST MOD 30 MIN: CPT | Mod: S$GLB,,, | Performed by: PSYCHIATRY & NEUROLOGY

## 2020-09-25 PROCEDURE — 1101F PR PT FALLS ASSESS DOC 0-1 FALLS W/OUT INJ PAST YR: ICD-10-PCS | Mod: CPTII,S$GLB,, | Performed by: PSYCHIATRY & NEUROLOGY

## 2020-09-25 PROCEDURE — 99499 UNLISTED E&M SERVICE: CPT | Mod: S$GLB,,, | Performed by: PSYCHIATRY & NEUROLOGY

## 2020-09-25 PROCEDURE — 3008F PR BODY MASS INDEX (BMI) DOCUMENTED: ICD-10-PCS | Mod: CPTII,S$GLB,, | Performed by: PSYCHIATRY & NEUROLOGY

## 2020-09-25 PROCEDURE — 99499 RISK ADDL DX/OHS AUDIT: ICD-10-PCS | Mod: S$GLB,,, | Performed by: PSYCHIATRY & NEUROLOGY

## 2020-09-25 PROCEDURE — 1101F PT FALLS ASSESS-DOCD LE1/YR: CPT | Mod: CPTII,S$GLB,, | Performed by: PSYCHIATRY & NEUROLOGY

## 2020-09-25 PROCEDURE — 1126F AMNT PAIN NOTED NONE PRSNT: CPT | Mod: S$GLB,,, | Performed by: PSYCHIATRY & NEUROLOGY

## 2020-09-25 PROCEDURE — 99999 PR PBB SHADOW E&M-EST. PATIENT-LVL V: ICD-10-PCS | Mod: PBBFAC,,, | Performed by: PSYCHIATRY & NEUROLOGY

## 2020-09-25 RX ORDER — SILODOSIN 4 MG/1
CAPSULE ORAL
COMMUNITY
Start: 2020-06-18 | End: 2021-07-28 | Stop reason: SDUPTHER

## 2020-09-25 RX ORDER — ZONISAMIDE 100 MG/1
100 CAPSULE ORAL
COMMUNITY
Start: 2020-09-06 | End: 2020-09-25

## 2020-09-25 RX ORDER — LEVETIRACETAM 500 MG/1
500 TABLET ORAL
COMMUNITY
Start: 2020-07-11 | End: 2020-09-25

## 2020-09-25 NOTE — PATIENT INSTRUCTIONS
TAKE LAMOTRIGINE 2 TABS (300MG) DAILY    YOU WILL BE CONTACTED BY HOME HEALTH FOR ASSISTANCE WITH MEDICATIONS

## 2020-09-25 NOTE — PROGRESS NOTES
Name: Cristiano Cruz  MRN: [id number]   CSN: [id number]      Date: 09/25/2020    HISTORY OF PRESENT ILLNESS (HPI)  The patient is a 65 y.o. man presents for follow up for epilepsy    Initial seizure was generalized convulsion from sleep witnessed by wife January 2019 with no focal findings on MRI Brain and EEG although recurrent convulsion in ED was described as involving right eye deviation.  He is a prior smoker and heavy drinker but stopped EtOH about age 58.  He had recurrent convulsion from sleep with post ictal confusion/agitation May 2020, unsure if he had missed doses leading up to this.  He increased LEV to 750mg bid on the advice of PCP and has continued to tolerate.    He describes an illness involving rash as well as renal and heart failure requiring respiratory support at age 9 with two week hospitalization.    Interim History  Believes he is taking LTG 300mg/d but unclear if still taking LEV and ZNS, presents with full dispense of LTG 25mg tabs - did not do titration, no seizures      Seizure Seminology  Seizure Type 1  Classification:   Aura -   Ictus  - Nonconv -  - Conv -  - Duration -   Post-ictal  - Symptoms  - Duration  Age of onset   Current Seizure Frequency -    Last Seizure    sz per month 2019 2020   Brooks 1    Feb     Mar     Apr     May  1   Salvatore     Jul     Aug     Sep     Oct     Nov     Dec     Tot       Seizure Triggers  Sleep Deprivation -  None  Other medications -  None   Benadral   Tramadol   Other  Psych/stress -  None  Photic stimulation -  None  Hyperventilation -  None  Medical Problems -  None  Menses -   No  Sensory Stimulation    Light  No   Sound  No   Problem Solv No   Other  No  Missed dose of Rx None    AED Treatments  Present regimen  lamotrigine (Lamictal, LTG) 300mg/d    Prior treatments  levetiracetam (Keppra, LEV) 750mg bid, recurrent break through with missed dose  zonisamide (Zonegran, ZNA) unable to tolerate  Not tried  acetazolamide (Diamox,  AZM)  Amantadine  brivitiracetam (Briviact, BRV)  carbamazepine (Tegretol, CBZ)  clobazam (Onfi or Frizium, CLB)  ethosuximide (Zarontin, ESM)  eslicarbazine (Aptiom, ESL)  felbamate (felbatol, FBM)  gabapentin (Neurontin, GPN)  lacosamide (Vimpat, LCS)   methsuximide (Celontin, MSM)  oxcarbazepine (Trileptal OXC)  perampanel (Fycompa, FCP)   phenobarbital (Pb)  phenytoin (Dilantin, PHT)  pregabalin (Lyrica, PGB)  primidone (Mysoline, PRM)  rufinamide (Banzel, RUF)  tiagabine (Gabatril,  TGB)  topiramate (Topamax, TPM)  viagabatrin, (Sabril, VGB)  vagal nerve stimulator (VNS)  valproic acid (Depakote, VPA)    Benzodiazepines  diazepam - rectal (Diastatl)  diazepam - oral (Valium, DZ)  clonazepam (Klonopin, CZP)  clorazepate (Tranxene, CLZ)  Ativan  Brain Stimulation  Vagal Nerve Stimulation-n/a  DBS- n/a    Adherence/Compliance method  Memory - yes  Mom or Spouse - Yes  Pill Box - no  Shade calendar - no  Turn over medication bottle - no  Phone alarm - no    Seizure Evaluation  EEG Routine -   EEG Ambulatory -   EEG\Video Monitoring -   MRI/MRA -   CT/CTA Scan -   PET Scan -   Neuropsychological evaluation -   DEXA Scan    Potential Epilepsy Risk Factors:   Pregnancy/Labor/Delivery - full term uncomplicated pregnancy labor and vaginal delivery  Febrile seizures - none  Head injury  - none  CNS infection - none     Stroke - none  Family Hx of Sz - none    PAST MEDICAL HISTORY:   Active Ambulatory Problems     Diagnosis Date Noted    Partial idiopathic epilepsy with seizures of localized onset, not intractable, without status epilepticus 01/26/2019    History of hepatitis C, s/p successful Rx w/ SVR12 (cure) - 11/2019 02/08/2019    Type 2 diabetes mellitus with microalbuminuria, without long-term current use of insulin 02/11/2019    Bilateral dry eyes 03/12/2019    Nuclear sclerotic cataract of both eyes 03/12/2019    OAG (open angle glaucoma) suspect, low risk, bilateral 03/12/2019    Poor memory 04/02/2019     Ex-very heavy cigarette smoker (more than 40 per day) 08/06/2019    B12 nutritional deficiency 08/07/2019    Mixed type COPD (chronic obstructive pulmonary disease) 08/15/2019    Benign prostatic hyperplasia with urinary frequency 12/20/2019    Erectile dysfunction 03/05/2020    Nuclear sclerotic cataract of right eye 07/28/2020    Nuclear sclerotic cataract of left eye 08/11/2020     Resolved Ambulatory Problems     Diagnosis Date Noted    Type 2 diabetes mellitus without complication, without long-term current use of insulin 01/26/2019    Lactic acidosis 01/26/2019    Elevated CPK 01/27/2019    Right inguinal hernia 02/08/2019    Pterygium, bilateral 03/12/2019    Kidney stone on left side 06/28/2019     Past Medical History:   Diagnosis Date    E. coli UTI 01/2019    Generalized tonic-clonic seizure 1/26/2019        PAST SURGICAL HISTORY:   Past Surgical History:   Procedure Laterality Date    CATARACT EXTRACTION W/  INTRAOCULAR LENS IMPLANT Right 7/28/2020    Procedure: EXTRACTION, CATARACT, WITH IOL INSERTION;  Surgeon: Daryl Calloway MD;  Location: [facility];  Service: Ophthalmology;  Laterality: Right;    CATARACT EXTRACTION W/  INTRAOCULAR LENS IMPLANT Left 8/11/2020    Procedure: EXTRACTION, CATARACT, WITH IOL INSERTION;  Surgeon: Daryl Calloway MD;  Location: [facility];  Service: Ophthalmology;  Laterality: Left;    HERNIA REPAIR Right 2000    [facility]        FAMILY HISTORY:   Family History   Problem Relation Age of Onset    Hypertension Mother          SOCIAL HISTORY:   Social History     Socioeconomic History    Marital status:      Spouse name: Not on file    Number of children: 1    Years of education: Not on file    Highest education level: Not on file   Occupational History    Not on file   Social Needs    Financial resource strain: Not on file    Food insecurity     Worry: Not on file     Inability: Not on file    Transportation needs      "Medical: Not on file     Non-medical: Not on file   Tobacco Use    Smoking status: Former Smoker     Packs/day: 3.00     Years: 25.00     Pack years: 75.00     Quit date: 2012     Years since quittin.6    Smokeless tobacco: Never Used   Substance and Sexual Activity    Alcohol use: No     Comment: Used to drink    Drug use: No    Sexual activity: Yes     Partners: Female   Lifestyle    Physical activity     Days per week: Not on file     Minutes per session: Not on file    Stress: Not on file   Relationships    Social connections     Talks on phone: Not on file     Gets together: Not on file     Attends Holiness service: Not on file     Active member of club or organization: Not on file     Attends meetings of clubs or organizations: Not on file     Relationship status: Not on file   Other Topics Concern    Not on file   Social History Narrative     with 1 daughter (42 as of 2019).    Work in [employer]        SUBSTANCE USE:  Social History     Tobacco Use    Smoking status: Former Smoker     Packs/day: 3.00     Years: 25.00     Pack years: 75.00     Quit date: 2012     Years since quittin.6    Smokeless tobacco: Never Used   Substance and Sexual Activity    Alcohol use: No     Comment: Used to drink    Drug use: No    Sexual activity: Yes     Partners: Female      Social History     Tobacco Use    Smoking status: Former Smoker     Packs/day: 3.00     Years: 25.00     Pack years: 75.00     Quit date: 2012     Years since quittin.6    Smokeless tobacco: Never Used   Substance Use Topics    Alcohol use: No     Comment: Used to drink        ALLERGIES: Patient has no known allergies.     /82   Pulse 83   Ht 5' 8" (1.727 m)   Wt 82.1 kg (180 lb 14.2 oz)   BMI 27.50 kg/m²     Higher Cortical Function:    Patient is a well developed, pleasant, well groomed individual appearing their stated age  Oriented - intact to person, place and time and followed " two step instruction correctly.    Fund of knowledge was appropriate.    R-L Orientation - Intact  Language - Speech was fluent without evidence for an aphasia.  Cranial Nerves II - XII:    EOMs were intact with normal smooth and no nystagmus.    PERRLA. D/C   Visual fields were full to confrontation.    Motor - facial movement was symmetrical and normal.    Facial sensory - Light touch and pin prick sensations were normal.    Hearing was normal to finger rub.  Palate moved well and was symmetrical with normal palatal and oral sensation.    Tongue movement was full  Normal power and bulk was found in the massiter and rotator muscles of the neck.  Motor: Power, bulk and tone were normal in all extremities.  Sensory: Light touch position senses were normal in all extremities.    Coordination:       Rapid alternating movements and rapid finger tapping - normal.       Finger to nose - nl.       Arm roll - symmetrical.    Gait:  Gait steady, unable to tandem walk and Romberg was negative.    Deep tendon reflexes:    Reflex L R   Bicpets 2+ 2+   Tricepts 2+ 2+   Brachio-radialis 2+ 2+   Knee 2+ 2+   Ankle mute mute          Tremor: resting, postural, intentional - none    Pulses    Carotids - strong without bruits    Peripheral - strong and symmetrical      IMPRESSION  1. CLRE  2. B12 deficiency    DISPOSITION:   Continue LTG 300mg daily, home health for assist with med management  2. Continue IM supplement goal >300    Follow up 6 weeks

## 2020-09-30 PROCEDURE — G0180 PR HOME HEALTH MD CERTIFICATION: ICD-10-PCS | Mod: ,,, | Performed by: PSYCHIATRY & NEUROLOGY

## 2020-09-30 PROCEDURE — G0180 MD CERTIFICATION HHA PATIENT: HCPCS | Mod: ,,, | Performed by: PSYCHIATRY & NEUROLOGY

## 2020-10-12 ENCOUNTER — EXTERNAL HOME HEALTH (OUTPATIENT)
Dept: HOME HEALTH SERVICES | Facility: HOSPITAL | Age: 65
End: 2020-10-12
Payer: MEDICARE

## 2020-10-16 ENCOUNTER — PES CALL (OUTPATIENT)
Dept: ADMINISTRATIVE | Facility: CLINIC | Age: 65
End: 2020-10-16

## 2020-10-23 ENCOUNTER — OFFICE VISIT (OUTPATIENT)
Dept: NEPHROLOGY | Facility: CLINIC | Age: 65
End: 2020-10-23
Payer: MEDICARE

## 2020-10-23 ENCOUNTER — LAB VISIT (OUTPATIENT)
Dept: LAB | Facility: HOSPITAL | Age: 65
End: 2020-10-23
Attending: INTERNAL MEDICINE
Payer: MEDICARE

## 2020-10-23 VITALS
HEIGHT: 68 IN | BODY MASS INDEX: 27.43 KG/M2 | SYSTOLIC BLOOD PRESSURE: 118 MMHG | DIASTOLIC BLOOD PRESSURE: 76 MMHG | WEIGHT: 181 LBS | HEART RATE: 84 BPM | OXYGEN SATURATION: 93 %

## 2020-10-23 DIAGNOSIS — R39.11 URINARY HESITANCY: ICD-10-CM

## 2020-10-23 DIAGNOSIS — N18.2 CHRONIC KIDNEY DISEASE, STAGE II (MILD): ICD-10-CM

## 2020-10-23 DIAGNOSIS — N20.0 KIDNEY STONE: Primary | ICD-10-CM

## 2020-10-23 DIAGNOSIS — N20.0 KIDNEY STONE: ICD-10-CM

## 2020-10-23 DIAGNOSIS — E53.8 B12 NUTRITIONAL DEFICIENCY: ICD-10-CM

## 2020-10-23 LAB
ALBUMIN SERPL BCP-MCNC: 4.2 G/DL (ref 3.5–5.2)
ANION GAP SERPL CALC-SCNC: 8 MMOL/L (ref 8–16)
BUN SERPL-MCNC: 10 MG/DL (ref 8–23)
CALCIUM SERPL-MCNC: 9.3 MG/DL (ref 8.7–10.5)
CHLORIDE SERPL-SCNC: 110 MMOL/L (ref 95–110)
CO2 SERPL-SCNC: 25 MMOL/L (ref 23–29)
CREAT SERPL-MCNC: 1.4 MG/DL (ref 0.5–1.4)
EST. GFR  (AFRICAN AMERICAN): 59.7 ML/MIN/1.73 M^2
EST. GFR  (NON AFRICAN AMERICAN): 51.6 ML/MIN/1.73 M^2
GLUCOSE SERPL-MCNC: 103 MG/DL (ref 70–110)
PHOSPHATE SERPL-MCNC: 2.6 MG/DL (ref 2.7–4.5)
POTASSIUM SERPL-SCNC: 4.7 MMOL/L (ref 3.5–5.1)
SODIUM SERPL-SCNC: 143 MMOL/L (ref 136–145)

## 2020-10-23 PROCEDURE — 99213 PR OFFICE/OUTPT VISIT, EST, LEVL III, 20-29 MIN: ICD-10-PCS | Mod: S$GLB,,, | Performed by: INTERNAL MEDICINE

## 2020-10-23 PROCEDURE — 99213 OFFICE O/P EST LOW 20 MIN: CPT | Mod: S$GLB,,, | Performed by: INTERNAL MEDICINE

## 2020-10-23 PROCEDURE — 3008F BODY MASS INDEX DOCD: CPT | Mod: CPTII,S$GLB,, | Performed by: INTERNAL MEDICINE

## 2020-10-23 PROCEDURE — 99999 PR PBB SHADOW E&M-EST. PATIENT-LVL IV: CPT | Mod: PBBFAC,,, | Performed by: INTERNAL MEDICINE

## 2020-10-23 PROCEDURE — 80069 RENAL FUNCTION PANEL: CPT

## 2020-10-23 PROCEDURE — 1159F PR MEDICATION LIST DOCUMENTED IN MEDICAL RECORD: ICD-10-PCS | Mod: S$GLB,,, | Performed by: INTERNAL MEDICINE

## 2020-10-23 PROCEDURE — 99499 RISK ADDL DX/OHS AUDIT: ICD-10-PCS | Mod: S$GLB,,, | Performed by: INTERNAL MEDICINE

## 2020-10-23 PROCEDURE — 1126F PR PAIN SEVERITY QUANTIFIED, NO PAIN PRESENT: ICD-10-PCS | Mod: S$GLB,,, | Performed by: INTERNAL MEDICINE

## 2020-10-23 PROCEDURE — 1101F PR PT FALLS ASSESS DOC 0-1 FALLS W/OUT INJ PAST YR: ICD-10-PCS | Mod: CPTII,S$GLB,, | Performed by: INTERNAL MEDICINE

## 2020-10-23 PROCEDURE — 1126F AMNT PAIN NOTED NONE PRSNT: CPT | Mod: S$GLB,,, | Performed by: INTERNAL MEDICINE

## 2020-10-23 PROCEDURE — 36415 COLL VENOUS BLD VENIPUNCTURE: CPT

## 2020-10-23 PROCEDURE — 1159F MED LIST DOCD IN RCRD: CPT | Mod: S$GLB,,, | Performed by: INTERNAL MEDICINE

## 2020-10-23 PROCEDURE — 99499 UNLISTED E&M SERVICE: CPT | Mod: S$GLB,,, | Performed by: INTERNAL MEDICINE

## 2020-10-23 PROCEDURE — 3008F PR BODY MASS INDEX (BMI) DOCUMENTED: ICD-10-PCS | Mod: CPTII,S$GLB,, | Performed by: INTERNAL MEDICINE

## 2020-10-23 PROCEDURE — 99999 PR PBB SHADOW E&M-EST. PATIENT-LVL IV: ICD-10-PCS | Mod: PBBFAC,,, | Performed by: INTERNAL MEDICINE

## 2020-10-23 PROCEDURE — 1101F PT FALLS ASSESS-DOCD LE1/YR: CPT | Mod: CPTII,S$GLB,, | Performed by: INTERNAL MEDICINE

## 2020-10-23 RX ORDER — CYANOCOBALAMIN 1000 UG/ML
1000 INJECTION, SOLUTION INTRAMUSCULAR; SUBCUTANEOUS
Qty: 10 ML | Refills: 0 | Status: SHIPPED | OUTPATIENT
Start: 2020-10-23 | End: 2021-07-28 | Stop reason: SDUPTHER

## 2020-10-23 RX ORDER — ZONISAMIDE 100 MG/1
100 CAPSULE ORAL DAILY
COMMUNITY
End: 2020-10-25 | Stop reason: ALTCHOICE

## 2020-10-25 NOTE — PROGRESS NOTES
REASON FOR FOLLOW UP: Kidney Stones    REFERRING PHYSICIAN: Srikanth Son MD    HISTORY OF PRESENT ILLNESS: 65 y.o. male  has a past medical history of B12 nutritional deficiency (8/7/2019), E. coli UTI (01/2019), Generalized tonic-clonic seizure (1/26/2019), History of hepatitis C, s/p successful Rx w/ SVR12 (cure) - 11/2019 (2/8/2019), Kidney stone on left side (6/28/2019), Mixed type COPD (chronic obstructive pulmonary disease) (8/15/2019), Pterygium, bilateral (3/12/2019), and Type 2 diabetes mellitus with microalbuminuria, without long-term current use of insulin (2/11/2019). patient was referred here for kidney stones.  Denied taking chronic NSAID's, Lithium or Lead in the past. He was diagnosed with DM in 2018, had not been on insulin.   Continues to drink less than a liter of water some days. He never had a fracture before and was never diagnosed with osteoporosis. His typical diet consists of eating meat twice a day, less quantity of vegetables. Denied any urinary symptoms at this time.    ROS:  General: negative for chills, or fatigue  ENT: No epistaxis or headaches  Hematological and Lymphatic: No bleeding problems or blood clots.  Endocrine: No skin changes or temperature intolerance  Respiratory: No cough, shortness of breath, or wheezing  Cardiovascular: No chest pain or dyspnea   Gastrointestinal: No abdominal pain, change in bowel habits  Genito-Urinary: No dysuria, trouble voiding, or hematuria  Musculoskeletal: ROS: negative for - joint pain, joint stiffness, joint swelling, muscle pain or muscular weakness  Neurological: No focal weakness, no numbness  Dermatological: No rash or ulcers.    PAST MEDICAL HISTORY:  Past Medical History:   Diagnosis Date    B12 nutritional deficiency 8/7/2019    E. coli UTI 01/2019    During hospitalization for seizure    Generalized tonic-clonic seizure 1/26/2019 1-2019 admitted for new-onset seizures.Normal CT head.  His mental status normalized, and he had no  recurrent seizures following keppra loading in the ED and twice-daily maintenance upon arrival to the floor. Workup into the etiology of his seizures remained negative. EEG was performed, with the preliminary interpretation being no epileptiform activity with normal background.     History of hepatitis C, s/p successful Rx w/ SVR12 (cure) - 11/2019 2/8/2019    S/p epclusa    Kidney stone on left side 6/28/2019    Mixed type COPD (chronic obstructive pulmonary disease) 8/15/2019    8-2019 PFT: Normal airflow. Airflow not improved after bronchodilator. Moderate restriction. Diffusion capacity is mildly decreased. Oximetry is normal.    Pterygium, bilateral 3/12/2019    Type 2 diabetes mellitus with microalbuminuria, without long-term current use of insulin 2/11/2019       PAST SURGICAL HISTORY:  Past Surgical History:   Procedure Laterality Date    CATARACT EXTRACTION W/  INTRAOCULAR LENS IMPLANT Right 7/28/2020    Procedure: EXTRACTION, CATARACT, WITH IOL INSERTION;  Surgeon: Daryl Calloway MD;  Location: [facility];  Service: Ophthalmology;  Laterality: Right;    CATARACT EXTRACTION W/  INTRAOCULAR LENS IMPLANT Left 8/11/2020    Procedure: EXTRACTION, CATARACT, WITH IOL INSERTION;  Surgeon: Daryl Calloway MD;  Location: [facility];  Service: Ophthalmology;  Laterality: Left;    HERNIA REPAIR Right 2000    [facility]       FAMILY HISTORY:   Family History   Problem Relation Age of Onset    Hypertension Mother        SOCIAL HISTORY:  Social History     Socioeconomic History    Marital status:      Spouse name: Not on file    Number of children: 1    Years of education: Not on file    Highest education level: Not on file   Occupational History    Not on file   Social Needs    Financial resource strain: Not on file    Food insecurity     Worry: Not on file     Inability: Not on file    Transportation needs     Medical: Not on file     Non-medical: Not on file   Tobacco Use     Smoking status: Former Smoker     Packs/day: 3.00     Years: 25.00     Pack years: 75.00     Quit date: 2012     Years since quittin.7    Smokeless tobacco: Never Used   Substance and Sexual Activity    Alcohol use: No     Comment: Used to drink    Drug use: No    Sexual activity: Yes     Partners: Female   Lifestyle    Physical activity     Days per week: Not on file     Minutes per session: Not on file    Stress: Not on file   Relationships    Social connections     Talks on phone: Not on file     Gets together: Not on file     Attends Bahai service: Not on file     Active member of club or organization: Not on file     Attends meetings of clubs or organizations: Not on file     Relationship status: Not on file   Other Topics Concern    Not on file   Social History Narrative     with 1 daughter (42 as of 2019).    Work in [employer]       ALLERGIES:  Review of patient's allergies indicates:  No Known Allergies    MEDICATIONS:    Current Outpatient Medications:     blood sugar diagnostic Strp, Use 2 (two) times daily., Disp: 100 each, Rfl: 11    lamoTRIgine (LAMICTAL) 150 MG Tab, Take 1 tab daily for 1 week then increase to 2 tabs daily.  Do not start until you finish 25mg tabs., Disp: 180 tablet, Rfl: 3    lancets Misc, Use twice daily as directed, Disp: 100 each, Rfl: 11    latanoprost 0.005 % ophthalmic solution, INSTILL 1 DROP IN BOTH EYES EVERY EVENING, Disp: 7.5 mL, Rfl: 4    pravastatin (PRAVACHOL) 10 MG tablet, Take 1 tablet (10 mg total) by mouth once daily., Disp: 90 tablet, Rfl: 3    silodosin (RAPAFLO) 4 mg Cap capsule, Take 1 capsule (4 mg total) by mouth once daily., Disp: 90 capsule, Rfl: 3    silodosin (RAPAFLO) 4 mg Cap capsule, , Disp: , Rfl:     SITagliptin (JANUVIA) 25 MG Tab, Take 1 tablet (25 mg total) by mouth once daily., Disp: 90 tablet, Rfl: 3    zonisamide (ZONEGRAN) 100 MG Cap, Take 100 mg by mouth once daily., Disp: , Rfl:      cyanocobalamin 1,000 mcg/mL injection, Inject 1 mL (1,000 mcg total) into the muscle every 30 days., Disp: 10 mL, Rfl: 0    methocarbamoL (ROBAXIN) 500 MG Tab, Take 1-2 tablets every 8 hours as needed for muscle pain and headaches. (Patient not taking: Reported on 10/23/2020), Disp: 30 tablet, Rfl: 0    tadalafiL (CIALIS) 20 MG Tab, Take 1 tablet (20 mg total) by mouth daily as needed. (Patient not taking: Reported on 10/23/2020), Disp: 30 tablet, Rfl: 5  No current facility-administered medications for this visit.     Facility-Administered Medications Ordered in Other Visits:     cyclopentolate 1% ophthalmic solution 1 drop, 1 drop, Right Eye, On Call Procedure, Daryl Calloway MD, 1 drop at 07/28/20 1049    cyclopentolate 1% ophthalmic solution 1 drop, 1 drop, Left Eye, On Call Procedure, Daryl Calloway MD, 1 drop at 08/11/20 0727    ofloxacin 0.3 % ophthalmic solution 1 drop, 1 drop, Right Eye, On Call Procedure, Daryl Calloway MD, 1 drop at 07/28/20 1156    ofloxacin 0.3 % ophthalmic solution 1 drop, 1 drop, Left Eye, On Call Procedure, Daryl Calloway MD, 1 drop at 08/11/20 0902    sodium chloride 0.9% flush 10 mL, 10 mL, Intravenous, PRN, Daryl Calloway MD    sodium chloride 0.9% flush 10 mL, 10 mL, Intravenous, PRN, Daryl Calloway MD    tetracaine HCl (PF) 0.5 % Drop 1 drop, 1 drop, Right Eye, On Call Procedure, Daryl Calloway MD, 1 drop at 07/28/20 1049   Medication List with Changes/Refills   Current Medications    BLOOD SUGAR DIAGNOSTIC STRP    Use 2 (two) times daily.    LAMOTRIGINE (LAMICTAL) 150 MG TAB    Take 1 tab daily for 1 week then increase to 2 tabs daily.  Do not start until you finish 25mg tabs.    LANCETS MISC    Use twice daily as directed    LATANOPROST 0.005 % OPHTHALMIC SOLUTION    INSTILL 1 DROP IN BOTH EYES EVERY EVENING    METHOCARBAMOL (ROBAXIN) 500 MG TAB    Take 1-2 tablets every 8 hours as needed for muscle pain and  "headaches.    PRAVASTATIN (PRAVACHOL) 10 MG TABLET    Take 1 tablet (10 mg total) by mouth once daily.    SILODOSIN (RAPAFLO) 4 MG CAP CAPSULE    Take 1 capsule (4 mg total) by mouth once daily.    SILODOSIN (RAPAFLO) 4 MG CAP CAPSULE        SITAGLIPTIN (JANUVIA) 25 MG TAB    Take 1 tablet (25 mg total) by mouth once daily.    TADALAFIL (CIALIS) 20 MG TAB    Take 1 tablet (20 mg total) by mouth daily as needed.    ZONISAMIDE (ZONEGRAN) 100 MG CAP    Take 100 mg by mouth once daily.   Changed and/or Refilled Medications    Modified Medication Previous Medication    CYANOCOBALAMIN 1,000 MCG/ML INJECTION cyanocobalamin 1,000 mcg/mL injection       Inject 1 mL (1,000 mcg total) into the muscle every 30 days.    Inject 1 mL (1,000 mcg total) into the muscle every 30 days.        PHYSICAL EXAM:  /76   Pulse 84   Ht 5' 8" (1.727 m)   Wt 82.1 kg (181 lb)   SpO2 (!) 93%   BMI 27.52 kg/m²     General: No distress, No fever or chills  Head: Normocephalic,atraumatic  Eyes: conjunctivae/corneas clear. PERRL, EOM's intact.  Nose: Nares normal. Mucosa normal. No drainage or sinus tenderness.  Neck: No adenopathy,no carotid bruit,no JVD  Lungs:Clear to auscultation bilaterally, No Crackles  Heart: Regular rate and rhythm, no murmur, gallops or rubs  Abdomen: Soft, no tenderness, bowel sounds normal  Extremities: Atraumatic, no edema in LE  Skin: Turgor normal. No rashes or ulcers  Neurologic: No focal weakness, oriented.  Dialysis Access: Non applicable         LABS:  Lab Results   Component Value Date    CREATININE 1.4 10/23/2020       Prot/Creat Ratio, Ur   Date Value Ref Range Status   07/08/2020 0.13 0.00 - 0.20 Final       Lab Results   Component Value Date     10/23/2020    K 4.7 10/23/2020    CO2 25 10/23/2020       last PTH   Lab Results   Component Value Date    PTH 45.0 07/08/2020    CALCIUM 9.3 10/23/2020    PHOS 2.6 (L) 10/23/2020       Lab Results   Component Value Date    HGB 13.6 (L) 06/29/2020    "     Lab Results   Component Value Date    HGBA1C 6.6 (H) 03/05/2020       Lab Results   Component Value Date    LDLCALC 87.0 03/05/2020           ASSESSMENT:    1) Recurrent Kidney stone  Patient has been long standing diabetic, his urine analysis is showing pH of 5.0 most of the time in the past. He had kidney stone in 2016 and 2019. Most likely patient has calcium oxalate vs uric acid stone. Patient was started on Zonisamide prior to July 2020 which has some carbonic anhydrase inhibiting properties and can increase stone formation by causing hypocitraturia (this explains the transient increase in pH to 6.0 on 6/29/20). Discussed with neurology who switched his Zonisamide. His 24 hour urine during that time showed low citrate but he was taking Zonisamide.     - 2.5-3 Liters of free water intake/day.  - Low sodium and low protein diet.  - If stone recurs will consider potassium citrate by mouth    2) CKD stage 2  3) Urinary hesitancy  Patients last creatinine is 1.3, It has been above 1 since Nov 2019. His urine analysis did not show significant proteinuria in the past, has few RBC which could be secondary to kidney stones. Abdomen ultrasound from 2019 is showing 11.3 kidneys bilaterally. This could be underlying CKD from his DM. He is already  on Silodosin for his urinary hesitancy.      RTC in 6 months    Diagnosis and plan of care explained to the patient.  Pt Verbalized understanding. Answered all questions.  Thanks for allowing me to participate in the care of this patient.     9:50 PM    Douglas Sue MD  Nephrology & Critical Care

## 2020-11-09 ENCOUNTER — PATIENT OUTREACH (OUTPATIENT)
Dept: ADMINISTRATIVE | Facility: OTHER | Age: 65
End: 2020-11-09

## 2020-11-10 NOTE — PROGRESS NOTES
Requested updates within Care Everywhere.  Patient's chart was reviewed for overdue DEMARCUS topics.  Immunizations reconciled.

## 2021-04-10 ENCOUNTER — TELEPHONE (OUTPATIENT)
Dept: INTERNAL MEDICINE | Facility: CLINIC | Age: 66
End: 2021-04-10

## 2021-05-17 DIAGNOSIS — E11.29 TYPE 2 DIABETES MELLITUS WITH MICROALBUMINURIA, WITHOUT LONG-TERM CURRENT USE OF INSULIN: Chronic | ICD-10-CM

## 2021-05-17 DIAGNOSIS — R80.9 TYPE 2 DIABETES MELLITUS WITH MICROALBUMINURIA, WITHOUT LONG-TERM CURRENT USE OF INSULIN: Chronic | ICD-10-CM

## 2021-05-18 RX ORDER — PRAVASTATIN SODIUM 10 MG/1
10 TABLET ORAL DAILY
Qty: 90 TABLET | Refills: 3 | Status: SHIPPED | OUTPATIENT
Start: 2021-05-18 | End: 2021-07-28 | Stop reason: SDUPTHER

## 2021-07-15 DIAGNOSIS — E11.29 TYPE 2 DIABETES MELLITUS WITH MICROALBUMINURIA, WITHOUT LONG-TERM CURRENT USE OF INSULIN: Chronic | ICD-10-CM

## 2021-07-15 DIAGNOSIS — R80.9 TYPE 2 DIABETES MELLITUS WITH MICROALBUMINURIA, WITHOUT LONG-TERM CURRENT USE OF INSULIN: Chronic | ICD-10-CM

## 2021-07-19 DIAGNOSIS — E11.29 TYPE 2 DIABETES MELLITUS WITH MICROALBUMINURIA, WITHOUT LONG-TERM CURRENT USE OF INSULIN: Chronic | ICD-10-CM

## 2021-07-19 DIAGNOSIS — R80.9 TYPE 2 DIABETES MELLITUS WITH MICROALBUMINURIA, WITHOUT LONG-TERM CURRENT USE OF INSULIN: Chronic | ICD-10-CM

## 2021-07-28 ENCOUNTER — OFFICE VISIT (OUTPATIENT)
Dept: INTERNAL MEDICINE | Facility: CLINIC | Age: 66
End: 2021-07-28
Payer: MEDICARE

## 2021-07-28 ENCOUNTER — LAB VISIT (OUTPATIENT)
Dept: LAB | Facility: HOSPITAL | Age: 66
End: 2021-07-28
Attending: INTERNAL MEDICINE
Payer: MEDICARE

## 2021-07-28 VITALS
OXYGEN SATURATION: 96 % | SYSTOLIC BLOOD PRESSURE: 113 MMHG | HEIGHT: 67 IN | DIASTOLIC BLOOD PRESSURE: 71 MMHG | HEART RATE: 73 BPM | BODY MASS INDEX: 26.19 KG/M2 | WEIGHT: 166.88 LBS

## 2021-07-28 DIAGNOSIS — Z86.19 HISTORY OF HEPATITIS C: ICD-10-CM

## 2021-07-28 DIAGNOSIS — Z12.5 SCREENING FOR MALIGNANT NEOPLASM OF PROSTATE: ICD-10-CM

## 2021-07-28 DIAGNOSIS — J44.9 MIXED TYPE COPD (CHRONIC OBSTRUCTIVE PULMONARY DISEASE): ICD-10-CM

## 2021-07-28 DIAGNOSIS — E53.8 B12 NUTRITIONAL DEFICIENCY: ICD-10-CM

## 2021-07-28 DIAGNOSIS — G40.009 PARTIAL IDIOPATHIC EPILEPSY WITH SEIZURES OF LOCALIZED ONSET, NOT INTRACTABLE, WITHOUT STATUS EPILEPTICUS: ICD-10-CM

## 2021-07-28 DIAGNOSIS — Z00.00 ANNUAL PHYSICAL EXAM: Primary | ICD-10-CM

## 2021-07-28 DIAGNOSIS — Z87.891 EX-VERY HEAVY CIGARETTE SMOKER (MORE THAN 40 PER DAY): ICD-10-CM

## 2021-07-28 DIAGNOSIS — E11.29 TYPE 2 DIABETES MELLITUS WITH MICROALBUMINURIA, WITHOUT LONG-TERM CURRENT USE OF INSULIN: Chronic | ICD-10-CM

## 2021-07-28 DIAGNOSIS — R80.9 TYPE 2 DIABETES MELLITUS WITH MICROALBUMINURIA, WITHOUT LONG-TERM CURRENT USE OF INSULIN: Chronic | ICD-10-CM

## 2021-07-28 DIAGNOSIS — N40.1 BENIGN PROSTATIC HYPERPLASIA WITH URINARY FREQUENCY: ICD-10-CM

## 2021-07-28 DIAGNOSIS — R41.3 POOR MEMORY: ICD-10-CM

## 2021-07-28 DIAGNOSIS — R35.0 BENIGN PROSTATIC HYPERPLASIA WITH URINARY FREQUENCY: ICD-10-CM

## 2021-07-28 DIAGNOSIS — M94.9 DISORDER OF CARTILAGE, UNSPECIFIED: ICD-10-CM

## 2021-07-28 DIAGNOSIS — Z12.11 SCREENING FOR MALIGNANT NEOPLASM OF COLON: ICD-10-CM

## 2021-07-28 DIAGNOSIS — Z00.00 ANNUAL PHYSICAL EXAM: ICD-10-CM

## 2021-07-28 LAB
25(OH)D3+25(OH)D2 SERPL-MCNC: 19 NG/ML (ref 30–96)
ALBUMIN SERPL BCP-MCNC: 4.2 G/DL (ref 3.5–5.2)
ALBUMIN/CREAT UR: 13 UG/MG (ref 0–30)
ALP SERPL-CCNC: 68 U/L (ref 55–135)
ALT SERPL W/O P-5'-P-CCNC: 8 U/L (ref 10–44)
ANION GAP SERPL CALC-SCNC: 10 MMOL/L (ref 8–16)
AST SERPL-CCNC: 12 U/L (ref 10–40)
BASOPHILS # BLD AUTO: 0.05 K/UL (ref 0–0.2)
BASOPHILS NFR BLD: 0.8 % (ref 0–1.9)
BILIRUB SERPL-MCNC: 0.4 MG/DL (ref 0.1–1)
BUN SERPL-MCNC: 11 MG/DL (ref 8–23)
CALCIUM SERPL-MCNC: 10 MG/DL (ref 8.7–10.5)
CHLORIDE SERPL-SCNC: 110 MMOL/L (ref 95–110)
CHOLEST SERPL-MCNC: 161 MG/DL (ref 120–199)
CHOLEST/HDLC SERPL: 3.2 {RATIO} (ref 2–5)
CO2 SERPL-SCNC: 24 MMOL/L (ref 23–29)
COMPLEXED PSA SERPL-MCNC: 0.12 NG/ML (ref 0–4)
CREAT SERPL-MCNC: 1.4 MG/DL (ref 0.5–1.4)
CREAT UR-MCNC: 192 MG/DL (ref 23–375)
DIFFERENTIAL METHOD: ABNORMAL
EOSINOPHIL # BLD AUTO: 0.1 K/UL (ref 0–0.5)
EOSINOPHIL NFR BLD: 2 % (ref 0–8)
ERYTHROCYTE [DISTWIDTH] IN BLOOD BY AUTOMATED COUNT: 14.9 % (ref 11.5–14.5)
EST. GFR  (AFRICAN AMERICAN): 59.3 ML/MIN/1.73 M^2
EST. GFR  (NON AFRICAN AMERICAN): 51.3 ML/MIN/1.73 M^2
ESTIMATED AVG GLUCOSE: 134 MG/DL (ref 68–131)
GLUCOSE SERPL-MCNC: 115 MG/DL (ref 70–110)
HBA1C MFR BLD: 6.3 % (ref 4–5.6)
HCT VFR BLD AUTO: 44.6 % (ref 40–54)
HDLC SERPL-MCNC: 50 MG/DL (ref 40–75)
HDLC SERPL: 31.1 % (ref 20–50)
HGB BLD-MCNC: 14 G/DL (ref 14–18)
IMM GRANULOCYTES # BLD AUTO: 0.01 K/UL (ref 0–0.04)
IMM GRANULOCYTES NFR BLD AUTO: 0.2 % (ref 0–0.5)
LDLC SERPL CALC-MCNC: 100.6 MG/DL (ref 63–159)
LYMPHOCYTES # BLD AUTO: 2 K/UL (ref 1–4.8)
LYMPHOCYTES NFR BLD: 31.7 % (ref 18–48)
MCH RBC QN AUTO: 28.1 PG (ref 27–31)
MCHC RBC AUTO-ENTMCNC: 31.4 G/DL (ref 32–36)
MCV RBC AUTO: 90 FL (ref 82–98)
MICROALBUMIN UR DL<=1MG/L-MCNC: 25 UG/ML
MONOCYTES # BLD AUTO: 0.4 K/UL (ref 0.3–1)
MONOCYTES NFR BLD: 5.9 % (ref 4–15)
NEUTROPHILS # BLD AUTO: 3.8 K/UL (ref 1.8–7.7)
NEUTROPHILS NFR BLD: 59.4 % (ref 38–73)
NONHDLC SERPL-MCNC: 111 MG/DL
NRBC BLD-RTO: 0 /100 WBC
PLATELET # BLD AUTO: 194 K/UL (ref 150–450)
PMV BLD AUTO: 12.1 FL (ref 9.2–12.9)
POTASSIUM SERPL-SCNC: 4.9 MMOL/L (ref 3.5–5.1)
PROT SERPL-MCNC: 8 G/DL (ref 6–8.4)
RBC # BLD AUTO: 4.98 M/UL (ref 4.6–6.2)
SODIUM SERPL-SCNC: 144 MMOL/L (ref 136–145)
TRIGL SERPL-MCNC: 52 MG/DL (ref 30–150)
TSH SERPL DL<=0.005 MIU/L-ACNC: 1.49 UIU/ML (ref 0.4–4)
VIT B12 SERPL-MCNC: 419 PG/ML (ref 210–950)
WBC # BLD AUTO: 6.43 K/UL (ref 3.9–12.7)

## 2021-07-28 PROCEDURE — 84153 ASSAY OF PSA TOTAL: CPT | Performed by: INTERNAL MEDICINE

## 2021-07-28 PROCEDURE — 82306 VITAMIN D 25 HYDROXY: CPT | Performed by: INTERNAL MEDICINE

## 2021-07-28 PROCEDURE — 36415 COLL VENOUS BLD VENIPUNCTURE: CPT | Performed by: INTERNAL MEDICINE

## 2021-07-28 PROCEDURE — 1101F PR PT FALLS ASSESS DOC 0-1 FALLS W/OUT INJ PAST YR: ICD-10-PCS | Mod: CPTII,S$GLB,, | Performed by: INTERNAL MEDICINE

## 2021-07-28 PROCEDURE — 1126F AMNT PAIN NOTED NONE PRSNT: CPT | Mod: CPTII,S$GLB,, | Performed by: INTERNAL MEDICINE

## 2021-07-28 PROCEDURE — 3288F FALL RISK ASSESSMENT DOCD: CPT | Mod: CPTII,S$GLB,, | Performed by: INTERNAL MEDICINE

## 2021-07-28 PROCEDURE — 99214 OFFICE O/P EST MOD 30 MIN: CPT | Mod: S$GLB,,, | Performed by: INTERNAL MEDICINE

## 2021-07-28 PROCEDURE — 3072F PR LOW RISK FOR RETINOPATHY: ICD-10-PCS | Mod: CPTII,S$GLB,, | Performed by: INTERNAL MEDICINE

## 2021-07-28 PROCEDURE — 99999 PR PBB SHADOW E&M-EST. PATIENT-LVL III: ICD-10-PCS | Mod: PBBFAC,,, | Performed by: INTERNAL MEDICINE

## 2021-07-28 PROCEDURE — 82570 ASSAY OF URINE CREATININE: CPT | Performed by: INTERNAL MEDICINE

## 2021-07-28 PROCEDURE — 3008F BODY MASS INDEX DOCD: CPT | Mod: CPTII,S$GLB,, | Performed by: INTERNAL MEDICINE

## 2021-07-28 PROCEDURE — 3072F LOW RISK FOR RETINOPATHY: CPT | Mod: CPTII,S$GLB,, | Performed by: INTERNAL MEDICINE

## 2021-07-28 PROCEDURE — 1159F MED LIST DOCD IN RCRD: CPT | Mod: CPTII,S$GLB,, | Performed by: INTERNAL MEDICINE

## 2021-07-28 PROCEDURE — 1126F PR PAIN SEVERITY QUANTIFIED, NO PAIN PRESENT: ICD-10-PCS | Mod: CPTII,S$GLB,, | Performed by: INTERNAL MEDICINE

## 2021-07-28 PROCEDURE — 99499 RISK ADDL DX/OHS AUDIT: ICD-10-PCS | Mod: S$GLB,,, | Performed by: INTERNAL MEDICINE

## 2021-07-28 PROCEDURE — 84443 ASSAY THYROID STIM HORMONE: CPT | Performed by: INTERNAL MEDICINE

## 2021-07-28 PROCEDURE — 82043 UR ALBUMIN QUANTITATIVE: CPT | Performed by: INTERNAL MEDICINE

## 2021-07-28 PROCEDURE — 99214 PR OFFICE/OUTPT VISIT, EST, LEVL IV, 30-39 MIN: ICD-10-PCS | Mod: S$GLB,,, | Performed by: INTERNAL MEDICINE

## 2021-07-28 PROCEDURE — 83036 HEMOGLOBIN GLYCOSYLATED A1C: CPT | Performed by: INTERNAL MEDICINE

## 2021-07-28 PROCEDURE — 3288F PR FALLS RISK ASSESSMENT DOCUMENTED: ICD-10-PCS | Mod: CPTII,S$GLB,, | Performed by: INTERNAL MEDICINE

## 2021-07-28 PROCEDURE — 82607 VITAMIN B-12: CPT | Performed by: INTERNAL MEDICINE

## 2021-07-28 PROCEDURE — 99999 PR PBB SHADOW E&M-EST. PATIENT-LVL III: CPT | Mod: PBBFAC,,, | Performed by: INTERNAL MEDICINE

## 2021-07-28 PROCEDURE — 80053 COMPREHEN METABOLIC PANEL: CPT | Performed by: INTERNAL MEDICINE

## 2021-07-28 PROCEDURE — 99499 UNLISTED E&M SERVICE: CPT | Mod: S$GLB,,, | Performed by: INTERNAL MEDICINE

## 2021-07-28 PROCEDURE — 1101F PT FALLS ASSESS-DOCD LE1/YR: CPT | Mod: CPTII,S$GLB,, | Performed by: INTERNAL MEDICINE

## 2021-07-28 PROCEDURE — 1159F PR MEDICATION LIST DOCUMENTED IN MEDICAL RECORD: ICD-10-PCS | Mod: CPTII,S$GLB,, | Performed by: INTERNAL MEDICINE

## 2021-07-28 PROCEDURE — 85025 COMPLETE CBC W/AUTO DIFF WBC: CPT | Performed by: INTERNAL MEDICINE

## 2021-07-28 PROCEDURE — 3008F PR BODY MASS INDEX (BMI) DOCUMENTED: ICD-10-PCS | Mod: CPTII,S$GLB,, | Performed by: INTERNAL MEDICINE

## 2021-07-28 PROCEDURE — 80061 LIPID PANEL: CPT | Performed by: INTERNAL MEDICINE

## 2021-07-28 RX ORDER — PRAVASTATIN SODIUM 10 MG/1
10 TABLET ORAL DAILY
Qty: 90 TABLET | Refills: 1 | Status: SHIPPED | OUTPATIENT
Start: 2021-07-28 | End: 2021-12-07 | Stop reason: SDUPTHER

## 2021-07-28 RX ORDER — SILODOSIN 4 MG/1
4 CAPSULE ORAL DAILY
Qty: 90 CAPSULE | Refills: 1 | Status: SHIPPED | OUTPATIENT
Start: 2021-07-28 | End: 2022-01-31 | Stop reason: SDUPTHER

## 2021-07-28 RX ORDER — CYANOCOBALAMIN 1000 UG/ML
1000 INJECTION, SOLUTION INTRAMUSCULAR; SUBCUTANEOUS
Qty: 10 ML | Refills: 0 | Status: SHIPPED | OUTPATIENT
Start: 2021-07-28 | End: 2022-01-31 | Stop reason: SDUPTHER

## 2021-07-28 RX ORDER — LAMOTRIGINE 150 MG/1
150 TABLET ORAL 2 TIMES DAILY
Qty: 180 TABLET | Refills: 1 | Status: SHIPPED | OUTPATIENT
Start: 2021-07-28 | End: 2021-12-01

## 2021-07-28 RX ORDER — LAMOTRIGINE 150 MG/1
150 TABLET ORAL 2 TIMES DAILY
Qty: 180 TABLET | Refills: 1 | Status: SHIPPED | OUTPATIENT
Start: 2021-07-28 | End: 2021-07-28 | Stop reason: SDUPTHER

## 2021-07-29 ENCOUNTER — TELEPHONE (OUTPATIENT)
Dept: INTERNAL MEDICINE | Facility: CLINIC | Age: 66
End: 2021-07-29

## 2021-07-29 DIAGNOSIS — E55.9 VITAMIN D INSUFFICIENCY: Primary | ICD-10-CM

## 2021-07-29 RX ORDER — ACETAMINOPHEN 500 MG
5000 TABLET ORAL DAILY
Qty: 90 TABLET | Refills: 1 | Status: SHIPPED | OUTPATIENT
Start: 2021-07-29 | End: 2022-01-31 | Stop reason: SDUPTHER

## 2021-08-09 ENCOUNTER — TELEPHONE (OUTPATIENT)
Dept: INTERNAL MEDICINE | Facility: CLINIC | Age: 66
End: 2021-08-09

## 2021-08-27 ENCOUNTER — TELEPHONE (OUTPATIENT)
Dept: INTERNAL MEDICINE | Facility: CLINIC | Age: 66
End: 2021-08-27

## 2021-11-09 ENCOUNTER — TELEPHONE (OUTPATIENT)
Dept: OPTOMETRY | Facility: CLINIC | Age: 66
End: 2021-11-09
Payer: MEDICARE

## 2021-12-07 DIAGNOSIS — R80.9 TYPE 2 DIABETES MELLITUS WITH MICROALBUMINURIA, WITHOUT LONG-TERM CURRENT USE OF INSULIN: Chronic | ICD-10-CM

## 2021-12-07 DIAGNOSIS — E11.29 TYPE 2 DIABETES MELLITUS WITH MICROALBUMINURIA, WITHOUT LONG-TERM CURRENT USE OF INSULIN: Chronic | ICD-10-CM

## 2021-12-07 RX ORDER — PRAVASTATIN SODIUM 10 MG/1
10 TABLET ORAL DAILY
Qty: 90 TABLET | Refills: 1 | Status: SHIPPED | OUTPATIENT
Start: 2021-12-07 | End: 2022-01-31 | Stop reason: SDUPTHER

## 2021-12-10 DIAGNOSIS — H40.053 OCULAR HYPERTENSION, BILATERAL: ICD-10-CM

## 2021-12-10 DIAGNOSIS — H40.013 OAG (OPEN ANGLE GLAUCOMA) SUSPECT, LOW RISK, BILATERAL: Primary | ICD-10-CM

## 2021-12-13 ENCOUNTER — PATIENT OUTREACH (OUTPATIENT)
Dept: ADMINISTRATIVE | Facility: OTHER | Age: 66
End: 2021-12-13
Payer: MEDICARE

## 2021-12-24 ENCOUNTER — HOSPITAL ENCOUNTER (EMERGENCY)
Facility: HOSPITAL | Age: 66
Discharge: HOME | End: 2021-12-24
Attending: EMERGENCY MEDICINE
Payer: MEDICARE

## 2021-12-24 VITALS
WEIGHT: 165 LBS | DIASTOLIC BLOOD PRESSURE: 97 MMHG | RESPIRATION RATE: 16 BRPM | SYSTOLIC BLOOD PRESSURE: 186 MMHG | TEMPERATURE: 101 F | BODY MASS INDEX: 29.23 KG/M2 | OXYGEN SATURATION: 98 % | HEART RATE: 100 BPM | HEIGHT: 63 IN

## 2021-12-24 DIAGNOSIS — U07.1 COVID-19 VIRUS INFECTION: Primary | ICD-10-CM

## 2021-12-24 LAB
CTP QC/QA: YES
SARS-COV-2 RDRP RESP QL NAA+PROBE: POSITIVE

## 2021-12-24 PROCEDURE — U0002 COVID-19 LAB TEST NON-CDC: HCPCS | Performed by: EMERGENCY MEDICINE

## 2021-12-24 PROCEDURE — 99284 EMERGENCY DEPT VISIT MOD MDM: CPT | Mod: CR,CS,, | Performed by: EMERGENCY MEDICINE

## 2021-12-24 PROCEDURE — 99282 EMERGENCY DEPT VISIT SF MDM: CPT | Mod: 25

## 2021-12-24 PROCEDURE — 99284 PR EMERGENCY DEPT VISIT,LEVEL IV: ICD-10-PCS | Mod: CR,CS,, | Performed by: EMERGENCY MEDICINE

## 2021-12-24 NOTE — DISCHARGE INSTRUCTIONS
Take tylenol if needed for fever  Follow up with your doctor  Return to ED for worsening shortness of breath or oxygen saturation less than 93%

## 2021-12-24 NOTE — ED PROVIDER NOTES
Encounter Date: 12/24/2021       History     Chief Complaint   Patient presents with    COVID-19 Concerns     + body aches, HA, chills, + COVID contact      65 y/o M with medical history of DM2, HLD, seizures and COPD presents with 1 day of chills and body aches. Headache today.  No fever.  No cough.   No SOB.  No n/v/d  States his wife went to a wedding last week and many of the guests at the wedding tested positive for covid  His wife is also being evaluated for covid        Review of patient's allergies indicates:  No Known Allergies     Past Medical History:   Diagnosis Date    B12 nutritional deficiency 8/7/2019    E. coli UTI 01/2019    During hospitalization for seizure    Generalized tonic-clonic seizure 1/26/2019 1-2019 admitted for new-onset seizures.Normal CT head.  His mental status normalized, and he had no recurrent seizures following keppra loading in the ED and twice-daily maintenance upon arrival to the floor. Workup into the etiology of his seizures remained negative. EEG was performed, with the preliminary interpretation being no epileptiform activity with normal background.     History of hepatitis C, s/p successful Rx w/ SVR12 (cure) - 11/2019 2/8/2019    S/p epclusa    Kidney stone on left side 6/28/2019    Mixed type COPD (chronic obstructive pulmonary disease) 8/15/2019    8-2019 PFT: Normal airflow. Airflow not improved after bronchodilator. Moderate restriction. Diffusion capacity is mildly decreased. Oximetry is normal.    Partial idiopathic epilepsy with seizures of localized onset, not intractable, without status epilepticus 1/26/2019    new-onset seizures (1/209). Normal CTH, MRI and routine EEG . On keppra 500 mg BID    Pterygium, bilateral 3/12/2019    Type 2 diabetes mellitus with microalbuminuria, without long-term current use of insulin 2/11/2019    Vitamin D insufficiency 7/29/2021     Past Surgical History:   Procedure Laterality Date    CATARACT EXTRACTION W/   INTRAOCULAR LENS IMPLANT Right 2020    Procedure: EXTRACTION, CATARACT, WITH IOL INSERTION;  Surgeon: Daryl Calloway MD;  Location: [facility] OR;  Service: Ophthalmology;  Laterality: Right;    CATARACT EXTRACTION W/  INTRAOCULAR LENS IMPLANT Left 2020    Procedure: EXTRACTION, CATARACT, WITH IOL INSERTION;  Surgeon: Daryl Calloway MD;  Location: [facility] OR;  Service: Ophthalmology;  Laterality: Left;    HERNIA REPAIR Right     [facility]     Family History   Problem Relation Age of Onset    Hypertension Mother      Social History     Tobacco Use    Smoking status: Former Smoker     Packs/day: 3.00     Years: 25.00     Pack years: 75.00     Quit date: 2012     Years since quittin.9    Smokeless tobacco: Never Used   Substance Use Topics    Alcohol use: No     Comment: Used to drink    Drug use: No     Review of Systems   Constitutional: Positive for fatigue. Negative for fever.   HENT: Positive for congestion. Negative for sore throat.    Eyes: Negative for visual disturbance.   Respiratory: Negative for cough and shortness of breath.    Cardiovascular: Negative for chest pain and leg swelling.   Gastrointestinal: Negative for diarrhea, nausea and vomiting.   Genitourinary: Negative for dysuria.   Musculoskeletal: Positive for myalgias.   Skin: Negative for rash.   Neurological: Positive for headaches.       Physical Exam     Initial Vitals [21 1631]   BP Pulse Resp Temp SpO2   (!) 186/97 100 16 (!) 100.9 °F (38.3 °C) 98 %      MAP       --         Physical Exam    Nursing note and vitals reviewed.  Constitutional: He appears well-developed and well-nourished. He is not diaphoretic. He appears distressed.   HENT:   Head: Normocephalic and atraumatic.   Eyes: Conjunctivae are normal.   Neck: Neck supple.   Cardiovascular: Normal rate.   Pulmonary/Chest: No respiratory distress.   Abdominal: He exhibits no distension.   Musculoskeletal:         General: No edema.       Cervical back: Neck supple.     Neurological: He is alert and oriented to person, place, and time. GCS score is 15. GCS eye subscore is 4. GCS verbal subscore is 5. GCS motor subscore is 6.   Skin: Skin is warm and dry.         ED Course   Procedures  Labs Reviewed   SARS-COV-2 RDRP GENE - Abnormal; Notable for the following components:       Result Value    POC Rapid COVID Positive (*)     All other components within normal limits    Narrative:     This test utilizes isothermal nucleic acid amplification   technology to detect the SARS-CoV-2 RdRp nucleic acid segment.   The analytical sensitivity (limit of detection) is 125 genome   equivalents/mL.   A POSITIVE result implies infection with the SARS-CoV-2 virus;   the patient is presumed to be contagious.     A NEGATIVE result means that SARS-CoV-2 nucleic acids are not   present above the limit of detection. A NEGATIVE result should be   treated as presumptive. It does not rule out the possibility of   COVID-19 and should not be the sole basis for treatment decisions.   If COVID-19 is strongly suspected based on clinical and exposure   history, re-testing using an alternate molecular assay should be   considered.   This test is only for use under the Food and Drug   Administration s Emergency Use Authorization (EUA).   Commercial kits are provided by [employer].   Performance characteristics of the EUA have been independently   verified by [facility] Department of   Pathology and Laboratory Medicine.   _________________________________________________________________   The authorized Fact Sheet for Healthcare Providers and the authorized Fact   Sheet for Patients of the ID NOW COVID-19 are available on the FDA   website:     https://www.fda.gov/media/431542/download  https://www.fda.gov/media/419376/download              Imaging Results    None          Medications - No data to display     Medical Decision Making:   History:   I obtained  history from: someone other than patient.       <> Summary of History: wife  Old Medical Records: I decided to obtain old medical records.  Initial Assessment:   67 y/o M with known covid exposure and symptoms of covid  Likely covid vs UTI  poc covid  Clinical Tests:   Lab Tests: Ordered and Reviewed  ED Management:  Poc covid positive. Ambulatory O2 sat 96%  Pt with COPD and DM2 - will provide with ambulatory referral to monoclonal antibody infusion as well as home surveillance program. Discharge home with O2 sat monitor. Information on covid treatment and quarantine provided. Return precautions provided.                      Clinical Impression:   Final diagnoses:  [U07.1] COVID-19 virus infection (Primary)          ED Disposition Condition    Discharge Stable        ED Prescriptions     Medication Sig Dispense Start Date End Date Auth. Provider    pulse oximeter (PULSE OXIMETER) device by Apply Externally route 2 (two) times a day. Use twice daily at 8 AM and 3 PM and record the value in MyChart as directed. 1 each 12/24/2021  Narda Ware MD        Follow-up Information     Follow up With Specialties Details Why Contact Info    Srikanth Son MD Internal Medicine, Hospitalist Schedule an appointment as soon as possible for a visit   [address]  [place] [address]  [phone]             Narda Ware MD  12/25/21 6981

## 2021-12-24 NOTE — Clinical Note
"Cristiano"Genaro Cruz was seen and treated in our emergency department on 12/24/2021.     COVID-19 is present in our communities across the state. There is limited testing for COVID at this time, so not all patients can be tested. In this situation, your employee meets the following criteria:    Cristiano Cruz has met the criteria for COVID-19 testing and has a POSITIVE result. He can return to work once they are asymptomatic for 72 hours without the use of fever reducing medications AND at least ten days from the first positive result.     If you have any questions or concerns, or if I can be of further assistance, please do not hesitate to contact me.    Sincerely,             Narda Ware MD"

## 2021-12-25 PROBLEM — U07.1 COVID-19: Status: ACTIVE | Noted: 2021-12-24

## 2021-12-27 ENCOUNTER — TELEPHONE (OUTPATIENT)
Dept: ADMINISTRATIVE | Facility: CLINIC | Age: 66
End: 2021-12-27
Payer: MEDICARE

## 2022-01-02 ENCOUNTER — HOSPITAL ENCOUNTER (INPATIENT)
Facility: HOSPITAL | Age: 67
LOS: 1 days | Discharge: HOME | DRG: 179 | End: 2022-01-04
Attending: EMERGENCY MEDICINE | Admitting: STUDENT IN AN ORGANIZED HEALTH CARE EDUCATION/TRAINING PROGRAM
Payer: MEDICARE

## 2022-01-02 DIAGNOSIS — R00.0 TACHYCARDIA: ICD-10-CM

## 2022-01-02 DIAGNOSIS — U07.1 COVID-19: Primary | ICD-10-CM

## 2022-01-02 DIAGNOSIS — K59.00 CONSTIPATION, UNSPECIFIED CONSTIPATION TYPE: ICD-10-CM

## 2022-01-02 LAB
ALBUMIN SERPL BCP-MCNC: 3.5 G/DL (ref 3.5–5.2)
ALP SERPL-CCNC: 50 U/L (ref 55–135)
ALT SERPL W/O P-5'-P-CCNC: 17 U/L (ref 10–44)
ANION GAP SERPL CALC-SCNC: 12 MMOL/L (ref 8–16)
APTT BLDCRRT: 29.8 SEC (ref 21–32)
AST SERPL-CCNC: 29 U/L (ref 10–40)
BASOPHILS # BLD AUTO: 0.01 K/UL (ref 0–0.2)
BASOPHILS NFR BLD: 0.2 % (ref 0–1.9)
BILIRUB SERPL-MCNC: 0.4 MG/DL (ref 0.1–1)
BNP SERPL-MCNC: 13 PG/ML (ref 0–99)
BUN SERPL-MCNC: 17 MG/DL (ref 8–23)
CALCIUM SERPL-MCNC: 8.9 MG/DL (ref 8.7–10.5)
CHLORIDE SERPL-SCNC: 100 MMOL/L (ref 95–110)
CK SERPL-CCNC: 198 U/L (ref 20–200)
CO2 SERPL-SCNC: 19 MMOL/L (ref 23–29)
CREAT SERPL-MCNC: 1.2 MG/DL (ref 0.5–1.4)
D DIMER PPP IA.FEU-MCNC: 0.33 MG/L FEU
DIFFERENTIAL METHOD: ABNORMAL
EOSINOPHIL # BLD AUTO: 0 K/UL (ref 0–0.5)
EOSINOPHIL NFR BLD: 0 % (ref 0–8)
ERYTHROCYTE [DISTWIDTH] IN BLOOD BY AUTOMATED COUNT: 14 % (ref 11.5–14.5)
ERYTHROCYTE [SEDIMENTATION RATE] IN BLOOD BY WESTERGREN METHOD: 36 MM/HR (ref 0–23)
EST. GFR  (AFRICAN AMERICAN): >60 ML/MIN/1.73 M^2
EST. GFR  (NON AFRICAN AMERICAN): >60 ML/MIN/1.73 M^2
ESTIMATED AVG GLUCOSE: 143 MG/DL (ref 68–131)
FERRITIN SERPL-MCNC: 636 NG/ML (ref 20–300)
GLUCOSE SERPL-MCNC: 122 MG/DL (ref 70–110)
HBA1C MFR BLD: 6.6 % (ref 4–5.6)
HCT VFR BLD AUTO: 41.8 % (ref 40–54)
HGB BLD-MCNC: 13.4 G/DL (ref 14–18)
IMM GRANULOCYTES # BLD AUTO: 0.02 K/UL (ref 0–0.04)
IMM GRANULOCYTES NFR BLD AUTO: 0.3 % (ref 0–0.5)
INR PPP: 1 (ref 0.8–1.2)
LACTATE SERPL-SCNC: 0.9 MMOL/L (ref 0.5–2.2)
LDH SERPL L TO P-CCNC: 257 U/L (ref 110–260)
LYMPHOCYTES # BLD AUTO: 0.5 K/UL (ref 1–4.8)
LYMPHOCYTES NFR BLD: 9 % (ref 18–48)
MCH RBC QN AUTO: 27.7 PG (ref 27–31)
MCHC RBC AUTO-ENTMCNC: 32.1 G/DL (ref 32–36)
MCV RBC AUTO: 87 FL (ref 82–98)
MONOCYTES # BLD AUTO: 0.3 K/UL (ref 0.3–1)
MONOCYTES NFR BLD: 5.5 % (ref 4–15)
NEUTROPHILS # BLD AUTO: 5.1 K/UL (ref 1.8–7.7)
NEUTROPHILS NFR BLD: 85 % (ref 38–73)
NRBC BLD-RTO: 0 /100 WBC
PLATELET # BLD AUTO: 114 K/UL (ref 150–450)
PMV BLD AUTO: 11.9 FL (ref 9.2–12.9)
POCT GLUCOSE: 145 MG/DL (ref 70–110)
POCT GLUCOSE: 187 MG/DL (ref 70–110)
POTASSIUM SERPL-SCNC: 3.9 MMOL/L (ref 3.5–5.1)
PROT SERPL-MCNC: 7.7 G/DL (ref 6–8.4)
PROTHROMBIN TIME: 10.9 SEC (ref 9–12.5)
RBC # BLD AUTO: 4.83 M/UL (ref 4.6–6.2)
SODIUM SERPL-SCNC: 131 MMOL/L (ref 136–145)
TROPONIN I SERPL DL<=0.01 NG/ML-MCNC: 0.01 NG/ML (ref 0–0.03)
WBC # BLD AUTO: 6.02 K/UL (ref 3.9–12.7)

## 2022-01-02 PROCEDURE — 83615 LACTATE (LD) (LDH) ENZYME: CPT | Performed by: STUDENT IN AN ORGANIZED HEALTH CARE EDUCATION/TRAINING PROGRAM

## 2022-01-02 PROCEDURE — 25000003 PHARM REV CODE 250: Performed by: STUDENT IN AN ORGANIZED HEALTH CARE EDUCATION/TRAINING PROGRAM

## 2022-01-02 PROCEDURE — 85379 FIBRIN DEGRADATION QUANT: CPT | Performed by: STUDENT IN AN ORGANIZED HEALTH CARE EDUCATION/TRAINING PROGRAM

## 2022-01-02 PROCEDURE — 99220 PR INITIAL OBSERVATION CARE,LEVL III: ICD-10-PCS | Mod: ,,, | Performed by: STUDENT IN AN ORGANIZED HEALTH CARE EDUCATION/TRAINING PROGRAM

## 2022-01-02 PROCEDURE — 85652 RBC SED RATE AUTOMATED: CPT | Performed by: STUDENT IN AN ORGANIZED HEALTH CARE EDUCATION/TRAINING PROGRAM

## 2022-01-02 PROCEDURE — 63600175 PHARM REV CODE 636 W HCPCS: Performed by: STUDENT IN AN ORGANIZED HEALTH CARE EDUCATION/TRAINING PROGRAM

## 2022-01-02 PROCEDURE — 85025 COMPLETE CBC W/AUTO DIFF WBC: CPT | Performed by: STUDENT IN AN ORGANIZED HEALTH CARE EDUCATION/TRAINING PROGRAM

## 2022-01-02 PROCEDURE — 99285 PR EMERGENCY DEPT VISIT,LEVEL V: ICD-10-PCS | Mod: ,,, | Performed by: EMERGENCY MEDICINE

## 2022-01-02 PROCEDURE — 82550 ASSAY OF CK (CPK): CPT | Performed by: STUDENT IN AN ORGANIZED HEALTH CARE EDUCATION/TRAINING PROGRAM

## 2022-01-02 PROCEDURE — 25000242 PHARM REV CODE 250 ALT 637 W/ HCPCS: Performed by: STUDENT IN AN ORGANIZED HEALTH CARE EDUCATION/TRAINING PROGRAM

## 2022-01-02 PROCEDURE — 94640 AIRWAY INHALATION TREATMENT: CPT

## 2022-01-02 PROCEDURE — 96360 HYDRATION IV INFUSION INIT: CPT

## 2022-01-02 PROCEDURE — 99285 EMERGENCY DEPT VISIT HI MDM: CPT | Mod: ,,, | Performed by: EMERGENCY MEDICINE

## 2022-01-02 PROCEDURE — 85610 PROTHROMBIN TIME: CPT | Performed by: STUDENT IN AN ORGANIZED HEALTH CARE EDUCATION/TRAINING PROGRAM

## 2022-01-02 PROCEDURE — 85730 THROMBOPLASTIN TIME PARTIAL: CPT | Performed by: STUDENT IN AN ORGANIZED HEALTH CARE EDUCATION/TRAINING PROGRAM

## 2022-01-02 PROCEDURE — 99220 PR INITIAL OBSERVATION CARE,LEVL III: CPT | Mod: ,,, | Performed by: STUDENT IN AN ORGANIZED HEALTH CARE EDUCATION/TRAINING PROGRAM

## 2022-01-02 PROCEDURE — 82728 ASSAY OF FERRITIN: CPT | Performed by: STUDENT IN AN ORGANIZED HEALTH CARE EDUCATION/TRAINING PROGRAM

## 2022-01-02 PROCEDURE — G0378 HOSPITAL OBSERVATION PER HR: HCPCS

## 2022-01-02 PROCEDURE — 99285 EMERGENCY DEPT VISIT HI MDM: CPT | Mod: 25

## 2022-01-02 PROCEDURE — 93010 EKG 12-LEAD: ICD-10-PCS | Mod: ,,, | Performed by: INTERNAL MEDICINE

## 2022-01-02 PROCEDURE — 83880 ASSAY OF NATRIURETIC PEPTIDE: CPT | Performed by: STUDENT IN AN ORGANIZED HEALTH CARE EDUCATION/TRAINING PROGRAM

## 2022-01-02 PROCEDURE — 93005 ELECTROCARDIOGRAM TRACING: CPT

## 2022-01-02 PROCEDURE — 83036 HEMOGLOBIN GLYCOSYLATED A1C: CPT | Performed by: STUDENT IN AN ORGANIZED HEALTH CARE EDUCATION/TRAINING PROGRAM

## 2022-01-02 PROCEDURE — 83605 ASSAY OF LACTIC ACID: CPT | Performed by: STUDENT IN AN ORGANIZED HEALTH CARE EDUCATION/TRAINING PROGRAM

## 2022-01-02 PROCEDURE — 84484 ASSAY OF TROPONIN QUANT: CPT | Performed by: STUDENT IN AN ORGANIZED HEALTH CARE EDUCATION/TRAINING PROGRAM

## 2022-01-02 PROCEDURE — 93010 ELECTROCARDIOGRAM REPORT: CPT | Mod: ,,, | Performed by: INTERNAL MEDICINE

## 2022-01-02 PROCEDURE — 99900035 HC TECH TIME PER 15 MIN (STAT)

## 2022-01-02 PROCEDURE — 94761 N-INVAS EAR/PLS OXIMETRY MLT: CPT

## 2022-01-02 PROCEDURE — 96372 THER/PROPH/DIAG INJ SC/IM: CPT | Mod: 59

## 2022-01-02 PROCEDURE — 80053 COMPREHEN METABOLIC PANEL: CPT | Performed by: STUDENT IN AN ORGANIZED HEALTH CARE EDUCATION/TRAINING PROGRAM

## 2022-01-02 RX ORDER — INSULIN ASPART 100 [IU]/ML
0-5 INJECTION, SOLUTION INTRAVENOUS; SUBCUTANEOUS
Status: DISCONTINUED | OUTPATIENT
Start: 2022-01-02 | End: 2022-01-04 | Stop reason: HOSPADM

## 2022-01-02 RX ORDER — PREDNISONE 20 MG/1
60 TABLET ORAL
Status: COMPLETED | OUTPATIENT
Start: 2022-01-02 | End: 2022-01-02

## 2022-01-02 RX ORDER — TALC
6 POWDER (GRAM) TOPICAL NIGHTLY PRN
Status: CANCELLED | OUTPATIENT
Start: 2022-01-02

## 2022-01-02 RX ORDER — CHOLECALCIFEROL (VITAMIN D3) 25 MCG
5000 TABLET ORAL DAILY
Status: DISCONTINUED | OUTPATIENT
Start: 2022-01-02 | End: 2022-01-04 | Stop reason: HOSPADM

## 2022-01-02 RX ORDER — IBUPROFEN 200 MG
24 TABLET ORAL
Status: DISCONTINUED | OUTPATIENT
Start: 2022-01-02 | End: 2022-01-04 | Stop reason: HOSPADM

## 2022-01-02 RX ORDER — ALBUTEROL SULFATE 90 UG/1
2 AEROSOL, METERED RESPIRATORY (INHALATION) EVERY 6 HOURS
Status: DISCONTINUED | OUTPATIENT
Start: 2022-01-02 | End: 2022-01-04 | Stop reason: HOSPADM

## 2022-01-02 RX ORDER — ENOXAPARIN SODIUM 100 MG/ML
1 INJECTION SUBCUTANEOUS 2 TIMES DAILY
Status: DISCONTINUED | OUTPATIENT
Start: 2022-01-02 | End: 2022-01-04 | Stop reason: HOSPADM

## 2022-01-02 RX ORDER — IPRATROPIUM BROMIDE AND ALBUTEROL SULFATE 2.5; .5 MG/3ML; MG/3ML
3 SOLUTION RESPIRATORY (INHALATION)
Status: COMPLETED | OUTPATIENT
Start: 2022-01-02 | End: 2022-01-02

## 2022-01-02 RX ORDER — BISACODYL 10 MG
10 SUPPOSITORY, RECTAL RECTAL DAILY PRN
Status: DISCONTINUED | OUTPATIENT
Start: 2022-01-02 | End: 2022-01-04 | Stop reason: HOSPADM

## 2022-01-02 RX ORDER — SODIUM CHLORIDE 0.9 % (FLUSH) 0.9 %
10 SYRINGE (ML) INJECTION
Status: DISCONTINUED | OUTPATIENT
Start: 2022-01-02 | End: 2022-01-04 | Stop reason: HOSPADM

## 2022-01-02 RX ORDER — POLYETHYLENE GLYCOL 3350 17 G/17G
17 POWDER, FOR SOLUTION ORAL DAILY
Status: DISCONTINUED | OUTPATIENT
Start: 2022-01-02 | End: 2022-01-04 | Stop reason: HOSPADM

## 2022-01-02 RX ORDER — GUAIFENESIN/DEXTROMETHORPHAN 100-10MG/5
10 SYRUP ORAL EVERY 4 HOURS PRN
Status: DISCONTINUED | OUTPATIENT
Start: 2022-01-02 | End: 2022-01-04 | Stop reason: HOSPADM

## 2022-01-02 RX ORDER — ONDANSETRON 8 MG/1
8 TABLET, ORALLY DISINTEGRATING ORAL EVERY 8 HOURS PRN
Status: DISCONTINUED | OUTPATIENT
Start: 2022-01-02 | End: 2022-01-04 | Stop reason: HOSPADM

## 2022-01-02 RX ORDER — ASCORBIC ACID 500 MG
500 TABLET ORAL 2 TIMES DAILY
Status: DISCONTINUED | OUTPATIENT
Start: 2022-01-02 | End: 2022-01-04 | Stop reason: HOSPADM

## 2022-01-02 RX ORDER — SODIUM CHLORIDE 0.9 % (FLUSH) 0.9 %
10 SYRINGE (ML) INJECTION
Status: CANCELLED | OUTPATIENT
Start: 2022-01-02

## 2022-01-02 RX ORDER — POLYETHYLENE GLYCOL 3350 17 G/17G
17 POWDER, FOR SOLUTION ORAL ONCE
Status: COMPLETED | OUTPATIENT
Start: 2022-01-02 | End: 2022-01-02

## 2022-01-02 RX ORDER — AMOXICILLIN 250 MG
1 CAPSULE ORAL 2 TIMES DAILY
Status: DISCONTINUED | OUTPATIENT
Start: 2022-01-02 | End: 2022-01-04 | Stop reason: HOSPADM

## 2022-01-02 RX ORDER — ACETAMINOPHEN 500 MG
1000 TABLET ORAL
Status: COMPLETED | OUTPATIENT
Start: 2022-01-02 | End: 2022-01-02

## 2022-01-02 RX ORDER — IBUPROFEN 200 MG
16 TABLET ORAL
Status: DISCONTINUED | OUTPATIENT
Start: 2022-01-02 | End: 2022-01-04 | Stop reason: HOSPADM

## 2022-01-02 RX ORDER — LATANOPROST 50 UG/ML
1 SOLUTION/ DROPS OPHTHALMIC NIGHTLY
Status: DISCONTINUED | OUTPATIENT
Start: 2022-01-02 | End: 2022-01-04 | Stop reason: HOSPADM

## 2022-01-02 RX ORDER — LAMOTRIGINE 100 MG/1
300 TABLET ORAL DAILY
Status: DISCONTINUED | OUTPATIENT
Start: 2022-01-02 | End: 2022-01-04 | Stop reason: HOSPADM

## 2022-01-02 RX ORDER — SILODOSIN 4 MG/1
4 CAPSULE ORAL DAILY
Status: DISCONTINUED | OUTPATIENT
Start: 2022-01-02 | End: 2022-01-04 | Stop reason: HOSPADM

## 2022-01-02 RX ORDER — PRAVASTATIN SODIUM 10 MG/1
10 TABLET ORAL DAILY
Status: DISCONTINUED | OUTPATIENT
Start: 2022-01-02 | End: 2022-01-04 | Stop reason: HOSPADM

## 2022-01-02 RX ORDER — GLUCAGON 1 MG
1 KIT INJECTION
Status: DISCONTINUED | OUTPATIENT
Start: 2022-01-02 | End: 2022-01-04 | Stop reason: HOSPADM

## 2022-01-02 RX ORDER — ACETAMINOPHEN 325 MG/1
650 TABLET ORAL EVERY 4 HOURS PRN
Status: DISCONTINUED | OUTPATIENT
Start: 2022-01-02 | End: 2022-01-04 | Stop reason: HOSPADM

## 2022-01-02 RX ADMIN — ACETAMINOPHEN 1000 MG: 500 TABLET, FILM COATED ORAL at 05:01

## 2022-01-02 RX ADMIN — ENOXAPARIN SODIUM 70 MG: 100 INJECTION SUBCUTANEOUS at 09:01

## 2022-01-02 RX ADMIN — IPRATROPIUM BROMIDE AND ALBUTEROL SULFATE 3 ML: .5; 3 SOLUTION RESPIRATORY (INHALATION) at 06:01

## 2022-01-02 RX ADMIN — SENNOSIDES AND DOCUSATE SODIUM 1 TABLET: 50; 8.6 TABLET ORAL at 09:01

## 2022-01-02 RX ADMIN — MULTIPLE VITAMINS W/ MINERALS TAB 1 TABLET: TAB at 09:01

## 2022-01-02 RX ADMIN — PREDNISONE 60 MG: 20 TABLET ORAL at 06:01

## 2022-01-02 RX ADMIN — POLYETHYLENE GLYCOL 3350 17 G: 17 POWDER, FOR SOLUTION ORAL at 07:01

## 2022-01-02 RX ADMIN — LATANOPROST 1 DROP: 50 SOLUTION OPHTHALMIC at 09:01

## 2022-01-02 RX ADMIN — Medication 500 MG: at 09:01

## 2022-01-02 RX ADMIN — CHOLECALCIFEROL TAB 25 MCG (1000 UNIT) 5000 UNITS: 25 TAB at 09:01

## 2022-01-02 RX ADMIN — SILODOSIN 4 MG: 4 CAPSULE ORAL at 09:01

## 2022-01-02 RX ADMIN — ALBUTEROL SULFATE 2 PUFF: 108 INHALANT RESPIRATORY (INHALATION) at 07:01

## 2022-01-02 RX ADMIN — LAMOTRIGINE 300 MG: 100 TABLET ORAL at 09:01

## 2022-01-02 RX ADMIN — SODIUM CHLORIDE 1000 ML: 0.9 INJECTION, SOLUTION INTRAVENOUS at 05:01

## 2022-01-02 RX ADMIN — PRAVASTATIN SODIUM 10 MG: 10 TABLET ORAL at 09:01

## 2022-01-02 RX ADMIN — ALBUTEROL SULFATE 2 PUFF: 108 INHALANT RESPIRATORY (INHALATION) at 01:01

## 2022-01-02 NOTE — ED PROVIDER NOTES
Encounter Date: 1/2/2022     History     Chief Complaint   Patient presents with    Constipation     Pt reports constipation since yesterday. Denies abdominal pain. Denies PTA meds.     HPI     66-year-old male with a diagnosis of COVID 9 days ago presents for 1 day history of constipation.  States that he took some flour and water and that stops him up.  His last bowel movement was 18 hours ago and normal.  He was anxious all night about not being able to have a bowel movement.  He denies abdominal pain.  Denies nausea or vomiting.  Denies dysuria hematuria urgency or frequency.  Endorses cough ongoing since COVID which is dry nonproductive.  Denies shortness of breath except with exertion or arguing, which has been ongoing.  He denies chest pain.  He denies fever chills.    Review of patient's allergies indicates:  No Known Allergies  Past Medical History:   Diagnosis Date    B12 nutritional deficiency 8/7/2019    COVID-19 12/24/2021    E. coli UTI 01/2019    During hospitalization for seizure    Generalized tonic-clonic seizure 1/26/2019 1-2019 admitted for new-onset seizures.Normal CT head.  His mental status normalized, and he had no recurrent seizures following keppra loading in the ED and twice-daily maintenance upon arrival to the floor. Workup into the etiology of his seizures remained negative. EEG was performed, with the preliminary interpretation being no epileptiform activity with normal background.     History of hepatitis C, s/p successful Rx w/ SVR12 (cure) - 11/2019 2/8/2019    S/p epclusa    Kidney stone on left side 6/28/2019    Mixed type COPD (chronic obstructive pulmonary disease) 8/15/2019    8-2019 PFT: Normal airflow. Airflow not improved after bronchodilator. Moderate restriction. Diffusion capacity is mildly decreased. Oximetry is normal.    Partial idiopathic epilepsy with seizures of localized onset, not intractable, without status epilepticus 1/26/2019    new-onset seizures  (). Normal CTH, MRI and routine EEG . On keppra 500 mg BID    Pterygium, bilateral 3/12/2019    Type 2 diabetes mellitus with microalbuminuria, without long-term current use of insulin 2019    Vitamin D insufficiency 2021     Past Surgical History:   Procedure Laterality Date    CATARACT EXTRACTION W/  INTRAOCULAR LENS IMPLANT Right 2020    Procedure: EXTRACTION, CATARACT, WITH IOL INSERTION;  Surgeon: Daryl Calloway MD;  Location: [facility];  Service: Ophthalmology;  Laterality: Right;    CATARACT EXTRACTION W/  INTRAOCULAR LENS IMPLANT Left 2020    Procedure: EXTRACTION, CATARACT, WITH IOL INSERTION;  Surgeon: Daryl Calloway MD;  Location: [facility] OR;  Service: Ophthalmology;  Laterality: Left;    HERNIA REPAIR Right     [facility]     Family History   Problem Relation Age of Onset    Hypertension Mother      Social History     Tobacco Use    Smoking status: Former Smoker     Packs/day: 3.00     Years: 25.00     Pack years: 75.00     Quit date: 2012     Years since quittin.9    Smokeless tobacco: Never Used   Substance Use Topics    Alcohol use: No     Comment: Used to drink    Drug use: No     Review of Systems   Constitutional: Negative for fever.   HENT: Negative for sore throat.    Respiratory: Positive for cough. Negative for shortness of breath.    Cardiovascular: Negative for chest pain.   Gastrointestinal: Positive for constipation. Negative for nausea.   Genitourinary: Negative for dysuria.   Musculoskeletal: Negative for back pain.   Skin: Negative for rash.   Neurological: Negative for weakness.   Hematological: Does not bruise/bleed easily.       Physical Exam     Initial Vitals [22 0432]   BP Pulse Resp Temp SpO2   137/79 (!) 120 20 100.3 °F (37.9 °C) 96 %      MAP       --         Physical Exam    Nursing note and vitals reviewed.  Constitutional: He appears well-developed and well-nourished.   HENT:   Head: Normocephalic and  atraumatic.   Mouth/Throat: Oropharynx is clear and moist.   Eyes: Conjunctivae and EOM are normal. Pupils are equal, round, and reactive to light.   Neck: No thyromegaly present.   Normal range of motion.  Cardiovascular: Regular rhythm and intact distal pulses.   tachycardia   Pulmonary/Chest: Breath sounds normal. No respiratory distress. He has no wheezes.   Abdominal: Abdomen is soft. Bowel sounds are normal. He exhibits no distension and no mass. There is no abdominal tenderness. There is no rebound and no guarding.   Musculoskeletal:         General: No tenderness or edema. Normal range of motion.      Cervical back: Normal range of motion.     Neurological: He is alert and oriented to person, place, and time. He has normal strength. No cranial nerve deficit.   Skin: Skin is warm and dry. No rash noted.   Psychiatric: He has a normal mood and affect. His behavior is normal. Thought content normal.       EKG notable for sinus tachycardia, normal [place], QRS and QT intervals, no acute ST elevations or depressions, normal axis. Interpreted by me    ED Course   Procedures  Labs Reviewed   CBC W/ AUTO DIFFERENTIAL - Abnormal; Notable for the following components:       Result Value    Hemoglobin 13.4 (*)     Platelets 114 (*)     Lymph # 0.5 (*)     Gran % 85.0 (*)     Lymph % 9.0 (*)     All other components within normal limits   COMPREHENSIVE METABOLIC PANEL - Abnormal; Notable for the following components:    Sodium 131 (*)     CO2 19 (*)     Glucose 122 (*)     Alkaline Phosphatase 50 (*)     All other components within normal limits   TROPONIN I   B-TYPE NATRIURETIC PEPTIDE          Imaging Results          X-Ray Chest AP Portable (Final result)  Result time 01/02/22 05:49:24    Final result by Brenda Gray MD (01/02/22 05:49:24)                 Impression:      No acute intrathoracic abnormality or significant interval detrimental change in cardiopulmonary status identified on this single radiographic view  of the chest.      Electronically signed by: Brenda Gray MD  Date:    01/02/2022  Time:    05:49             Narrative:    EXAMINATION:  XR CHEST AP PORTABLE    CLINICAL HISTORY:  cough;    TECHNIQUE:  Single frontal view of the chest was performed.    COMPARISON:  12/31/2021    FINDINGS:  Mediastinal structures are midline.  The cardiomediastinal silhouette is unchanged in size and configuration.  The lungs are symmetrically expanded with diffuse coarse interstitial attenuation which appears similar to most prior examinations and could relate to chronic interstitial/emphysematous change.  No new confluent airspace consolidation, large volume of pleural fluid or pneumothorax is identified.  Osseous structures are intact with degenerative change.                                 Medications   polyethylene glycol packet 17 g (has no administration in time range)   acetaminophen tablet 1,000 mg (1,000 mg Oral Given 1/2/22 0512)   sodium chloride 0.9% bolus 1,000 mL (1,000 mLs Intravenous New Bag 1/2/22 0546)   albuterol-ipratropium 2.5 mg-0.5 mg/3 mL nebulizer solution 3 mL (3 mLs Nebulization Given 1/2/22 0632)   predniSONE tablet 60 mg (60 mg Oral Given 1/2/22 0644)     Medical Decision Making:   Initial Assessment:   Tachycardic and hypoxic w/ COVID  He has silent hypoxia w/o wheezing or rales    Differential Diagnosis:   COVID, super imposed pneumonia, HF, ACS  Independently Interpreted Test(s):   I have ordered and independently interpreted X-rays - see prior notes.  Clinical Tests:   Lab Tests: Reviewed  Radiological Study: Reviewed  Medical Tests: Reviewed  ED Management:  XR w/o consolidation  EKG w/o ischemic changes, Trop WNL  Duo-nebs and steroids given smoking history and prior CT showing emphysematous changes  Miralax for constipation  Tachycardia improved with IVF. He remained hypoxic during his stay, sats dropped to 90% with walking.   Will place in observation with Medicine.                        Clinical Impression:   Final diagnoses:  [R00.0] Tachycardia  [K59.00] Constipation, unspecified constipation type  [U07.1] COVID-19 (Primary)          ED Disposition Condition    Admit               Naun Mahmood MD  Resident  01/02/22 0722

## 2022-01-02 NOTE — H&P
[facility] - Emergency Dept  [facility]  History & Physical    Patient Name: Cristiano Cruz  MRN: [id number]  Patient Class: OP- Observation  Admission Date: 1/2/2022  Attending Physician: Shade Morales MD   Primary Care Provider: Srikanth Son MD         Patient information was obtained from patient.     Subjective:     Principal Problem:COVID-19    Chief Complaint:   Chief Complaint   Patient presents with    Constipation     Pt reports constipation since yesterday. Denies abdominal pain. Denies PTA meds.        HPI: 66 year old male with PMHx of DMII (not on insulin), glaucoma, COPD (per PFT's, not on oxygen), HLD, BPH, seizure disorder, and Vitamin D/B12 deficiency who presents to the ED with chief complaint of constipation. Patient reports his last bowel movement was yesterday at noon and he has not had one since then. He denies any abdominal pain, nausea, vomiting. He is passing gas. He has not taken anything over the counter to help. Patient also recently COVID-19 positive on 12/24/21. Patient reports there was a family wedding that his wife went to and was exposed at the event and that is how he got it. He denies any SOB, chest pain, fevers or chills, no leg swelling but has had some cough. Patient is unvaccinated.    In the ED, patient given 1L IVF, prednisone 60 mg, miralax, and admitted to hospital medicine under observation.      Past Medical History:   Diagnosis Date    B12 nutritional deficiency 8/7/2019    COVID-19 12/24/2021    E. coli UTI 01/2019    During hospitalization for seizure    Generalized tonic-clonic seizure 1/26/2019 1-2019 admitted for new-onset seizures.Normal CT head.  His mental status normalized, and he had no recurrent seizures following keppra loading in the ED and twice-daily maintenance upon arrival to the floor. Workup into the etiology of his seizures remained negative. EEG was performed, with the preliminary interpretation being no epileptiform activity with normal  background.     History of hepatitis C, s/p successful Rx w/ SVR12 (cure) - 11/2019 2/8/2019    S/p epclusa    Kidney stone on left side 6/28/2019    Mixed type COPD (chronic obstructive pulmonary disease) 8/15/2019    8-2019 PFT: Normal airflow. Airflow not improved after bronchodilator. Moderate restriction. Diffusion capacity is mildly decreased. Oximetry is normal.    Partial idiopathic epilepsy with seizures of localized onset, not intractable, without status epilepticus 1/26/2019    new-onset seizures (1/209). Normal CTH, MRI and routine EEG . On keppra 500 mg BID    Pterygium, bilateral 3/12/2019    Type 2 diabetes mellitus with microalbuminuria, without long-term current use of insulin 2/11/2019    Vitamin D insufficiency 7/29/2021       Past Surgical History:   Procedure Laterality Date    CATARACT EXTRACTION W/  INTRAOCULAR LENS IMPLANT Right 7/28/2020    Procedure: EXTRACTION, CATARACT, WITH IOL INSERTION;  Surgeon: Daryl Calloway MD;  Location: [facility] OR;  Service: Ophthalmology;  Laterality: Right;    CATARACT EXTRACTION W/  INTRAOCULAR LENS IMPLANT Left 8/11/2020    Procedure: EXTRACTION, CATARACT, WITH IOL INSERTION;  Surgeon: Daryl Calloway MD;  Location: [facility] OR;  Service: Ophthalmology;  Laterality: Left;    HERNIA REPAIR Right 2000    [facility]       Review of patient's allergies indicates:  No Known Allergies    No current facility-administered medications on file prior to encounter.     Current Outpatient Medications on File Prior to Encounter   Medication Sig    cholecalciferol, vitamin D3, 125 mcg (5,000 unit) Tab Take 1 tablet (5,000 Units total) by mouth once daily.    cyanocobalamin 1,000 mcg/mL injection Inject 1 mL (1,000 mcg total) into the muscle every 30 days.    lamoTRIgine (LAMICTAL) 150 MG Tab Take 2 tablets (300 mg total) by mouth once daily. NO FURTHER REFILLS WITHOUT APPOINTMENT    latanoprost 0.005 % ophthalmic solution INSTILL 1 DROP IN BOTH  EYES EVERY EVENING    pravastatin (PRAVACHOL) 10 MG tablet Take 1 tablet (10 mg total) by mouth once daily.    pulse oximeter (PULSE OXIMETER) device by Apply Externally route 2 (two) times a day. Use twice daily at 8 AM and 3 PM and record the value in MyChart as directed.    silodosin (RAPAFLO) 4 mg Cap capsule Take 1 capsule (4 mg total) by mouth once daily.    SITagliptin (JANUVIA) 25 MG Tab Take 1 tablet (25 mg total) by mouth once daily.     Family History     Problem Relation (Age of Onset)    Hypertension Mother        Tobacco Use    Smoking status: Former Smoker     Packs/day: 3.00     Years: 25.00     Pack years: 75.00     Quit date: 2012     Years since quittin.9    Smokeless tobacco: Never Used   Substance and Sexual Activity    Alcohol use: No     Comment: Used to drink    Drug use: No    Sexual activity: Yes     Partners: Female     Review of Systems   Constitutional: Negative for diaphoresis and fever.   HENT: Negative for congestion, sneezing and sore throat.    Respiratory: Positive for cough. Negative for shortness of breath and wheezing.    Cardiovascular: Positive for chest pain. Negative for leg swelling.   Gastrointestinal: Positive for constipation. Negative for abdominal distention, abdominal pain, nausea and vomiting.   Genitourinary: Negative for dysuria, frequency and urgency.   Musculoskeletal: Negative for arthralgias and myalgias.   Neurological: Positive for dizziness and light-headedness. Negative for weakness.     Objective:     Vital Signs (Most Recent):  Temp: 99.2 °F (37.3 °C) (22)  Pulse: 85 (22 08)  Resp: 20 (22)  BP: 118/78 (22 07)  SpO2: 96 % (22) Vital Signs (24h Range):  Temp:  [99.2 °F (37.3 °C)-100.3 °F (37.9 °C)] 99.2 °F (37.3 °C)  Pulse:  [] 85  Resp:  [20-21] 20  SpO2:  [90 %-96 %] 96 %  BP: (118-137)/(71-79) 118/78     Weight: 74.8 kg (165 lb)  Body mass index is 29.23 kg/m².    Physical  Exam  Constitutional:       General: He is not in acute distress.  HENT:      Head: Normocephalic and atraumatic.      Mouth/Throat:      Mouth: Mucous membranes are moist.      Pharynx: Oropharynx is clear.   Eyes:      Pupils: Pupils are equal, round, and reactive to light.   Cardiovascular:      Rate and Rhythm: Normal rate and regular rhythm.      Pulses: Normal pulses.   Pulmonary:      Effort: Pulmonary effort is normal. No respiratory distress.      Breath sounds: No wheezing.   Abdominal:      General: Bowel sounds are normal. There is no distension.      Palpations: Abdomen is soft.      Tenderness: There is no abdominal tenderness.   Musculoskeletal:         General: No swelling. Normal range of motion.      Cervical back: Normal range of motion and neck supple.   Skin:     General: Skin is warm.   Neurological:      General: No focal deficit present.      Mental Status: He is alert and oriented to person, place, and time.           CRANIAL NERVES     CN III, IV, VI   Pupils are equal, round, and reactive to light.       Significant Labs:   All pertinent labs within the past 24 hours have been reviewed.  BMP:   Recent Labs   Lab 01/02/22  0544   *   *   K 3.9      CO2 19*   BUN 17   CREATININE 1.2   CALCIUM 8.9     CBC:   Recent Labs   Lab 01/02/22  0544   WBC 6.02   HGB 13.4*   HCT 41.8   *       Significant Imaging: I have reviewed all pertinent imaging results/findings within the past 24 hours.    Assessment/Plan:     Constipation  - last bowel movement 1/1/22 at 12pm  - abdomen benign, patient passing gas  - start bowel regimen with miralax and senna-doc  - PRN suppository      COVID-19  - COVID positive 12/24/21 and unvaccinated, patient currently with oxygen saturations in low to mid 90's, CXR without any infiltrates  - given prednisone 60 mg in the ED, will start dexamethasone  - patient does not qualify for/need remdesivir or abx at this time  - maintain oxygen sats >  92%  - albuterol/MDI q6h      Vitamin D insufficiency  - chronic, stable  - continue supplementation      Benign prostatic hyperplasia with urinary frequency  - chronic, stable  - continue home silodosin      Mixed type COPD (chronic obstructive pulmonary disease)  8-2019 PFT:  Normal airflow. Airflow not improved after bronchodilator. Moderate restriction.  Diffusion capacity is mildly decreased. Oximetry is normal.  - patient not on home oxygen      OAG (open angle glaucoma) suspect, low risk, bilateral  - chronic, stable  - continue home latanoprost eye drops      Type 2 diabetes mellitus with microalbuminuria, without long-term current use of insulin  - repeat HbA1c, not on insulin  - hold home januvia  - diabetic diet, POCT glucose ac/hs, SSI while on dexamethasone    Partial idiopathic epilepsy with seizures of localized onset, not intractable, without status epilepticus  - chronic, stable  - continue home lamictal       VTE Risk Mitigation (From admission, onward)         Ordered     enoxaparin injection 70 mg  2 times daily         01/02/22 0901                   Sheila Holden MD  Department of Hospital Medicine   [facility] - Emergency Dept

## 2022-01-02 NOTE — ASSESSMENT & PLAN NOTE
8-2019 PFT:  Normal airflow. Airflow not improved after bronchodilator. Moderate restriction.  Diffusion capacity is mildly decreased. Oximetry is normal.  - patient not on home oxygen

## 2022-01-02 NOTE — PLAN OF CARE
[facility] - Intensive Care ([place] [id number])  Initial Discharge Assessment       Primary Care Provider: Srikanth Son MD    Admission Diagnosis: Tachycardia [R00.0]  Constipation, unspecified constipation type [K59.00]  COVID-19 [U07.1]    Admission Date: 1/2/2022  Expected Discharge Date:     Discharge Barriers Identified: None    Payor: [employer]ICARE / Plan: [employer] / Product Type: Medicare Advantage /     Extended Emergency Contact Information  Primary Emergency Contact: Lana Cruzvia  Address: [address]           [place] [address] [place]  Mobile Phone: [phone]  Relation: Spouse    Discharge Plan A: Home with family  Discharge Plan B: [facility]      [id number] - [place], [place] - [address] [place] [address] AT [address] [place] [address]  [address] [place] [place] [address]  Phone: [phone] Fax: [phone]    [facility]  [address] [place] [address]  [facility] [address]  Phone: [phone] Fax: [phone]    [facility] - [place] - [address] [place] [address]  [address] [place] [address]  [place] [address]  Phone: [phone] Fax: [phone]      Patient lives with his wife in a one-story home.    Independent with ADL and does not use medical equipment.   CM/SW will follow for discharge p[anning.      Initial Assessment (most recent)     Adult Discharge Assessment - 01/02/22 1649        Discharge Assessment    Assessment Type Discharge Planning Assessment     Confirmed/corrected address, phone number and insurance Yes     Confirmed Demographics Correct on Facesheet     Source of Information family     If unable to respond/provide information was family/caregiver contacted? Yes     Contact Name/Number Caro Cruz (spouse)  [phone]     Reason For Admission tachycardia     Lives With spouse     Facility Arrived From: home     Do you expect to return to your current living situation? Yes     Do you have  help at home or someone to help you manage your care at home? Yes     Who are your caregiver(s) and their phone number(s)? Caro Cruz (spouse)  [phone]     Prior to hospitilization cognitive status: Alert/Oriented     Current cognitive status: Unable to Assess     Walking or Climbing Stairs Difficulty none     Dressing/Bathing Difficulty none     Home Layout Able to live on 1st floor     Equipment Currently Used at Home none     Readmission within 30 days? No     Patient currently being followed by outpatient case management? No     Do you currently have service(s) that help you manage your care at home? No     Do you take prescription medications? Yes     Do you have prescription coverage? Yes     Coverage [facility]     Do you have any problems affording any of your prescribed medications? No     Is the patient taking medications as prescribed? yes     Are you on dialysis? No     Do you take coumadin? No     Discharge Plan A Home with family     Discharge Plan B Home Health     DME Needed Upon Discharge  --   tbd    Discharge Plan discussed with: Spouse/sig other     Name(s) and Number(s) Caro Cruz (spouse)  [phone]     Discharge Barriers Identified None        Relationship/Environment    Name(s) of Who Lives With Patient Caro Cruz (spouse)  [phone]

## 2022-01-02 NOTE — SUBJECTIVE & OBJECTIVE
Past Medical History:   Diagnosis Date    B12 nutritional deficiency 8/7/2019    COVID-19 12/24/2021    E. coli UTI 01/2019    During hospitalization for seizure    Generalized tonic-clonic seizure 1/26/2019 1-2019 admitted for new-onset seizures.Normal CT head.  His mental status normalized, and he had no recurrent seizures following keppra loading in the ED and twice-daily maintenance upon arrival to the floor. Workup into the etiology of his seizures remained negative. EEG was performed, with the preliminary interpretation being no epileptiform activity with normal background.     History of hepatitis C, s/p successful Rx w/ SVR12 (cure) - 11/2019 2/8/2019    S/p epclusa    Kidney stone on left side 6/28/2019    Mixed type COPD (chronic obstructive pulmonary disease) 8/15/2019    8-2019 PFT: Normal airflow. Airflow not improved after bronchodilator. Moderate restriction. Diffusion capacity is mildly decreased. Oximetry is normal.    Partial idiopathic epilepsy with seizures of localized onset, not intractable, without status epilepticus 1/26/2019    new-onset seizures (1/209). Normal CTH, MRI and routine EEG . On keppra 500 mg BID    Pterygium, bilateral 3/12/2019    Type 2 diabetes mellitus with microalbuminuria, without long-term current use of insulin 2/11/2019    Vitamin D insufficiency 7/29/2021       Past Surgical History:   Procedure Laterality Date    CATARACT EXTRACTION W/  INTRAOCULAR LENS IMPLANT Right 7/28/2020    Procedure: EXTRACTION, CATARACT, WITH IOL INSERTION;  Surgeon: Daryl Calloway MD;  Location: [facility] OR;  Service: Ophthalmology;  Laterality: Right;    CATARACT EXTRACTION W/  INTRAOCULAR LENS IMPLANT Left 8/11/2020    Procedure: EXTRACTION, CATARACT, WITH IOL INSERTION;  Surgeon: Daryl Calloway MD;  Location: [facility] OR;  Service: Ophthalmology;  Laterality: Left;    HERNIA REPAIR Right 2000    [facility]       Review of patient's allergies indicates:  No  Known Allergies    No current facility-administered medications on file prior to encounter.     Current Outpatient Medications on File Prior to Encounter   Medication Sig    cholecalciferol, vitamin D3, 125 mcg (5,000 unit) Tab Take 1 tablet (5,000 Units total) by mouth once daily.    cyanocobalamin 1,000 mcg/mL injection Inject 1 mL (1,000 mcg total) into the muscle every 30 days.    lamoTRIgine (LAMICTAL) 150 MG Tab Take 2 tablets (300 mg total) by mouth once daily. NO FURTHER REFILLS WITHOUT APPOINTMENT    latanoprost 0.005 % ophthalmic solution INSTILL 1 DROP IN BOTH EYES EVERY EVENING    pravastatin (PRAVACHOL) 10 MG tablet Take 1 tablet (10 mg total) by mouth once daily.    pulse oximeter (PULSE OXIMETER) device by Apply Externally route 2 (two) times a day. Use twice daily at 8 AM and 3 PM and record the value in MyChart as directed.    silodosin (RAPAFLO) 4 mg Cap capsule Take 1 capsule (4 mg total) by mouth once daily.    SITagliptin (JANUVIA) 25 MG Tab Take 1 tablet (25 mg total) by mouth once daily.     Family History     Problem Relation (Age of Onset)    Hypertension Mother        Tobacco Use    Smoking status: Former Smoker     Packs/day: 3.00     Years: 25.00     Pack years: 75.00     Quit date: 2012     Years since quittin.9    Smokeless tobacco: Never Used   Substance and Sexual Activity    Alcohol use: No     Comment: Used to drink    Drug use: No    Sexual activity: Yes     Partners: Female     Review of Systems   Constitutional: Negative for diaphoresis and fever.   HENT: Negative for congestion, sneezing and sore throat.    Respiratory: Positive for cough. Negative for shortness of breath and wheezing.    Cardiovascular: Positive for chest pain. Negative for leg swelling.   Gastrointestinal: Positive for constipation. Negative for abdominal distention, abdominal pain, nausea and vomiting.   Genitourinary: Negative for dysuria, frequency and urgency.   Musculoskeletal:  Negative for arthralgias and myalgias.   Neurological: Positive for dizziness and light-headedness. Negative for weakness.     Objective:     Vital Signs (Most Recent):  Temp: 99.2 °F (37.3 °C) (01/02/22 0703)  Pulse: 85 (01/02/22 0829)  Resp: 20 (01/02/22 0829)  BP: 118/78 (01/02/22 0703)  SpO2: 96 % (01/02/22 0829) Vital Signs (24h Range):  Temp:  [99.2 °F (37.3 °C)-100.3 °F (37.9 °C)] 99.2 °F (37.3 °C)  Pulse:  [] 85  Resp:  [20-21] 20  SpO2:  [90 %-96 %] 96 %  BP: (118-137)/(71-79) 118/78     Weight: 74.8 kg (165 lb)  Body mass index is 29.23 kg/m².    Physical Exam  Constitutional:       General: He is not in acute distress.  HENT:      Head: Normocephalic and atraumatic.      Mouth/Throat:      Mouth: Mucous membranes are moist.      Pharynx: Oropharynx is clear.   Eyes:      Pupils: Pupils are equal, round, and reactive to light.   Cardiovascular:      Rate and Rhythm: Normal rate and regular rhythm.      Pulses: Normal pulses.   Pulmonary:      Effort: Pulmonary effort is normal. No respiratory distress.      Breath sounds: No wheezing.   Abdominal:      General: Bowel sounds are normal. There is no distension.      Palpations: Abdomen is soft.      Tenderness: There is no abdominal tenderness.   Musculoskeletal:         General: No swelling. Normal range of motion.      Cervical back: Normal range of motion and neck supple.   Skin:     General: Skin is warm.   Neurological:      General: No focal deficit present.      Mental Status: He is alert and oriented to person, place, and time.           CRANIAL NERVES     CN III, IV, VI   Pupils are equal, round, and reactive to light.       Significant Labs:   All pertinent labs within the past 24 hours have been reviewed.  BMP:   Recent Labs   Lab 01/02/22  0544   *   *   K 3.9      CO2 19*   BUN 17   CREATININE 1.2   CALCIUM 8.9     CBC:   Recent Labs   Lab 01/02/22 0544   WBC 6.02   HGB 13.4*   HCT 41.8   *       Significant  Imaging: I have reviewed all pertinent imaging results/findings within the past 24 hours.

## 2022-01-02 NOTE — ASSESSMENT & PLAN NOTE
- COVID positive 12/24/21 and unvaccinated, patient currently with oxygen saturations in low to mid 90's, CXR without any infiltrates  - given prednisone 60 mg in the ED, will start dexamethasone  - patient does not qualify for/need remdesivir or abx at this time  - maintain oxygen sats > 92%  - albuterol/MDI q6h

## 2022-01-02 NOTE — ED NOTES
Pt laying in bed, denies complaints at present.  Pt updated on status and v/u.  Pt provided pillow and blanket, bedside commode brought into room.  Siderails up x 2/call light in reach.  Will continue to monitor.    LOC: The patient is awake, alert and aware of environment with an appropriate affect, the patient is oriented x 3 and speaking appropriately.  APPEARANCE: Patient resting comfortably and in no acute distress, patient is clean and well groomed, patient's clothing is properly fastened.  SKIN: The skin is warm and dry, color consistent with ethnicity, patient has normal skin turgor and moist mucus membranes, skin intact, no breakdown or bruising noted.  MUSCULOSKELETAL: Patient moving all extremities spontaneously, no obvious swelling or deformities noted.  RESPIRATORY: Airway is open and patent, respirations are spontaneous, patient has a normal effort and rate, no accessory muscle use noted.  CARDIAC: Patient has a normal rate and regular rhythm, no periphreal edema note.  ABDOMEN: Soft and non tender to palpation, no distention noted, normoactive bowel sounds present in all four quadrants.  NEUROLOGIC:  facial expression is symmetrical, patient moving all extremities spontaneously, normal sensation in all extremities when touched with a finger.  Follows all commands appropriately.

## 2022-01-02 NOTE — ASSESSMENT & PLAN NOTE
- repeat HbA1c, not on insulin  - hold home januvia  - diabetic diet, POCT glucose ac/hs, SSI while on dexamethasone

## 2022-01-02 NOTE — HPI
66 year old male with PMHx of DMII (not on insulin), glaucoma, COPD (per PFT's, not on oxygen), HLD, BPH, seizure disorder, and Vitamin D/B12 deficiency who presents to the ED with chief complaint of constipation. Patient reports his last bowel movement was yesterday at noon and he has not had one since then. He denies any abdominal pain, nausea, vomiting. He is passing gas. He has not taken anything over the counter to help. Patient also recently COVID-19 positive on 12/24/21. Patient reports there was a family wedding that his wife went to and was exposed at the event and that is how he got it. He denies any SOB, chest pain, fevers or chills, no leg swelling but has had some cough. Patient is unvaccinated.    In the ED, patient given 1L IVF, prednisone 60 mg, miralax, and admitted to hospital medicine under observation.

## 2022-01-02 NOTE — ASSESSMENT & PLAN NOTE
- last bowel movement 1/1/22 at 12pm  - abdomen benign, patient passing gas  - start bowel regimen with miralax and senna-doc  - PRN suppository

## 2022-01-03 LAB
POCT GLUCOSE: 113 MG/DL (ref 70–110)
POCT GLUCOSE: 115 MG/DL (ref 70–110)
POCT GLUCOSE: 131 MG/DL (ref 70–110)
POCT GLUCOSE: 148 MG/DL (ref 70–110)

## 2022-01-03 PROCEDURE — 12000002 HC ACUTE/MED SURGE SEMI-PRIVATE ROOM

## 2022-01-03 PROCEDURE — 63600175 PHARM REV CODE 636 W HCPCS: Performed by: STUDENT IN AN ORGANIZED HEALTH CARE EDUCATION/TRAINING PROGRAM

## 2022-01-03 PROCEDURE — C9399 UNCLASSIFIED DRUGS OR BIOLOG: HCPCS | Performed by: STUDENT IN AN ORGANIZED HEALTH CARE EDUCATION/TRAINING PROGRAM

## 2022-01-03 PROCEDURE — 94761 N-INVAS EAR/PLS OXIMETRY MLT: CPT

## 2022-01-03 PROCEDURE — 25000003 PHARM REV CODE 250: Performed by: STUDENT IN AN ORGANIZED HEALTH CARE EDUCATION/TRAINING PROGRAM

## 2022-01-03 PROCEDURE — 27000221 HC OXYGEN, UP TO 24 HOURS

## 2022-01-03 PROCEDURE — 94640 AIRWAY INHALATION TREATMENT: CPT

## 2022-01-03 PROCEDURE — 96372 THER/PROPH/DIAG INJ SC/IM: CPT

## 2022-01-03 PROCEDURE — 99900035 HC TECH TIME PER 15 MIN (STAT)

## 2022-01-03 PROCEDURE — 27000207 HC ISOLATION

## 2022-01-03 PROCEDURE — 99233 SBSQ HOSP IP/OBS HIGH 50: CPT | Mod: ,,, | Performed by: STUDENT IN AN ORGANIZED HEALTH CARE EDUCATION/TRAINING PROGRAM

## 2022-01-03 PROCEDURE — 99233 PR SUBSEQUENT HOSPITAL CARE,LEVL III: ICD-10-PCS | Mod: ,,, | Performed by: STUDENT IN AN ORGANIZED HEALTH CARE EDUCATION/TRAINING PROGRAM

## 2022-01-03 RX ORDER — MUPIROCIN 20 MG/G
OINTMENT TOPICAL 2 TIMES DAILY
Status: DISCONTINUED | OUTPATIENT
Start: 2022-01-03 | End: 2022-01-04 | Stop reason: HOSPADM

## 2022-01-03 RX ADMIN — ALBUTEROL SULFATE 2 PUFF: 108 INHALANT RESPIRATORY (INHALATION) at 07:01

## 2022-01-03 RX ADMIN — ENOXAPARIN SODIUM 70 MG: 100 INJECTION SUBCUTANEOUS at 10:01

## 2022-01-03 RX ADMIN — DEXAMETHASONE 6 MG: 4 TABLET ORAL at 09:01

## 2022-01-03 RX ADMIN — ALBUTEROL SULFATE 2 PUFF: 108 INHALANT RESPIRATORY (INHALATION) at 01:01

## 2022-01-03 RX ADMIN — Medication 500 MG: at 10:01

## 2022-01-03 RX ADMIN — MUPIROCIN: 20 OINTMENT TOPICAL at 10:01

## 2022-01-03 RX ADMIN — LAMOTRIGINE 300 MG: 100 TABLET ORAL at 09:01

## 2022-01-03 RX ADMIN — REMDESIVIR 200 MG: 100 INJECTION, POWDER, LYOPHILIZED, FOR SOLUTION INTRAVENOUS at 02:01

## 2022-01-03 RX ADMIN — SENNOSIDES AND DOCUSATE SODIUM 1 TABLET: 50; 8.6 TABLET ORAL at 09:01

## 2022-01-03 RX ADMIN — PRAVASTATIN SODIUM 10 MG: 10 TABLET ORAL at 09:01

## 2022-01-03 RX ADMIN — LATANOPROST 1 DROP: 50 SOLUTION OPHTHALMIC at 11:01

## 2022-01-03 RX ADMIN — POLYETHYLENE GLYCOL 3350 17 G: 17 POWDER, FOR SOLUTION ORAL at 09:01

## 2022-01-03 RX ADMIN — SILODOSIN 4 MG: 4 CAPSULE ORAL at 09:01

## 2022-01-03 RX ADMIN — CHOLECALCIFEROL TAB 25 MCG (1000 UNIT) 5000 UNITS: 25 TAB at 09:01

## 2022-01-03 RX ADMIN — ENOXAPARIN SODIUM 70 MG: 100 INJECTION SUBCUTANEOUS at 09:01

## 2022-01-03 RX ADMIN — MULTIPLE VITAMINS W/ MINERALS TAB 1 TABLET: TAB at 09:01

## 2022-01-03 RX ADMIN — SENNOSIDES AND DOCUSATE SODIUM 1 TABLET: 50; 8.6 TABLET ORAL at 10:01

## 2022-01-03 RX ADMIN — ALBUTEROL SULFATE 2 PUFF: 108 INHALANT RESPIRATORY (INHALATION) at 08:01

## 2022-01-03 RX ADMIN — Medication 500 MG: at 09:01

## 2022-01-03 NOTE — ASSESSMENT & PLAN NOTE
- COVID positive 12/24/21 and unvaccinated, patient currently with oxygen saturations in low to mid 90's, CXR without any infiltrates  - given prednisone 60 mg in the ED, will start dexamethasone  - patient does not qualify for abx at this time  - maintain oxygen sats > 92%  - albuterol/MDI q6h  6m walk showed patient's oxygenation dropped to 89% with ambulation, qualifying him for Remdesivir.

## 2022-01-03 NOTE — HOSPITAL COURSE
Admitted with constipation and covid+. Patient able to have multiple BM now. 6m walk showed patient's oxygenation dropped to 89% with ambulation, qualifying him for Remdesivir. Will upgrade to inpatient status and give remdes. 1/4 Pt reports feeling significantly improved, adamant about discharging today. Pt satruating well on RA at rest, SpO2 maintained in 90s on ambulation. Pt amenable to discharge home, instructed to return with any worsening shortness of breath, and to isolate at home per recent CDC guidance.

## 2022-01-03 NOTE — CARE UPDATE
Patient's oxygenation dropped to 89% with ambulation, qualifying him for Remdesivir.  -Will upgrade to inpatient status and give remdes.        Shade Morales MD  Internal Medicine Staff

## 2022-01-03 NOTE — SUBJECTIVE & OBJECTIVE
Review of Systems   Constitutional: Positive for activity change and fatigue. Negative for diaphoresis, fever and unexpected weight change.   HENT: Positive for rhinorrhea. Negative for congestion, sneezing, sore throat, trouble swallowing and voice change.    Eyes: Negative for photophobia and visual disturbance.   Respiratory: Positive for cough. Negative for shortness of breath and wheezing.    Cardiovascular: Positive for chest pain. Negative for palpitations and leg swelling.   Gastrointestinal: Positive for constipation. Negative for abdominal distention, abdominal pain, blood in stool, diarrhea, nausea and vomiting.   Endocrine: Negative for polydipsia, polyphagia and polyuria.   Genitourinary: Negative for difficulty urinating, dysuria, frequency, hematuria and urgency.   Musculoskeletal: Negative for arthralgias, myalgias, neck pain and neck stiffness.   Skin: Negative for pallor and rash.   Allergic/Immunologic: Negative for food allergies and immunocompromised state.   Neurological: Positive for dizziness and light-headedness. Negative for seizures, syncope, facial asymmetry and weakness.   Psychiatric/Behavioral: Negative for agitation, behavioral problems and confusion.     Objective:     Vital Signs (Most Recent):  Temp: 98.4 °F (36.9 °C) (01/03/22 1212)  Pulse: 97 (01/03/22 1212)  Resp: 19 (01/03/22 1212)  BP: 137/77 (01/03/22 1212)  SpO2: 97 % (01/03/22 1212) Vital Signs (24h Range):  Temp:  [98.3 °F (36.8 °C)-99 °F (37.2 °C)] 98.4 °F (36.9 °C)  Pulse:  [] 97  Resp:  [16-21] 19  SpO2:  [94 %-98 %] 97 %  BP: (118-156)/(65-83) 137/77     Weight: 74.8 kg (165 lb)  Body mass index is 29.23 kg/m².    Intake/Output Summary (Last 24 hours) at 1/3/2022 1309  Last data filed at 1/3/2022 0800  Gross per 24 hour   Intake --   Output 450 ml   Net -450 ml      Physical Exam  Vitals and nursing note reviewed.   Constitutional:       General: He is not in acute distress.     Appearance: He is  well-developed and well-nourished. He is ill-appearing. He is not diaphoretic.   HENT:      Head: Normocephalic and atraumatic.      Nose: Congestion and rhinorrhea present.      Mouth/Throat:      Mouth: Mucous membranes are moist.      Pharynx: Oropharynx is clear. No oropharyngeal exudate.   Eyes:      General: No scleral icterus.     Pupils: Pupils are equal, round, and reactive to light.   Neck:      Thyroid: No thyromegaly.   Cardiovascular:      Rate and Rhythm: Normal rate and regular rhythm.      Pulses: Normal pulses.      Heart sounds: No murmur heard.  No gallop.    Pulmonary:      Effort: Pulmonary effort is normal. No respiratory distress.      Breath sounds: No stridor. Rales present. No wheezing.   Abdominal:      General: Bowel sounds are normal. There is no distension.      Palpations: Abdomen is soft.      Tenderness: There is no abdominal tenderness. There is no guarding.   Musculoskeletal:         General: No swelling or deformity. Normal range of motion.      Cervical back: Normal range of motion and neck supple. No rigidity.      Right lower leg: No edema.      Left lower leg: No edema.   Lymphadenopathy:      Cervical: No cervical adenopathy.   Skin:     General: Skin is warm.      Capillary Refill: Capillary refill takes less than 2 seconds.      Coloration: Skin is not jaundiced.      Findings: No bruising.   Neurological:      General: No focal deficit present.      Mental Status: He is alert and oriented to person, place, and time.      Cranial Nerves: No cranial nerve deficit.   Psychiatric:         Mood and Affect: Mood and affect and mood normal.         Behavior: Behavior normal.             Recent Results (from the past 24 hour(s))   POCT glucose    Collection Time: 01/02/22  9:45 PM   Result Value Ref Range    POCT Glucose 145 (H) 70 - 110 mg/dL   POCT glucose    Collection Time: 01/03/22 12:42 PM   Result Value Ref Range    POCT Glucose 131 (H) 70 - 110 mg/dL       Microbiology  Results (last 7 days)     ** No results found for the last 168 hours. **           Imaging Results          X-Ray Chest AP Portable (Final result)  Result time 01/02/22 05:49:24    Final result by Brenda Gray MD (01/02/22 05:49:24)                 Impression:      No acute intrathoracic abnormality or significant interval detrimental change in cardiopulmonary status identified on this single radiographic view of the chest.      Electronically signed by: Brenda Gray MD  Date:    01/02/2022  Time:    05:49             Narrative:    EXAMINATION:  XR CHEST AP PORTABLE    CLINICAL HISTORY:  cough;    TECHNIQUE:  Single frontal view of the chest was performed.    COMPARISON:  12/31/2021    FINDINGS:  Mediastinal structures are midline.  The cardiomediastinal silhouette is unchanged in size and configuration.  The lungs are symmetrically expanded with diffuse coarse interstitial attenuation which appears similar to most prior examinations and could relate to chronic interstitial/emphysematous change.  No new confluent airspace consolidation, large volume of pleural fluid or pneumothorax is identified.  Osseous structures are intact with degenerative change.

## 2022-01-03 NOTE — PROGRESS NOTES
[facility] - Intensive Care ([place] [address])  [facility] Medicine  Progress Note    Patient Name: Cristiano Cruz  MRN: [id number]  Patient Class: IP- Inpatient   Admission Date: 1/2/2022  Length of Stay: 0 days  Attending Physician: Shade Morales MD  Primary Care Provider: Srikanth Son MD        Subjective:     Principal Problem:COVID-19        HPI:  66 year old male with PMHx of DMII (not on insulin), glaucoma, COPD (per PFT's, not on oxygen), HLD, BPH, seizure disorder, and Vitamin D/B12 deficiency who presents to the ED with chief complaint of constipation. Patient reports his last bowel movement was yesterday at noon and he has not had one since then. He denies any abdominal pain, nausea, vomiting. He is passing gas. He has not taken anything over the counter to help. Patient also recently COVID-19 positive on 12/24/21. Patient reports there was a family wedding that his wife went to and was exposed at the event and that is how he got it. He denies any SOB, chest pain, fevers or chills, no leg swelling but has had some cough. Patient is unvaccinated.    In the ED, patient given 1L IVF, prednisone 60 mg, miralax, and admitted to hospital medicine under observation.      Overview/Hospital Course:  Admitted with constipation and covid+. Patient able to have multiple BM now. 6m walk showed patient's oxygenation dropped to 89% with ambulation, qualifying him for Remdesivir. Will upgrade to inpatient status and give remdes.          Review of Systems   Constitutional: Positive for activity change and fatigue. Negative for diaphoresis, fever and unexpected weight change.   HENT: Positive for rhinorrhea. Negative for congestion, sneezing, sore throat, trouble swallowing and voice change.    Eyes: Negative for photophobia and visual disturbance.   Respiratory: Positive for cough. Negative for shortness of breath and wheezing.    Cardiovascular: Positive for chest pain. Negative for palpitations and leg swelling.   Gastrointestinal:  Positive for constipation. Negative for abdominal distention, abdominal pain, blood in stool, diarrhea, nausea and vomiting.   Endocrine: Negative for polydipsia, polyphagia and polyuria.   Genitourinary: Negative for difficulty urinating, dysuria, frequency, hematuria and urgency.   Musculoskeletal: Negative for arthralgias, myalgias, neck pain and neck stiffness.   Skin: Negative for pallor and rash.   Allergic/Immunologic: Negative for food allergies and immunocompromised state.   Neurological: Positive for dizziness and light-headedness. Negative for seizures, syncope, facial asymmetry and weakness.   Psychiatric/Behavioral: Negative for agitation, behavioral problems and confusion.     Objective:     Vital Signs (Most Recent):  Temp: 98.4 °F (36.9 °C) (01/03/22 1212)  Pulse: 97 (01/03/22 1212)  Resp: 19 (01/03/22 1212)  BP: 137/77 (01/03/22 1212)  SpO2: 97 % (01/03/22 1212) Vital Signs (24h Range):  Temp:  [98.3 °F (36.8 °C)-99 °F (37.2 °C)] 98.4 °F (36.9 °C)  Pulse:  [] 97  Resp:  [16-21] 19  SpO2:  [94 %-98 %] 97 %  BP: (118-156)/(65-83) 137/77     Weight: 74.8 kg (165 lb)  Body mass index is 29.23 kg/m².    Intake/Output Summary (Last 24 hours) at 1/3/2022 1309  Last data filed at 1/3/2022 0800  Gross per 24 hour   Intake --   Output 450 ml   Net -450 ml      Physical Exam  Vitals and nursing note reviewed.   Constitutional:       General: He is not in acute distress.     Appearance: He is well-developed and well-nourished. He is ill-appearing. He is not diaphoretic.   HENT:      Head: Normocephalic and atraumatic.      Nose: Congestion and rhinorrhea present.      Mouth/Throat:      Mouth: Mucous membranes are moist.      Pharynx: Oropharynx is clear. No oropharyngeal exudate.   Eyes:      General: No scleral icterus.     Pupils: Pupils are equal, round, and reactive to light.   Neck:      Thyroid: No thyromegaly.   Cardiovascular:      Rate and Rhythm: Normal rate and regular rhythm.      Pulses:  Normal pulses.      Heart sounds: No murmur heard.  No gallop.    Pulmonary:      Effort: Pulmonary effort is normal. No respiratory distress.      Breath sounds: No stridor. Rales present. No wheezing.   Abdominal:      General: Bowel sounds are normal. There is no distension.      Palpations: Abdomen is soft.      Tenderness: There is no abdominal tenderness. There is no guarding.   Musculoskeletal:         General: No swelling or deformity. Normal range of motion.      Cervical back: Normal range of motion and neck supple. No rigidity.      Right lower leg: No edema.      Left lower leg: No edema.   Lymphadenopathy:      Cervical: No cervical adenopathy.   Skin:     General: Skin is warm.      Capillary Refill: Capillary refill takes less than 2 seconds.      Coloration: Skin is not jaundiced.      Findings: No bruising.   Neurological:      General: No focal deficit present.      Mental Status: He is alert and oriented to person, place, and time.      Cranial Nerves: No cranial nerve deficit.   Psychiatric:         Mood and Affect: Mood and affect and mood normal.         Behavior: Behavior normal.             Recent Results (from the past 24 hour(s))   POCT glucose    Collection Time: 01/02/22  9:45 PM   Result Value Ref Range    POCT Glucose 145 (H) 70 - 110 mg/dL   POCT glucose    Collection Time: 01/03/22 12:42 PM   Result Value Ref Range    POCT Glucose 131 (H) 70 - 110 mg/dL       Microbiology Results (last 7 days)     ** No results found for the last 168 hours. **           Imaging Results          X-Ray Chest AP Portable (Final result)  Result time 01/02/22 05:49:24    Final result by Brenda Gray MD (01/02/22 05:49:24)                 Impression:      No acute intrathoracic abnormality or significant interval detrimental change in cardiopulmonary status identified on this single radiographic view of the chest.      Electronically signed by: Brenda Gray MD  Date:    01/02/2022  Time:    05:49              Narrative:    EXAMINATION:  XR CHEST AP PORTABLE    CLINICAL HISTORY:  cough;    TECHNIQUE:  Single frontal view of the chest was performed.    COMPARISON:  12/31/2021    FINDINGS:  Mediastinal structures are midline.  The cardiomediastinal silhouette is unchanged in size and configuration.  The lungs are symmetrically expanded with diffuse coarse interstitial attenuation which appears similar to most prior examinations and could relate to chronic interstitial/emphysematous change.  No new confluent airspace consolidation, large volume of pleural fluid or pneumothorax is identified.  Osseous structures are intact with degenerative change.                                      Assessment/Plan:      * COVID-19  - COVID positive 12/24/21 and unvaccinated, patient currently with oxygen saturations in low to mid 90's, CXR without any infiltrates  - given prednisone 60 mg in the ED, will start dexamethasone  - patient does not qualify for abx at this time  - maintain oxygen sats > 92%  - albuterol/MDI q6h  6m walk showed patient's oxygenation dropped to 89% with ambulation, qualifying him for Remdesivir.       Constipation  - last bowel movement 1/1/22 at 12pm  - abdomen benign, patient passing gas  - start bowel regimen with miralax and senna-doc  - PRN suppository      Vitamin D insufficiency  - chronic, stable  - continue supplementation      Benign prostatic hyperplasia with urinary frequency  - chronic, stable  - continue home silodosin      Mixed type COPD (chronic obstructive pulmonary disease)  8-2019 PFT:  Normal airflow. Airflow not improved after bronchodilator. Moderate restriction.  Diffusion capacity is mildly decreased. Oximetry is normal.  - patient not on home oxygen      OAG (open angle glaucoma) suspect, low risk, bilateral  - chronic, stable  - continue home latanoprost eye drops      Type 2 diabetes mellitus with microalbuminuria, without long-term current use of insulin  - repeat HbA1c, not on  insulin  - hold home januvia  - diabetic diet, POCT glucose ac/hs, SSI while on dexamethasone    Partial idiopathic epilepsy with seizures of localized onset, not intractable, without status epilepticus  - chronic, stable  - continue home lamictal       VTE Risk Mitigation (From admission, onward)         Ordered     enoxaparin injection 70 mg  2 times daily         01/02/22 0901                Discharge Planning   ALECIA:      Code Status: Prior   Is the patient medically ready for discharge?:     Reason for patient still in hospital (select all that apply): Treatment  Discharge Plan A: Home with family                  Shade Morales MD  Department of Hospital Medicine   [facility] - Intensive Care ([place])

## 2022-01-03 NOTE — PROGRESS NOTES
Home Oxygen Evaluation    Date Performed: 1/3/2022    1) Patient's Home O2 Sat on room air, while at rest: 93%        If O2 sats on room air at rest are 88% or below, patient qualifies. No additional testing needed. Document N/A in steps 2 and 3. If 89% or above, complete steps 2.      2) Patient's O2 Sat on room air while exercisin%

## 2022-01-04 VITALS
HEART RATE: 98 BPM | BODY MASS INDEX: 29.23 KG/M2 | TEMPERATURE: 98 F | DIASTOLIC BLOOD PRESSURE: 78 MMHG | WEIGHT: 165 LBS | RESPIRATION RATE: 21 BRPM | OXYGEN SATURATION: 94 % | HEIGHT: 63 IN | SYSTOLIC BLOOD PRESSURE: 127 MMHG

## 2022-01-04 LAB
POCT GLUCOSE: 107 MG/DL (ref 70–110)
POCT GLUCOSE: 126 MG/DL (ref 70–110)

## 2022-01-04 PROCEDURE — 63600175 PHARM REV CODE 636 W HCPCS: Performed by: STUDENT IN AN ORGANIZED HEALTH CARE EDUCATION/TRAINING PROGRAM

## 2022-01-04 PROCEDURE — 25000003 PHARM REV CODE 250: Performed by: STUDENT IN AN ORGANIZED HEALTH CARE EDUCATION/TRAINING PROGRAM

## 2022-01-04 PROCEDURE — 99900035 HC TECH TIME PER 15 MIN (STAT)

## 2022-01-04 PROCEDURE — 99239 PR HOSPITAL DISCHARGE DAY,>30 MIN: ICD-10-PCS | Mod: ,,, | Performed by: STUDENT IN AN ORGANIZED HEALTH CARE EDUCATION/TRAINING PROGRAM

## 2022-01-04 PROCEDURE — 94640 AIRWAY INHALATION TREATMENT: CPT

## 2022-01-04 PROCEDURE — 99239 HOSP IP/OBS DSCHRG MGMT >30: CPT | Mod: ,,, | Performed by: STUDENT IN AN ORGANIZED HEALTH CARE EDUCATION/TRAINING PROGRAM

## 2022-01-04 PROCEDURE — C9399 UNCLASSIFIED DRUGS OR BIOLOG: HCPCS | Performed by: STUDENT IN AN ORGANIZED HEALTH CARE EDUCATION/TRAINING PROGRAM

## 2022-01-04 PROCEDURE — 94761 N-INVAS EAR/PLS OXIMETRY MLT: CPT

## 2022-01-04 RX ORDER — POLYETHYLENE GLYCOL 3350 17 G/17G
17 POWDER, FOR SOLUTION ORAL DAILY
Qty: 510 G | Refills: 0 | Status: SHIPPED | OUTPATIENT
Start: 2022-01-05 | End: 2022-01-31

## 2022-01-04 RX ORDER — AMOXICILLIN 250 MG
1 CAPSULE ORAL 2 TIMES DAILY
Qty: 60 TABLET | Refills: 0 | Status: SHIPPED | OUTPATIENT
Start: 2022-01-04 | End: 2022-01-31

## 2022-01-04 RX ADMIN — CHOLECALCIFEROL TAB 25 MCG (1000 UNIT) 5000 UNITS: 25 TAB at 09:01

## 2022-01-04 RX ADMIN — ENOXAPARIN SODIUM 70 MG: 100 INJECTION SUBCUTANEOUS at 09:01

## 2022-01-04 RX ADMIN — MUPIROCIN: 20 OINTMENT TOPICAL at 09:01

## 2022-01-04 RX ADMIN — REMDESIVIR 100 MG: 100 INJECTION, POWDER, LYOPHILIZED, FOR SOLUTION INTRAVENOUS at 10:01

## 2022-01-04 RX ADMIN — DEXAMETHASONE 6 MG: 4 TABLET ORAL at 09:01

## 2022-01-04 RX ADMIN — PRAVASTATIN SODIUM 10 MG: 10 TABLET ORAL at 09:01

## 2022-01-04 RX ADMIN — SILODOSIN 4 MG: 4 CAPSULE ORAL at 09:01

## 2022-01-04 RX ADMIN — MULTIPLE VITAMINS W/ MINERALS TAB 1 TABLET: TAB at 09:01

## 2022-01-04 RX ADMIN — LAMOTRIGINE 300 MG: 100 TABLET ORAL at 09:01

## 2022-01-04 RX ADMIN — Medication 500 MG: at 09:01

## 2022-01-04 NOTE — DISCHARGE SUMMARY
[facility] - Intensive Care ([place] [address])  [facility] Medicine  Discharge Summary      Patient Name: Cristiano Cruz  MRN: [id number]  Patient Class: IP- Inpatient  Admission Date: 1/2/2022  Hospital Length of Stay: 1 days  Discharge Date and Time:  01/04/2022 5:32 PM  Attending Physician: No att. providers found   Discharging Provider: Rony Mcmahon DO  Primary Care Provider: Srikanth Son MD      HPI:   66 year old male with PMHx of DMII (not on insulin), glaucoma, COPD (per PFT's, not on oxygen), HLD, BPH, seizure disorder, and Vitamin D/B12 deficiency who presents to the ED with chief complaint of constipation. Patient reports his last bowel movement was yesterday at noon and he has not had one since then. He denies any abdominal pain, nausea, vomiting. He is passing gas. He has not taken anything over the counter to help. Patient also recently COVID-19 positive on 12/24/21. Patient reports there was a family wedding that his wife went to and was exposed at the event and that is how he got it. He denies any SOB, chest pain, fevers or chills, no leg swelling but has had some cough. Patient is unvaccinated.    In the ED, patient given 1L IVF, prednisone 60 mg, miralax, and admitted to hospital medicine under observation.      * No surgery found *      Hospital Course:   Admitted with constipation and covid+. Patient able to have multiple BM now. 6m walk showed patient's oxygenation dropped to 89% with ambulation, qualifying him for Remdesivir. Will upgrade to inpatient status and give remdes. 1/4 Pt reports feeling significantly improved, adamant about discharging today. Pt satruating well on RA at rest, SpO2 maintained in 90s on ambulation. Pt amenable to discharge home, instructed to return with any worsening shortness of breath, and to isolate at home per recent CDC guidance.        Goals of Care Treatment Preferences:  Code Status: Full Code      Consults:     No new Assessment & Plan notes have been filed under  this hospital service since the last note was generated.  Service: Hospital Medicine    Final Active Diagnoses:    Diagnosis Date Noted POA    PRINCIPAL PROBLEM:  COVID-19 [U07.1] 12/24/2021 Yes    Constipation [K59.00] 01/02/2022 Yes    Vitamin D insufficiency [E55.9] 07/29/2021 Yes    Benign prostatic hyperplasia with urinary frequency [N40.1, R35.0] 12/20/2019 Yes    Mixed type COPD (chronic obstructive pulmonary disease) [J44.9] 08/15/2019 Yes    OAG (open angle glaucoma) suspect, low risk, bilateral [H40.013] 03/12/2019 Yes    Type 2 diabetes mellitus with microalbuminuria, without long-term current use of insulin [E11.29, R80.9] 02/11/2019 Yes     Chronic    Partial idiopathic epilepsy with seizures of localized onset, not intractable, without status epilepticus [G40.009] 01/26/2019 Yes      Problems Resolved During this Admission:       Discharged Condition: stable    Disposition: Home or Self Care    Follow Up:    Patient Instructions:      COVID-19 Home Symptom Monitoring  - Duration (days): 14     Order Specific Question Answer Comments   Duration (days) 14      Notify your health care provider if you experience any of the following:  temperature >100.4     Notify your health care provider if you experience any of the following:  difficulty breathing or increased cough     Notify your health care provider if you experience any of the following:  persistent dizziness, light-headedness, or visual disturbances       Significant Diagnostic Studies: Labs: All labs within the past 24 hours have been reviewed    Pending Diagnostic Studies:     None         Medications:  Reconciled Home Medications:      Medication List      START taking these medications    polyethylene glycol 17 gram/dose powder  Commonly known as: GLYCOLAX  Mix 1 capful (17 g) with a liquid and take by mouth once daily.  Start taking on: January 5, 2022     senna-docusate 8.6-50 mg 8.6-50 mg per tablet  Commonly known as:  PERICOLACE  Take 1 tablet by mouth 2 (two) times daily.        CONTINUE taking these medications    cholecalciferol (vitamin D3) 125 mcg (5,000 unit) Tab  Take 1 tablet (5,000 Units total) by mouth once daily.     cyanocobalamin 1,000 mcg/mL injection  Inject 1 mL (1,000 mcg total) into the muscle every 30 days.     lamoTRIgine 150 MG Tab  Commonly known as: LAMICTAL  Take 2 tablets (300 mg total) by mouth once daily. NO FURTHER REFILLS WITHOUT APPOINTMENT     latanoprost 0.005 % ophthalmic solution  INSTILL 1 DROP IN BOTH EYES EVERY EVENING     pravastatin 10 MG tablet  Commonly known as: PRAVACHOL  Take 1 tablet (10 mg total) by mouth once daily.     pulse oximeter device  Commonly known as: pulse oximeter  by Apply Externally route 2 (two) times a day. Use twice daily at 8 AM and 3 PM and record the value in [facility] as directed.     silodosin 4 mg Cap capsule  Commonly known as: RAPAFLO  Take 1 capsule (4 mg total) by mouth once daily.     SITagliptin 25 MG Tab  Commonly known as: JANUVIA  Take 1 tablet (25 mg total) by mouth once daily.            Indwelling Lines/Drains at time of discharge:   Lines/Drains/Airways     None                 Time spent on the discharge of patient: 35 minutes         Rony Mcmahon DO  Department of Hospital Medicine  [facility] - Intensive Care ([place])

## 2022-01-04 NOTE — PLAN OF CARE
Patient is being d/c today with no Social Service needs identified at this time.      01/04/22 1337   Post-Acute Status   Post-Acute Authorization Other   Other Status No Post-Acute Service Needs     Nedra Sandoval LMSW   - [facility]  Ext. [phone]

## 2022-01-04 NOTE — PROGRESS NOTES
Home Oxygen Evaluation    Date Performed: 2022    1) Patient's Home O2 Sat on room air, while at rest: 94%        If O2 sats on room air at rest are 88% or below, patient qualifies. No additional testing needed. Document N/A in steps 2 and 3. If 89% or above, complete steps 2.      2) Patient's O2 Sat on room air while exercisin%        If O2 sats on room air while exercising remain 89% or above patient does not qualify, no further testing needed Document N/A in step 3. If O2 sats on room air while exercising are 88% or below, continue to step 3.      3) Patient's O2 Sat while exercising on O2:N/A         (Must show improvement from #2 for patients to qualify)    If O2 sats improve on oxygen, patient qualifies for portable oxygen. If not, the patient does not qualify.

## 2022-01-04 NOTE — DISCHARGE INSTRUCTIONS
Stay home for 5 days.  If you have no symptoms or your symptoms are resolving after 5 days, you can leave your house.  Continue to wear a mask around others for 5 additional days.  If you have a fever, continue to stay home until your fever resolves.

## 2022-01-04 NOTE — NURSING
"Pt. DC to home. AVS/Covid quarantine guidelines reviewed. Pt. verbalized the understanding. Pt. refused a WC and left unit ambulating with all personal belongings. Pt. stated "my friend is waiting downstairs for a ride home"   "

## 2022-01-05 ENCOUNTER — HOSPITAL ENCOUNTER (INPATIENT)
Facility: HOSPITAL | Age: 67
LOS: 3 days | Discharge: HOME HEALTH | DRG: 177 | End: 2022-01-08
Attending: EMERGENCY MEDICINE | Admitting: EMERGENCY MEDICINE
Payer: MEDICARE

## 2022-01-05 DIAGNOSIS — U07.1 COVID-19 VIRUS INFECTION: ICD-10-CM

## 2022-01-05 DIAGNOSIS — R55 NEAR SYNCOPE: Primary | ICD-10-CM

## 2022-01-05 DIAGNOSIS — R06.02 SHORTNESS OF BREATH: ICD-10-CM

## 2022-01-05 PROBLEM — E11.9 TYPE 2 DIABETES MELLITUS, WITHOUT LONG-TERM CURRENT USE OF INSULIN: Status: ACTIVE | Noted: 2019-02-11

## 2022-01-05 LAB
ALBUMIN SERPL BCP-MCNC: 3.2 G/DL (ref 3.5–5.2)
ALP SERPL-CCNC: 48 U/L (ref 55–135)
ALT SERPL W/O P-5'-P-CCNC: 25 U/L (ref 10–44)
ANION GAP SERPL CALC-SCNC: 12 MMOL/L (ref 8–16)
AST SERPL-CCNC: 29 U/L (ref 10–40)
BASOPHILS # BLD AUTO: 0.01 K/UL (ref 0–0.2)
BASOPHILS NFR BLD: 0.1 % (ref 0–1.9)
BILIRUB SERPL-MCNC: 0.6 MG/DL (ref 0.1–1)
BNP SERPL-MCNC: 11 PG/ML (ref 0–99)
BUN SERPL-MCNC: 21 MG/DL (ref 8–23)
CALCIUM SERPL-MCNC: 9 MG/DL (ref 8.7–10.5)
CHLORIDE SERPL-SCNC: 99 MMOL/L (ref 95–110)
CO2 SERPL-SCNC: 24 MMOL/L (ref 23–29)
CREAT SERPL-MCNC: 1.2 MG/DL (ref 0.5–1.4)
DIFFERENTIAL METHOD: ABNORMAL
EOSINOPHIL # BLD AUTO: 0 K/UL (ref 0–0.5)
EOSINOPHIL NFR BLD: 0.1 % (ref 0–8)
ERYTHROCYTE [DISTWIDTH] IN BLOOD BY AUTOMATED COUNT: 14.2 % (ref 11.5–14.5)
EST. GFR  (AFRICAN AMERICAN): >60 ML/MIN/1.73 M^2
EST. GFR  (NON AFRICAN AMERICAN): >60 ML/MIN/1.73 M^2
FERRITIN SERPL-MCNC: 747 NG/ML (ref 20–300)
GLUCOSE SERPL-MCNC: 126 MG/DL (ref 70–110)
HCT VFR BLD AUTO: 40.2 % (ref 40–54)
HGB BLD-MCNC: 13 G/DL (ref 14–18)
IMM GRANULOCYTES # BLD AUTO: 0.03 K/UL (ref 0–0.04)
IMM GRANULOCYTES NFR BLD AUTO: 0.4 % (ref 0–0.5)
LACTATE SERPL-SCNC: 1 MMOL/L (ref 0.5–2.2)
LYMPHOCYTES # BLD AUTO: 0.8 K/UL (ref 1–4.8)
LYMPHOCYTES NFR BLD: 9 % (ref 18–48)
MAGNESIUM SERPL-MCNC: 2 MG/DL (ref 1.6–2.6)
MCH RBC QN AUTO: 28.4 PG (ref 27–31)
MCHC RBC AUTO-ENTMCNC: 32.3 G/DL (ref 32–36)
MCV RBC AUTO: 88 FL (ref 82–98)
MONOCYTES # BLD AUTO: 0.4 K/UL (ref 0.3–1)
MONOCYTES NFR BLD: 4.7 % (ref 4–15)
NEUTROPHILS # BLD AUTO: 7.3 K/UL (ref 1.8–7.7)
NEUTROPHILS NFR BLD: 85.7 % (ref 38–73)
NRBC BLD-RTO: 0 /100 WBC
PLATELET # BLD AUTO: 196 K/UL (ref 150–450)
PMV BLD AUTO: 10.8 FL (ref 9.2–12.9)
POTASSIUM SERPL-SCNC: 3.6 MMOL/L (ref 3.5–5.1)
PROT SERPL-MCNC: 7.5 G/DL (ref 6–8.4)
RBC # BLD AUTO: 4.57 M/UL (ref 4.6–6.2)
SODIUM SERPL-SCNC: 135 MMOL/L (ref 136–145)
TROPONIN I SERPL DL<=0.01 NG/ML-MCNC: <0.006 NG/ML (ref 0–0.03)
WBC # BLD AUTO: 8.53 K/UL (ref 3.9–12.7)

## 2022-01-05 PROCEDURE — 99285 EMERGENCY DEPT VISIT HI MDM: CPT | Mod: ,,, | Performed by: EMERGENCY MEDICINE

## 2022-01-05 PROCEDURE — 99223 1ST HOSP IP/OBS HIGH 75: CPT | Mod: CR,AI,, | Performed by: STUDENT IN AN ORGANIZED HEALTH CARE EDUCATION/TRAINING PROGRAM

## 2022-01-05 PROCEDURE — 93010 ELECTROCARDIOGRAM REPORT: CPT | Mod: ,,, | Performed by: INTERNAL MEDICINE

## 2022-01-05 PROCEDURE — 25000003 PHARM REV CODE 250: Performed by: STUDENT IN AN ORGANIZED HEALTH CARE EDUCATION/TRAINING PROGRAM

## 2022-01-05 PROCEDURE — 63600175 PHARM REV CODE 636 W HCPCS: Performed by: STUDENT IN AN ORGANIZED HEALTH CARE EDUCATION/TRAINING PROGRAM

## 2022-01-05 PROCEDURE — 80053 COMPREHEN METABOLIC PANEL: CPT | Performed by: EMERGENCY MEDICINE

## 2022-01-05 PROCEDURE — 25000242 PHARM REV CODE 250 ALT 637 W/ HCPCS: Performed by: STUDENT IN AN ORGANIZED HEALTH CARE EDUCATION/TRAINING PROGRAM

## 2022-01-05 PROCEDURE — 83735 ASSAY OF MAGNESIUM: CPT | Performed by: EMERGENCY MEDICINE

## 2022-01-05 PROCEDURE — 99285 EMERGENCY DEPT VISIT HI MDM: CPT | Mod: 25

## 2022-01-05 PROCEDURE — 93010 EKG 12-LEAD: ICD-10-PCS | Mod: ,,, | Performed by: INTERNAL MEDICINE

## 2022-01-05 PROCEDURE — 93005 ELECTROCARDIOGRAM TRACING: CPT

## 2022-01-05 PROCEDURE — 84484 ASSAY OF TROPONIN QUANT: CPT | Performed by: EMERGENCY MEDICINE

## 2022-01-05 PROCEDURE — 99223 PR INITIAL HOSPITAL CARE,LEVL III: ICD-10-PCS | Mod: CR,AI,, | Performed by: STUDENT IN AN ORGANIZED HEALTH CARE EDUCATION/TRAINING PROGRAM

## 2022-01-05 PROCEDURE — 83605 ASSAY OF LACTIC ACID: CPT | Performed by: EMERGENCY MEDICINE

## 2022-01-05 PROCEDURE — 82728 ASSAY OF FERRITIN: CPT | Performed by: EMERGENCY MEDICINE

## 2022-01-05 PROCEDURE — 25000003 PHARM REV CODE 250: Performed by: EMERGENCY MEDICINE

## 2022-01-05 PROCEDURE — 12000002 HC ACUTE/MED SURGE SEMI-PRIVATE ROOM

## 2022-01-05 PROCEDURE — 85025 COMPLETE CBC W/AUTO DIFF WBC: CPT | Performed by: EMERGENCY MEDICINE

## 2022-01-05 PROCEDURE — 27000207 HC ISOLATION

## 2022-01-05 PROCEDURE — 99285 PR EMERGENCY DEPT VISIT,LEVEL V: ICD-10-PCS | Mod: ,,, | Performed by: EMERGENCY MEDICINE

## 2022-01-05 PROCEDURE — 96360 HYDRATION IV INFUSION INIT: CPT

## 2022-01-05 PROCEDURE — 83880 ASSAY OF NATRIURETIC PEPTIDE: CPT | Performed by: EMERGENCY MEDICINE

## 2022-01-05 PROCEDURE — C9399 UNCLASSIFIED DRUGS OR BIOLOG: HCPCS | Performed by: STUDENT IN AN ORGANIZED HEALTH CARE EDUCATION/TRAINING PROGRAM

## 2022-01-05 RX ORDER — INSULIN ASPART 100 [IU]/ML
0-5 INJECTION, SOLUTION INTRAVENOUS; SUBCUTANEOUS
Status: DISCONTINUED | OUTPATIENT
Start: 2022-01-05 | End: 2022-01-08 | Stop reason: HOSPADM

## 2022-01-05 RX ORDER — MUPIROCIN 20 MG/G
OINTMENT TOPICAL 2 TIMES DAILY
Status: DISCONTINUED | OUTPATIENT
Start: 2022-01-05 | End: 2022-01-08 | Stop reason: HOSPADM

## 2022-01-05 RX ORDER — POLYETHYLENE GLYCOL 3350 17 G/17G
17 POWDER, FOR SOLUTION ORAL DAILY
Status: DISCONTINUED | OUTPATIENT
Start: 2022-01-05 | End: 2022-01-08 | Stop reason: HOSPADM

## 2022-01-05 RX ORDER — ACETAMINOPHEN 325 MG/1
650 TABLET ORAL EVERY 4 HOURS PRN
Status: DISCONTINUED | OUTPATIENT
Start: 2022-01-05 | End: 2022-01-08 | Stop reason: HOSPADM

## 2022-01-05 RX ORDER — AMOXICILLIN 250 MG
1 CAPSULE ORAL 2 TIMES DAILY
Status: DISCONTINUED | OUTPATIENT
Start: 2022-01-05 | End: 2022-01-08 | Stop reason: HOSPADM

## 2022-01-05 RX ORDER — ONDANSETRON 8 MG/1
8 TABLET, ORALLY DISINTEGRATING ORAL EVERY 8 HOURS PRN
Status: DISCONTINUED | OUTPATIENT
Start: 2022-01-05 | End: 2022-01-08 | Stop reason: HOSPADM

## 2022-01-05 RX ORDER — IBUPROFEN 200 MG
24 TABLET ORAL
Status: DISCONTINUED | OUTPATIENT
Start: 2022-01-05 | End: 2022-01-08 | Stop reason: HOSPADM

## 2022-01-05 RX ORDER — IBUPROFEN 200 MG
16 TABLET ORAL
Status: DISCONTINUED | OUTPATIENT
Start: 2022-01-05 | End: 2022-01-08 | Stop reason: HOSPADM

## 2022-01-05 RX ORDER — PRAVASTATIN SODIUM 10 MG/1
10 TABLET ORAL DAILY
Status: DISCONTINUED | OUTPATIENT
Start: 2022-01-05 | End: 2022-01-08 | Stop reason: HOSPADM

## 2022-01-05 RX ORDER — GLUCAGON 1 MG
1 KIT INJECTION
Status: DISCONTINUED | OUTPATIENT
Start: 2022-01-05 | End: 2022-01-08 | Stop reason: HOSPADM

## 2022-01-05 RX ORDER — SODIUM CHLORIDE 0.9 % (FLUSH) 0.9 %
10 SYRINGE (ML) INJECTION
Status: DISCONTINUED | OUTPATIENT
Start: 2022-01-05 | End: 2022-01-08 | Stop reason: HOSPADM

## 2022-01-05 RX ORDER — ENOXAPARIN SODIUM 100 MG/ML
1 INJECTION SUBCUTANEOUS 2 TIMES DAILY
Status: DISCONTINUED | OUTPATIENT
Start: 2022-01-05 | End: 2022-01-08 | Stop reason: HOSPADM

## 2022-01-05 RX ORDER — LATANOPROST 50 UG/ML
1 SOLUTION/ DROPS OPHTHALMIC NIGHTLY
Status: DISCONTINUED | OUTPATIENT
Start: 2022-01-05 | End: 2022-01-08 | Stop reason: HOSPADM

## 2022-01-05 RX ORDER — LAMOTRIGINE 100 MG/1
300 TABLET ORAL DAILY
Status: DISCONTINUED | OUTPATIENT
Start: 2022-01-05 | End: 2022-01-08 | Stop reason: HOSPADM

## 2022-01-05 RX ORDER — SILODOSIN 4 MG/1
4 CAPSULE ORAL DAILY
Status: DISCONTINUED | OUTPATIENT
Start: 2022-01-05 | End: 2022-01-08 | Stop reason: HOSPADM

## 2022-01-05 RX ORDER — GUAIFENESIN/DEXTROMETHORPHAN 100-10MG/5
10 SYRUP ORAL EVERY 4 HOURS PRN
Status: DISCONTINUED | OUTPATIENT
Start: 2022-01-05 | End: 2022-01-08 | Stop reason: HOSPADM

## 2022-01-05 RX ORDER — ASCORBIC ACID 500 MG
500 TABLET ORAL 2 TIMES DAILY
Status: DISCONTINUED | OUTPATIENT
Start: 2022-01-05 | End: 2022-01-08 | Stop reason: HOSPADM

## 2022-01-05 RX ORDER — ALBUTEROL SULFATE 90 UG/1
2 AEROSOL, METERED RESPIRATORY (INHALATION) EVERY 6 HOURS
Status: DISCONTINUED | OUTPATIENT
Start: 2022-01-05 | End: 2022-01-08 | Stop reason: HOSPADM

## 2022-01-05 RX ORDER — BISACODYL 10 MG
10 SUPPOSITORY, RECTAL RECTAL DAILY PRN
Status: DISCONTINUED | OUTPATIENT
Start: 2022-01-05 | End: 2022-01-08 | Stop reason: HOSPADM

## 2022-01-05 RX ORDER — CHOLECALCIFEROL (VITAMIN D3) 25 MCG
5000 TABLET ORAL DAILY
Status: DISCONTINUED | OUTPATIENT
Start: 2022-01-05 | End: 2022-01-08 | Stop reason: HOSPADM

## 2022-01-05 RX ADMIN — Medication 500 MG: at 08:01

## 2022-01-05 RX ADMIN — SENNOSIDES AND DOCUSATE SODIUM 1 TABLET: 50; 8.6 TABLET ORAL at 09:01

## 2022-01-05 RX ADMIN — DEXAMETHASONE 6 MG: 4 TABLET ORAL at 03:01

## 2022-01-05 RX ADMIN — MULTIPLE VITAMINS W/ MINERALS TAB 1 TABLET: TAB at 03:01

## 2022-01-05 RX ADMIN — POLYETHYLENE GLYCOL 3350 17 G: 17 POWDER, FOR SOLUTION ORAL at 03:01

## 2022-01-05 RX ADMIN — REMDESIVIR 100 MG: 100 INJECTION, POWDER, LYOPHILIZED, FOR SOLUTION INTRAVENOUS at 03:01

## 2022-01-05 RX ADMIN — ENOXAPARIN SODIUM 70 MG: 80 INJECTION SUBCUTANEOUS at 08:01

## 2022-01-05 RX ADMIN — Medication 5000 UNITS: at 03:01

## 2022-01-05 RX ADMIN — MUPIROCIN: 20 OINTMENT TOPICAL at 08:01

## 2022-01-05 RX ADMIN — ALBUTEROL SULFATE 2 PUFF: 108 INHALANT RESPIRATORY (INHALATION) at 06:01

## 2022-01-05 RX ADMIN — SODIUM CHLORIDE 500 ML: 0.9 INJECTION, SOLUTION INTRAVENOUS at 11:01

## 2022-01-05 NOTE — DISCHARGE INSTRUCTIONS
You were admitted with weakness and COVID infection. Your COVID symptoms improved prior to discharge. PT evaluated you and recommended home health.  Follow up with PCP requested.    You will also be arranged follow up in COVID surveillance clinic.

## 2022-01-05 NOTE — SUBJECTIVE & OBJECTIVE
Past Medical History:   Diagnosis Date    B12 nutritional deficiency 8/7/2019    COVID-19 12/24/2021    E. coli UTI 01/2019    During hospitalization for seizure    Generalized tonic-clonic seizure 1/26/2019 1-2019 admitted for new-onset seizures.Normal CT head.  His mental status normalized, and he had no recurrent seizures following keppra loading in the ED and twice-daily maintenance upon arrival to the floor. Workup into the etiology of his seizures remained negative. EEG was performed, with the preliminary interpretation being no epileptiform activity with normal background.     History of hepatitis C, s/p successful Rx w/ SVR12 (cure) - 11/2019 2/8/2019    S/p epclusa    Kidney stone on left side 6/28/2019    Mixed type COPD (chronic obstructive pulmonary disease) 8/15/2019    8-2019 PFT: Normal airflow. Airflow not improved after bronchodilator. Moderate restriction. Diffusion capacity is mildly decreased. Oximetry is normal.    Partial idiopathic epilepsy with seizures of localized onset, not intractable, without status epilepticus 1/26/2019    new-onset seizures (1/209). Normal CTH, MRI and routine EEG . On keppra 500 mg BID    Pterygium, bilateral 3/12/2019    Type 2 diabetes mellitus with microalbuminuria, without long-term current use of insulin 2/11/2019    Vitamin D insufficiency 7/29/2021       Past Surgical History:   Procedure Laterality Date    CATARACT EXTRACTION W/  INTRAOCULAR LENS IMPLANT Right 7/28/2020    Procedure: EXTRACTION, CATARACT, WITH IOL INSERTION;  Surgeon: Daryl Calloway MD;  Location: [facility] OR;  Service: Ophthalmology;  Laterality: Right;    CATARACT EXTRACTION W/  INTRAOCULAR LENS IMPLANT Left 8/11/2020    Procedure: EXTRACTION, CATARACT, WITH IOL INSERTION;  Surgeon: Daryl Calloway MD;  Location: [facility] OR;  Service: Ophthalmology;  Laterality: Left;    HERNIA REPAIR Right 2000    [facility]       Review of patient's allergies indicates:  No  Known Allergies    Current Facility-Administered Medications on File Prior to Encounter   Medication    [DISCONTINUED] acetaminophen tablet 650 mg    [DISCONTINUED] albuterol inhaler 2 puff    [DISCONTINUED] ascorbic acid (vitamin C) tablet 500 mg    [DISCONTINUED] bisacodyL suppository 10 mg    [DISCONTINUED] dexAMETHasone tablet 6 mg    [DISCONTINUED] dextromethorphan-guaiFENesin  mg/5 ml liquid 10 mL    [DISCONTINUED] dextrose 50% injection 12.5 g    [DISCONTINUED] dextrose 50% injection 25 g    [DISCONTINUED] enoxaparin injection 70 mg    [DISCONTINUED] glucagon (human recombinant) injection 1 mg    [DISCONTINUED] glucose chewable tablet 16 g    [DISCONTINUED] glucose chewable tablet 24 g    [DISCONTINUED] insulin aspart U-100 pen 0-5 Units    [DISCONTINUED] lamoTRIgine tablet 300 mg    [DISCONTINUED] latanoprost 0.005 % ophthalmic solution 1 drop    [DISCONTINUED] multivitamin tablet    [DISCONTINUED] mupirocin 2 % ointment    [DISCONTINUED] ondansetron disintegrating tablet 8 mg    [DISCONTINUED] polyethylene glycol packet 17 g    [DISCONTINUED] pravastatin tablet 10 mg    [DISCONTINUED] remdesivir 100 mg in sodium chloride 0.9% 250 mL infusion    [DISCONTINUED] senna-docusate 8.6-50 mg per tablet 1 tablet    [DISCONTINUED] silodosin capsule 4 mg    [DISCONTINUED] sodium chloride 0.9% flush 10 mL    [DISCONTINUED] vitamin D 1000 units tablet 5,000 Units     Current Outpatient Medications on File Prior to Encounter   Medication Sig    cholecalciferol, vitamin D3, 125 mcg (5,000 unit) Tab Take 1 tablet (5,000 Units total) by mouth once daily.    cyanocobalamin 1,000 mcg/mL injection Inject 1 mL (1,000 mcg total) into the muscle every 30 days.    lamoTRIgine (LAMICTAL) 150 MG Tab Take 2 tablets (300 mg total) by mouth once daily. NO FURTHER REFILLS WITHOUT APPOINTMENT    latanoprost 0.005 % ophthalmic solution INSTILL 1 DROP IN BOTH EYES EVERY EVENING    polyethylene glycol  (GLYCOLAX) 17 gram/dose powder Mix 1 capful (17 g) with a liquid and take by mouth once daily.    pravastatin (PRAVACHOL) 10 MG tablet Take 1 tablet (10 mg total) by mouth once daily.    pulse oximeter (PULSE OXIMETER) device by Apply Externally route 2 (two) times a day. Use twice daily at 8 AM and 3 PM and record the value in MyChart as directed.    senna-docusate 8.6-50 mg (PERICOLACE) 8.6-50 mg per tablet Take 1 tablet by mouth 2 (two) times daily.    silodosin (RAPAFLO) 4 mg Cap capsule Take 1 capsule (4 mg total) by mouth once daily.    SITagliptin (JANUVIA) 25 MG Tab Take 1 tablet (25 mg total) by mouth once daily.     Family History     Problem Relation (Age of Onset)    Hypertension Mother        Tobacco Use    Smoking status: Former Smoker     Packs/day: 3.00     Years: 25.00     Pack years: 75.00     Quit date: 2012     Years since quittin.9    Smokeless tobacco: Never Used   Substance and Sexual Activity    Alcohol use: No     Comment: Used to drink    Drug use: No    Sexual activity: Yes     Partners: Female     Review of Systems   Constitutional: Positive for chills and fatigue. Negative for fever.   HENT: Negative for mouth sores and trouble swallowing.    Eyes: Negative for photophobia and visual disturbance.   Respiratory: Positive for cough and shortness of breath.    Cardiovascular: Negative for chest pain and palpitations.   Gastrointestinal: Negative for constipation, nausea and vomiting.   Genitourinary: Negative for difficulty urinating and dysuria.   Musculoskeletal: Negative for myalgias and neck stiffness.   Skin: Negative for rash and wound.   Neurological: Positive for weakness. Negative for facial asymmetry.   Psychiatric/Behavioral: Negative for hallucinations. The patient is not nervous/anxious.      Objective:     Vital Signs (Most Recent):  Temp: 98.2 °F (36.8 °C) (22 1101)  Pulse: 98 (22 1342)  Resp: 18 (22 1101)  BP: 135/72 (22  1342)  SpO2: 95 % (01/05/22 1342) Vital Signs (24h Range):  Temp:  [98.2 °F (36.8 °C)] 98.2 °F (36.8 °C)  Pulse:  [] 98  Resp:  [18] 18  SpO2:  [88 %-96 %] 95 %  BP: (122-135)/(66-84) 135/72     Weight: 74.8 kg (165 lb)  Body mass index is 25.84 kg/m².    Physical Exam  Constitutional:       General: He is not in acute distress.     Appearance: He is normal weight. He is ill-appearing. He is not diaphoretic.      Interventions: Nasal cannula in place.   HENT:      Head: Normocephalic and atraumatic.   Eyes:      Extraocular Movements: Extraocular movements intact.      Pupils: Pupils are equal, round, and reactive to light.   Cardiovascular:      Rate and Rhythm: Normal rate and regular rhythm.      Pulses: Normal pulses.      Heart sounds: No murmur heard.  No friction rub. No gallop.    Pulmonary:      Effort: Pulmonary effort is normal. No respiratory distress.      Breath sounds: No wheezing or rales.   Chest:      Chest wall: No tenderness.   Abdominal:      General: Abdomen is flat. There is no distension.      Palpations: Abdomen is soft.      Tenderness: There is no abdominal tenderness.   Musculoskeletal:      Right lower leg: No edema.      Left lower leg: No edema.   Skin:     General: Skin is warm and dry.      Findings: No erythema or rash.   Neurological:      General: No focal deficit present.      Mental Status: He is alert and oriented to person, place, and time.   Psychiatric:         Mood and Affect: Mood normal.         Behavior: Behavior normal.         Thought Content: Thought content normal.         Judgment: Judgment normal.           CRANIAL NERVES     CN III, IV, VI   Pupils are equal, round, and reactive to light.       Significant Labs:   All pertinent labs within the past 24 hours have been reviewed.  CBC:   Recent Labs   Lab 01/05/22  1128   WBC 8.53   HGB 13.0*   HCT 40.2        CMP:   Recent Labs   Lab 01/05/22  1128   *   K 3.6   CL 99   CO2 24   *   BUN 21    CREATININE 1.2   CALCIUM 9.0   PROT 7.5   ALBUMIN 3.2*   BILITOT 0.6   ALKPHOS 48*   AST 29   ALT 25   ANIONGAP 12   EGFRNONAA >60.0     Lactic Acid:   Recent Labs   Lab 01/05/22  1128   LACTATE 1.0       Significant Imaging: I have reviewed all pertinent imaging results/findings within the past 24 hours.     X-Ray Chest AP Portable  Narrative: EXAMINATION:  XR CHEST AP PORTABLE    CLINICAL HISTORY:  Shortness of breath    FINDINGS:  Chest two views to include pelvis.    Heart size is normal.  Perihilar and lower lobe interstitial infiltrates.  Bones showed DJD.  Impression: Mild interstitial edema versus interstitial pneumonia perhaps viral.    Electronically signed by: Michael Duckworth MD  Date:    01/05/2022  Time:    12:14

## 2022-01-05 NOTE — HPI
66 year old male with PMHx of DMII (not on insulin), glaucoma, COPD (per PFT's, not on oxygen), HLD, BPH, seizure disorder, and Vitamin D/B12 deficiency who presents to the ED presents with presyncope and shortness of breath. Pt discharged from hospital 1/4 after being admitted for 3d for constipation and covid+. During previous hospitalization, pt constipation improved with stool softeners, but pt oxygenation dropped to 89% with ambulation, qualifying him for Remdesivir. On day 3 of remdesiver 1/4 Pt reported feeling significantly improved, adamant about discharging that day. Pt satruating well on RA at rest, SpO2 maintained in 90s on ambulation. Pt amenable to discharge home, instructed to return with any worsening shortness of breath, and to isolate at home per recent CDC guidance.     Today, pt's wife reports that he has not been eating or drinking normally, and is generally weak. Pt went to the store at [employer] today and reports that he felt light-headed and wobbly and like he was about to pass out. In the ED, pt was hypoxic to 89% on room air, improved to 94% on 2L NC. Pt initially resistant to being re-admitted, but care team and wife stressed to pt importance of monitoring and further treatment to keep him safe. Pt admitted to  for further management of severe covid pneumonia.

## 2022-01-05 NOTE — PLAN OF CARE
[facility] Care ([facility])  Discharge Final Note    Primary Care Provider: Srikanth Son MD    Expected Discharge Date: 1/4/2022    Final Discharge Note (most recent)       Final Note - 01/05/22 0719          Final Note    Assessment Type Final Discharge Note     Anticipated Discharge Disposition Home or Self Care                   Future Appointments   Date Time Provider Department Center   1/10/2022  3:00 PM Srikanth Son MD [facility]   3/22/2022  3:00 PM MAHAN[facility]   3/22/2022  3:30 PM Allegra Scanlon OD [facility]         Important Message from Medicare  Important Message from Medicare regarding Discharge Appeal Rights: Given to patient/caregiver,Explained to patient/caregiver,Signed/date by patient/caregiver,Other (comments) (verbal: spoke with Caro)     Date IMM was signed: 01/04/22  Time IMM was signed: 1220

## 2022-01-05 NOTE — ASSESSMENT & PLAN NOTE
-A1c 6.6  -not on insulin at home  - hold home januvia  - diabetic diet, POCT glucose ac/hs, SSI while on dexamethasone

## 2022-01-05 NOTE — H&P
[facility] - Emergency Dept  [facility] Medicine  History & Physical    Patient Name: Cristiano Cruz  MRN: [id number]  Patient Class: IP- Inpatient  Admission Date: 1/5/2022  Attending Physician: Rony Mcmahon DO   Primary Care Provider: Srikanth Son MD         Patient information was obtained from patient, spouse/SO, past medical records and ER records.     Subjective:     Principal Problem:COVID-19    Chief Complaint:   Chief Complaint   Patient presents with    Loss of Consciousness     Arrived via ems from home, wife states he keeps passing out, recently discharged after having COVID, + 12/24/21        HPI: 66 year old male with PMHx of DMII (not on insulin), glaucoma, COPD (per PFT's, not on oxygen), HLD, BPH, seizure disorder, and Vitamin D/B12 deficiency who presents to the ED presents with presyncope and shortness of breath. Pt discharged from hospital 1/4 after being admitted for 3d for constipation and covid+. During previous hospitalization, pt constipation improved with stool softeners, but pt oxygenation dropped to 89% with ambulation, qualifying him for Remdesivir. On day 3 of remdesiver 1/4 Pt reported feeling significantly improved, adamant about discharging that day. Pt satruating well on RA at rest, SpO2 maintained in 90s on ambulation. Pt amenable to discharge home, instructed to return with any worsening shortness of breath, and to isolate at home per recent CDC guidance.     Today, pt's wife reports that he has not been eating or drinking normally, and is generally weak. Pt went to the store at [employer] today and reports that he felt light-headed and wobbly and like he was about to pass out. In the ED, pt was hypoxic to 89% on room air, improved to 94% on 2L NC. Pt initially resistant to being re-admitted, but care team and wife stressed to pt importance of monitoring and further treatment to keep him safe. Pt admitted to  for further management of severe covid pneumonia.       Past Medical  History:   Diagnosis Date    B12 nutritional deficiency 8/7/2019    COVID-19 12/24/2021    E. coli UTI 01/2019    During hospitalization for seizure    Generalized tonic-clonic seizure 1/26/2019 1-2019 admitted for new-onset seizures.Normal CT head.  His mental status normalized, and he had no recurrent seizures following keppra loading in the ED and twice-daily maintenance upon arrival to the floor. Workup into the etiology of his seizures remained negative. EEG was performed, with the preliminary interpretation being no epileptiform activity with normal background.     History of hepatitis C, s/p successful Rx w/ SVR12 (cure) - 11/2019 2/8/2019    S/p epclusa    Kidney stone on left side 6/28/2019    Mixed type COPD (chronic obstructive pulmonary disease) 8/15/2019    8-2019 PFT: Normal airflow. Airflow not improved after bronchodilator. Moderate restriction. Diffusion capacity is mildly decreased. Oximetry is normal.    Partial idiopathic epilepsy with seizures of localized onset, not intractable, without status epilepticus 1/26/2019    new-onset seizures (1/209). Normal CTH, MRI and routine EEG . On keppra 500 mg BID    Pterygium, bilateral 3/12/2019    Type 2 diabetes mellitus with microalbuminuria, without long-term current use of insulin 2/11/2019    Vitamin D insufficiency 7/29/2021       Past Surgical History:   Procedure Laterality Date    CATARACT EXTRACTION W/  INTRAOCULAR LENS IMPLANT Right 7/28/2020    Procedure: EXTRACTION, CATARACT, WITH IOL INSERTION;  Surgeon: Daryl Calloway MD;  Location: [facility] OR;  Service: Ophthalmology;  Laterality: Right;    CATARACT EXTRACTION W/  INTRAOCULAR LENS IMPLANT Left 8/11/2020    Procedure: EXTRACTION, CATARACT, WITH IOL INSERTION;  Surgeon: Daryl Calloway MD;  Location: [facility] OR;  Service: Ophthalmology;  Laterality: Left;    HERNIA REPAIR Right 2000    [facility]       Review of patient's allergies indicates:  No Known  Allergies    Current Facility-Administered Medications on File Prior to Encounter   Medication    [DISCONTINUED] acetaminophen tablet 650 mg    [DISCONTINUED] albuterol inhaler 2 puff    [DISCONTINUED] ascorbic acid (vitamin C) tablet 500 mg    [DISCONTINUED] bisacodyL suppository 10 mg    [DISCONTINUED] dexAMETHasone tablet 6 mg    [DISCONTINUED] dextromethorphan-guaiFENesin  mg/5 ml liquid 10 mL    [DISCONTINUED] dextrose 50% injection 12.5 g    [DISCONTINUED] dextrose 50% injection 25 g    [DISCONTINUED] enoxaparin injection 70 mg    [DISCONTINUED] glucagon (human recombinant) injection 1 mg    [DISCONTINUED] glucose chewable tablet 16 g    [DISCONTINUED] glucose chewable tablet 24 g    [DISCONTINUED] insulin aspart U-100 pen 0-5 Units    [DISCONTINUED] lamoTRIgine tablet 300 mg    [DISCONTINUED] latanoprost 0.005 % ophthalmic solution 1 drop    [DISCONTINUED] multivitamin tablet    [DISCONTINUED] mupirocin 2 % ointment    [DISCONTINUED] ondansetron disintegrating tablet 8 mg    [DISCONTINUED] polyethylene glycol packet 17 g    [DISCONTINUED] pravastatin tablet 10 mg    [DISCONTINUED] remdesivir 100 mg in sodium chloride 0.9% 250 mL infusion    [DISCONTINUED] senna-docusate 8.6-50 mg per tablet 1 tablet    [DISCONTINUED] silodosin capsule 4 mg    [DISCONTINUED] sodium chloride 0.9% flush 10 mL    [DISCONTINUED] vitamin D 1000 units tablet 5,000 Units     Current Outpatient Medications on File Prior to Encounter   Medication Sig    cholecalciferol, vitamin D3, 125 mcg (5,000 unit) Tab Take 1 tablet (5,000 Units total) by mouth once daily.    cyanocobalamin 1,000 mcg/mL injection Inject 1 mL (1,000 mcg total) into the muscle every 30 days.    lamoTRIgine (LAMICTAL) 150 MG Tab Take 2 tablets (300 mg total) by mouth once daily. NO FURTHER REFILLS WITHOUT APPOINTMENT    latanoprost 0.005 % ophthalmic solution INSTILL 1 DROP IN BOTH EYES EVERY EVENING    polyethylene glycol  (GLYCOLAX) 17 gram/dose powder Mix 1 capful (17 g) with a liquid and take by mouth once daily.    pravastatin (PRAVACHOL) 10 MG tablet Take 1 tablet (10 mg total) by mouth once daily.    pulse oximeter (PULSE OXIMETER) device by Apply Externally route 2 (two) times a day. Use twice daily at 8 AM and 3 PM and record the value in MyChart as directed.    senna-docusate 8.6-50 mg (PERICOLACE) 8.6-50 mg per tablet Take 1 tablet by mouth 2 (two) times daily.    silodosin (RAPAFLO) 4 mg Cap capsule Take 1 capsule (4 mg total) by mouth once daily.    SITagliptin (JANUVIA) 25 MG Tab Take 1 tablet (25 mg total) by mouth once daily.     Family History     Problem Relation (Age of Onset)    Hypertension Mother        Tobacco Use    Smoking status: Former Smoker     Packs/day: 3.00     Years: 25.00     Pack years: 75.00     Quit date: 2012     Years since quittin.9    Smokeless tobacco: Never Used   Substance and Sexual Activity    Alcohol use: No     Comment: Used to drink    Drug use: No    Sexual activity: Yes     Partners: Female     Review of Systems   Constitutional: Positive for chills and fatigue. Negative for fever.   HENT: Negative for mouth sores and trouble swallowing.    Eyes: Negative for photophobia and visual disturbance.   Respiratory: Positive for cough and shortness of breath.    Cardiovascular: Negative for chest pain and palpitations.   Gastrointestinal: Negative for constipation, nausea and vomiting.   Genitourinary: Negative for difficulty urinating and dysuria.   Musculoskeletal: Negative for myalgias and neck stiffness.   Skin: Negative for rash and wound.   Neurological: Positive for weakness. Negative for facial asymmetry.   Psychiatric/Behavioral: Negative for hallucinations. The patient is not nervous/anxious.      Objective:     Vital Signs (Most Recent):  Temp: 98.2 °F (36.8 °C) (22 1101)  Pulse: 98 (22 1342)  Resp: 18 (22 1101)  BP: 135/72 (22  1342)  SpO2: 95 % (01/05/22 1342) Vital Signs (24h Range):  Temp:  [98.2 °F (36.8 °C)] 98.2 °F (36.8 °C)  Pulse:  [] 98  Resp:  [18] 18  SpO2:  [88 %-96 %] 95 %  BP: (122-135)/(66-84) 135/72     Weight: 74.8 kg (165 lb)  Body mass index is 25.84 kg/m².    Physical Exam  Constitutional:       General: He is not in acute distress.     Appearance: He is normal weight. He is ill-appearing. He is not diaphoretic.      Interventions: Nasal cannula in place.   HENT:      Head: Normocephalic and atraumatic.   Eyes:      Extraocular Movements: Extraocular movements intact.      Pupils: Pupils are equal, round, and reactive to light.   Cardiovascular:      Rate and Rhythm: Normal rate and regular rhythm.      Pulses: Normal pulses.      Heart sounds: No murmur heard.  No friction rub. No gallop.    Pulmonary:      Effort: Pulmonary effort is normal. No respiratory distress.      Breath sounds: No wheezing or rales.   Chest:      Chest wall: No tenderness.   Abdominal:      General: Abdomen is flat. There is no distension.      Palpations: Abdomen is soft.      Tenderness: There is no abdominal tenderness.   Musculoskeletal:      Right lower leg: No edema.      Left lower leg: No edema.   Skin:     General: Skin is warm and dry.      Findings: No erythema or rash.   Neurological:      General: No focal deficit present.      Mental Status: He is alert and oriented to person, place, and time.   Psychiatric:         Mood and Affect: Mood normal.         Behavior: Behavior normal.         Thought Content: Thought content normal.         Judgment: Judgment normal.           CRANIAL NERVES     CN III, IV, VI   Pupils are equal, round, and reactive to light.       Significant Labs:   All pertinent labs within the past 24 hours have been reviewed.  CBC:   Recent Labs   Lab 01/05/22  1128   WBC 8.53   HGB 13.0*   HCT 40.2        CMP:   Recent Labs   Lab 01/05/22  1128   *   K 3.6   CL 99   CO2 24   *   BUN 21    CREATININE 1.2   CALCIUM 9.0   PROT 7.5   ALBUMIN 3.2*   BILITOT 0.6   ALKPHOS 48*   AST 29   ALT 25   ANIONGAP 12   EGFRNONAA >60.0     Lactic Acid:   Recent Labs   Lab 01/05/22  1128   LACTATE 1.0       Significant Imaging: I have reviewed all pertinent imaging results/findings within the past 24 hours.     X-Ray Chest AP Portable  Narrative: EXAMINATION:  XR CHEST AP PORTABLE    CLINICAL HISTORY:  Shortness of breath    FINDINGS:  Chest two views to include pelvis.    Heart size is normal.  Perihilar and lower lobe interstitial infiltrates.  Bones showed DJD.  Impression: Mild interstitial edema versus interstitial pneumonia perhaps viral.    Electronically signed by: Michael Duckworth MD  Date:    01/05/2022  Time:    12:14       Assessment/Plan:     * COVID-19  Patient is identified as Severe COVID-19 based on hypoxemia with O2 saturations <94% on room air or on ambulation   Initiate standard COVID protocols; COVID-19 testing ,Infection Control notification  and isolation- respiratory, contact and droplet per protocol    Diagnostics: (leukopenia, hyponatremia, hyperferritinemia, elevated troponin, elevated d-dimer, age, and comorbidities are significant predictors of poor clinical outcome)  CBC, CMP, Ferritin, CRP and Portable CXR    Management: Initiate targeted therapy with Remdesivir, 200mg IV x1, followed by 100mg IV daily x5 days total, Dexamethasone PO/IV 6mg daily x10 days and Anticoagulation, Patient admitted to non-critical care unit- Will initiate full dose anticoagulation with Weight based lovenox 1mg/kg IV q12, Maintain oxygen saturations 92-96% via Nasal Cannula 2 LPM and monitor with continuous/intermittent pulse oximetry.  and Inhaled bronchodilators as needed for shortness of breath.    Advance Care Planning  Current advance care plan has been discussed with patient/family/POA and patient currently wishes Full Code.     Vitamin D insufficiency  -chronic, stable  -continue home  supplementation      OAG (open angle glaucoma) suspect, low risk, bilateral  - chronic, stable  - continue home latanoprost eye drops    Type 2 diabetes mellitus, without long-term current use of insulin  -A1c 6.6  -not on insulin at home  - hold home januvia  - diabetic diet, POCT glucose ac/hs, SSI while on dexamethasone      Partial idiopathic epilepsy with seizures of localized onset, not intractable, without status epilepticus  - chronic, stable  - continue home lamictal      VTE Risk Mitigation (From admission, onward)         Ordered     enoxaparin injection 70 mg  2 times daily         01/05/22 5594                   Rony Mcmahon DO  Department of Hospital Medicine   [facility] - Emergency Dept

## 2022-01-05 NOTE — ED NOTES
"Patients wife is now at bedside saying the patient "gets to where he is talking out of his head and now he wants to stay, he needs to stay". MD notified  "

## 2022-01-05 NOTE — ED PROVIDER NOTES
Encounter Date: 1/5/2022       History     Chief Complaint   Patient presents with    Loss of Consciousness     Arrived via ems from home, wife states he keeps passing out, recently discharged after having COVID, + 12/24/21     66-year-old male recently hospitalized with COVID presents with presyncope and shortness of breath.  Just discharged yesterday.  He says he wanted to go home.  Primary team wanted to keep him longer.  He did not get his home oxygen.  He has not been eating or drinking normally.  Today he says he got wobbly at the [email or web address].  He denies nausea, vomiting, diarrhea, fever, chest pain, abdominal pain, or dysuria.  A ten point review of systems was completed and is negative except as documented above.  Patient denies any other acute medical complaint.  The patients available PMH, PSH, Social History, medications, allergies, and triage vital signs were reviewed just prior to their medical evaluation.  Patient is a difficult historian with limited insight into their medical issues.         Review of patient's allergies indicates:  No Known Allergies  Past Medical History:   Diagnosis Date    B12 nutritional deficiency 8/7/2019    COVID-19 12/24/2021    E. coli UTI 01/2019    During hospitalization for seizure    Generalized tonic-clonic seizure 1/26/2019 1-2019 admitted for new-onset seizures.Normal CT head.  His mental status normalized, and he had no recurrent seizures following keppra loading in the ED and twice-daily maintenance upon arrival to the floor. Workup into the etiology of his seizures remained negative. EEG was performed, with the preliminary interpretation being no epileptiform activity with normal background.     History of hepatitis C, s/p successful Rx w/ SVR12 (cure) - 11/2019 2/8/2019    S/p epclusa    Kidney stone on left side 6/28/2019    Mixed type COPD (chronic obstructive pulmonary disease) 8/15/2019    8-2019 PFT: Normal airflow. Airflow not improved after  bronchodilator. Moderate restriction. Diffusion capacity is mildly decreased. Oximetry is normal.    Partial idiopathic epilepsy with seizures of localized onset, not intractable, without status epilepticus 2019    new-onset seizures (). Normal CTH, MRI and routine EEG . On keppra 500 mg BID    Pterygium, bilateral 3/12/2019    Type 2 diabetes mellitus with microalbuminuria, without long-term current use of insulin 2019    Vitamin D insufficiency 2021     Past Surgical History:   Procedure Laterality Date    CATARACT EXTRACTION W/  INTRAOCULAR LENS IMPLANT Right 2020    Procedure: EXTRACTION, CATARACT, WITH IOL INSERTION;  Surgeon: Daryl Calloway MD;  Location: [facility];  Service: Ophthalmology;  Laterality: Right;    CATARACT EXTRACTION W/  INTRAOCULAR LENS IMPLANT Left 2020    Procedure: EXTRACTION, CATARACT, WITH IOL INSERTION;  Surgeon: Daryl Calloway MD;  Location: [facility];  Service: Ophthalmology;  Laterality: Left;    HERNIA REPAIR Right     [facility]     Family History   Problem Relation Age of Onset    Hypertension Mother      Social History     Tobacco Use    Smoking status: Former Smoker     Packs/day: 3.00     Years: 25.00     Pack years: 75.00     Quit date: 2012     Years since quittin.9    Smokeless tobacco: Never Used   Substance Use Topics    Alcohol use: No     Comment: Used to drink    Drug use: No     Review of Systems   Constitutional: Negative for fever.   HENT: Negative for sore throat.    Eyes: Negative for visual disturbance.   Respiratory: Positive for cough and shortness of breath.    Cardiovascular: Negative for chest pain.   Gastrointestinal: Negative for abdominal pain, diarrhea, nausea and vomiting.   Genitourinary: Negative for dysuria.   Musculoskeletal: Negative for neck pain.   Skin: Negative for rash and wound.   Allergic/Immunologic: Negative for immunocompromised state.   Neurological: Positive for  light-headedness. Negative for syncope.   Psychiatric/Behavioral: Negative for confusion.       Physical Exam     Initial Vitals [01/05/22 1101]   BP Pulse Resp Temp SpO2   122/84 103 18 98.2 °F (36.8 °C) 96 %      MAP       --         Physical Exam    Nursing note and vitals reviewed.  Constitutional: He appears well-developed and well-nourished. He is not diaphoretic. No distress.   HENT:   Head: Normocephalic and atraumatic.   Nose: Nose normal.   Eyes: Conjunctivae are normal. Right eye exhibits no discharge. Left eye exhibits no discharge.   Neck: Neck supple.   Normal range of motion.  Cardiovascular: Normal rate, regular rhythm and normal heart sounds. Exam reveals no gallop and no friction rub.    No murmur heard.  Pulmonary/Chest: Breath sounds normal. No respiratory distress. He has no wheezes. He has no rhonchi. He has no rales.   Abdominal: Abdomen is soft. He exhibits no distension. There is no abdominal tenderness. There is no rebound and no guarding.   Musculoskeletal:         General: No tenderness or edema. Normal range of motion.      Cervical back: Normal range of motion and neck supple.     Neurological: He is alert and oriented to person, place, and time. He has normal strength. GCS score is 15. GCS eye subscore is 4. GCS verbal subscore is 5. GCS motor subscore is 6.   Skin: Skin is warm and dry. No rash noted. No erythema.   Psychiatric: He has a normal mood and affect. His behavior is normal. Judgment and thought content normal.         ED Course   Procedures  Labs Reviewed   CBC W/ AUTO DIFFERENTIAL - Abnormal; Notable for the following components:       Result Value    RBC 4.57 (*)     Hemoglobin 13.0 (*)     Lymph # 0.8 (*)     Gran % 85.7 (*)     Lymph % 9.0 (*)     All other components within normal limits   COMPREHENSIVE METABOLIC PANEL - Abnormal; Notable for the following components:    Sodium 135 (*)     Glucose 126 (*)     Albumin 3.2 (*)     Alkaline Phosphatase 48 (*)     All  other components within normal limits   LACTIC ACID, PLASMA   MAGNESIUM   TROPONIN I   B-TYPE NATRIURETIC PEPTIDE     EKG Readings: (Independently Interpreted)   Initial Reading: No STEMI. Rhythm: Normal Sinus Rhythm. Heart Rate: 94. Ectopy: No Ectopy. Conduction: Normal.   LAE       Imaging Results          X-Ray Chest AP Portable (Final result)  Result time 01/05/22 12:14:27    Final result by Michael Duckworth III, MD (01/05/22 12:14:27)                 Impression:      Mild interstitial edema versus interstitial pneumonia perhaps viral.      Electronically signed by: Michael Duckworth MD  Date:    01/05/2022  Time:    12:14             Narrative:    EXAMINATION:  XR CHEST AP PORTABLE    CLINICAL HISTORY:  Shortness of breath    FINDINGS:  Chest two views to include pelvis.    Heart size is normal.  Perihilar and lower lobe interstitial infiltrates.  Bones showed DJD.                                 Medications   sodium chloride 0.9% bolus 500 mL (0 mLs Intravenous Stopped 1/5/22 1251)     Medical Decision Making:   History:   Old Medical Records: I decided to obtain old medical records.  Independently Interpreted Test(s):   I have ordered and independently interpreted EKG Reading(s) - see prior notes  Clinical Tests:   Lab Tests: Ordered and Reviewed  Radiological Study: Ordered and Reviewed  Medical Tests: Reviewed and Ordered  ED Management:  66-year-old male presents with shortness of breath or presyncope.  Vitals with hypoxia.  Physical exam as above.  EKG without arrhythmia.  Chest x-ray consistent with COVID.  Labs unremarkable.  No anemia.  Doubt cardiogenic or neurogenic syncope.  Symptoms likely secondary to debility and COVID.  We had extensive conversation at bedside.  Patient demanded to be discharged.  I expect he will bounce back soon after discharge.  He acknowledges understanding.  Patient will return to ED for worsening symptoms, inability to eat/drink, fever greater than 100.4, or any other  concerns.  Did bedside teaching with return precautions.  All questions answered.  The patient acknowledges understanding.  Gave verbal discharge instructions.  This was a difficult patient encounter.  Despite this, the patient was always treated with respect and every effort was made to explain the care being delivered.                      Clinical Impression:   Final diagnoses:  [R06.02] Shortness of breath  [R55] Near syncope (Primary)  [U07.1] COVID-19 virus infection          ED Disposition Condition    Discharge Stable        ED Prescriptions     None        Follow-up Information     Follow up With Specialties Details Why Contact Info    [facility] - Emergency Dept Emergency Medicine  Return to ED for worsening symptoms, inability to eat/drink, fever greater than 100.4, or any other concerns. [address] [facility] [address] [phone]        Level of Complexity:  High, level 5.     Frankie Hernandez MD  01/05/22 1389

## 2022-01-05 NOTE — ED TRIAGE NOTES
"Pt was discharged from hospital and states that when he got home he became "wobbly walking and dizzy and couldn't breath". Pt states "I cant walk good". Pt lives at home with his wife and wife told EMS that the patient has had syncopal episodes. PHIL, NAD, A&O x4.   "

## 2022-01-05 NOTE — ED NOTES
"Pt is anxious saying "I need to leave, I need to get out of here I cant stay in this room." MD notified.  "

## 2022-01-05 NOTE — ASSESSMENT & PLAN NOTE
Patient is identified as Severe COVID-19 based on hypoxemia with O2 saturations <94% on room air or on ambulation   Initiate standard COVID protocols; COVID-19 testing ,Infection Control notification  and isolation- respiratory, contact and droplet per protocol    Diagnostics: (leukopenia, hyponatremia, hyperferritinemia, elevated troponin, elevated d-dimer, age, and comorbidities are significant predictors of poor clinical outcome)  CBC, CMP, Ferritin, CRP and Portable CXR    Management: Initiate targeted therapy with Remdesivir, 200mg IV x1, followed by 100mg IV daily x5 days total, Dexamethasone PO/IV 6mg daily x10 days and Anticoagulation, Patient admitted to non-critical care unit- Will initiate full dose anticoagulation with Weight based lovenox 1mg/kg IV q12, Maintain oxygen saturations 92-96% via Nasal Cannula 2 LPM and monitor with continuous/intermittent pulse oximetry.  and Inhaled bronchodilators as needed for shortness of breath.    Advance Care Planning  Current advance care plan has been discussed with patient/family/POA and patient currently wishes Full Code.

## 2022-01-06 LAB
POCT GLUCOSE: 112 MG/DL (ref 70–110)
POCT GLUCOSE: 119 MG/DL (ref 70–110)
POCT GLUCOSE: 134 MG/DL (ref 70–110)
POCT GLUCOSE: 148 MG/DL (ref 70–110)

## 2022-01-06 PROCEDURE — 27000207 HC ISOLATION

## 2022-01-06 PROCEDURE — 25000003 PHARM REV CODE 250: Performed by: STUDENT IN AN ORGANIZED HEALTH CARE EDUCATION/TRAINING PROGRAM

## 2022-01-06 PROCEDURE — C9399 UNCLASSIFIED DRUGS OR BIOLOG: HCPCS | Performed by: STUDENT IN AN ORGANIZED HEALTH CARE EDUCATION/TRAINING PROGRAM

## 2022-01-06 PROCEDURE — 99232 PR SUBSEQUENT HOSPITAL CARE,LEVL II: ICD-10-PCS | Mod: CR,,, | Performed by: STUDENT IN AN ORGANIZED HEALTH CARE EDUCATION/TRAINING PROGRAM

## 2022-01-06 PROCEDURE — 94761 N-INVAS EAR/PLS OXIMETRY MLT: CPT

## 2022-01-06 PROCEDURE — 99900035 HC TECH TIME PER 15 MIN (STAT)

## 2022-01-06 PROCEDURE — 63600175 PHARM REV CODE 636 W HCPCS: Performed by: STUDENT IN AN ORGANIZED HEALTH CARE EDUCATION/TRAINING PROGRAM

## 2022-01-06 PROCEDURE — 11000001 HC ACUTE MED/SURG PRIVATE ROOM

## 2022-01-06 PROCEDURE — 94640 AIRWAY INHALATION TREATMENT: CPT

## 2022-01-06 PROCEDURE — 25000242 PHARM REV CODE 250 ALT 637 W/ HCPCS: Performed by: STUDENT IN AN ORGANIZED HEALTH CARE EDUCATION/TRAINING PROGRAM

## 2022-01-06 PROCEDURE — 27000221 HC OXYGEN, UP TO 24 HOURS

## 2022-01-06 PROCEDURE — 99232 SBSQ HOSP IP/OBS MODERATE 35: CPT | Mod: CR,,, | Performed by: STUDENT IN AN ORGANIZED HEALTH CARE EDUCATION/TRAINING PROGRAM

## 2022-01-06 RX ADMIN — ENOXAPARIN SODIUM 70 MG: 80 INJECTION SUBCUTANEOUS at 08:01

## 2022-01-06 RX ADMIN — MUPIROCIN: 20 OINTMENT TOPICAL at 08:01

## 2022-01-06 RX ADMIN — Medication 500 MG: at 08:01

## 2022-01-06 RX ADMIN — LAMOTRIGINE 300 MG: 150 TABLET ORAL at 08:01

## 2022-01-06 RX ADMIN — POLYETHYLENE GLYCOL 3350 17 G: 17 POWDER, FOR SOLUTION ORAL at 08:01

## 2022-01-06 RX ADMIN — ALBUTEROL SULFATE 2 PUFF: 108 INHALANT RESPIRATORY (INHALATION) at 01:01

## 2022-01-06 RX ADMIN — SILODOSIN 4 MG: 4 CAPSULE ORAL at 08:01

## 2022-01-06 RX ADMIN — LATANOPROST 1 DROP: 50 SOLUTION OPHTHALMIC at 01:01

## 2022-01-06 RX ADMIN — SENNOSIDES AND DOCUSATE SODIUM 1 TABLET: 50; 8.6 TABLET ORAL at 08:01

## 2022-01-06 RX ADMIN — REMDESIVIR 100 MG: 100 INJECTION, POWDER, LYOPHILIZED, FOR SOLUTION INTRAVENOUS at 08:01

## 2022-01-06 RX ADMIN — ALBUTEROL SULFATE 2 PUFF: 108 INHALANT RESPIRATORY (INHALATION) at 07:01

## 2022-01-06 RX ADMIN — DEXAMETHASONE 6 MG: 4 TABLET ORAL at 08:01

## 2022-01-06 RX ADMIN — PRAVASTATIN SODIUM 10 MG: 10 TABLET ORAL at 08:01

## 2022-01-06 RX ADMIN — LATANOPROST 1 DROP: 50 SOLUTION OPHTHALMIC at 08:01

## 2022-01-06 RX ADMIN — Medication 5000 UNITS: at 08:01

## 2022-01-06 RX ADMIN — MULTIPLE VITAMINS W/ MINERALS TAB 1 TABLET: TAB at 08:01

## 2022-01-06 NOTE — SUBJECTIVE & OBJECTIVE
Interval History:   - No events overnight  - Still noting dyspnea and weakness. Denies fevers  - Last BM on 1/2      Review of Systems   Constitutional: Positive for fatigue. Negative for chills and fever.   HENT: Negative for mouth sores and trouble swallowing.    Eyes: Negative for photophobia and visual disturbance.   Respiratory: Positive for cough and shortness of breath.    Cardiovascular: Negative for chest pain and palpitations.   Gastrointestinal: Positive for constipation. Negative for nausea and vomiting.   Genitourinary: Negative for difficulty urinating and dysuria.   Musculoskeletal: Negative for myalgias and neck stiffness.   Skin: Negative for rash and wound.   Neurological: Positive for weakness. Negative for facial asymmetry.   Psychiatric/Behavioral: Negative for hallucinations. The patient is not nervous/anxious.      Objective:     Vital Signs (Most Recent):  Temp: 98.6 °F (37 °C) (01/06/22 1650)  Pulse: 90 (01/06/22 1650)  Resp: 18 (01/06/22 1650)  BP: (!) 161/80 (01/06/22 1650)  SpO2: 97 % (01/06/22 1650) Vital Signs (24h Range):  Temp:  [97 °F (36.1 °C)-98.8 °F (37.1 °C)] 98.6 °F (37 °C)  Pulse:  [] 90  Resp:  [17-20] 18  SpO2:  [91 %-97 %] 97 %  BP: (115-166)/(70-90) 161/80     Weight: 68 kg (149 lb 14.6 oz)  Body mass index is 22.79 kg/m².  No intake or output data in the 24 hours ending 01/06/22 1724   Physical Exam  Constitutional:       General: He is not in acute distress.     Appearance: He is normal weight. He is ill-appearing. He is not diaphoretic.      Interventions: Nasal cannula in place.   HENT:      Head: Normocephalic and atraumatic.   Eyes:      Extraocular Movements: Extraocular movements intact.      Pupils: Pupils are equal, round, and reactive to light.   Cardiovascular:      Rate and Rhythm: Normal rate and regular rhythm.      Pulses: Normal pulses.      Heart sounds: No murmur heard.  No friction rub. No gallop.    Pulmonary:      Effort: Pulmonary effort is  normal. No respiratory distress.      Breath sounds: No wheezing or rales.   Chest:      Chest wall: No tenderness.   Abdominal:      General: Abdomen is flat. Bowel sounds are normal. There is no distension.      Palpations: Abdomen is soft.      Tenderness: There is no abdominal tenderness.   Musculoskeletal:      Right lower leg: No edema.      Left lower leg: No edema.   Skin:     General: Skin is warm and dry.      Findings: No erythema or rash.   Neurological:      General: No focal deficit present.      Mental Status: He is alert and oriented to person, place, and time.   Psychiatric:         Mood and Affect: Mood normal.         Behavior: Behavior normal.         Thought Content: Thought content normal.         Judgment: Judgment normal.         Significant Labs: All pertinent labs within the past 24 hours have been reviewed.    Significant Imaging: I have reviewed all pertinent imaging results/findings within the past 24 hours.

## 2022-01-06 NOTE — ASSESSMENT & PLAN NOTE
- A1c 6.6  - Not on insulin at home  - Hold home januvia  - Diabetic diet, POCT glucose ac/hs, SSI while on dexamethasone

## 2022-01-06 NOTE — PLAN OF CARE
[facility] - Med Surg  Initial Discharge Assessment       Primary Care Provider: Srikanth Son MD    Admission Diagnosis: Shortness of breath [R06.02]  Near syncope [R55]  COVID-19 virus infection [U07.1]    Admission Date: 1/5/2022  Expected Discharge Date: 1/10/2022    Discharge Barriers Identified: None    Payor: [employer]ARE / Plan: [employer] / Product Type: Medicare Advantage /     Extended Emergency Contact Information  Primary Emergency Contact: Caro Cruz  Address: [address] [place] [address] [place]  Mobile Phone: [phone]  Relation: Spouse    Discharge Plan A: Home with family  Discharge Plan B: Home Health      [id number] - [place] - [address] AT [facility] & [address]  [address] [place] [address] [place] [address]  Phone: [phone] Fax: [phone]    [facility]  [address] [place]  [facility] [address]  Phone: [phone] Fax: [phone]    [facility] - [place] - [address] [place] [address]  [address] [place] [address]  [place] [address]  Phone: [phone] Fax: [phone]      Initial Assessment (most recent)     Adult Discharge Assessment - 01/06/22 0944        Discharge Assessment    Assessment Type Discharge Planning Assessment     Confirmed/corrected address, phone number and insurance Yes     Confirmed Demographics Correct on Facesheet     Source of Information health record     Reason For Admission Syncope     Lives With spouse     Do you expect to return to your current living situation? Yes     Do you have help at home or someone to help you manage your care at home? Yes     Who are your caregiver(s) and their phone number(s)? Caro Cruz (spouse) [phone]     Prior to hospitilization cognitive status: Alert/Oriented     Current cognitive status: Unable to Assess     Walking or Climbing Stairs Difficulty none     Dressing/Bathing Difficulty none      Equipment Currently Used at Home none     Readmission within 30 days? Yes   Recently discharged from [facility] on 1/4.    Patient currently being followed by outpatient case management? No     Do you currently have service(s) that help you manage your care at home? No     Do you take prescription medications? Yes     Do you have prescription coverage? Yes     Do you have any problems affording any of your prescribed medications? No     Is the patient taking medications as prescribed? yes     Who is going to help you get home at discharge? Patient's spouse can provide transportation home.     Are you on dialysis? No     Do you take coumadin? No     Discharge Plan A Home with family     Discharge Plan B Home Health     DME Needed Upon Discharge  other (see comments)   TBD    Discharge Barriers Identified None        Relationship/Environment    Name(s) of Who Lives With Patient Caro Cruz (spouse) [phone]                  Milena Major RN  Ext [phone]

## 2022-01-06 NOTE — PLAN OF CARE
POC reviewed. Pt used urine bottle, stood at side of bed independently then used the urine bottle. No complaint of pain. Repositions in bed independently. Comfort and safety promoted. Bed on lowest position, locked. Call bell within reach. Assisted in his needs. Bedside nursing care done.

## 2022-01-06 NOTE — ASSESSMENT & PLAN NOTE
Patient is identified as Severe COVID-19 based on hypoxemia with O2 saturations <94% on room air or on ambulation   Initiate standard COVID protocols; COVID-19 testing ,Infection Control notification  and isolation- respiratory, contact and droplet per protocol    Diagnostics: (leukopenia, hyponatremia, hyperferritinemia, elevated troponin, elevated d-dimer, age, and comorbidities are significant predictors of poor clinical outcome)  CBC, CMP, Ferritin, CRP and Portable CXR    Management:   - Initiate targeted therapy with Remdesivir, 200mg IV x1, followed by 100mg IV daily x5 days total  - Dexamethasone PO/IV 6mg daily x10 days and Anticoagulation  - Patient admitted to non-critical care unit  - Will initiate full dose anticoagulation with Weight based lovenox 1mg/kg IV q12  - Maintain oxygen saturations 92-96% via Nasal Cannula 1 LPM and monitor with continuous/intermittent pulse oximetry  -Inhaled bronchodilators as needed for shortness of breath.    Advance Care Planning  Current advance care plan has been discussed with patient/family/POA and patient currently wishes Full Code.

## 2022-01-06 NOTE — PROGRESS NOTES
[facility] Medicine  Progress Note    Patient Name: Cristiano Cruz  MRN: [id number]  Patient Class: IP- Inpatient   Admission Date: 1/5/2022  Length of Stay: 1 days  Attending Physician: Franklin Peraza MD  Primary Care Provider: Srikanth Son MD        Subjective:     Principal Problem:COVID-19        HPI:  66 year old male with PMHx of DMII (not on insulin), glaucoma, COPD (per PFT's, not on oxygen), HLD, BPH, seizure disorder, and Vitamin D/B12 deficiency who presents to the ED presents with presyncope and shortness of breath. Pt discharged from hospital 1/4 after being admitted for 3d for constipation and covid+. During previous hospitalization, pt constipation improved with stool softeners, but pt oxygenation dropped to 89% with ambulation, qualifying him for Remdesivir. On day 3 of remdesiver 1/4 Pt reported feeling significantly improved, adamant about discharging that day. Pt satruating well on RA at rest, SpO2 maintained in 90s on ambulation. Pt amenable to discharge home, instructed to return with any worsening shortness of breath, and to isolate at home per recent CDC guidance.     Today, pt's wife reports that he has not been eating or drinking normally, and is generally weak. Pt went to the store at [employer] today and reports that he felt light-headed and wobbly and like he was about to pass out. In the ED, pt was hypoxic to 89% on room air, improved to 94% on 2L NC. Pt initially resistant to being re-admitted, but care team and wife stressed to pt importance of monitoring and further treatment to keep him safe. Pt admitted to  for further management of severe covid pneumonia.       Overview/Hospital Course:  No notes on file    Interval History:   - No events overnight  - Still noting dyspnea and weakness. Denies fevers  - Last BM on 1/2      Review of Systems   Constitutional: Positive for fatigue. Negative for chills and fever.   HENT: Negative for mouth sores and trouble  swallowing.    Eyes: Negative for photophobia and visual disturbance.   Respiratory: Positive for cough and shortness of breath.    Cardiovascular: Negative for chest pain and palpitations.   Gastrointestinal: Positive for constipation. Negative for nausea and vomiting.   Genitourinary: Negative for difficulty urinating and dysuria.   Musculoskeletal: Negative for myalgias and neck stiffness.   Skin: Negative for rash and wound.   Neurological: Positive for weakness. Negative for facial asymmetry.   Psychiatric/Behavioral: Negative for hallucinations. The patient is not nervous/anxious.      Objective:     Vital Signs (Most Recent):  Temp: 98.6 °F (37 °C) (01/06/22 1650)  Pulse: 90 (01/06/22 1650)  Resp: 18 (01/06/22 1650)  BP: (!) 161/80 (01/06/22 1650)  SpO2: 97 % (01/06/22 1650) Vital Signs (24h Range):  Temp:  [97 °F (36.1 °C)-98.8 °F (37.1 °C)] 98.6 °F (37 °C)  Pulse:  [] 90  Resp:  [17-20] 18  SpO2:  [91 %-97 %] 97 %  BP: (115-166)/(70-90) 161/80     Weight: 68 kg (149 lb 14.6 oz)  Body mass index is 22.79 kg/m².  No intake or output data in the 24 hours ending 01/06/22 1724   Physical Exam  Constitutional:       General: He is not in acute distress.     Appearance: He is normal weight. He is ill-appearing. He is not diaphoretic.      Interventions: Nasal cannula in place.   HENT:      Head: Normocephalic and atraumatic.   Eyes:      Extraocular Movements: Extraocular movements intact.      Pupils: Pupils are equal, round, and reactive to light.   Cardiovascular:      Rate and Rhythm: Normal rate and regular rhythm.      Pulses: Normal pulses.      Heart sounds: No murmur heard.  No friction rub. No gallop.    Pulmonary:      Effort: Pulmonary effort is normal. No respiratory distress.      Breath sounds: No wheezing or rales.   Chest:      Chest wall: No tenderness.   Abdominal:      General: Abdomen is flat. Bowel sounds are normal. There is no distension.      Palpations: Abdomen is soft.       Tenderness: There is no abdominal tenderness.   Musculoskeletal:      Right lower leg: No edema.      Left lower leg: No edema.   Skin:     General: Skin is warm and dry.      Findings: No erythema or rash.   Neurological:      General: No focal deficit present.      Mental Status: He is alert and oriented to person, place, and time.   Psychiatric:         Mood and Affect: Mood normal.         Behavior: Behavior normal.         Thought Content: Thought content normal.         Judgment: Judgment normal.         Significant Labs: All pertinent labs within the past 24 hours have been reviewed.    Significant Imaging: I have reviewed all pertinent imaging results/findings within the past 24 hours.      Assessment/Plan:      * COVID-19  Patient is identified as Severe COVID-19 based on hypoxemia with O2 saturations <94% on room air or on ambulation   Initiate standard COVID protocols; COVID-19 testing ,Infection Control notification  and isolation- respiratory, contact and droplet per protocol    Diagnostics: (leukopenia, hyponatremia, hyperferritinemia, elevated troponin, elevated d-dimer, age, and comorbidities are significant predictors of poor clinical outcome)  CBC, CMP, Ferritin, CRP and Portable CXR    Management:   - Initiate targeted therapy with Remdesivir, 200mg IV x1, followed by 100mg IV daily x5 days total  - Dexamethasone PO/IV 6mg daily x10 days and Anticoagulation  - Patient admitted to non-critical care unit  - Will initiate full dose anticoagulation with Weight based lovenox 1mg/kg IV q12  - Maintain oxygen saturations 92-96% via Nasal Cannula 1 LPM and monitor with continuous/intermittent pulse oximetry  -Inhaled bronchodilators as needed for shortness of breath.    Advance Care Planning  Current advance care plan has been discussed with patient/family/POA and patient currently wishes Full Code.     Type 2 diabetes mellitus, without long-term current use of insulin  - A1c 6.6  - Not on insulin at  home  - Hold home januvia  - Diabetic diet, POCT glucose ac/hs, SSI while on dexamethasone      Constipation  - Continue MiraLAX, docusate      Vitamin D insufficiency  - Chronic, stable  - Continue home supplementation      OAG (open angle glaucoma) suspect, low risk, bilateral  - Chronic, stable  - Continue home latanoprost eye drops    Partial idiopathic epilepsy with seizures of localized onset, not intractable, without status epilepticus  - Chronic, stable  - Continue home lamictal      VTE Risk Mitigation (From admission, onward)         Ordered     enoxaparin injection 70 mg  2 times daily         01/05/22 1451                Discharge Planning   ALECIA: 1/10/2022     Code Status: Prior   Is the patient medically ready for discharge?: No    Reason for patient still in hospital (select all that apply): Treatment and PT / OT recommendations  Discharge Plan A: Home with family                  Franklin Peraza MD  Department of Hospital Medicine   [facility]

## 2022-01-07 PROBLEM — R26.89 DECREASED MOBILITY: Status: ACTIVE | Noted: 2022-01-07

## 2022-01-07 LAB
POCT GLUCOSE: 129 MG/DL (ref 70–110)
POCT GLUCOSE: 133 MG/DL (ref 70–110)
POCT GLUCOSE: 154 MG/DL (ref 70–110)
POCT GLUCOSE: 98 MG/DL (ref 70–110)

## 2022-01-07 PROCEDURE — 12000002 HC ACUTE/MED SURGE SEMI-PRIVATE ROOM

## 2022-01-07 PROCEDURE — 99900035 HC TECH TIME PER 15 MIN (STAT)

## 2022-01-07 PROCEDURE — 25000003 PHARM REV CODE 250: Performed by: STUDENT IN AN ORGANIZED HEALTH CARE EDUCATION/TRAINING PROGRAM

## 2022-01-07 PROCEDURE — 99232 PR SUBSEQUENT HOSPITAL CARE,LEVL II: ICD-10-PCS | Mod: CR,,, | Performed by: STUDENT IN AN ORGANIZED HEALTH CARE EDUCATION/TRAINING PROGRAM

## 2022-01-07 PROCEDURE — 94640 AIRWAY INHALATION TREATMENT: CPT

## 2022-01-07 PROCEDURE — 63600175 PHARM REV CODE 636 W HCPCS: Performed by: STUDENT IN AN ORGANIZED HEALTH CARE EDUCATION/TRAINING PROGRAM

## 2022-01-07 PROCEDURE — 99232 SBSQ HOSP IP/OBS MODERATE 35: CPT | Mod: CR,,, | Performed by: STUDENT IN AN ORGANIZED HEALTH CARE EDUCATION/TRAINING PROGRAM

## 2022-01-07 PROCEDURE — 27000221 HC OXYGEN, UP TO 24 HOURS

## 2022-01-07 PROCEDURE — 94761 N-INVAS EAR/PLS OXIMETRY MLT: CPT

## 2022-01-07 PROCEDURE — 27000207 HC ISOLATION

## 2022-01-07 PROCEDURE — 27100098 HC SPACER

## 2022-01-07 PROCEDURE — 94760 N-INVAS EAR/PLS OXIMETRY 1: CPT

## 2022-01-07 RX ORDER — PANTOPRAZOLE SODIUM 40 MG/1
40 TABLET, DELAYED RELEASE ORAL DAILY
Status: CANCELLED | OUTPATIENT
Start: 2022-01-07

## 2022-01-07 RX ADMIN — MULTIPLE VITAMINS W/ MINERALS TAB 1 TABLET: TAB at 08:01

## 2022-01-07 RX ADMIN — ALBUTEROL SULFATE 2 PUFF: 108 INHALANT RESPIRATORY (INHALATION) at 01:01

## 2022-01-07 RX ADMIN — LAMOTRIGINE 300 MG: 150 TABLET ORAL at 08:01

## 2022-01-07 RX ADMIN — ALBUTEROL SULFATE 2 PUFF: 108 INHALANT RESPIRATORY (INHALATION) at 12:01

## 2022-01-07 RX ADMIN — ALBUTEROL SULFATE 2 PUFF: 108 INHALANT RESPIRATORY (INHALATION) at 07:01

## 2022-01-07 RX ADMIN — MUPIROCIN: 20 OINTMENT TOPICAL at 08:01

## 2022-01-07 RX ADMIN — SENNOSIDES AND DOCUSATE SODIUM 1 TABLET: 50; 8.6 TABLET ORAL at 08:01

## 2022-01-07 RX ADMIN — Medication 500 MG: at 08:01

## 2022-01-07 RX ADMIN — POLYETHYLENE GLYCOL 3350 17 G: 17 POWDER, FOR SOLUTION ORAL at 08:01

## 2022-01-07 RX ADMIN — LATANOPROST 1 DROP: 50 SOLUTION OPHTHALMIC at 08:01

## 2022-01-07 RX ADMIN — PRAVASTATIN SODIUM 10 MG: 10 TABLET ORAL at 08:01

## 2022-01-07 RX ADMIN — ENOXAPARIN SODIUM 70 MG: 80 INJECTION SUBCUTANEOUS at 08:01

## 2022-01-07 RX ADMIN — Medication 5000 UNITS: at 08:01

## 2022-01-07 RX ADMIN — SILODOSIN 4 MG: 4 CAPSULE ORAL at 08:01

## 2022-01-07 RX ADMIN — DEXAMETHASONE 6 MG: 4 TABLET ORAL at 08:01

## 2022-01-07 NOTE — NURSING
Patient is alert and oriented X4, no complain of pain or discomfort noted on this shift.  POC reiviewed with patient and spouse, both  patient and spouse  verbalized understanding.  No acute changes from previous shift.Bed in low position call light within reach will continue to monitor patient.

## 2022-01-08 VITALS
RESPIRATION RATE: 19 BRPM | OXYGEN SATURATION: 94 % | BODY MASS INDEX: 22.72 KG/M2 | SYSTOLIC BLOOD PRESSURE: 161 MMHG | TEMPERATURE: 98 F | DIASTOLIC BLOOD PRESSURE: 86 MMHG | WEIGHT: 149.94 LBS | HEIGHT: 68 IN | HEART RATE: 94 BPM

## 2022-01-08 LAB
ANION GAP SERPL CALC-SCNC: 11 MMOL/L (ref 8–16)
BASOPHILS # BLD AUTO: 0.01 K/UL (ref 0–0.2)
BASOPHILS NFR BLD: 0.1 % (ref 0–1.9)
BUN SERPL-MCNC: 15 MG/DL (ref 8–23)
CALCIUM SERPL-MCNC: 9.1 MG/DL (ref 8.7–10.5)
CHLORIDE SERPL-SCNC: 104 MMOL/L (ref 95–110)
CO2 SERPL-SCNC: 25 MMOL/L (ref 23–29)
CREAT SERPL-MCNC: 0.9 MG/DL (ref 0.5–1.4)
DIFFERENTIAL METHOD: ABNORMAL
EOSINOPHIL # BLD AUTO: 0 K/UL (ref 0–0.5)
EOSINOPHIL NFR BLD: 0.4 % (ref 0–8)
ERYTHROCYTE [DISTWIDTH] IN BLOOD BY AUTOMATED COUNT: 14.2 % (ref 11.5–14.5)
EST. GFR  (AFRICAN AMERICAN): >60 ML/MIN/1.73 M^2
EST. GFR  (NON AFRICAN AMERICAN): >60 ML/MIN/1.73 M^2
GLUCOSE SERPL-MCNC: 110 MG/DL (ref 70–110)
HCT VFR BLD AUTO: 37.2 % (ref 40–54)
HGB BLD-MCNC: 11.9 G/DL (ref 14–18)
IMM GRANULOCYTES # BLD AUTO: 0.07 K/UL (ref 0–0.04)
IMM GRANULOCYTES NFR BLD AUTO: 0.9 % (ref 0–0.5)
LYMPHOCYTES # BLD AUTO: 1.8 K/UL (ref 1–4.8)
LYMPHOCYTES NFR BLD: 22.1 % (ref 18–48)
MCH RBC QN AUTO: 27.8 PG (ref 27–31)
MCHC RBC AUTO-ENTMCNC: 32 G/DL (ref 32–36)
MCV RBC AUTO: 87 FL (ref 82–98)
MONOCYTES # BLD AUTO: 0.6 K/UL (ref 0.3–1)
MONOCYTES NFR BLD: 7.7 % (ref 4–15)
NEUTROPHILS # BLD AUTO: 5.5 K/UL (ref 1.8–7.7)
NEUTROPHILS NFR BLD: 68.8 % (ref 38–73)
NRBC BLD-RTO: 0 /100 WBC
PLATELET # BLD AUTO: 265 K/UL (ref 150–450)
PMV BLD AUTO: 10.3 FL (ref 9.2–12.9)
POCT GLUCOSE: 125 MG/DL (ref 70–110)
POCT GLUCOSE: 136 MG/DL (ref 70–110)
POCT GLUCOSE: 161 MG/DL (ref 70–110)
POTASSIUM SERPL-SCNC: 3.8 MMOL/L (ref 3.5–5.1)
RBC # BLD AUTO: 4.28 M/UL (ref 4.6–6.2)
SODIUM SERPL-SCNC: 140 MMOL/L (ref 136–145)
WBC # BLD AUTO: 7.93 K/UL (ref 3.9–12.7)

## 2022-01-08 PROCEDURE — 85025 COMPLETE CBC W/AUTO DIFF WBC: CPT | Performed by: STUDENT IN AN ORGANIZED HEALTH CARE EDUCATION/TRAINING PROGRAM

## 2022-01-08 PROCEDURE — 97116 GAIT TRAINING THERAPY: CPT

## 2022-01-08 PROCEDURE — 99239 HOSP IP/OBS DSCHRG MGMT >30: CPT | Mod: CR,,, | Performed by: STUDENT IN AN ORGANIZED HEALTH CARE EDUCATION/TRAINING PROGRAM

## 2022-01-08 PROCEDURE — 27000221 HC OXYGEN, UP TO 24 HOURS

## 2022-01-08 PROCEDURE — 80048 BASIC METABOLIC PNL TOTAL CA: CPT | Performed by: STUDENT IN AN ORGANIZED HEALTH CARE EDUCATION/TRAINING PROGRAM

## 2022-01-08 PROCEDURE — 94640 AIRWAY INHALATION TREATMENT: CPT

## 2022-01-08 PROCEDURE — 99900035 HC TECH TIME PER 15 MIN (STAT)

## 2022-01-08 PROCEDURE — 97161 PT EVAL LOW COMPLEX 20 MIN: CPT

## 2022-01-08 PROCEDURE — 94761 N-INVAS EAR/PLS OXIMETRY MLT: CPT

## 2022-01-08 PROCEDURE — 99239 PR HOSPITAL DISCHARGE DAY,>30 MIN: ICD-10-PCS | Mod: CR,,, | Performed by: STUDENT IN AN ORGANIZED HEALTH CARE EDUCATION/TRAINING PROGRAM

## 2022-01-08 PROCEDURE — 97535 SELF CARE MNGMENT TRAINING: CPT

## 2022-01-08 PROCEDURE — 36415 COLL VENOUS BLD VENIPUNCTURE: CPT | Performed by: STUDENT IN AN ORGANIZED HEALTH CARE EDUCATION/TRAINING PROGRAM

## 2022-01-08 PROCEDURE — 63600175 PHARM REV CODE 636 W HCPCS: Performed by: STUDENT IN AN ORGANIZED HEALTH CARE EDUCATION/TRAINING PROGRAM

## 2022-01-08 PROCEDURE — 25000003 PHARM REV CODE 250: Performed by: STUDENT IN AN ORGANIZED HEALTH CARE EDUCATION/TRAINING PROGRAM

## 2022-01-08 PROCEDURE — 97165 OT EVAL LOW COMPLEX 30 MIN: CPT

## 2022-01-08 RX ADMIN — ALBUTEROL SULFATE 2 PUFF: 108 INHALANT RESPIRATORY (INHALATION) at 08:01

## 2022-01-08 RX ADMIN — Medication 5000 UNITS: at 08:01

## 2022-01-08 RX ADMIN — ALBUTEROL SULFATE 2 PUFF: 108 INHALANT RESPIRATORY (INHALATION) at 02:01

## 2022-01-08 RX ADMIN — Medication 500 MG: at 08:01

## 2022-01-08 RX ADMIN — ENOXAPARIN SODIUM 70 MG: 80 INJECTION SUBCUTANEOUS at 08:01

## 2022-01-08 RX ADMIN — PRAVASTATIN SODIUM 10 MG: 10 TABLET ORAL at 08:01

## 2022-01-08 RX ADMIN — DEXAMETHASONE 6 MG: 4 TABLET ORAL at 08:01

## 2022-01-08 RX ADMIN — MUPIROCIN: 20 OINTMENT TOPICAL at 09:01

## 2022-01-08 RX ADMIN — MULTIPLE VITAMINS W/ MINERALS TAB 1 TABLET: TAB at 08:01

## 2022-01-08 RX ADMIN — SENNOSIDES AND DOCUSATE SODIUM 1 TABLET: 50; 8.6 TABLET ORAL at 08:01

## 2022-01-08 RX ADMIN — SILODOSIN 4 MG: 4 CAPSULE ORAL at 08:01

## 2022-01-08 RX ADMIN — LAMOTRIGINE 300 MG: 150 TABLET ORAL at 08:01

## 2022-01-08 NOTE — PLAN OF CARE
Problem: Physical Therapy Goal  Goal: Physical Therapy Goal  Description: Goals to be met by: 2022     Patient will increase functional independence with mobility by performin. Supine to sit with Set-up [place]  2. Sit to supine with Set-up [place]  3. Sit to stand transfer with Supervision  4. Bed to chair transfer with Supervision using LRAD  5. Gait  x 150 feet with Supervision using LRAD.   6. Lower extremity exercise program x15 reps per handout, with supervision    Outcome: Ongoing, Progressing

## 2022-01-08 NOTE — PT/OT/SLP EVAL
Physical Therapy Co-Evaluation    Patient Name:  Cristiano Cruz   MRN:  [id number]     OT for cotx due to pt's multiple medical comorbidities and functional deficits req'ing two skilled therapists to appropriately maximize functional potential by progressing pt's musculoskeletal strength and endurance, facilitating neuromuscular postural balance and control ,and accommodating for patient's impaired cardiopulmonary tolerance to activities.     Recommendations:     Discharge Recommendations:  home health PT   Discharge Equipment Recommendations: walker, rolling   Barriers to discharge: None    Assessment:     Cristiano Cruz is a 66 y.o. male admitted with a medical diagnosis of COVID-19.  He presents with the following impairments/functional limitations:  weakness,impaired endurance,impaired self care skills,impaired functional mobilty,gait instability,impaired balance,impaired cardiopulmonary response to activity Pt. cooperative and tolerated treatment well. Pt. progressing with mobility, but moves slowly.    Rehab Prognosis: Good; patient would benefit from acute skilled PT services to address these deficits and reach maximum level of function.    Recent Surgery: * No surgery found *      Plan:     During this hospitalization, patient to be seen 3 x/week to address the identified rehab impairments via gait training,therapeutic activities,therapeutic exercises and progress toward the following goals:    · Plan of Care Expires:  02/07/22    Subjective     Chief Complaint: weakness  Patient/Family Comments/goals: pt. Agreeable to PT  Pain/Comfort:  · Pain Rating 1: 0/10  · Pain Rating Post-Intervention 1: 0/10    Patients cultural, spiritual, Jainism conflicts given the current situation: no    Living Environment:  Pt. Lives with spouse in 1st floor apt. with no CASEY  Prior to admission, patients level of function was indep.  Equipment used at home: none.  Upon discharge, patient will have assistance from  spouse.    Objective:     Communicated with nursing prior to session.  Patient found supine with peripheral IV,oxygen  upon PT entry to room.    General Precautions: Standard, airborne,contact,droplet,fall, Covid+  Orthopedic Precautions:N/A   Braces: N/A  Respiratory Status: Nasal cannula, flow 2 L/min    Exams:  · RLE ROM: WFL  · RLE Strength: WFL  · LLE ROM: WFL  · LLE Strength: WFL    Functional Mobility:  · Bed Mobility:     · Rolling Left:  stand by assistance  · Scooting: stand by assistance  · Supine to Sit: stand by assistance  · Sit to Supine: stand by assistance  · Transfers:     · Sit to Stand:  contact guard assistance with rolling walker  · Gait: 40' with RW and CGA-SBA and 15' with SBA without AD. Pt. amb. with decreased step length/chandler  · Balance: fair    Therapeutic Activities and Exercises:   Discussed therapy needs, goals, and POC.    AM-PAC 6 CLICK MOBILITY  Total Score:18     Patient left supine with all lines intact and call button in reach.    GOALS:   Multidisciplinary Problems     Physical Therapy Goals        Problem: Physical Therapy Goal    Goal Priority Disciplines Outcome Goal Variances Interventions   Physical Therapy Goal     PT, PT/OT Ongoing, Progressing     Description: Goals to be met by: 2022     Patient will increase functional independence with mobility by performin. Supine to sit with Set-up [place]  2. Sit to supine with Set-up [place]  3. Sit to stand transfer with Supervision  4. Bed to chair transfer with Supervision using LRAD  5. Gait  x 150 feet with Supervision using LRAD.   6. Lower extremity exercise program x15 reps per handout, with supervision                     History:     Past Medical History:   Diagnosis Date    B12 nutritional deficiency 2019    COVID-19 2021    E. coli UTI 2019    During hospitalization for seizure    Generalized tonic-clonic seizure 2019 admitted for new-onset seizures.Normal CT  head.  His mental status normalized, and he had no recurrent seizures following keppra loading in the ED and twice-daily maintenance upon arrival to the floor. Workup into the etiology of his seizures remained negative. EEG was performed, with the preliminary interpretation being no epileptiform activity with normal background.     History of hepatitis C, s/p successful Rx w/ SVR12 (cure) - 11/2019 2/8/2019    S/p epclusa    Kidney stone on left side 6/28/2019    Mixed type COPD (chronic obstructive pulmonary disease) 8/15/2019    8-2019 PFT: Normal airflow. Airflow not improved after bronchodilator. Moderate restriction. Diffusion capacity is mildly decreased. Oximetry is normal.    Partial idiopathic epilepsy with seizures of localized onset, not intractable, without status epilepticus 1/26/2019    new-onset seizures (1/209). Normal CTH, MRI and routine EEG . On keppra 500 mg BID    Pterygium, bilateral 3/12/2019    Type 2 diabetes mellitus with microalbuminuria, without long-term current use of insulin 2/11/2019    Vitamin D insufficiency 7/29/2021       Past Surgical History:   Procedure Laterality Date    CATARACT EXTRACTION W/  INTRAOCULAR LENS IMPLANT Right 7/28/2020    Procedure: EXTRACTION, CATARACT, WITH IOL INSERTION;  Surgeon: Daryl Calloway MD;  Location: [facility];  Service: Ophthalmology;  Laterality: Right;    CATARACT EXTRACTION W/  INTRAOCULAR LENS IMPLANT Left 8/11/2020    Procedure: EXTRACTION, CATARACT, WITH IOL INSERTION;  Surgeon: Daryl Calloway MD;  Location: [facility] OR;  Service: Ophthalmology;  Laterality: Left;    HERNIA REPAIR Right 2000    [facility]       Time Tracking:     PT Received On: 01/08/22  PT Start Time: 0931     PT Stop Time: 0951  PT Total Time (min): 20 min     Billable Minutes: Evaluation 12 and Gait Training 8      01/08/2022

## 2022-01-08 NOTE — PLAN OF CARE
POC reviewed. Needs assistance with ADLs. Stands at side of bed when using urine bottle. No complaint of pain. Bed on lowest position, locked. Call bell within reach. Assisted in his needs. Bedside nursing care done.

## 2022-01-08 NOTE — ASSESSMENT & PLAN NOTE
Patient is identified as Severe COVID-19 based on hypoxemia with O2 saturations <94% on room air or on ambulation   Initiate standard COVID protocols; COVID-19 testing ,Infection Control notification  and isolation- respiratory, contact and droplet per protocol    Diagnostics: (leukopenia, hyponatremia, hyperferritinemia, elevated troponin, elevated d-dimer, age, and comorbidities are significant predictors of poor clinical outcome)  CBC, CMP, Ferritin, CRP and Portable CXR    Management:   - Initiate targeted therapy with Remdesivir, 200mg IV x1, followed by 100mg IV daily x5 days total  - Dexamethasone PO/IV 6mg daily x10 days and Anticoagulation  - Patient admitted to non-critical care unit  - Will initiate full dose anticoagulation with Weight based lovenox 1mg/kg IV q12  - Maintain oxygen saturations 92-96% via Nasal Cannula 1.5 LPM and monitor with continuous/intermittent pulse oximetry  - Inhaled bronchodilators as needed for shortness of breath.    Advance Care Planning  Current advance care plan has been discussed with patient/family/POA and patient currently wishes Full Code.

## 2022-01-08 NOTE — PROGRESS NOTES
Home Oxygen Evaluation    Date Performed: 2022    1) Patient's Home O2 Sat on room air, while at rest: 94%      If O2 sats on room air at rest are 88% or below, patient qualifies. No additional testing needed. Document N/A in steps 2 and 3. If 89% or above, complete steps 2.      2) Patient's O2 Sat on room air while exercisin%

## 2022-01-08 NOTE — PLAN OF CARE
Evaluation completed.  OT plan of care developed and reviewed with patient.     Problem: Occupational Therapy Goal  Goal: Occupational Therapy Goal  Description: Goals to be met by: 1/15/2021    Patient will increase functional independence with ADLs by performing:    UE Dressing with [place].  LE Dressing with [place].  Grooming while standing at sink with [place].  Toileting from toilet with Modified [place] for hygiene and clothing management.     1/8/2022 1537 by TONY Villarreal  Outcome: Ongoing, Progressing     TONY Villarreal  1/8/2022  Rehab Services

## 2022-01-08 NOTE — PT/OT/SLP EVAL
Occupational Therapy   Evaluation    Name: Cristiano Cruz  MRN: [id number]  Admitting Diagnosis:  COVID-19  Recent Surgery: * No surgery found *      Recommendations:     Discharge Recommendations: home health OT  Discharge Equipment Recommendations:  walker, rolling  Barriers to discharge:  None    Assessment:     Cristiano Cruz is a 66 y.o. male with a medical diagnosis of COVID-19.  He presents with decline in ADLs and functional mobility. Pt participated well and tolerated ambulation without supplemental O2. He initially used RW but then progressed to taking steps with SBA and no AD. Pt can complete bed mobility, LB dressing while seated EOB. His ADLs are mainly limited by decreased gait stability. Will continue to work with him during admission to improve functional status.  Performance deficits affecting function: weakness,impaired endurance,impaired self care skills,impaired functional mobilty,gait instability,impaired balance,impaired cardiopulmonary response to activity.      Rehab Prognosis: Good; patient would benefit from acute skilled OT services to address these deficits and reach maximum level of function.       Plan:     Patient to be seen 3 x/week to address the above listed problems via self-care/home management,therapeutic activities,therapeutic exercises,neuromuscular re-education  · Plan of Care Expires: 02/07/22  · Plan of Care Reviewed with: patient    Subjective     Chief Complaint: pt was concerned about gait instability and getting weaker during admission  Patient/Family Comments/goals: Pt agreeable to progressing towards independence with ADLs and functional mobility.     Occupational Profile:  Living Environment: Pt lives with his wife in a 1st floor apartment. Uses a tub shower  Previous level of function: independent with ADLs and functional mobility  Roles and Routines:    Equipment Used at Home:  none  Assistance upon Discharge: wife can assist as needed    Pain/Comfort:  · Pain Rating  1: 0/10  · Pain Rating Post-Intervention 1: 0/10    Patients cultural, spiritual, Yarsani conflicts given the current situation: no    Objective:     Communicated with: RN prior to session.  Patient found HOB elevated with peripheral IV,oxygen upon OT entry to room.    General Precautions: Standard, airborne,aspiration,contact,fall   Orthopedic Precautions:N/A   Braces: N/A  Respiratory Status: Nasal cannula, flow 2 L/min    Occupational Performance:    Bed Mobility:    · Patient completed Supine to Sit with independence  · Patient completed Sit to Supine with independence    Functional Mobility/Transfers:  · Patient completed Sit <> Stand Transfer with independence  with  no assistive device   · Functional Mobility: Pt ambulated in the room with RW and SBA. Progressed to walking without AD short unsteady steps but no need for intervention. Just close SBA incase of LOB.     Activities of Daily Living:  · Feeding:  set up assist    · Grooming: pt can ambulate to sink, can balance without holding on to walker and has UE skills neccessary to complete task.    · Bathing: DNT but can reach all areas of his body, would recommend sitting to shower to minimize fall risk    · Upper Body Dressing: UE ROM WFL for task, sitting balance adequate for task.    · Lower Body Dressing: set up EOB for socks  · Toileting: SBA with RW for safety, did not test formally    Cognitive/Visual Perceptual:  Cognitive/Psychosocial Skills:     -       Follows Commands/attention:Follows one-step commands  Visual/Perceptual:      -Intact      Physical Exam:  Balance: -       good   Upper Extremity Range of Motion:  -       Right Upper Extremity: WFL  -       Left Upper Extremity: WFL  Upper Extremity Strength: -       Right Upper Extremity: WNL  -       Left Upper Extremity: WNL  Neurological: -       grossly intact, shuffled steps and balance impairment but likely related to weakness,  AMPAC 6 Click ADL:  AMPAC Total Score: 18    Treatment &  Education:  · Pt educated on role of OT in acute care setting.   · Assisted with ADLs and functional mobility with assist levels noted above  Education:    Patient left HOB elevated with all lines intact and call button in reach    GOALS:   Multidisciplinary Problems     Occupational Therapy Goals        Problem: Occupational Therapy Goal    Goal Priority Disciplines Outcome Interventions   Occupational Therapy Goal     OT, PT/OT Ongoing, Progressing    Description: Goals to be met by: 1/15/2021    Patient will increase functional independence with ADLs by performing:    UE Dressing with [place].  LE Dressing with [place].  Grooming while standing at sink with [place].  Toileting from toilet with Modified [place] for hygiene and clothing management.                      History:     Past Medical History:   Diagnosis Date    B12 nutritional deficiency 8/7/2019    COVID-19 12/24/2021    E. coli UTI 01/2019    During hospitalization for seizure    Generalized tonic-clonic seizure 1/26/2019 1-2019 admitted for new-onset seizures.Normal CT head.  His mental status normalized, and he had no recurrent seizures following keppra loading in the ED and twice-daily maintenance upon arrival to the floor. Workup into the etiology of his seizures remained negative. EEG was performed, with the preliminary interpretation being no epileptiform activity with normal background.     History of hepatitis C, s/p successful Rx w/ SVR12 (cure) - 11/2019 2/8/2019    S/p epclusa    Kidney stone on left side 6/28/2019    Mixed type COPD (chronic obstructive pulmonary disease) 8/15/2019    8-2019 PFT: Normal airflow. Airflow not improved after bronchodilator. Moderate restriction. Diffusion capacity is mildly decreased. Oximetry is normal.    Partial idiopathic epilepsy with seizures of localized onset, not intractable, without status epilepticus 1/26/2019    new-onset seizures (1/209). Normal CTH, MRI and routine  EEG . On keppra 500 mg BID    Pterygium, bilateral 3/12/2019    Type 2 diabetes mellitus with microalbuminuria, without long-term current use of insulin 2/11/2019    Vitamin D insufficiency 7/29/2021       Past Surgical History:   Procedure Laterality Date    CATARACT EXTRACTION W/  INTRAOCULAR LENS IMPLANT Right 7/28/2020    Procedure: EXTRACTION, CATARACT, WITH IOL INSERTION;  Surgeon: Daryl Calloway MD;  Location: [facility];  Service: Ophthalmology;  Laterality: Right;    CATARACT EXTRACTION W/  INTRAOCULAR LENS IMPLANT Left 8/11/2020    Procedure: EXTRACTION, CATARACT, WITH IOL INSERTION;  Surgeon: Daryl Calloway MD;  Location: [facility];  Service: Ophthalmology;  Laterality: Left;    HERNIA REPAIR Right 2000    [facility]       Time Tracking:     Co-treatment performed due to patient's multiple functional impairments requiring two skilled therapists to appropriately and safely assess patient's strength and endurance while facilitating functional tasks.    OT Date of Treatment: 01/08/22  OT Start Time: 0931  OT Stop Time: 0951  OT Total Time (min): 20 min    Billable Minutes:Evaluation 1 procedure  Self Care/Home Management 10 min    1/8/2022

## 2022-01-08 NOTE — NURSING
Patient given d/c instructions; IV removed with catheter intact; pulse ox delivered to bedside; patient transported via wheelchair to private vehicle with personal belongings.

## 2022-01-08 NOTE — SUBJECTIVE & OBJECTIVE
Interval History:   - No events overnight  - Patient had BM this AM  - Still feels weak. Able to stand with assistance yo urine this AM.    Review of Systems   Constitutional: Positive for fatigue. Negative for chills and fever.   HENT: Negative for mouth sores and trouble swallowing.    Eyes: Negative for photophobia and visual disturbance.   Respiratory: Positive for cough. Negative for shortness of breath.    Cardiovascular: Negative for chest pain and palpitations.   Gastrointestinal: Negative for constipation, nausea and vomiting.   Genitourinary: Negative for difficulty urinating and dysuria.   Musculoskeletal: Negative for myalgias and neck stiffness.   Skin: Negative for rash and wound.   Neurological: Positive for weakness. Negative for facial asymmetry.   Psychiatric/Behavioral: Negative for hallucinations. The patient is not nervous/anxious.      Objective:     Vital Signs (Most Recent):  Temp: 97.9 °F (36.6 °C) (01/07/22 1609)  Pulse: 74 (01/07/22 2000)  Resp: 18 (01/07/22 1609)  BP: 128/74 (01/07/22 2000)  SpO2: (!) 94 % (01/07/22 2000) Vital Signs (24h Range):  Temp:  [97.8 °F (36.6 °C)-98.8 °F (37.1 °C)] 97.9 °F (36.6 °C)  Pulse:  [70-93] 74  Resp:  [18] 18  SpO2:  [90 %-98 %] 94 %  BP: (125-172)/(68-88) 128/74     Weight: 68 kg (149 lb 14.6 oz)  Body mass index is 22.79 kg/m².  No intake or output data in the 24 hours ending 01/07/22 2100   Physical Exam  Constitutional:       General: He is not in acute distress.     Appearance: He is normal weight. He is not ill-appearing or diaphoretic.      Interventions: Nasal cannula in place.   HENT:      Head: Normocephalic and atraumatic.   Eyes:      Extraocular Movements: Extraocular movements intact.      Pupils: Pupils are equal, round, and reactive to light.   Cardiovascular:      Rate and Rhythm: Normal rate and regular rhythm.      Pulses: Normal pulses.      Heart sounds: No murmur heard.  No friction rub. No gallop.    Pulmonary:      Effort:  Pulmonary effort is normal. No respiratory distress.      Breath sounds: No wheezing or rales.   Chest:      Chest wall: No tenderness.   Abdominal:      General: Abdomen is flat. Bowel sounds are normal. There is no distension.      Palpations: Abdomen is soft.      Tenderness: There is no abdominal tenderness.   Musculoskeletal:      Right lower leg: No edema.      Left lower leg: No edema.   Skin:     General: Skin is warm and dry.      Findings: No erythema or rash.   Neurological:      General: No focal deficit present.      Mental Status: He is alert and oriented to person, place, and time.   Psychiatric:         Mood and Affect: Mood normal.         Behavior: Behavior normal.         Thought Content: Thought content normal.         Judgment: Judgment normal.         Significant Labs: All pertinent labs within the past 24 hours have been reviewed.    Significant Imaging: I have reviewed all pertinent imaging results/findings within the past 24 hours.

## 2022-01-08 NOTE — PLAN OF CARE
01/08/22 1543   Final Note   Assessment Type Final Discharge Note   Anticipated Discharge Disposition Home-Health   Post-Acute Status   Post-Acute Authorization Home Health   Home Health Status Referrals Sent   Discharge Delays None known at this time        orders faxed to [facility] for assignment.

## 2022-01-08 NOTE — PLAN OF CARE
[facility] ([place] [address])      HOME HEALTH ORDERS  FACE TO FACE ENCOUNTER    Patient Name: Cristiano Cruz  YOB: 1955    PCP: Srikanth Son MD   PCP Address: [address] / [place] [address]  PCP Phone Number: [phone]  PCP Fax: [phone]    Encounter Date: 1/5/22    Admit to Home Health    Diagnoses:  Active Hospital Problems    Diagnosis  POA    *COVID-19 [U07.1]  Yes     Priority: 1 - High    Type 2 diabetes mellitus, without long-term current use of insulin [E11.9]  Yes     Priority: 2     Decreased mobility [R26.89]  Unknown    Constipation [K59.00]  Yes    Vitamin D insufficiency [E55.9]  Yes    OAG (open angle glaucoma) suspect, low risk, bilateral [H40.013]  Yes    Partial idiopathic epilepsy with seizures of localized onset, not intractable, without status epilepticus [G40.009]  Yes     new-onset seizures (1/209).  Normal CTH, MRI and routine EEG .  On keppra 500 mg BID        Resolved Hospital Problems   No resolved problems to display.       Follow Up Appointments:  Future Appointments   Date Time Provider Department Center   1/31/2022  9:00 AM Srikanth Son MD [facility]   3/22/2022  3:00 PM MAHAN, [facility]   3/22/2022  3:30 PM Allegra Scanlon OD [facility]       Allergies:Review of patient's allergies indicates:  No Known Allergies    Medications: Review discharge medications with patient and family and provide education.    Current Facility-Administered Medications   Medication Dose Route Frequency Provider Last Rate Last Admin    acetaminophen tablet 650 mg  650 mg Oral Q4H PRN Rony Mcmahon, DO        albuterol inhaler 2 puff  2 puff Inhalation Q6H Rony Mcmahon, DO   2 puff at 01/08/22 1401    ascorbic acid (vitamin C) tablet 500 mg  500 mg Oral BID Rony Mcmahon, DO   500 mg at 01/08/22 0857    bisacodyL suppository 10 mg  10 mg Rectal Daily PRN Rony Biswas  Lisandro, DO        dexAMETHasone tablet 6 mg  6 mg Oral Daily Rony Mcmahon, DO   6 mg at 01/08/22 0857    dextromethorphan-guaiFENesin  mg/5 ml liquid 10 mL  10 mL Oral Q4H PRN Rony Mcmahon, DO        dextrose 50% injection 12.5 g  12.5 g Intravenous PRN Rony Mcmahon, DO        dextrose 50% injection 25 g  25 g Intravenous PRN Rony Mcmahon, DO        enoxaparin injection 70 mg  1 mg/kg Subcutaneous BID Rony Mcmahon, DO   70 mg at 01/08/22 0857    glucagon (human recombinant) injection 1 mg  1 mg Intramuscular PRN Rony Mcmahon, DO        glucose chewable tablet 16 g  16 g Oral PRN Rony Mcmahon, DO        glucose chewable tablet 24 g  24 g Oral PRN Rony Mcmahon, DO        insulin aspart U-100 pen 0-5 Units  0-5 Units Subcutaneous QID (AC + HS) PRN Rony Mcmahon, DO        lamoTRIgine tablet 300 mg  300 mg Oral Daily Rony Mcmahon, DO   300 mg at 01/08/22 0858    latanoprost 0.005 % ophthalmic solution 1 drop  1 drop Both Eyes QHS Rony Mcmahon, DO   1 drop at 01/07/22 2029    multivitamin tablet  1 tablet Oral Daily Rony Mcmahon, DO   1 tablet at 01/08/22 0858    mupirocin 2 % ointment   Nasal BID Rony Mcmahon, DO   Given at 01/08/22 0906    ondansetron disintegrating tablet 8 mg  8 mg Oral Q8H PRN Rony Mcmahon, DO        polyethylene glycol packet 17 g  17 g Oral Daily Rony Mcmahon, DO   17 g at 01/07/22 0848    pravastatin tablet 10 mg  10 mg Oral Daily Rony Mcmahon, DO   10 mg at 01/08/22 0857    senna-docusate 8.6-50 mg per tablet 1 tablet  1 tablet Oral BID Rony Mcmahon, DO   1 tablet at 01/08/22 0857    silodosin capsule 4 mg  4 mg Oral Daily Rony Mcmahon, DO   4 mg at 01/08/22 0858    sodium chloride 0.9% flush 10 mL  10 mL Intravenous PRN Rony Mcmahon DO        vitamin D 1000 units tablet 5,000 Units  5,000 Units Oral  Daily Rony Mcmahon, DO   5,000 Units at 01/08/22 0857     Current Discharge Medication List      CONTINUE these medications which have NOT CHANGED    Details   cholecalciferol, vitamin D3, 125 mcg (5,000 unit) Tab Take 1 tablet (5,000 Units total) by mouth once daily.  Qty: 90 tablet, Refills: 1    Associated Diagnoses: Vitamin D insufficiency      cyanocobalamin 1,000 mcg/mL injection Inject 1 mL (1,000 mcg total) into the muscle every 30 days.  Qty: 10 mL, Refills: 0    Associated Diagnoses: B12 nutritional deficiency      lamoTRIgine (LAMICTAL) 150 MG Tab Take 2 tablets (300 mg total) by mouth once daily. NO FURTHER REFILLS WITHOUT APPOINTMENT  Qty: 180 tablet, Refills: 1    Associated Diagnoses: Partial idiopathic epilepsy with seizures of localized onset, not intractable, without status epilepticus      latanoprost 0.005 % ophthalmic solution INSTILL 1 DROP IN BOTH EYES EVERY EVENING  Qty: 7.5 mL, Refills: 4    Comments: **Patient requests 90 days supply**      polyethylene glycol (GLYCOLAX) 17 gram/dose powder Mix 1 capful (17 g) with a liquid and take by mouth once daily.  Qty: 510 g, Refills: 0      pravastatin (PRAVACHOL) 10 MG tablet Take 1 tablet (10 mg total) by mouth once daily.  Qty: 90 tablet, Refills: 1    Associated Diagnoses: Type 2 diabetes mellitus with microalbuminuria, without long-term current use of insulin      pulse oximeter (PULSE OXIMETER) device by Apply Externally route 2 (two) times a day. Use twice daily at 8 AM and 3 PM and record the value in [employer] as directed.  Qty: 1 each, Refills: 0    Comments: This is a NO CHARGE item.  Please override price to zero.  DO NOT PRINT.  NORMAL MODE e-PRESCRIBE ONLY.      senna-docusate 8.6-50 mg (PERICOLACE) 8.6-50 mg per tablet Take 1 tablet by mouth 2 (two) times daily.  Qty: 60 tablet, Refills: 0      silodosin (RAPAFLO) 4 mg Cap capsule Take 1 capsule (4 mg total) by mouth once daily.  Qty: 90 capsule, Refills: 1    Associated  Diagnoses: Benign prostatic hyperplasia with urinary frequency      SITagliptin (JANUVIA) 25 MG Tab Take 1 tablet (25 mg total) by mouth once daily.  Qty: 90 tablet, Refills: 1    Associated Diagnoses: Type 2 diabetes mellitus with microalbuminuria, without long-term current use of insulin               I have seen and examined this patient within the last 30 days. My clinical findings that support the need for the home health skilled services and home bound status are the following:no   Weakness/numbness causing balance and gait disturbance due to Weakness/Debility making it taxing to leave home.     Diet:   regular diet    Labs:  Report Lab results to PCP.    Referrals/ Consults  Physical Therapy to evaluate and treat. Evaluate for home safety and equipment needs; Establish/upgrade home exercise program. Perform / instruct on therapeutic exercises, gait training, transfer training, and Range of Motion.    Activities:   activity as tolerated    Nursing:   Agency to admit patient within 24 hours of hospital discharge unless specified on physician order or at patient request    SN to complete comprehensive assessment including routine vital signs. Instruct on disease process and s/s of complications to report to MD. Review/verify medication list sent home with the patient at time of discharge  and instruct patient/caregiver as needed. Frequency may be adjusted depending on start of care date.     Skilled nurse to perform up to 3 visits PRN for symptoms related to diagnosis    Notify MD if SBP > 160 or < 90; DBP > 90 or < 50; HR > 120 or < 50; Temp > 101; O2 < 88%; Other:   N/a      Ok to schedule additional visits based on staff availability and patient request on consecutive days within the home health episode.    When multiple disciplines ordered:    Start of Care occurs on Sunday - Wednesday schedule remaining discipline evaluations as ordered on separate consecutive days following the start of care.    Thursday  SOC -schedule subsequent evaluations Friday and Monday the following week.     Friday - Saturday SOC - schedule subsequent discipline evaluations on consecutive days starting Monday of the following week.    For all post-discharge communication and subsequent orders please contact patient's primary care physician. If unable to reach primary care physician or do not receive response within 30 minutes, please contact [facility] for clinical staff order clarification    Miscellaneous   N/A    Home Health Aide:  Physical Therapy Three times weekly and      Wound Care Orders  n/a    I certify that this patient is confined to his home and needs physical therapy.

## 2022-01-08 NOTE — PROGRESS NOTES
[facility] Medicine  Progress Note    Patient Name: Cristiano Cruz  MRN: [id number]  Patient Class: IP- Inpatient   Admission Date: 1/5/2022  Length of Stay: 2 days  Attending Physician: Franklin Peraza MD  Primary Care Provider: Srikanth Son MD        Subjective:     Principal Problem:COVID-19        HPI:  66 year old male with PMHx of DMII (not on insulin), glaucoma, COPD (per PFT's, not on oxygen), HLD, BPH, seizure disorder, and Vitamin D/B12 deficiency who presents to the ED presents with presyncope and shortness of breath. Pt discharged from hospital 1/4 after being admitted for 3d for constipation and covid+. During previous hospitalization, pt constipation improved with stool softeners, but pt oxygenation dropped to 89% with ambulation, qualifying him for Remdesivir. On day 3 of remdesiver 1/4 Pt reported feeling significantly improved, adamant about discharging that day. Pt satruating well on RA at rest, SpO2 maintained in 90s on ambulation. Pt amenable to discharge home, instructed to return with any worsening shortness of breath, and to isolate at home per recent CDC guidance.     Today, pt's wife reports that he has not been eating or drinking normally, and is generally weak. Pt went to the store at [employer] today and reports that he felt light-headed and wobbly and like he was about to pass out. In the ED, pt was hypoxic to 89% on room air, improved to 94% on 2L NC. Pt initially resistant to being re-admitted, but care team and wife stressed to pt importance of monitoring and further treatment to keep him safe. Pt admitted to  for further management of severe covid pneumonia.       Overview/Hospital Course:  No notes on file    Interval History:   - No events overnight  - Patient had BM this AM  - Still feels weak. Able to stand with assistance yo urine this AM.    Review of Systems   Constitutional: Positive for fatigue. Negative for chills and fever.   HENT: Negative for mouth  sores and trouble swallowing.    Eyes: Negative for photophobia and visual disturbance.   Respiratory: Positive for cough. Negative for shortness of breath.    Cardiovascular: Negative for chest pain and palpitations.   Gastrointestinal: Negative for constipation, nausea and vomiting.   Genitourinary: Negative for difficulty urinating and dysuria.   Musculoskeletal: Negative for myalgias and neck stiffness.   Skin: Negative for rash and wound.   Neurological: Positive for weakness. Negative for facial asymmetry.   Psychiatric/Behavioral: Negative for hallucinations. The patient is not nervous/anxious.      Objective:     Vital Signs (Most Recent):  Temp: 97.9 °F (36.6 °C) (01/07/22 1609)  Pulse: 74 (01/07/22 2000)  Resp: 18 (01/07/22 1609)  BP: 128/74 (01/07/22 2000)  SpO2: (!) 94 % (01/07/22 2000) Vital Signs (24h Range):  Temp:  [97.8 °F (36.6 °C)-98.8 °F (37.1 °C)] 97.9 °F (36.6 °C)  Pulse:  [70-93] 74  Resp:  [18] 18  SpO2:  [90 %-98 %] 94 %  BP: (125-172)/(68-88) 128/74     Weight: 68 kg (149 lb 14.6 oz)  Body mass index is 22.79 kg/m².  No intake or output data in the 24 hours ending 01/07/22 2100   Physical Exam  Constitutional:       General: He is not in acute distress.     Appearance: He is normal weight. He is not ill-appearing or diaphoretic.      Interventions: Nasal cannula in place.   HENT:      Head: Normocephalic and atraumatic.   Eyes:      Extraocular Movements: Extraocular movements intact.      Pupils: Pupils are equal, round, and reactive to light.   Cardiovascular:      Rate and Rhythm: Normal rate and regular rhythm.      Pulses: Normal pulses.      Heart sounds: No murmur heard.  No friction rub. No gallop.    Pulmonary:      Effort: Pulmonary effort is normal. No respiratory distress.      Breath sounds: No wheezing or rales.   Chest:      Chest wall: No tenderness.   Abdominal:      General: Abdomen is flat. Bowel sounds are normal. There is no distension.      Palpations: Abdomen is soft.       Tenderness: There is no abdominal tenderness.   Musculoskeletal:      Right lower leg: No edema.      Left lower leg: No edema.   Skin:     General: Skin is warm and dry.      Findings: No erythema or rash.   Neurological:      General: No focal deficit present.      Mental Status: He is alert and oriented to person, place, and time.   Psychiatric:         Mood and Affect: Mood normal.         Behavior: Behavior normal.         Thought Content: Thought content normal.         Judgment: Judgment normal.         Significant Labs: All pertinent labs within the past 24 hours have been reviewed.    Significant Imaging: I have reviewed all pertinent imaging results/findings within the past 24 hours.      Assessment/Plan:      * COVID-19  Patient is identified as Severe COVID-19 based on hypoxemia with O2 saturations <94% on room air or on ambulation   Initiate standard COVID protocols; COVID-19 testing ,Infection Control notification  and isolation- respiratory, contact and droplet per protocol    Diagnostics: (leukopenia, hyponatremia, hyperferritinemia, elevated troponin, elevated d-dimer, age, and comorbidities are significant predictors of poor clinical outcome)  CBC, CMP, Ferritin, CRP and Portable CXR    Management:   - Initiate targeted therapy with Remdesivir, 200mg IV x1, followed by 100mg IV daily x5 days total  - Dexamethasone PO/IV 6mg daily x10 days and Anticoagulation  - Patient admitted to non-critical care unit  - Will initiate full dose anticoagulation with Weight based lovenox 1mg/kg IV q12  - Maintain oxygen saturations 92-96% via Nasal Cannula 1.5 LPM and monitor with continuous/intermittent pulse oximetry  - Inhaled bronchodilators as needed for shortness of breath.    Advance Care Planning  Current advance care plan has been discussed with patient/family/POA and patient currently wishes Full Code.     Type 2 diabetes mellitus, without long-term current use of insulin  - A1c 6.6  - Not on insulin  at home  - Hold home januvia  - Diabetic diet, POCT glucose ac/hs, SSI while on dexamethasone      Decreased mobility  - PT/OT consulted, needs to be seen      Constipation  - Continue MiraLAX, docusate      Vitamin D insufficiency  - Chronic, stable  - Continue home supplementation      OAG (open angle glaucoma) suspect, low risk, bilateral  - Chronic, stable  - Continue home latanoprost eye drops    Partial idiopathic epilepsy with seizures of localized onset, not intractable, without status epilepticus  - Chronic, stable  - Continue home lamictal      VTE Risk Mitigation (From admission, onward)         Ordered     enoxaparin injection 70 mg  2 times daily         01/05/22 1451                Discharge Planning   ALECAI: 1/10/2022     Code Status: Prior   Is the patient medically ready for discharge?: No    Reason for patient still in hospital (select all that apply): PT / OT recommendations and Pending disposition  Discharge Plan A: Home with family                  Franklin Peraza MD  Department of Hospital Medicine   [facility] - Med Surg

## 2022-01-09 ENCOUNTER — HOSPITAL ENCOUNTER (EMERGENCY)
Facility: HOSPITAL | Age: 67
Discharge: HOME | End: 2022-01-09
Attending: EMERGENCY MEDICINE
Payer: MEDICARE

## 2022-01-09 VITALS
OXYGEN SATURATION: 98 % | DIASTOLIC BLOOD PRESSURE: 88 MMHG | HEART RATE: 98 BPM | TEMPERATURE: 98 F | BODY MASS INDEX: 22.73 KG/M2 | HEIGHT: 68 IN | RESPIRATION RATE: 16 BRPM | SYSTOLIC BLOOD PRESSURE: 162 MMHG | WEIGHT: 150 LBS

## 2022-01-09 DIAGNOSIS — R53.1 WEAKNESS: Primary | ICD-10-CM

## 2022-01-09 DIAGNOSIS — R06.02 SOB (SHORTNESS OF BREATH): ICD-10-CM

## 2022-01-09 LAB
ALBUMIN SERPL BCP-MCNC: 3.3 G/DL (ref 3.5–5.2)
ALP SERPL-CCNC: 54 U/L (ref 55–135)
ALT SERPL W/O P-5'-P-CCNC: 43 U/L (ref 10–44)
ANION GAP SERPL CALC-SCNC: 11 MMOL/L (ref 8–16)
AST SERPL-CCNC: 44 U/L (ref 10–40)
BASOPHILS # BLD AUTO: 0.02 K/UL (ref 0–0.2)
BASOPHILS NFR BLD: 0.2 % (ref 0–1.9)
BILIRUB SERPL-MCNC: 0.4 MG/DL (ref 0.1–1)
BUN SERPL-MCNC: 12 MG/DL (ref 8–23)
CALCIUM SERPL-MCNC: 9.6 MG/DL (ref 8.7–10.5)
CHLORIDE SERPL-SCNC: 102 MMOL/L (ref 95–110)
CO2 SERPL-SCNC: 27 MMOL/L (ref 23–29)
CREAT SERPL-MCNC: 1.1 MG/DL (ref 0.5–1.4)
DIFFERENTIAL METHOD: ABNORMAL
EOSINOPHIL # BLD AUTO: 0 K/UL (ref 0–0.5)
EOSINOPHIL NFR BLD: 0.2 % (ref 0–8)
ERYTHROCYTE [DISTWIDTH] IN BLOOD BY AUTOMATED COUNT: 13.9 % (ref 11.5–14.5)
EST. GFR  (AFRICAN AMERICAN): >60 ML/MIN/1.73 M^2
EST. GFR  (NON AFRICAN AMERICAN): >60 ML/MIN/1.73 M^2
GLUCOSE SERPL-MCNC: 134 MG/DL (ref 70–110)
HCT VFR BLD AUTO: 41.4 % (ref 40–54)
HGB BLD-MCNC: 12.7 G/DL (ref 14–18)
IMM GRANULOCYTES # BLD AUTO: 0.06 K/UL (ref 0–0.04)
IMM GRANULOCYTES NFR BLD AUTO: 0.7 % (ref 0–0.5)
LYMPHOCYTES # BLD AUTO: 0.9 K/UL (ref 1–4.8)
LYMPHOCYTES NFR BLD: 9.5 % (ref 18–48)
MCH RBC QN AUTO: 27.4 PG (ref 27–31)
MCHC RBC AUTO-ENTMCNC: 30.7 G/DL (ref 32–36)
MCV RBC AUTO: 89 FL (ref 82–98)
MONOCYTES # BLD AUTO: 0.5 K/UL (ref 0.3–1)
MONOCYTES NFR BLD: 5.4 % (ref 4–15)
NEUTROPHILS # BLD AUTO: 7.6 K/UL (ref 1.8–7.7)
NEUTROPHILS NFR BLD: 84 % (ref 38–73)
NRBC BLD-RTO: 0 /100 WBC
PLATELET # BLD AUTO: 325 K/UL (ref 150–450)
PMV BLD AUTO: 10.2 FL (ref 9.2–12.9)
POTASSIUM SERPL-SCNC: 4.4 MMOL/L (ref 3.5–5.1)
PROT SERPL-MCNC: 7.7 G/DL (ref 6–8.4)
RBC # BLD AUTO: 4.64 M/UL (ref 4.6–6.2)
SODIUM SERPL-SCNC: 140 MMOL/L (ref 136–145)
WBC # BLD AUTO: 9.06 K/UL (ref 3.9–12.7)

## 2022-01-09 PROCEDURE — 93010 EKG 12-LEAD: ICD-10-PCS | Mod: ,,, | Performed by: INTERNAL MEDICINE

## 2022-01-09 PROCEDURE — 99284 PR EMERGENCY DEPT VISIT,LEVEL IV: ICD-10-PCS | Mod: ,,, | Performed by: EMERGENCY MEDICINE

## 2022-01-09 PROCEDURE — 93005 ELECTROCARDIOGRAM TRACING: CPT

## 2022-01-09 PROCEDURE — 80053 COMPREHEN METABOLIC PANEL: CPT

## 2022-01-09 PROCEDURE — 99285 EMERGENCY DEPT VISIT HI MDM: CPT | Mod: 25

## 2022-01-09 PROCEDURE — 85025 COMPLETE CBC W/AUTO DIFF WBC: CPT

## 2022-01-09 PROCEDURE — 93010 ELECTROCARDIOGRAM REPORT: CPT | Mod: ,,, | Performed by: INTERNAL MEDICINE

## 2022-01-09 PROCEDURE — 99284 EMERGENCY DEPT VISIT MOD MDM: CPT | Mod: ,,, | Performed by: EMERGENCY MEDICINE

## 2022-01-09 NOTE — ED NOTES
Patient identifiers verified and correct for Cristiano Anthony  C/C: As per wife patient presenting with bilateral lower extremities weakness, moving all extremities, patient stated that he was feeling good until this morning that he started feeling dizziness.  APPEARANCE: awake and alert in NAD.  SKIN: warm, dry and intact. No breakdown or bruising.  MUSCULOSKELETAL: Patient moving all extremities spontaneously, no obvious swelling or deformities noted. Ambulates independently.  RESPIRATORY: Denies shortness of breath.Respirations unlabored.   CARDIAC: Denies CP, 2+ distal pulses; no peripheral edema  ABDOMEN: S/ND/NT, Denies nausea  : voids spontaneously, denies difficulty  Neurologic: AAO x 4; follows commands equal strength in all extremities; denies numbness/tingling.  Complaining of dizziness

## 2022-01-09 NOTE — ED NOTES
Patient left his  License and insurance card. Patient's wife made aware, she will return to the ED. Documents placed in the charge nurse drawer.

## 2022-01-09 NOTE — ED PROVIDER NOTES
Encounter Date: 1/9/2022       History     Chief Complaint   Patient presents with    Extremity Weakness     Began this morning.      Cristiano Cruz is a 66 y.o. male with history of Alzheimer's, COPD, epilepsy, type 2 diabetes, recent COVID infection diagnosed on 12/24 presenting with lower extremity weakness.  Patient has had multiple admissions in the past 2 weeks most recently this past Tuesday through Saturday.  Today patient states that he had an episode of lower extremity weakness and shaking where he was unable to walk.  He states he got up around 7:00 a.m. went to go to the bathroom, sat down and started shaking and was unable to lift his legs.  He states this lasted for 30 minutes.  It has since resolved. I spoke with the patient's spouse who states that his Alzheimer's is bad at baseline but he has been more confused and weak since his recent COVID infection.  Patient ambulates with a walker at baseline.  He is endorsing dizziness and headache at this time.    The history is provided by the patient and the spouse.     Review of patient's allergies indicates:  No Known Allergies  Past Medical History:   Diagnosis Date    B12 nutritional deficiency 8/7/2019    COVID-19 12/24/2021    E. coli UTI 01/2019    During hospitalization for seizure    Generalized tonic-clonic seizure 1/26/2019 1-2019 admitted for new-onset seizures.Normal CT head.  His mental status normalized, and he had no recurrent seizures following keppra loading in the ED and twice-daily maintenance upon arrival to the floor. Workup into the etiology of his seizures remained negative. EEG was performed, with the preliminary interpretation being no epileptiform activity with normal background.     History of hepatitis C, s/p successful Rx w/ SVR12 (cure) - 11/2019 2/8/2019    S/p epclusa    Kidney stone on left side 6/28/2019    Mixed type COPD (chronic obstructive pulmonary disease) 8/15/2019    8-2019 PFT: Normal airflow. Airflow not  improved after bronchodilator. Moderate restriction. Diffusion capacity is mildly decreased. Oximetry is normal.    Partial idiopathic epilepsy with seizures of localized onset, not intractable, without status epilepticus 2019    new-onset seizures (). Normal CTH, MRI and routine EEG . On keppra 500 mg BID    Pterygium, bilateral 3/12/2019    Type 2 diabetes mellitus with microalbuminuria, without long-term current use of insulin 2019    Vitamin D insufficiency 2021     Past Surgical History:   Procedure Laterality Date    CATARACT EXTRACTION W/  INTRAOCULAR LENS IMPLANT Right 2020    Procedure: EXTRACTION, CATARACT, WITH IOL INSERTION;  Surgeon: Daryl Calloway MD;  Location: [facility];  Service: Ophthalmology;  Laterality: Right;    CATARACT EXTRACTION W/  INTRAOCULAR LENS IMPLANT Left 2020    Procedure: EXTRACTION, CATARACT, WITH IOL INSERTION;  Surgeon: Daryl Calloway MD;  Location: [facility];  Service: Ophthalmology;  Laterality: Left;    HERNIA REPAIR Right     [facility]     Family History   Problem Relation Age of Onset    Hypertension Mother      Social History     Tobacco Use    Smoking status: Former Smoker     Packs/day: 3.00     Years: 25.00     Pack years: 75.00     Quit date: 2012     Years since quittin.9    Smokeless tobacco: Never Used   Substance Use Topics    Alcohol use: No     Comment: Used to drink    Drug use: No     Review of Systems   Constitutional: Negative for chills and fever.   HENT: Negative for congestion and sore throat.    Respiratory: Negative for cough and shortness of breath.    Cardiovascular: Negative for chest pain and palpitations.   Gastrointestinal: Negative for abdominal pain, diarrhea, nausea and vomiting.   Genitourinary: Negative for difficulty urinating and dysuria.   Musculoskeletal: Positive for gait problem. Negative for arthralgias and myalgias.   Skin: Negative for pallor and rash.    Neurological: Positive for dizziness, weakness ( Lower extremity) and headaches.   Hematological: Does not bruise/bleed easily.   Psychiatric/Behavioral: Negative for agitation and confusion.       Physical Exam     Initial Vitals [01/09/22 1326]   BP Pulse Resp Temp SpO2   126/82 90 16 98 °F (36.7 °C) 100 %      MAP       --         Physical Exam    Nursing note and vitals reviewed.  Constitutional: He appears well-developed and well-nourished. He is not diaphoretic. No distress.   Ill appearing, nontoxic, very confused   HENT:   Head: Normocephalic and atraumatic.   Mouth/Throat: Oropharynx is clear and moist.   Eyes: Conjunctivae are normal. No scleral icterus.   Neck: Neck supple. No tracheal deviation present. No JVD present.   Cardiovascular: Normal rate, regular rhythm, normal heart sounds and intact distal pulses. Exam reveals no gallop and no friction rub.    No murmur heard.  Pulmonary/Chest: No stridor. No respiratory distress. He has no wheezes. He has no rhonchi. He has no rales.   Diminished breath sounds diffusely   Abdominal: Abdomen is soft. Bowel sounds are normal. He exhibits no distension. There is no abdominal tenderness. There is no guarding.   Musculoskeletal:         General: No tenderness or edema.      Cervical back: Neck supple.     Neurological: He is alert. He has normal strength. No cranial nerve deficit. GCS score is 15. GCS eye subscore is 4. GCS verbal subscore is 5. GCS motor subscore is 6.   Skin: Skin is warm and dry. Capillary refill takes less than 2 seconds. No rash noted. No erythema. No pallor.         ED Course   Procedures  Labs Reviewed   CBC W/ AUTO DIFFERENTIAL - Abnormal; Notable for the following components:       Result Value    Hemoglobin 12.7 (*)     MCHC 30.7 (*)     Immature Granulocytes 0.7 (*)     Immature Grans (Abs) 0.06 (*)     Lymph # 0.9 (*)     Gran % 84.0 (*)     Lymph % 9.5 (*)     All other components within normal limits   COMPREHENSIVE METABOLIC  PANEL - Abnormal; Notable for the following components:    Glucose 134 (*)     Albumin 3.3 (*)     Alkaline Phosphatase 54 (*)     AST 44 (*)     All other components within normal limits     EKG Readings: (Independently Interpreted)   Initial Reading: No STEMI. Rhythm: Normal Sinus Rhythm. Heart Rate: 97. Ectopy: No Ectopy.       Imaging Results          X-Ray Chest AP Portable (Final result)  Result time 01/09/22 16:53:09    Final result by Frankie Baca MD (01/09/22 16:53:09)                 Impression:      As above.      Electronically signed by: Frankie Baca  Date:    01/09/2022  Time:    16:53             Narrative:    EXAMINATION:  XR CHEST AP PORTABLE    CLINICAL HISTORY:  Shortness of breath    TECHNIQUE:  Single frontal view of the chest was performed.    COMPARISON:  01/05/2022    FINDINGS:  Mediastinal structures are midline.  Stable cardiomediastinal silhouette.    Persistent diffuse patchy airspace opacities.  Slightly improved aeration of the left lower lung field.  Otherwise, findings are similar compared to prior exam.  No evidence of pleural effusion or pneumothorax.    Visualized osseous structures appear intact.                                 Medications - No data to display  Medical Decision Making:   Initial Assessment:   66-year-old marrow here with transient lower extremity weakness  Differential Diagnosis:   Worsening Alzheimer's, COVID complications, metabolic encephalopathy  Clinical Tests:   Lab Tests: Ordered and Reviewed  Radiological Study: Ordered and Reviewed  ED Management:  Basic labs EKG and chest x-ray ordered.  CBC showing mild anemia, no leukocytosis.  EKG showing normal sinus rhythm with no signs of ischemia.  Chest x-ray showing persistent patchy airspace opacities with some improvement in lung aeration from previous.  After taking a short nap patient is much more conversational and lucid.  He is able to walk and transfer on his own.  CMP unremarkable.  Patient feels  he is ready to go home, and I do not see any acute indications to keep patient in the hospital overnight. Patient's wife called and patient will be discharged with return precautions and instructions to follow-up with PCP                      Clinical Impression:   Final diagnoses:  [R06.02] SOB (shortness of breath)  [R53.1] Weakness (Primary)          ED Disposition Condition    Discharge Stable        ED Prescriptions     None        Follow-up Information     Follow up With Specialties Details Why Contact Info    Srikanth Son MD Internal Medicine, Hospitalist Schedule an appointment as soon as possible for a visit   [address]  [place] [address]  [phone]      [facility] - Emergency Dept Emergency Medicine Go to  As needed, If symptoms worsen [address] [facility] [id number] [phone]           Vipin Leo MD  Resident  01/09/22 [id number]

## 2022-01-11 NOTE — DISCHARGE SUMMARY
[facility] - Intensive Care ([address] Medicine  Discharge Summary      Patient Name: Cristiano Cruz  MRN: [id number]  Patient Class: IP- Inpatient  Admission Date: 1/5/2022  Hospital Length of Stay: 3 days  Discharge Date and Time: 1/8/2022  5:46 PM  Attending Physician: No att. providers found   Discharging Provider: Franklin Peraza MD  Primary Care Provider: Srikanth Son MD      HPI:   66 year old male with PMHx of DMII (not on insulin), glaucoma, COPD (per PFT's, not on oxygen), HLD, BPH, seizure disorder, and Vitamin D/B12 deficiency who presents to the ED presents with presyncope and shortness of breath. Pt discharged from hospital 1/4 after being admitted for 3d for constipation and covid+. During previous hospitalization, pt constipation improved with stool softeners, but pt oxygenation dropped to 89% with ambulation, qualifying him for Remdesivir. On day 3 of remdesiver 1/4 Pt reported feeling significantly improved, adamant about discharging that day. Pt satruating well on RA at rest, SpO2 maintained in 90s on ambulation. Pt amenable to discharge home, instructed to return with any worsening shortness of breath, and to isolate at home per recent CDC guidance.     Today, pt's wife reports that he has not been eating or drinking normally, and is generally weak. Pt went to the store at [employer] today and reports that he felt light-headed and wobbly and like he was about to pass out. In the ED, pt was hypoxic to 89% on room air, improved to 94% on 2L NC. Pt initially resistant to being re-admitted, but care team and wife stressed to pt importance of monitoring and further treatment to keep him safe. Pt admitted to  for further management of severe covid pneumonia.       * No surgery found *      Hospital Course:   Patient treated with dexamethasone, remdesivir, and supportive treatment. Patient had improvement of symptoms the first two days of admission and weaned to room air. It is noted that  he still felt weak and seen by PT and OT who reccommended home health. Patient did not require home oxygen on assessment. Enrolled in Covid surveillance program. PCP follow up requested.        Goals of Care Treatment Preferences:  Code Status: Full Code      Consults:     No new Assessment & Plan notes have been filed under this hospital service since the last note was generated.  Service: Hospital Medicine    Final Active Diagnoses:    Diagnosis Date Noted POA    PRINCIPAL PROBLEM:  COVID-19 [U07.1] 12/24/2021 Yes    Type 2 diabetes mellitus, without long-term current use of insulin [E11.9] 02/11/2019 Yes    Decreased mobility [R26.89] 01/07/2022 Unknown    Constipation [K59.00] 01/02/2022 Yes    Vitamin D insufficiency [E55.9] 07/29/2021 Yes    OAG (open angle glaucoma) suspect, low risk, bilateral [H40.013] 03/12/2019 Yes    Partial idiopathic epilepsy with seizures of localized onset, not intractable, without status epilepticus [G40.009] 01/26/2019 Yes      Problems Resolved During this Admission:       Discharged Condition: stable    Disposition: Home or Self Care    Follow Up:   Follow-up Information     [facility] - Emergency Dept.    Specialty: Emergency Medicine  Why: Return to ED for worsening symptoms, inability to eat/drink, fever greater than 100.4, or any other concerns.  Contact information:  [address]  [facility] [address] [phone]                     Patient Instructions:      Ambulatory referral/consult to Internal Medicine   Standing Status: Future   Referral Priority: Routine Referral Type: Consultation   Referral Reason: Specialty Services Required   Requested Specialty: Internal Medicine   Number of Visits Requested: 1     Diet Adult Regular     COVID-19 Surveillance Program     Order Specific Question Answer Comments   Does patient have a smartphone? Yes    Does patient have the [facility] on their smartphone? No    While in surveillance program, will patient  be using home oxygen? No      COVID-19 Surveillance Program     Order Specific Question Answer Comments   Does patient have a smartphone? Yes    Does patient have the [facility] on their smartphone? No    While in surveillance program, will patient be using home oxygen? No      Notify your health care provider if you experience any of the following:  persistent nausea and vomiting or diarrhea     Notify your health care provider if you experience any of the following:  temperature >100.4     Notify your health care provider if you experience any of the following:  severe uncontrolled pain     Notify your health care provider if you experience any of the following:  increased confusion or weakness     Notify your health care provider if you experience any of the following:  persistent dizziness, light-headedness, or visual disturbances     Notify your health care provider if you experience any of the following:  worsening rash     Notify your health care provider if you experience any of the following:  severe persistent headache     Notify your health care provider if you experience any of the following:  difficulty breathing or increased cough     Notify your health care provider if you experience any of the following:  redness, tenderness, or signs of infection (pain, swelling, redness, odor or green/yellow discharge around incision site)     Activity as tolerated       Significant Diagnostic Studies: Labs: All labs within the past 24 hours have been reviewed    Pending Diagnostic Studies:     None         Medications:  Reconciled Home Medications:      Medication List      CONTINUE taking these medications    cholecalciferol (vitamin D3) 125 mcg (5,000 unit) Tab  Take 1 tablet (5,000 Units total) by mouth once daily.     cyanocobalamin 1,000 mcg/mL injection  Inject 1 mL (1,000 mcg total) into the muscle every 30 days.     lamoTRIgine 150 MG Tab  Commonly known as: LAMICTAL  Take 2 tablets (300 mg total) by  mouth once daily. NO FURTHER REFILLS WITHOUT APPOINTMENT     latanoprost 0.005 % ophthalmic solution  INSTILL 1 DROP IN BOTH EYES EVERY EVENING     polyethylene glycol 17 gram/dose powder  Commonly known as: GLYCOLAX  Mix 1 capful (17 g) with a liquid and take by mouth once daily.     pravastatin 10 MG tablet  Commonly known as: PRAVACHOL  Take 1 tablet (10 mg total) by mouth once daily.     pulse oximeter device  Commonly known as: pulse oximeter  by Apply Externally route 2 (two) times a day. Use twice daily at 8 AM and 3 PM and record the value in [facility] as directed.     senna-docusate 8.6-50 mg 8.6-50 mg per tablet  Commonly known as: PERICOLACE  Take 1 tablet by mouth 2 (two) times daily.     silodosin 4 mg Cap capsule  Commonly known as: RAPAFLO  Take 1 capsule (4 mg total) by mouth once daily.     SITagliptin 25 MG Tab  Commonly known as: JANUVIA  Take 1 tablet (25 mg total) by mouth once daily.            Indwelling Lines/Drains at time of discharge:   Lines/Drains/Airways     None                 Time spent on the discharge of patient: 35 minutes         rFanklin Peraza MD  Department of Hospital Medicine  [facility] - Intensive Care ([place])

## 2022-01-11 NOTE — PLAN OF CARE
[facility] ([facility]  Discharge Final Note    Primary Care Provider: Srikanth Son MD    Expected Discharge Date: 1/8/2022       Future Appointments   Date Time Provider Department Center   1/31/2022  9:00 AM Srikanth Son MD [facility]   3/22/2022  3:00 PM MAHAN, VISUAL FIELDS [facility]   3/22/2022  3:30 PM Allegra Scanlon, BETH [facility]         Final Discharge Note (most recent)     Final Note - 01/11/22 0812        Final Note    Assessment Type Final Discharge Note     Anticipated Discharge Disposition Home-Health Care [facility]     What phone number can be called within the next 1-3 days to see how you are doing after discharge? [phone]     Hospital Resources/Appts/Education Provided Appointments scheduled and added to AVS        Post-Acute Status    Post-Acute Authorization Home Health     Home Health Status Set-up Complete/Auth obtained   [facility]                Important Message from Medicare             Contact Info     [facility] - Emergency Dept   Specialty: Emergency Medicine    [address]  [facility] [address]   Phone: [phone]       Next Steps: Follow up    Instructions: Return to ED for worsening symptoms, inability to eat/drink, fever greater than 100.4, or any other concerns.              Milena Major RN  Ext [phone]

## 2022-01-11 NOTE — HOSPITAL COURSE
Patient treated with dexamethasone, remdesivir, and supportive treatment. Patient had improvement of symptoms the first two days of admission and weaned to room air. It is noted that he still felt weak and seen by PT and OT who reccommended home health. Patient did not require home oxygen on assessment. Enrolled in Covid surveillance program. PCP follow up requested.

## 2022-01-13 ENCOUNTER — TELEPHONE (OUTPATIENT)
Dept: INTERNAL MEDICINE | Facility: CLINIC | Age: 67
End: 2022-01-13
Payer: MEDICARE

## 2022-01-13 NOTE — TELEPHONE ENCOUNTER
----- Message from Srikanth Son MD sent at 1/11/2022 11:45 AM CST -----  Contact: Betty from [facility] [phone]  Tell them yes.    ----- Message -----  From: Manasa Headley MA  Sent: 1/11/2022  11:29 AM CST  To: Srikanth Son MD      ----- Message -----  From: Maria Ines Colbert  Sent: 1/11/2022  10:40 AM CST  To: Huy COBURN Staff    [facility] is calling to request if Dr Son can sign Home health orders. Please advise    Thank you

## 2022-01-14 PROCEDURE — G0180 PR HOME HEALTH MD CERTIFICATION: ICD-10-PCS | Mod: ,,, | Performed by: PSYCHIATRY & NEUROLOGY

## 2022-01-14 PROCEDURE — G0180 MD CERTIFICATION HHA PATIENT: HCPCS | Mod: ,,, | Performed by: PSYCHIATRY & NEUROLOGY

## 2022-01-20 ENCOUNTER — PES CALL (OUTPATIENT)
Dept: ADMINISTRATIVE | Facility: CLINIC | Age: 67
End: 2022-01-20
Payer: MEDICARE

## 2022-01-21 ENCOUNTER — EXTERNAL HOME HEALTH (OUTPATIENT)
Dept: HOME HEALTH SERVICES | Facility: HOSPITAL | Age: 67
End: 2022-01-21
Payer: MEDICARE

## 2022-01-22 NOTE — PROGRESS NOTES
[facility]  Progress Note   Recent Hospital Discharge     PRESENTING HISTORY     Chief Complaint/Reason for Visit:  Follow up Hospital Discharge     PCP: Srikanth Son MD    History of Present Illness: Mr. Cristiano Cruz is a 66 y.o. male who was recently admitted to the hospital.    Patient Class: IP- Inpatient  Admission Date: 1/5/2022  Hospital Length of Stay: 3 days  Discharge Date and Time: 1/8/2022  5:46 PM  Discharging Provider: Franklin Peraza MD  Primary Care Provider: Srikanth Son MD     HPI:   66 year old male with PMHx of DMII (not on insulin), glaucoma, COPD (per PFT's, not on oxygen), HLD, BPH, seizure disorder, and Vitamin D/B12 deficiency who presents to the ED presents with presyncope and shortness of breath. Pt discharged from hospital 1/4 after being admitted for 3d for constipation and covid+. During previous hospitalization, pt constipation improved with stool softeners, but pt oxygenation dropped to 89% with ambulation, qualifying him for Remdesivir. On day 3 of remdesiver 1/4 Pt reported feeling significantly improved, adamant about discharging that day. Pt satruating well on RA at rest, SpO2 maintained in 90s on ambulation. Pt amenable to discharge home, instructed to return with any worsening shortness of breath, and to isolate at home per recent CDC guidance.      Today, pt's wife reports that he has not been eating or drinking normally, and is generally weak. Pt went to the store at [employer] today and reports that he felt light-headed and wobbly and like he was about to pass out. In the ED, pt was hypoxic to 89% on room air, improved to 94% on 2L NC. Pt initially resistant to being re-admitted, but care team and wife stressed to pt importance of monitoring and further treatment to keep him safe. Pt admitted to  for further management of severe covid pneumonia.      Hospital Course:   Patient treated with dexamethasone, remdesivir, and supportive treatment. Patient had improvement  of symptoms the first two days of admission and weaned to room air. It is noted that he still felt weak and seen by PT and OT who reccommended home health. Patient did not require home oxygen on assessment. Enrolled in Covid surveillance program. PCP follow up requested.     __________________________________________________________________    Today:  Doing well.  Still has home health.   Uses a walker.  No seizure.   Has mild resting tremor of hands post COVID.    PAST HISTORY:     Past Medical History:   Diagnosis Date    B12 nutritional deficiency 8/7/2019    COVID-19 12/24/2021    E. coli UTI 01/2019    During hospitalization for seizure    Generalized tonic-clonic seizure 1/26/2019 1-2019 admitted for new-onset seizures.Normal CT head.  His mental status normalized, and he had no recurrent seizures following keppra loading in the ED and twice-daily maintenance upon arrival to the floor. Workup into the etiology of his seizures remained negative. EEG was performed, with the preliminary interpretation being no epileptiform activity with normal background.     History of hepatitis C, s/p successful Rx w/ SVR12 (cure) - 11/2019 2/8/2019    S/p epclusa    Kidney stone on left side 6/28/2019    Mixed type COPD (chronic obstructive pulmonary disease) 8/15/2019    8-2019 PFT: Normal airflow. Airflow not improved after bronchodilator. Moderate restriction. Diffusion capacity is mildly decreased. Oximetry is normal.    Nuclear sclerotic cataract of left eye 8/11/2020    Nuclear sclerotic cataract of right eye 7/28/2020    Partial idiopathic epilepsy with seizures of localized onset, not intractable, without status epilepticus 1/26/2019    new-onset seizures (1/209). Normal CTH, MRI and routine EEG . On keppra 500 mg BID    Pterygium, bilateral 3/12/2019    Type 2 diabetes mellitus with microalbuminuria, without long-term current use of insulin 2/11/2019    Vitamin D insufficiency 7/29/2021       Past  Surgical History:   Procedure Laterality Date    CATARACT EXTRACTION W/  INTRAOCULAR LENS IMPLANT Right 7/28/2020    Procedure: EXTRACTION, CATARACT, WITH IOL INSERTION;  Surgeon: Daryl Calloway MD;  Location: [facility];  Service: Ophthalmology;  Laterality: Right;    CATARACT EXTRACTION W/  INTRAOCULAR LENS IMPLANT Left 8/11/2020    Procedure: EXTRACTION, CATARACT, WITH IOL INSERTION;  Surgeon: Daryl Calloway MD;  Location: [facility];  Service: Ophthalmology;  Laterality: Left;    HERNIA REPAIR Right 2000    [facility]       Family History   Problem Relation Age of Onset    Hypertension Mother        Social History     Socioeconomic History    Marital status:     Number of children: 1   Tobacco Use    Smoking status: Former Smoker     Packs/day: 3.00     Years: 25.00     Pack years: 75.00     Quit date: 1/31/2012     Years since quitting: 10.0    Smokeless tobacco: Never Used   Substance and Sexual Activity    Alcohol use: No     Comment: Used to drink    Drug use: No    Sexual activity: Yes     Partners: Female   Social History Narrative     with 1 daughter (42 as of 2019).    Work in [employer]       MEDICATIONS & ALLERGIES:     Current Outpatient Medications on File Prior to Visit   Medication Sig Dispense Refill            cholecalciferol, vitamin D3, 125 mcg (5,000 unit) Tab Take 1 tablet (5,000 Units total) by mouth once daily. 90 tablet 1     cyanocobalamin 1,000 mcg/mL injection Inject 1 mL (1,000 mcg total) into the muscle every 30 days. 10 mL 0     lamoTRIgine (LAMICTAL) 150 MG Tab Take 2 tablets (300 mg total) by mouth once daily. NO FURTHER REFILLS WITHOUT APPOINTMENT 180 tablet 1            pravastatin (PRAVACHOL) 10 MG tablet Take 1 tablet (10 mg total) by mouth once daily. 90 tablet 1                   SITagliptin (JANUVIA) 25 MG Tab Take 1 tablet (25 mg total) by mouth once daily. 90 tablet 1    latanoprost 0.005 % ophthalmic solution  INSTILL 1 DROP IN BOTH EYES EVERY EVENING 7.5 mL 4    ] silodosin (RAPAFLO) 4 mg Cap capsule Take 1 capsule (4 mg total) by mouth once daily. 90 capsule 1     No current facility-administered medications on file prior to visit.        Review of patient's allergies indicates:  No Known Allergies    OBJECTIVE:     Vital Signs:  Vitals:    01/31/22 0919   BP: 130/80   Pulse: 97   Resp: 18     Wt Readings from Last 3 Encounters:   01/26/22 1614 68 kg (150 lb)   01/09/22 1326 68 kg (150 lb)   01/06/22 0337 68 kg (149 lb 14.6 oz)   01/05/22 1101 74.8 kg (165 lb)     Body mass index is 23.63 kg/m².     Physical Exam:  General: Well developed, well nourished. No distress.  HEENT: Head is normocephalic, atraumatic; ears are normal.    Eyes: Clear conjunctiva.  Neck: Supple, symmetrical neck; trachea midline.  Lungs: Clear to auscultation bilaterally and normal respiratory effort.  Cardiovascular: Heart with regular rate and rhythm.    Extremities: No LE edema.    Abdomen: Abdomen is soft, non-tender non-distended with normal bowel sounds.  Musculoskeletal: Uses a walker.  Psychiatric: Normal affect. Alert.    Laboratory  Lab Results   Component Value Date    WBC 9.86 01/26/2022    HGB 13.3 (L) 01/26/2022    HCT 41.6 01/26/2022    MCV 88 01/26/2022     01/26/2022     BMP  Lab Results   Component Value Date     01/26/2022    K 3.8 01/26/2022     01/26/2022    CO2 25 01/26/2022    BUN 10 01/26/2022    CREATININE 1.5 (H) 01/26/2022    CALCIUM 9.5 01/26/2022    ANIONGAP 7 (L) 01/26/2022    ESTGFRAFRICA 54.5 (A) 01/26/2022    EGFRNONAA 47.2 (A) 01/26/2022     Lab Results   Component Value Date    ALT 43 01/09/2022    AST 44 (H) 01/09/2022    ALKPHOS 54 (L) 01/09/2022    BILITOT 0.4 01/09/2022     Lab Results   Component Value Date    INR 1.0 01/02/2022    INR 1.0 03/14/2019     Lab Results   Component Value Date    HGBA1C 6.6 (H) 01/02/2022 1- Urine  Component 5 d ago   Urine Culture, Routine   Abnormal   PROTEUS MIRABILIS   50,000 - 99,999 cfu/ml     Treated with Keflex 500 mg every 6 hr for 7 days           Susceptibility     Proteus mirabilis     CULTURE, URINE     Amox/K Clav'ate <=8/4 mcg/mL Sensitive     Amp/Sulbactam <=8/4 mcg/mL Sensitive     Ampicillin <=8 mcg/mL Sensitive     Cefazolin <=2 mcg/mL Sensitive     Cefepime <=2 mcg/mL Sensitive     Ceftriaxone <=1 mcg/mL Sensitive     Ciprofloxacin <=1 mcg/mL Sensitive     Ertapenem <=0.5 mcg/mL Sensitive     Gentamicin <=4 mcg/mL Sensitive     Levofloxacin <=2 mcg/mL Sensitive     Meropenem <=1 mcg/mL Sensitive     Piperacillin/Tazo <=16 mcg/mL Sensitive     Tobramycin <=4 mcg/mL Sensitive     Trimeth/Sulfa <=2/38 mcg/mL Sensitive                           TRANSITION OF CARE:     [facility] On [facility] Contact Note: 1-    Family and/or Caretaker present at visit?  Yes.  Diagnostic tests reviewed/disposition: No diagnosic tests pending after this hospitalization.  Disease/illness education: [facility]  Home health/community services discussion/referrals: Patient has home health established at [facility]..   Establishment or re-establishment of referral orders for community resources: No other necessary community resources.   Discussion with other health care providers: No discussion with other health care providers necessary.     Transition of Care Visit:     I have reviewed and updated the history and problem list.  I have reconciled the medication list.  I have discussed the hospitalization and current medical issues, prognosis and plans with the patient/family.  I  spent more than 50% of time discussing the care with the patient/family.  Total Face-to-Face Encounter in the [facility]: 60 minutes.    Medications Reconciliation:   I have reconciled the patient's home medications and discharge medications with the patient/family. I have updated all changes.  Refer to After-Visit Medication List.    ASSESSMENT & PLAN:     COVID-19  - Recovered. No need  for oxygen.  Getting home health for therapy.    Will get his vaccine at [facility] this week.    Partial idiopathic epilepsy    5-2020: Seizure X 2    7-19-19 Neurology:              - Advised him to go to the ED in case of any seizures              - f/u in clinic in 6-9 months for monitoring recurrence of events and prescriptions              -  if remains seizure-free for up to 2 years, will consider weaning off of AEDs     - On Lamictal 150 mg BID.       -   -     lamoTRIgine (LAMICTAL) 150 MG Tab; Take 2 tablets (300 mg total) by mouth once daily. NO FURTHER REFILLS WITHOUT APPOINTMENT  Dispense: 180 tablet; Refill: 3     Mixed COPD  - History of heavy smoking.     8-2019 PFT:  Normal airflow. Airflow not improved after bronchodilator. Moderate restriction.  Diffusion capacity is mildly decreased. Oximetry is normal.     - No inhaler necessary at present.     B12 Deficiency  Poor memory  - On B12 injection monthly.  - Improved with B12 injection.     -     cyanocobalamin 1,000 mcg/mL injection; Inject 1 mL (1,000 mcg total) into the muscle every 30 days.  Dispense: 10 mL; Refill: 3 ([facility])    Vitamin D insufficiency  -     cholecalciferol, vitamin D3, 125 mcg (5,000 unit) Tab; Take 1 tablet (5,000 Units total) by mouth once daily.  Dispense: 90 tablet; Refill: 3    History of hepatitis C, s/p successful Rx w/ SVR12 (cure) - 11/2019     Type 2 diabetes mellitus without complication, without long-term current use of insulin  - A1c 6.6.  He does not change his blood sugar at home. Has glucometer.     -     pravastatin (PRAVACHOL) 10 MG tablet; Take 1 tablet (10 mg total) by mouth once daily.  Dispense: 90 tablet; Refill:3  -     SITagliptin (JANUVIA) 25 MG Tab; Take 1 tablet (25 mg total) by mouth once daily.  Dispense: 90 tablet; Refill: 3    Personal history of nicotine dependence   Ex-very heavy cigarette smoker (more than 40 per day)  CT Chest Lung Screening Low Dose 3-:  Negative  He has no time currently to CT scan due to issues in his life.     Benign prostatic hyperplasia with urinary frequency  On silodosin (RAPAFLO) 4 mg Cap capsule; Take 1 capsule (4 mg total) by mouth once daily.    (Has no ejaculation from this, but he is okay with it).     Preventive Health Maintenance:    COVID vaccine scheduled by daughter at [facility] this week.    Eye exam 3- future     Return to Clinic for Follow Up with me:    Annual on April 27 (will do labs then).    Scheduled Follow-up :  Future Appointments   Date Time Provider Department Center   3/22/2022  3:00 PM MAHAN, [facility] [place] [facility]   3/22/2022  3:30 PM Allegra Scanlon, OD [facility] [place] [facility]   4/27/2022 10:00 AM Srikanth Son MD [facility] [place] [facility]       After Visit Medication List :     Medication List          Accurate as of January 31, 2022  9:45 AM. If you have any questions, ask your nurse or doctor.            CONTINUE taking these medications    cholecalciferol (vitamin D3) 125 mcg (5,000 unit) Tab  Take 1 tablet (5,000 Units total) by mouth once daily.     cyanocobalamin 1,000 mcg/mL injection  Inject 1 mL (1,000 mcg total) into the muscle every 30 days.     lamoTRIgine 150 MG Tab  Commonly known as: LAMICTAL  Take 2 tablets (300 mg total) by mouth once daily. NO FURTHER REFILLS WITHOUT APPOINTMENT     latanoprost 0.005 % ophthalmic solution  INSTILL 1 DROP IN BOTH EYES EVERY EVENING     pravastatin 10 MG tablet  Commonly known as: PRAVACHOL  Take 1 tablet (10 mg total) by mouth once daily.     silodosin 4 mg Cap capsule  Commonly known as: RAPAFLO  Take 1 capsule (4 mg total) by mouth once daily.     SITagliptin 25 MG Tab  Commonly known as: JANUVIA  Take 1 tablet (25 mg total) by mouth once daily.        STOP taking these medications    cephALEXin 500 MG capsule  Commonly known as: KEFLEX  Stopped by: Srikanth Son MD     polyethylene glycol 17 gram/dose powder  Commonly known as:  GLYCOLAX  Stopped by: Srikanth Son MD     pulse oximeter device  Commonly known as: pulse oximeter  Stopped by: Srikanth Son MD     senna-docusate 8.6-50 mg 8.6-50 mg per tablet  Commonly known as: PERICOLACE  Stopped by: Srikanth Son MD           Where to Get Your Medications      These medications [facility]  [address] [facility] [facility] [address]    Hours: Mon-Fri, 8a-5:30p Phone: [phone]   · cyanocobalamin 1,000 mcg/mL injection     These medications were sent to [id number] - [place] - [facility] AT [facility] [address]  [facility] [address] [place] [address]    Phone: [phone]   · cholecalciferol (vitamin D3) 125 mcg (5,000 unit) Tab  · lamoTRIgine 150 MG Tab  · pravastatin 10 MG tablet  · silodosin 4 mg Cap capsule  · SITagliptin 25 MG Tab           Signing Physician:  Srikanth Son MD

## 2022-01-26 ENCOUNTER — HOSPITAL ENCOUNTER (EMERGENCY)
Facility: HOSPITAL | Age: 67
Discharge: HOME | End: 2022-01-26
Attending: EMERGENCY MEDICINE
Payer: MEDICARE

## 2022-01-26 VITALS
HEART RATE: 74 BPM | BODY MASS INDEX: 22.81 KG/M2 | OXYGEN SATURATION: 96 % | RESPIRATION RATE: 16 BRPM | DIASTOLIC BLOOD PRESSURE: 77 MMHG | TEMPERATURE: 98 F | WEIGHT: 150 LBS | SYSTOLIC BLOOD PRESSURE: 142 MMHG

## 2022-01-26 DIAGNOSIS — N30.01 ACUTE CYSTITIS WITH HEMATURIA: Primary | ICD-10-CM

## 2022-01-26 LAB
ANION GAP SERPL CALC-SCNC: 7 MMOL/L (ref 8–16)
BACTERIA #/AREA URNS AUTO: ABNORMAL /HPF
BASOPHILS # BLD AUTO: 0.05 K/UL (ref 0–0.2)
BASOPHILS NFR BLD: 0.5 % (ref 0–1.9)
BILIRUB UR QL STRIP: NEGATIVE
BUN SERPL-MCNC: 10 MG/DL (ref 8–23)
CALCIUM SERPL-MCNC: 9.5 MG/DL (ref 8.7–10.5)
CHLORIDE SERPL-SCNC: 105 MMOL/L (ref 95–110)
CLARITY UR REFRACT.AUTO: ABNORMAL
CO2 SERPL-SCNC: 25 MMOL/L (ref 23–29)
COLOR UR AUTO: ABNORMAL
CREAT SERPL-MCNC: 1.5 MG/DL (ref 0.5–1.4)
DIFFERENTIAL METHOD: ABNORMAL
EOSINOPHIL # BLD AUTO: 0.2 K/UL (ref 0–0.5)
EOSINOPHIL NFR BLD: 1.5 % (ref 0–8)
ERYTHROCYTE [DISTWIDTH] IN BLOOD BY AUTOMATED COUNT: 14.3 % (ref 11.5–14.5)
EST. GFR  (AFRICAN AMERICAN): 54.5 ML/MIN/1.73 M^2
EST. GFR  (NON AFRICAN AMERICAN): 47.2 ML/MIN/1.73 M^2
GLUCOSE SERPL-MCNC: 122 MG/DL (ref 70–110)
GLUCOSE UR QL STRIP: NEGATIVE
HCT VFR BLD AUTO: 41.6 % (ref 40–54)
HGB BLD-MCNC: 13.3 G/DL (ref 14–18)
HGB UR QL STRIP: ABNORMAL
HYALINE CASTS UR QL AUTO: 0 /LPF
IMM GRANULOCYTES # BLD AUTO: 0.04 K/UL (ref 0–0.04)
IMM GRANULOCYTES NFR BLD AUTO: 0.4 % (ref 0–0.5)
KETONES UR QL STRIP: ABNORMAL
LEUKOCYTE ESTERASE UR QL STRIP: ABNORMAL
LYMPHOCYTES # BLD AUTO: 2.1 K/UL (ref 1–4.8)
LYMPHOCYTES NFR BLD: 21.2 % (ref 18–48)
MCH RBC QN AUTO: 28.2 PG (ref 27–31)
MCHC RBC AUTO-ENTMCNC: 32 G/DL (ref 32–36)
MCV RBC AUTO: 88 FL (ref 82–98)
MICROSCOPIC COMMENT: ABNORMAL
MONOCYTES # BLD AUTO: 0.6 K/UL (ref 0.3–1)
MONOCYTES NFR BLD: 6 % (ref 4–15)
NEUTROPHILS # BLD AUTO: 6.9 K/UL (ref 1.8–7.7)
NEUTROPHILS NFR BLD: 70.4 % (ref 38–73)
NITRITE UR QL STRIP: NEGATIVE
NRBC BLD-RTO: 0 /100 WBC
PH UR STRIP: 6 [PH] (ref 5–8)
PLATELET # BLD AUTO: 246 K/UL (ref 150–450)
PMV BLD AUTO: 10.5 FL (ref 9.2–12.9)
POTASSIUM SERPL-SCNC: 3.8 MMOL/L (ref 3.5–5.1)
PROT UR QL STRIP: ABNORMAL
RBC # BLD AUTO: 4.72 M/UL (ref 4.6–6.2)
RBC #/AREA URNS AUTO: >100 /HPF (ref 0–4)
SODIUM SERPL-SCNC: 137 MMOL/L (ref 136–145)
SP GR UR STRIP: >=1.03 (ref 1–1.03)
SQUAMOUS #/AREA URNS AUTO: 7 /HPF
URN SPEC COLLECT METH UR: ABNORMAL
WBC # BLD AUTO: 9.86 K/UL (ref 3.9–12.7)
WBC #/AREA URNS AUTO: >100 /HPF (ref 0–5)
WBC CLUMPS UR QL AUTO: ABNORMAL

## 2022-01-26 PROCEDURE — 85025 COMPLETE CBC W/AUTO DIFF WBC: CPT | Performed by: EMERGENCY MEDICINE

## 2022-01-26 PROCEDURE — 99284 EMERGENCY DEPT VISIT MOD MDM: CPT | Mod: 25

## 2022-01-26 PROCEDURE — 87077 CULTURE AEROBIC IDENTIFY: CPT | Performed by: EMERGENCY MEDICINE

## 2022-01-26 PROCEDURE — 80048 BASIC METABOLIC PNL TOTAL CA: CPT | Performed by: EMERGENCY MEDICINE

## 2022-01-26 PROCEDURE — 87186 SC STD MICRODIL/AGAR DIL: CPT | Performed by: EMERGENCY MEDICINE

## 2022-01-26 PROCEDURE — 63600175 PHARM REV CODE 636 W HCPCS: Performed by: EMERGENCY MEDICINE

## 2022-01-26 PROCEDURE — 99284 PR EMERGENCY DEPT VISIT,LEVEL IV: ICD-10-PCS | Mod: ,,, | Performed by: INTERNAL MEDICINE

## 2022-01-26 PROCEDURE — 25000003 PHARM REV CODE 250: Performed by: EMERGENCY MEDICINE

## 2022-01-26 PROCEDURE — 99284 EMERGENCY DEPT VISIT MOD MDM: CPT | Mod: ,,, | Performed by: INTERNAL MEDICINE

## 2022-01-26 PROCEDURE — 81001 URINALYSIS AUTO W/SCOPE: CPT | Performed by: EMERGENCY MEDICINE

## 2022-01-26 PROCEDURE — 87086 URINE CULTURE/COLONY COUNT: CPT | Performed by: EMERGENCY MEDICINE

## 2022-01-26 PROCEDURE — 87088 URINE BACTERIA CULTURE: CPT | Performed by: EMERGENCY MEDICINE

## 2022-01-26 PROCEDURE — 96360 HYDRATION IV INFUSION INIT: CPT

## 2022-01-26 RX ORDER — CEPHALEXIN 500 MG/1
500 CAPSULE ORAL 4 TIMES DAILY
Qty: 28 CAPSULE | Refills: 0 | Status: SHIPPED | OUTPATIENT
Start: 2022-01-26 | End: 2022-01-31

## 2022-01-26 RX ORDER — CEPHALEXIN 500 MG/1
500 CAPSULE ORAL
Status: COMPLETED | OUTPATIENT
Start: 2022-01-26 | End: 2022-01-26

## 2022-01-26 RX ADMIN — SODIUM CHLORIDE, SODIUM LACTATE, POTASSIUM CHLORIDE, AND CALCIUM CHLORIDE 1000 ML: .6; .31; .03; .02 INJECTION, SOLUTION INTRAVENOUS at 05:01

## 2022-01-26 RX ADMIN — CEPHALEXIN 500 MG: 500 CAPSULE ORAL at 07:01

## 2022-01-26 NOTE — ED TRIAGE NOTES
Patient presents to the ED with complaints of hematuria. Patient states he woke up this morning and was urinating normally, with maybe a slight bit of burning, until about 12 noon when he noticed that after he urinated he was dripping somewhat discolored urine. He states he went 2 more times after that and each time the urine got progressively more bloody, prompting him to call EMS. Patient also feels weak and dizzy, which was present before this started. No fevers, abdominal pain, nausea, or vomiting.

## 2022-01-26 NOTE — ED PROVIDER NOTES
Encounter Date: 1/26/2022       History     Chief Complaint   Patient presents with    Hematuria     Started today; denies blood thinners     Cristiano Cruz is a 66 M past medical history of COVID, UTI, COPD, epilepsy presenting today for hematuria.  Patient states symptoms started approximately 2:00 p.m..  He reports urinary frequency, incomplete emptying of his bladder, as well as urinating w/weak stream.  He denies blood clots or bright red blood, states urine looks rust colored.  No fever or chills.  No back pain.  No abdominal pain        Review of patient's allergies indicates:  No Known Allergies  Past Medical History:   Diagnosis Date    B12 nutritional deficiency 8/7/2019    COVID-19 12/24/2021    E. coli UTI 01/2019    During hospitalization for seizure    Generalized tonic-clonic seizure 1/26/2019 1-2019 admitted for new-onset seizures.Normal CT head.  His mental status normalized, and he had no recurrent seizures following keppra loading in the ED and twice-daily maintenance upon arrival to the floor. Workup into the etiology of his seizures remained negative. EEG was performed, with the preliminary interpretation being no epileptiform activity with normal background.     History of hepatitis C, s/p successful Rx w/ SVR12 (cure) - 11/2019 2/8/2019    S/p epclusa    Kidney stone on left side 6/28/2019    Mixed type COPD (chronic obstructive pulmonary disease) 8/15/2019    8-2019 PFT: Normal airflow. Airflow not improved after bronchodilator. Moderate restriction. Diffusion capacity is mildly decreased. Oximetry is normal.    Partial idiopathic epilepsy with seizures of localized onset, not intractable, without status epilepticus 1/26/2019    new-onset seizures (1/209). Normal CTH, MRI and routine EEG . On keppra 500 mg BID    Pterygium, bilateral 3/12/2019    Type 2 diabetes mellitus with microalbuminuria, without long-term current use of insulin 2/11/2019    Vitamin D insufficiency 7/29/2021      Past Surgical History:   Procedure Laterality Date    CATARACT EXTRACTION W/  INTRAOCULAR LENS IMPLANT Right 2020    Procedure: EXTRACTION, CATARACT, WITH IOL INSERTION;  Surgeon: Daryl Calloway MD;  Location: [facility];  Service: Ophthalmology;  Laterality: Right;    CATARACT EXTRACTION W/  INTRAOCULAR LENS IMPLANT Left 2020    Procedure: EXTRACTION, CATARACT, WITH IOL INSERTION;  Surgeon: Daryl Calloway MD;  Location: [facility];  Service: Ophthalmology;  Laterality: Left;    HERNIA REPAIR Right     [facility]     Family History   Problem Relation Age of Onset    Hypertension Mother      Social History     Tobacco Use    Smoking status: Former Smoker     Packs/day: 3.00     Years: 25.00     Pack years: 75.00     Quit date: 2012     Years since quittin.9    Smokeless tobacco: Never Used   Substance Use Topics    Alcohol use: No     Comment: Used to drink    Drug use: No     Review of Systems   Constitutional: Negative for chills and fever.   HENT: Negative for sore throat.    Respiratory: Negative for shortness of breath.    Cardiovascular: Negative for chest pain.   Gastrointestinal: Negative for nausea.   Genitourinary: Positive for decreased urine volume, difficulty urinating and hematuria. Negative for dysuria and testicular pain.   Musculoskeletal: Negative for back pain.   Skin: Negative for rash.   Neurological: Negative for weakness.   Hematological: Does not bruise/bleed easily.       Physical Exam     Initial Vitals [22 1614]   BP Pulse Resp Temp SpO2   (!) 148/84 90 18 97.9 °F (36.6 °C) 100 %      MAP       --         Physical Exam    Nursing note and vitals reviewed.  Constitutional: He appears well-developed and well-nourished. No distress.   HENT:   Mouth/Throat: Oropharynx is clear and moist.   Eyes: Conjunctivae are normal.   Neck: Neck supple.   Cardiovascular: Normal rate, regular rhythm and intact distal pulses.   Pulmonary/Chest: Breath  sounds normal. He has no wheezes. He has no rales.   Abdominal: Abdomen is soft. He exhibits no distension. There is abdominal tenderness. There is no rebound and no guarding.   Genitourinary:    Testes normal.   Right testis shows no tenderness. Left testis shows no tenderness. Uncircumcised. No discharge found.   Musculoskeletal:         General: No edema.      Cervical back: Neck supple.     Lymphadenopathy:     He has no cervical adenopathy.   Neurological: He is alert and oriented to person, place, and time.   Skin: Skin is warm. Capillary refill takes less than 2 seconds. No rash noted.   Psychiatric: He has a normal mood and affect.         ED Course   Procedures  Labs Reviewed   URINALYSIS, REFLEX TO URINE CULTURE - Abnormal; Notable for the following components:       Result Value    Appearance, UA Cloudy (*)     Specific Gravity, UA >=1.030 (*)     Protein, UA 2+ (*)     Ketones, UA Trace (*)     Occult Blood UA 3+ (*)     Leukocytes, UA 2+ (*)     All other components within normal limits    Narrative:     Specimen Source->Urine   CBC W/ AUTO DIFFERENTIAL - Abnormal; Notable for the following components:    Hemoglobin 13.3 (*)     All other components within normal limits   BASIC METABOLIC PANEL - Abnormal; Notable for the following components:    Glucose 122 (*)     Creatinine 1.5 (*)     Anion Gap 7 (*)     eGFR if  54.5 (*)     eGFR if non  47.2 (*)     All other components within normal limits   URINALYSIS MICROSCOPIC - Abnormal; Notable for the following components:    RBC, UA >100 (*)     WBC, UA >100 (*)     WBC Clumps, UA Many (*)     Bacteria Many (*)     All other components within normal limits    Narrative:     Specimen Source->Urine   CULTURE, URINE          Imaging Results          CT Renal Stone Study ABD Pelvis WO (Final result)  Result time 01/26/22 19:25:47    Final result by Santino Wolf MD (01/26/22 19:25:47)                 Impression:      1.  Bilateral nonobstructing nephrolithiasis, grossly similar burden from 06/29/2020 study.  2. Right greater than left basilar patchy subsegmental ground-glass opacities concerning for multifocal pneumonia from inflammatory or infectious process including atypical bacterial or viral etiology.  3. Left lung base 3 mm solid pleural based nodule along the left major fissure which could represent a small Clemencia fissural node, but not definitively seen on previous study.  For a solid nodule <6 mm, Fleischner Society 2017 guidelines recommend no routine follow up for a low risk patient, or follow-up with non-contrast chest CT at 12 months in a high risk patient.  4. Few additional findings as above.      Electronically signed by: Santino Wolf MD  Date:    01/26/2022  Time:    19:25             Narrative:    EXAMINATION:  CT RENAL STONE STUDY ABD PELVIS WO    CLINICAL HISTORY:  Nephrolithiasis, symptomatic/complicated;    TECHNIQUE:  Low dose axial images, sagittal and coronal reformations were obtained from the lung bases to the pubic symphysis.  Contrast was not administered.    COMPARISON:  CT renal stone study most recent 06/29/2020, MRI abdomen 04/23/2019, abdominal ultrasound 04/11/2019, chest CT 03/16/2020    FINDINGS:  Mild dependent atelectasis in the bilateral lower lobes.  Scattered bandlike opacities throughout each lung base consistent with platelike scarring versus atelectasis.  Interval patchy subsegmental areas of ground-glass attenuation throughout both lung bases, more so in the right lower lobe.  There is a 3 mm solid pleural based nodule along the left major fissure which could represent a small Clemencia fissural node, but not definitively seen on previous study.    Base of the heart is normal in size without significant pericardial fluid.    Noncontrast appearance of the liver, gallbladder, pancreas, spleen, stomach, duodenum and bilateral adrenal glands are within normal limits.  No biliary ductal  dilatation.    Bilateral kidneys are normal in size, shape and location.  Bilateral nonobstructing nephrolithiasis with largest nephrolith measuring 2 mm at the right renal upper pole, grossly stable in overall burden.  Ureters are nondilated.  Urinary bladder is well distended.  Prostate is normal in size containing dystrophic calcification within the central gland.    No ascites, free air or lymphadenopathy.  Mild scattered calcific atherosclerosis of the aorta which is slightly tortuous but not aneurysmal.    Appendix and terminal ileum are within normal limits.  Mild amount of scattered fecal material throughout the colon with prominent stool ball and mild distension of the rectum, but no rectal wall thickening or significant adjacent inflammatory change.  No evidence of bowel obstruction or inflammation.  No pneumatosis or portal venous gas.    Small fat containing umbilical hernia and small fat containing right inguinal hernia.    Osseous structures appear stable without acute process seen.                                 Medications   lactated ringers bolus 1,000 mL (0 mLs Intravenous Stopped 1/26/22 1841)   cephALEXin capsule 500 mg (500 mg Oral Given 1/26/22 1945)     Medical Decision Making:   History:   Old Medical Records: I decided to obtain old medical records.  Initial Assessment:   Emergent evaluation of 66-year-old male presenting with dark colored urine.    Vital signs stable.  Differential Diagnosis:   UTI, nephrolithiasis, acute kidney injury, anemia  Clinical Tests:   Lab Tests: Ordered and Reviewed  ED Management:  - labs, UA             ED Course as of 01/26/22 2140 Wed Jan 26, 2022   1727 BUN: 10 [GM]   1727 Creatinine(!): 1.5  Up from 1.1 2 weeks ago.  IVF ordered   [GM]   1759 Protein, UA(!): 2+ [GM]   1759 Ketones, UA(!): Trace [GM]   1759 Occult Blood UA(!): 3+ [GM]   1759 Leukocytes, UA(!): 2+ [GM]   1827 WBC, UA(!): >100 [GM]   1827 WBC Clumps, UA(!): Many [GM]   1827 Bacteria, UA(!):  Many [GM]   1827 RBC, UA(!): >100 [GM]   1829 UA with greater than 100 wbc's, rbc's, many bacteria with white blood cell count.  Patient does have a history of renal stones, given his new ROMMEL as well as UTI, CT renal stone study ordered to rule out complicated, septic stone. [GM]   1933 Impression:     1. Bilateral nonobstructing nephrolithiasis, grossly similar burden from 06/29/2020 study.  2. Right greater than left basilar patchy subsegmental ground-glass opacities concerning for multifocal pneumonia from inflammatory or infectious process including atypical bacterial or viral etiology.  3. Left lung base 3 mm solid pleural based nodule along the left major fissure which could represent a small Clemencia fissural node, but not definitively seen on previous study.  For a solid nodule <6 mm, Fleischner Society 2017 guidelines recommend no routine follow up for a low risk patient, or follow-up with non-contrast chest CT at 12 months in a high risk patient.  4. Few additional findings as above.        Electronically signed by: Santino Wolf MD  Date:                                            01/26/2022  Time:                                           19:25 [GM]   1933 CT scan reviewed with nonobstructing stones in the kidney.  Will treat for UTI, recommend patient follow-up with urology as an outpatient for further evaluation. [GM]      ED Course User Index  [GM] Chelsey Owusu MD             Clinical Impression:   Final diagnoses:  [N30.01] Acute cystitis with hematuria (Primary)          ED Disposition Condition    Discharge Stable        ED Prescriptions     Medication Sig Dispense Start Date End Date Auth. Provider    cephALEXin (KEFLEX) 500 MG capsule Take 1 capsule (500 mg total) by mouth 4 (four) times daily. for 7 days 28 capsule 1/26/2022 2/2/2022 Chelsey Owusu MD        Follow-up Information     Follow up With Specialties Details Why Contact Info    Srikanth Son MD Internal Medicine, Hospitalist Schedule an  appointment as soon as possible for a visit   [address]  [facility] [address]  [phone]             Chelsey Owusu MD  01/26/22 5727

## 2022-01-27 ENCOUNTER — DOCUMENT SCAN (OUTPATIENT)
Dept: HOME HEALTH SERVICES | Facility: HOSPITAL | Age: 67
End: 2022-01-27
Payer: MEDICARE

## 2022-01-27 NOTE — DISCHARGE INSTRUCTIONS
Please follow-up with urology as an outpatient if symptoms persist or if you have bloody urine again.  I think that your current symptoms or from a urinary tract infection.  Please start antibiotics.  Return to ED for worsening symptoms.   Expected Date Of Service: 08/29/2019

## 2022-01-27 NOTE — ED NOTES
Tele box placed on pt -- verified with war room. 87 NS   The patient is a 4y Male complaining of pain, upper leg.

## 2022-01-28 LAB — BACTERIA UR CULT: ABNORMAL

## 2022-01-31 ENCOUNTER — OFFICE VISIT (OUTPATIENT)
Dept: INTERNAL MEDICINE | Facility: CLINIC | Age: 67
End: 2022-01-31
Payer: MEDICARE

## 2022-01-31 VITALS
BODY MASS INDEX: 23.56 KG/M2 | HEIGHT: 68 IN | RESPIRATION RATE: 18 BRPM | SYSTOLIC BLOOD PRESSURE: 130 MMHG | WEIGHT: 155.44 LBS | OXYGEN SATURATION: 97 % | HEART RATE: 97 BPM | DIASTOLIC BLOOD PRESSURE: 80 MMHG

## 2022-01-31 DIAGNOSIS — E11.9 TYPE 2 DIABETES MELLITUS WITHOUT COMPLICATION, WITHOUT LONG-TERM CURRENT USE OF INSULIN: ICD-10-CM

## 2022-01-31 DIAGNOSIS — E55.9 VITAMIN D INSUFFICIENCY: ICD-10-CM

## 2022-01-31 DIAGNOSIS — Z87.891 EX-VERY HEAVY CIGARETTE SMOKER (MORE THAN 40 PER DAY): ICD-10-CM

## 2022-01-31 DIAGNOSIS — G40.009 PARTIAL IDIOPATHIC EPILEPSY WITH SEIZURES OF LOCALIZED ONSET, NOT INTRACTABLE, WITHOUT STATUS EPILEPTICUS: ICD-10-CM

## 2022-01-31 DIAGNOSIS — E53.8 B12 NUTRITIONAL DEFICIENCY: ICD-10-CM

## 2022-01-31 DIAGNOSIS — R41.3 POOR MEMORY: ICD-10-CM

## 2022-01-31 DIAGNOSIS — U07.1 COVID-19: Primary | ICD-10-CM

## 2022-01-31 DIAGNOSIS — J44.9 MIXED TYPE COPD (CHRONIC OBSTRUCTIVE PULMONARY DISEASE): ICD-10-CM

## 2022-01-31 DIAGNOSIS — N40.1 BENIGN PROSTATIC HYPERPLASIA WITH URINARY FREQUENCY: ICD-10-CM

## 2022-01-31 DIAGNOSIS — Z86.19 HISTORY OF HEPATITIS C: ICD-10-CM

## 2022-01-31 DIAGNOSIS — R55 NEAR SYNCOPE: ICD-10-CM

## 2022-01-31 DIAGNOSIS — R35.0 BENIGN PROSTATIC HYPERPLASIA WITH URINARY FREQUENCY: ICD-10-CM

## 2022-01-31 DIAGNOSIS — R80.9 TYPE 2 DIABETES MELLITUS WITH MICROALBUMINURIA, WITHOUT LONG-TERM CURRENT USE OF INSULIN: Chronic | ICD-10-CM

## 2022-01-31 DIAGNOSIS — E11.29 TYPE 2 DIABETES MELLITUS WITH MICROALBUMINURIA, WITHOUT LONG-TERM CURRENT USE OF INSULIN: Chronic | ICD-10-CM

## 2022-01-31 PROBLEM — H25.12 NUCLEAR SCLEROTIC CATARACT OF LEFT EYE: Status: RESOLVED | Noted: 2020-08-11 | Resolved: 2022-01-31

## 2022-01-31 PROBLEM — H25.11 NUCLEAR SCLEROTIC CATARACT OF RIGHT EYE: Status: RESOLVED | Noted: 2020-07-28 | Resolved: 2022-01-31

## 2022-01-31 PROCEDURE — 1101F PR PT FALLS ASSESS DOC 0-1 FALLS W/OUT INJ PAST YR: ICD-10-PCS | Mod: CPTII,S$GLB,, | Performed by: INTERNAL MEDICINE

## 2022-01-31 PROCEDURE — 3044F PR MOST RECENT HEMOGLOBIN A1C LEVEL <7.0%: ICD-10-PCS | Mod: CPTII,S$GLB,, | Performed by: INTERNAL MEDICINE

## 2022-01-31 PROCEDURE — 3288F FALL RISK ASSESSMENT DOCD: CPT | Mod: CPTII,S$GLB,, | Performed by: INTERNAL MEDICINE

## 2022-01-31 PROCEDURE — 99999 PR PBB SHADOW E&M-EST. PATIENT-LVL III: ICD-10-PCS | Mod: PBBFAC,,, | Performed by: INTERNAL MEDICINE

## 2022-01-31 PROCEDURE — 99999 PR PBB SHADOW E&M-EST. PATIENT-LVL III: CPT | Mod: PBBFAC,,, | Performed by: INTERNAL MEDICINE

## 2022-01-31 PROCEDURE — 3075F PR MOST RECENT SYSTOLIC BLOOD PRESS GE 130-139MM HG: ICD-10-PCS | Mod: CPTII,S$GLB,, | Performed by: INTERNAL MEDICINE

## 2022-01-31 PROCEDURE — 3044F HG A1C LEVEL LT 7.0%: CPT | Mod: CPTII,S$GLB,, | Performed by: INTERNAL MEDICINE

## 2022-01-31 PROCEDURE — 3075F SYST BP GE 130 - 139MM HG: CPT | Mod: CPTII,S$GLB,, | Performed by: INTERNAL MEDICINE

## 2022-01-31 PROCEDURE — 1126F AMNT PAIN NOTED NONE PRSNT: CPT | Mod: CPTII,S$GLB,, | Performed by: INTERNAL MEDICINE

## 2022-01-31 PROCEDURE — 99499 RISK ADDL DX/OHS AUDIT: ICD-10-PCS | Mod: S$GLB,,, | Performed by: INTERNAL MEDICINE

## 2022-01-31 PROCEDURE — 99499 UNLISTED E&M SERVICE: CPT | Mod: S$GLB,,, | Performed by: INTERNAL MEDICINE

## 2022-01-31 PROCEDURE — 3008F PR BODY MASS INDEX (BMI) DOCUMENTED: ICD-10-PCS | Mod: CPTII,S$GLB,, | Performed by: INTERNAL MEDICINE

## 2022-01-31 PROCEDURE — 99215 OFFICE O/P EST HI 40 MIN: CPT | Mod: S$GLB,,, | Performed by: INTERNAL MEDICINE

## 2022-01-31 PROCEDURE — 1159F MED LIST DOCD IN RCRD: CPT | Mod: CPTII,S$GLB,, | Performed by: INTERNAL MEDICINE

## 2022-01-31 PROCEDURE — 3079F DIAST BP 80-89 MM HG: CPT | Mod: CPTII,S$GLB,, | Performed by: INTERNAL MEDICINE

## 2022-01-31 PROCEDURE — 99215 PR OFFICE/OUTPT VISIT, EST, LEVL V, 40-54 MIN: ICD-10-PCS | Mod: S$GLB,,, | Performed by: INTERNAL MEDICINE

## 2022-01-31 PROCEDURE — 1101F PT FALLS ASSESS-DOCD LE1/YR: CPT | Mod: CPTII,S$GLB,, | Performed by: INTERNAL MEDICINE

## 2022-01-31 PROCEDURE — 3079F PR MOST RECENT DIASTOLIC BLOOD PRESSURE 80-89 MM HG: ICD-10-PCS | Mod: CPTII,S$GLB,, | Performed by: INTERNAL MEDICINE

## 2022-01-31 PROCEDURE — 1126F PR PAIN SEVERITY QUANTIFIED, NO PAIN PRESENT: ICD-10-PCS | Mod: CPTII,S$GLB,, | Performed by: INTERNAL MEDICINE

## 2022-01-31 PROCEDURE — 1159F PR MEDICATION LIST DOCUMENTED IN MEDICAL RECORD: ICD-10-PCS | Mod: CPTII,S$GLB,, | Performed by: INTERNAL MEDICINE

## 2022-01-31 PROCEDURE — 3288F PR FALLS RISK ASSESSMENT DOCUMENTED: ICD-10-PCS | Mod: CPTII,S$GLB,, | Performed by: INTERNAL MEDICINE

## 2022-01-31 PROCEDURE — 3008F BODY MASS INDEX DOCD: CPT | Mod: CPTII,S$GLB,, | Performed by: INTERNAL MEDICINE

## 2022-01-31 RX ORDER — PRAVASTATIN SODIUM 10 MG/1
10 TABLET ORAL DAILY
Qty: 90 TABLET | Refills: 3 | Status: SHIPPED | OUTPATIENT
Start: 2022-01-31 | End: 2022-12-21 | Stop reason: SDUPTHER

## 2022-01-31 RX ORDER — ACETAMINOPHEN 500 MG
5000 TABLET ORAL DAILY
Qty: 90 TABLET | Refills: 3 | Status: SHIPPED | OUTPATIENT
Start: 2022-01-31 | End: 2022-07-27 | Stop reason: SDUPTHER

## 2022-01-31 RX ORDER — LAMOTRIGINE 150 MG/1
300 TABLET ORAL DAILY
Qty: 180 TABLET | Refills: 3 | Status: SHIPPED | OUTPATIENT
Start: 2022-01-31 | End: 2022-12-21 | Stop reason: SDUPTHER

## 2022-01-31 RX ORDER — SILODOSIN 4 MG/1
4 CAPSULE ORAL DAILY
Qty: 90 CAPSULE | Refills: 3 | Status: SHIPPED | OUTPATIENT
Start: 2022-01-31 | End: 2022-06-06 | Stop reason: SDUPTHER

## 2022-01-31 RX ORDER — CYANOCOBALAMIN 1000 UG/ML
1000 INJECTION, SOLUTION INTRAMUSCULAR; SUBCUTANEOUS
Qty: 10 ML | Refills: 3 | Status: SHIPPED | OUTPATIENT
Start: 2022-01-31 | End: 2022-03-11

## 2022-02-04 ENCOUNTER — TELEPHONE (OUTPATIENT)
Dept: ADMINISTRATIVE | Facility: OTHER | Age: 67
End: 2022-02-04
Payer: MEDICARE

## 2022-02-09 ENCOUNTER — DOCUMENT SCAN (OUTPATIENT)
Dept: HOME HEALTH SERVICES | Facility: HOSPITAL | Age: 67
End: 2022-02-09
Payer: MEDICARE

## 2022-02-16 DIAGNOSIS — E11.9 TYPE 2 DIABETES MELLITUS WITHOUT COMPLICATION, WITHOUT LONG-TERM CURRENT USE OF INSULIN: Primary | ICD-10-CM

## 2022-02-16 DIAGNOSIS — E11.9 TYPE 2 DIABETES MELLITUS WITHOUT COMPLICATION, WITHOUT LONG-TERM CURRENT USE OF INSULIN: ICD-10-CM

## 2022-02-16 RX ORDER — LANCETS
1 EACH MISCELLANEOUS
Qty: 200 EACH | Refills: 3 | Status: SHIPPED | OUTPATIENT
Start: 2022-02-16 | End: 2023-04-12 | Stop reason: SDUPTHER

## 2022-02-16 RX ORDER — INSULIN PUMP SYRINGE, 3 ML
EACH MISCELLANEOUS
Qty: 1 EACH | Refills: 0 | Status: SHIPPED | OUTPATIENT
Start: 2022-02-16 | End: 2022-12-21

## 2022-02-16 RX ORDER — INSULIN PUMP SYRINGE, 3 ML
EACH MISCELLANEOUS
Qty: 1 EACH | Refills: 0 | OUTPATIENT
Start: 2022-02-16

## 2022-02-16 RX ORDER — LANCETS
1 EACH MISCELLANEOUS
Qty: 200 EACH | Refills: 3 | OUTPATIENT
Start: 2022-02-16

## 2022-02-16 NOTE — TELEPHONE ENCOUNTER
No new care gaps identified.  Powered by [employer]. Reference number: [id number].   2/16/2022 2:47:39 PM CST

## 2022-02-16 NOTE — TELEPHONE ENCOUNTER
----- Message from Orquidea Perry sent at 2/16/2022  2:33 PM CST -----  Regarding: request RX  Contact: [phone]  Pt Questions    Questions: Pt calling to request for diabetic supplies to be sent to [id number] - [place], [place] - [address] AT [facility] [address]  Call Back number: [phone]

## 2022-02-22 ENCOUNTER — DOCUMENT SCAN (OUTPATIENT)
Dept: HOME HEALTH SERVICES | Facility: HOSPITAL | Age: 67
End: 2022-02-22
Payer: MEDICARE

## 2022-02-23 ENCOUNTER — DOCUMENT SCAN (OUTPATIENT)
Dept: HOME HEALTH SERVICES | Facility: HOSPITAL | Age: 67
End: 2022-02-23
Payer: MEDICARE

## 2022-02-26 ENCOUNTER — HOSPITAL ENCOUNTER (EMERGENCY)
Facility: HOSPITAL | Age: 67
Discharge: HOME | End: 2022-02-26
Attending: EMERGENCY MEDICINE
Payer: MEDICARE

## 2022-02-26 VITALS
TEMPERATURE: 100 F | RESPIRATION RATE: 16 BRPM | BODY MASS INDEX: 23.57 KG/M2 | SYSTOLIC BLOOD PRESSURE: 155 MMHG | DIASTOLIC BLOOD PRESSURE: 72 MMHG | HEART RATE: 90 BPM | OXYGEN SATURATION: 100 % | WEIGHT: 155 LBS

## 2022-02-26 DIAGNOSIS — T50.Z95A SIDE EFFECTS OF VACCINATION, INITIAL ENCOUNTER: ICD-10-CM

## 2022-02-26 DIAGNOSIS — R51.9 NONINTRACTABLE HEADACHE, UNSPECIFIED CHRONICITY PATTERN, UNSPECIFIED HEADACHE TYPE: Primary | ICD-10-CM

## 2022-02-26 DIAGNOSIS — I10 HTN (HYPERTENSION): ICD-10-CM

## 2022-02-26 LAB
CTP QC/QA: YES
POCT GLUCOSE: 106 MG/DL (ref 70–110)
SARS-COV-2 RDRP RESP QL NAA+PROBE: NEGATIVE

## 2022-02-26 PROCEDURE — 99284 EMERGENCY DEPT VISIT MOD MDM: CPT | Mod: CS,,, | Performed by: EMERGENCY MEDICINE

## 2022-02-26 PROCEDURE — 99284 PR EMERGENCY DEPT VISIT,LEVEL IV: ICD-10-PCS | Mod: CS,,, | Performed by: EMERGENCY MEDICINE

## 2022-02-26 PROCEDURE — 93010 ELECTROCARDIOGRAM REPORT: CPT | Mod: ,,, | Performed by: INTERNAL MEDICINE

## 2022-02-26 PROCEDURE — 99283 EMERGENCY DEPT VISIT LOW MDM: CPT | Mod: 25

## 2022-02-26 PROCEDURE — 93005 ELECTROCARDIOGRAM TRACING: CPT

## 2022-02-26 PROCEDURE — U0002 COVID-19 LAB TEST NON-CDC: HCPCS | Performed by: EMERGENCY MEDICINE

## 2022-02-26 PROCEDURE — 82962 GLUCOSE BLOOD TEST: CPT

## 2022-02-26 PROCEDURE — 93010 EKG 12-LEAD: ICD-10-PCS | Mod: ,,, | Performed by: INTERNAL MEDICINE

## 2022-02-26 PROCEDURE — 25000003 PHARM REV CODE 250: Performed by: EMERGENCY MEDICINE

## 2022-02-26 RX ORDER — ACETAMINOPHEN 500 MG
1000 TABLET ORAL
Status: COMPLETED | OUTPATIENT
Start: 2022-02-26 | End: 2022-02-26

## 2022-02-26 RX ADMIN — ACETAMINOPHEN 1000 MG: 500 TABLET ORAL at 05:02

## 2022-02-26 NOTE — ED PROVIDER NOTES
"Encounter Date: 2/26/2022    SCRIBE #1 NOTE: I, Leidy Hernandez, am scribing for, and in the presence of,  Nick Faye DO. I have scribed the following portions of the note - Other sections scribed: HPI, ROS.       History     Chief Complaint   Patient presents with    multiple complaints     Pt reports headache, HTN, " I need my blood sugar checked" . Pt aaox4 , no numbness or tingling noted      Cristiano Cruz is a 66 y.o. male with a prior medical history of DM type 2, COPD, and seizures presenting with headache with onset today after receiving his COVID vaccine yesterday. The headache is mild in severity and at the top of his head. He came to the ED due to concern regarding his blood sugar and blood pressure. His blood sugar was 106 when checked today in the ED. Denies fever, chills, nausea, and vomiting.  He denies any chest pain, lightheadedness, falls, trauma, weakness, vision changes, neck pain, abdominal pain, leg pain, leg swelling or difficulty ambulating.  He was here to check his blood sugar and his blood pressure.  No other aggravating or alleviating factors.      The history is provided by the patient and medical records. No  was used.     Review of patient's allergies indicates:  No Known Allergies  Past Medical History:   Diagnosis Date    B12 nutritional deficiency 8/7/2019    COVID-19 12/24/2021    E. coli UTI 01/2019    During hospitalization for seizure    Generalized tonic-clonic seizure 1/26/2019 1-2019 admitted for new-onset seizures.Normal CT head.  His mental status normalized, and he had no recurrent seizures following keppra loading in the ED and twice-daily maintenance upon arrival to the floor. Workup into the etiology of his seizures remained negative. EEG was performed, with the preliminary interpretation being no epileptiform activity with normal background.     History of hepatitis C, s/p successful Rx w/ SVR12 (cure) - 11/2019 2/8/2019    S/p epclusa "    Kidney stone on left side 6/28/2019    Mixed type COPD (chronic obstructive pulmonary disease) 8/15/2019    8-2019 PFT: Normal airflow. Airflow not improved after bronchodilator. Moderate restriction. Diffusion capacity is mildly decreased. Oximetry is normal.    Nuclear sclerotic cataract of left eye 8/11/2020    Nuclear sclerotic cataract of right eye 7/28/2020    Partial idiopathic epilepsy with seizures of localized onset, not intractable, without status epilepticus 1/26/2019    new-onset seizures (1/209). Normal CTH, MRI and routine EEG . On keppra 500 mg BID    Pterygium, bilateral 3/12/2019    Type 2 diabetes mellitus with microalbuminuria, without long-term current use of insulin 2/11/2019    Vitamin D insufficiency 7/29/2021     Past Surgical History:   Procedure Laterality Date    CATARACT EXTRACTION W/  INTRAOCULAR LENS IMPLANT Right 7/28/2020    Procedure: EXTRACTION, CATARACT, WITH IOL INSERTION;  Surgeon: Daryl Calloway MD;  Location: [facility];  Service: Ophthalmology;  Laterality: Right;    CATARACT EXTRACTION W/  INTRAOCULAR LENS IMPLANT Left 8/11/2020    Procedure: EXTRACTION, CATARACT, WITH IOL INSERTION;  Surgeon: Daryl Calloway MD;  Location: [facility];  Service: Ophthalmology;  Laterality: Left;    HERNIA REPAIR Right 2000    [facility]     Family History   Problem Relation Age of Onset    Hypertension Mother      Social History     Tobacco Use    Smoking status: Former Smoker     Packs/day: 3.00     Years: 25.00     Pack years: 75.00     Quit date: 1/31/2012     Years since quitting: 10.0    Smokeless tobacco: Never Used   Substance Use Topics    Alcohol use: No     Comment: Used to drink    Drug use: No     Review of Systems   Constitutional: Negative for chills, diaphoresis, fatigue and fever.   HENT: Negative for congestion and sore throat.    Eyes: Negative for photophobia, pain and visual disturbance.   Respiratory: Negative for chest tightness,  shortness of breath and wheezing.    Cardiovascular: Negative for chest pain and palpitations.   Gastrointestinal: Negative for abdominal pain, nausea and vomiting.   Genitourinary: Negative for dysuria, frequency and hematuria.   Musculoskeletal: Negative for neck pain and neck stiffness.   Skin: Negative for color change and wound.   Neurological: Positive for headaches. Negative for tremors and light-headedness.   Psychiatric/Behavioral: Negative for confusion.       Physical Exam     Initial Vitals [02/26/22 1607]   BP Pulse Resp Temp SpO2   (!) 168/82 110 19 100.1 °F (37.8 °C) 97 %      MAP       --         Physical Exam    Nursing note and vitals reviewed.      Gen/Constitutional: Interactive. No acute distress  Head: Normocephalic, Atraumatic  Neck: supple, no masses or LAD, no JVD  Eyes: PERRLA, conjunctiva clear  Ears, Nose and Throat: No rhinorrhea or stridor.  Cardiac:  Regular rate, Reg Rhythm, No murmur  Pulmonary: CTA Bilat, no wheezes, rhonchi, rales.  No increased work of breathing.  GI: Abdomen soft, non-tender, non-distended; no rebound or guarding  : No CVA tenderness.  Musculoskeletal: Extremities warm, well perfused, no erythema, no edema  Skin: No rashes, cyanosis or jaundice.  Psych: Normal affect  Detailed Neurologic exam:  Mental Status:  Normal attention and reasoning. There is no evidence of a thought disorder. Affect and mood were unremarkable. Speech was normal and prosody was intact. Verbal expression and comprehension are intact. Immediate recall, recent and remote memory were intact.  Neck:  Supple. No cervical bruits.  Cranial Nerves:  Visual fields were normal to confrontation and visual acuity was at baseline. Funduscopy was limited by pupillary light response. Pupils were of equal (4mm to 3mm bilaterally) and exhibited a normal direct and consensual response to light. There was no APD. Ocular motility was full and there was no nystagmus evident. Strength of masticatory muscles  was normal. Facial sensation was normal. Corneal reflexes were present. No facial weakness. Hearing acuity was normal. Palatal movements and gag reflex were intact. Sternocleidomastoid and trapezii strength were normal. Tongue protruded in the midline and showed no atrophy, tremor or fasciculations.  Motor Examination (bulk, tone, strength):  Motor examination showed normal muscle bulk, tone, and strength throughout. Rapid alternating movements were normal. There were no adventitious movements noted.  Muscle Stretch reflexes (MSR's):  Muscle stretch reflexes were symmetric and normoactive. Plantar responses were flexor bilaterally. No pathologic reflexes were appreciated.  Sensory:  Normal light-touch and position sense.  Cerebellar and coordination:  Coordination examination showed normal rapid alternating movements. Finger-finger and finger-nose testing were unremarkable.  Romberg test:  Romberg was negative.  Gait and Station:  Erect posture was normal. There was no postural instability.   Spine:  Normal range of motion. No tenderness on palpation. SLR negative.      ED Course   Procedures  Labs Reviewed   SARS-COV-2 RDRP GENE   POCT GLUCOSE          Imaging Results    None          Medications   acetaminophen tablet 1,000 mg (1,000 mg Oral Given 2/26/22 8197)     Medical Decision Making:   History:   Old Medical Records: I decided to obtain old medical records.  Initial Assessment:   Cristiano Cruz is a 66 y.o. male with a prior medical history of DM type 2, COPD, and seizures presenting with headache with onset today after receiving his COVID vaccine yesterday.   Differential Diagnosis:   Hypoglycemia, hypertension, arrhythmia, vaccine reaction, dehydration, migraine, tension headache, cluster headache  Independently Interpreted Test(s):   I have ordered and independently interpreted EKG Reading(s) - see prior notes  Clinical Tests:   Lab Tests: Ordered and Reviewed  Medical Tests: Ordered and  Reviewed    Afebrile, vital signs stable.  Physical exam findings remarkable for slightly tachycardic heart rate, improved on re-evaluation.  Remainder exam unremarkable with no focal deficits, no weakness, 2+ distal pulses, gait intact without instability, and no visual changes.  Headache improved, was treated with Tylenol in ED.  He reported receiving his COVID vaccine yesterday when symptoms began.  He is currently COVID testing negative.  No high risk features including neck pain, doubt meningitis, headache mild in severity, without rapid onset, doubt subarachnoid hemorrhage or acute intracranial process.  He has a low-grade fever, suspect likely reaction from vaccine versus viral syndrome.  ECG was obtained with no signs of ischemia or STEMI on my read.  Blood sugar stable at 106, doubt hypoglycemia.  Patient educated on hypertension, hypoglycemia, vaccine reaction, and high risk features of headaches.  Plan for PCP follow-up as needed. Patient agreeable to discharge plan. Strict ED precautions and return instructions discussed at length and patient verbalized understanding. All questions were answered and ample time was given for questions.      Complexity:  Moderate          Scribe Attestation:   Scribe #1: I performed the above scribed service and the documentation accurately describes the services I performed. I attest to the accuracy of the note.                I, Dr. Nick Faye, personally performed the services described in this documentation. All medical record entries made by the scribe were at my direction and in my presence.  I have reviewed the chart and agree that the record reflects my personal performance and is accurate and complete.     Clinical Impression:   Final diagnoses:  [R51.9] Nonintractable headache, unspecified chronicity pattern, unspecified headache type (Primary)  [T50.Z95A] Side effects of vaccination, initial encounter  [I10] HTN (hypertension)          ED Disposition  Condition    Discharge Stable        ED Prescriptions     None        Follow-up Information    None       Nick Faye DO, FAAEM  Emergency Staff Physician   Dept of Emergency Medicine   [facility]  Spectralink: [id number]        Disclaimer: This note has been generated using voice-recognition software. There may be typographical errors that have been missed during proof-reading.     Nick Faye DO  02/26/22 5876

## 2022-02-26 NOTE — DISCHARGE INSTRUCTIONS
Today, your exam did not show any abnormalities in your heart, blood pressure, EKG and your negative for COVID.  Your symptoms are likely due to the reaction from vaccine.  Your blood sugar today was 106. Continue taking your home medications and follow-up with your primary care in 1 week if her symptoms do not improve or if they worsen.    Our goal in the emergency department is to always give you outstanding care and exceptional service. You may receive a survey by mail or e-mail in the next week regarding your experience in our ED. We would greatly appreciate your completing and returning the survey. Your feedback provides us with a way to recognize our staff who give very good care and it helps us learn how to improve when your experience was below our aspiration of excellence.

## 2022-02-26 NOTE — ED NOTES
Pt reports HA starting today post receiving dose of COVID vaccine.     AAOx4. Even and unlabored respirations noted. Ambulatory with cane.

## 2022-03-11 DIAGNOSIS — E53.8 B12 NUTRITIONAL DEFICIENCY: ICD-10-CM

## 2022-03-11 RX ORDER — CYANOCOBALAMIN 1000 UG/ML
1000 INJECTION, SOLUTION INTRAMUSCULAR; SUBCUTANEOUS
Qty: 10 ML | Refills: 3 | Status: SHIPPED | OUTPATIENT
Start: 2022-03-11 | End: 2022-04-27 | Stop reason: SDUPTHER

## 2022-03-11 RX ORDER — NEEDLES, DISPOSABLE 25GX5/8"
1 NEEDLE, DISPOSABLE MISCELLANEOUS
Qty: 15 EACH | Refills: 1 | Status: SHIPPED | OUTPATIENT
Start: 2022-03-11 | End: 2023-04-12 | Stop reason: SDUPTHER

## 2022-03-15 PROCEDURE — G0179 MD RECERTIFICATION HHA PT: HCPCS | Mod: ,,, | Performed by: PSYCHIATRY & NEUROLOGY

## 2022-03-15 PROCEDURE — G0179 PR HOME HEALTH MD RECERTIFICATION: ICD-10-PCS | Mod: ,,, | Performed by: PSYCHIATRY & NEUROLOGY

## 2022-03-25 ENCOUNTER — EXTERNAL HOME HEALTH (OUTPATIENT)
Dept: HOME HEALTH SERVICES | Facility: HOSPITAL | Age: 67
End: 2022-03-25
Payer: MEDICARE

## 2022-04-08 ENCOUNTER — PATIENT OUTREACH (OUTPATIENT)
Dept: ADMINISTRATIVE | Facility: OTHER | Age: 67
End: 2022-04-08
Payer: MEDICARE

## 2022-04-08 NOTE — PROGRESS NOTES
Requested updates within Care Everywhere.  Patient's chart was reviewed for overdue DEMARCUS topics.  Health maintenance:updated  Immunizations:reconciled   Legacy:   Media:  Orders placed:  Tasked appts:  Labs Linked:  Upcoming appt:Optometry 4/11/22

## 2022-04-11 ENCOUNTER — CLINICAL SUPPORT (OUTPATIENT)
Dept: OPHTHALMOLOGY | Facility: CLINIC | Age: 67
End: 2022-04-11
Payer: MEDICARE

## 2022-04-11 ENCOUNTER — OFFICE VISIT (OUTPATIENT)
Dept: OPTOMETRY | Facility: CLINIC | Age: 67
End: 2022-04-11
Payer: MEDICARE

## 2022-04-11 DIAGNOSIS — H40.053 OCULAR HYPERTENSION, BILATERAL: ICD-10-CM

## 2022-04-11 DIAGNOSIS — H52.7 REFRACTIVE ERROR: ICD-10-CM

## 2022-04-11 DIAGNOSIS — H11.003 PTERYGIUM, BILATERAL: ICD-10-CM

## 2022-04-11 DIAGNOSIS — H04.123 DRY EYE SYNDROME OF BOTH EYES: ICD-10-CM

## 2022-04-11 DIAGNOSIS — Z96.1 PSEUDOPHAKIA OF BOTH EYES: ICD-10-CM

## 2022-04-11 DIAGNOSIS — E11.9 TYPE 2 DIABETES MELLITUS WITHOUT RETINOPATHY: Primary | ICD-10-CM

## 2022-04-11 DIAGNOSIS — H40.013 OAG (OPEN ANGLE GLAUCOMA) SUSPECT, LOW RISK, BILATERAL: ICD-10-CM

## 2022-04-11 PROCEDURE — 3044F PR MOST RECENT HEMOGLOBIN A1C LEVEL <7.0%: ICD-10-PCS | Mod: CPTII,S$GLB,, | Performed by: OPTOMETRIST

## 2022-04-11 PROCEDURE — 1126F PR PAIN SEVERITY QUANTIFIED, NO PAIN PRESENT: ICD-10-PCS | Mod: CPTII,S$GLB,, | Performed by: OPTOMETRIST

## 2022-04-11 PROCEDURE — 1101F PR PT FALLS ASSESS DOC 0-1 FALLS W/OUT INJ PAST YR: ICD-10-PCS | Mod: CPTII,S$GLB,, | Performed by: OPTOMETRIST

## 2022-04-11 PROCEDURE — 3288F PR FALLS RISK ASSESSMENT DOCUMENTED: ICD-10-PCS | Mod: CPTII,S$GLB,, | Performed by: OPTOMETRIST

## 2022-04-11 PROCEDURE — 92014 COMPRE OPH EXAM EST PT 1/>: CPT | Mod: S$GLB,,, | Performed by: OPTOMETRIST

## 2022-04-11 PROCEDURE — 99999 PR PBB SHADOW E&M-EST. PATIENT-LVL II: CPT | Mod: PBBFAC,,, | Performed by: OPTOMETRIST

## 2022-04-11 PROCEDURE — 92015 DETERMINE REFRACTIVE STATE: CPT | Mod: S$GLB,,, | Performed by: OPTOMETRIST

## 2022-04-11 PROCEDURE — 1159F MED LIST DOCD IN RCRD: CPT | Mod: CPTII,S$GLB,, | Performed by: OPTOMETRIST

## 2022-04-11 PROCEDURE — 92014 PR EYE EXAM, EST PATIENT,COMPREHESV: ICD-10-PCS | Mod: S$GLB,,, | Performed by: OPTOMETRIST

## 2022-04-11 PROCEDURE — 1101F PT FALLS ASSESS-DOCD LE1/YR: CPT | Mod: CPTII,S$GLB,, | Performed by: OPTOMETRIST

## 2022-04-11 PROCEDURE — 92015 PR REFRACTION: ICD-10-PCS | Mod: S$GLB,,, | Performed by: OPTOMETRIST

## 2022-04-11 PROCEDURE — 3288F FALL RISK ASSESSMENT DOCD: CPT | Mod: CPTII,S$GLB,, | Performed by: OPTOMETRIST

## 2022-04-11 PROCEDURE — 99999 PR PBB SHADOW E&M-EST. PATIENT-LVL II: ICD-10-PCS | Mod: PBBFAC,,, | Performed by: OPTOMETRIST

## 2022-04-11 PROCEDURE — 3044F HG A1C LEVEL LT 7.0%: CPT | Mod: CPTII,S$GLB,, | Performed by: OPTOMETRIST

## 2022-04-11 PROCEDURE — 1126F AMNT PAIN NOTED NONE PRSNT: CPT | Mod: CPTII,S$GLB,, | Performed by: OPTOMETRIST

## 2022-04-11 PROCEDURE — 1159F PR MEDICATION LIST DOCUMENTED IN MEDICAL RECORD: ICD-10-PCS | Mod: CPTII,S$GLB,, | Performed by: OPTOMETRIST

## 2022-04-11 NOTE — PROGRESS NOTES
HVF done ou./rel/fix/coop. good ou./chart checked for latex allergy/ -.25 + .50 x 5/od -1.25 + 3.00 x 113/os-smh

## 2022-04-12 NOTE — PROGRESS NOTES
HPI     OVERDUE Type II DM ck     DLS 09/09/2020 by Dr. Daryl Calloway MD    CC: pt reports blurred vision. Patient also has OCT/HVF performed today.      x 1 wk ago     (-)eye pain  (-)diplopia  (-)headaches  (-)flashes/floaters  (-)veils/curtains/shadows    Eye Meds: AT's OU (pt hasn't used in a while)     POHx:   OAG Suspect OU  Pterygium OU  S/P CE OU       Last edited by Allegra Scanlon, OD on 4/12/2022  1:07 PM. (History)            Assessment /Plan     For exam results, see Encounter Report.    Type 2 diabetes mellitus without retinopathy    OAG (open angle glaucoma) suspect, low risk, bilateral    Pseudophakia of both eyes    Dry eye syndrome of both eyes    Pterygium, bilateral    Refractive error      1. No retinopathy noted, OU. Continue proper BS control and annual diabetic eye exams. Monitor yearly.     2. Educated pt on findings. OCT/HVF performed today. See results below. Mild changes noted on OCT, however, IOP much better. Pt currently not on gtt Tx.  Will continue to monitor off gtt Tx. Patient originally Dx with ocular HTN, but IOP has since decreased after having cataracts removed. Mild optic nerve changes may be associated with previous IOP spike (pre-cataract surgery). Will monitor in 6 months with repeat testing and IOP check. If progression noted, will start on gtt Tx.     RNFL OCT  OD: Significant RNFL thinning TS; Borderline RNFL thinning G  OS: Significant RNFL thinning TI; Borderline RNFL thinning TS and G     24-2 HVF  OD: Reliable testing (FL: 0/10 FP: 3% FN: 0% GHT: ONL VFI: 99%). Few non-specific inferior spots  OS: Reliable testing (FL: 0/11 FP: 2% FN: 1% GHT: WNL VFI: 99%). Roughly WNL. Scattered non-specific spots.     3. PCIOL clear and centered OU. Monitor yearly.     4. Educated pt on findings. Recommended ATs BID-TID for added lubrication and comfort. Monitor.     5. Educated pt on findings. Recommend ATs and UV protection when outside. Not impacting visually axis,  but may be causing irregular astigmatism OS. Monitor yearly.     6. Updated SRx. Given option of FTW specs vs OTC reading glasses only prn for near work. Monitor yearly.       RTC in 6 months for RNFL OCT, 24-2 HVF, and IOP check or sooner if needed.

## 2022-04-27 ENCOUNTER — OFFICE VISIT (OUTPATIENT)
Dept: INTERNAL MEDICINE | Facility: CLINIC | Age: 67
End: 2022-04-27
Payer: MEDICARE

## 2022-04-27 VITALS
HEIGHT: 68 IN | OXYGEN SATURATION: 96 % | BODY MASS INDEX: 26.36 KG/M2 | HEART RATE: 67 BPM | DIASTOLIC BLOOD PRESSURE: 80 MMHG | TEMPERATURE: 98 F | WEIGHT: 173.94 LBS | SYSTOLIC BLOOD PRESSURE: 138 MMHG

## 2022-04-27 DIAGNOSIS — N40.1 BENIGN PROSTATIC HYPERPLASIA WITH URINARY FREQUENCY: ICD-10-CM

## 2022-04-27 DIAGNOSIS — Z87.891 EX-VERY HEAVY CIGARETTE SMOKER (MORE THAN 40 PER DAY): ICD-10-CM

## 2022-04-27 DIAGNOSIS — E11.9 TYPE 2 DIABETES MELLITUS WITHOUT COMPLICATION, WITHOUT LONG-TERM CURRENT USE OF INSULIN: Primary | ICD-10-CM

## 2022-04-27 DIAGNOSIS — R35.0 BENIGN PROSTATIC HYPERPLASIA WITH URINARY FREQUENCY: ICD-10-CM

## 2022-04-27 DIAGNOSIS — R41.3 POOR MEMORY: ICD-10-CM

## 2022-04-27 DIAGNOSIS — Z86.19 HISTORY OF HEPATITIS C: ICD-10-CM

## 2022-04-27 DIAGNOSIS — Z12.11 SCREENING FOR MALIGNANT NEOPLASM OF COLON: ICD-10-CM

## 2022-04-27 DIAGNOSIS — E55.9 VITAMIN D INSUFFICIENCY: ICD-10-CM

## 2022-04-27 DIAGNOSIS — G40.009 PARTIAL IDIOPATHIC EPILEPSY WITH SEIZURES OF LOCALIZED ONSET, NOT INTRACTABLE, WITHOUT STATUS EPILEPTICUS: ICD-10-CM

## 2022-04-27 DIAGNOSIS — J44.9 MIXED TYPE COPD (CHRONIC OBSTRUCTIVE PULMONARY DISEASE): ICD-10-CM

## 2022-04-27 DIAGNOSIS — E53.8 B12 NUTRITIONAL DEFICIENCY: ICD-10-CM

## 2022-04-27 PROBLEM — U07.1 COVID-19: Status: RESOLVED | Noted: 2021-12-24 | Resolved: 2022-04-27

## 2022-04-27 PROCEDURE — 3075F SYST BP GE 130 - 139MM HG: CPT | Mod: CPTII,S$GLB,, | Performed by: INTERNAL MEDICINE

## 2022-04-27 PROCEDURE — 3075F PR MOST RECENT SYSTOLIC BLOOD PRESS GE 130-139MM HG: ICD-10-PCS | Mod: CPTII,S$GLB,, | Performed by: INTERNAL MEDICINE

## 2022-04-27 PROCEDURE — 1101F PT FALLS ASSESS-DOCD LE1/YR: CPT | Mod: CPTII,S$GLB,, | Performed by: INTERNAL MEDICINE

## 2022-04-27 PROCEDURE — 3288F PR FALLS RISK ASSESSMENT DOCUMENTED: ICD-10-PCS | Mod: CPTII,S$GLB,, | Performed by: INTERNAL MEDICINE

## 2022-04-27 PROCEDURE — 99214 OFFICE O/P EST MOD 30 MIN: CPT | Mod: S$GLB,,, | Performed by: INTERNAL MEDICINE

## 2022-04-27 PROCEDURE — 99214 PR OFFICE/OUTPT VISIT, EST, LEVL IV, 30-39 MIN: ICD-10-PCS | Mod: S$GLB,,, | Performed by: INTERNAL MEDICINE

## 2022-04-27 PROCEDURE — 1126F AMNT PAIN NOTED NONE PRSNT: CPT | Mod: CPTII,S$GLB,, | Performed by: INTERNAL MEDICINE

## 2022-04-27 PROCEDURE — 3044F PR MOST RECENT HEMOGLOBIN A1C LEVEL <7.0%: ICD-10-PCS | Mod: CPTII,S$GLB,, | Performed by: INTERNAL MEDICINE

## 2022-04-27 PROCEDURE — 3079F DIAST BP 80-89 MM HG: CPT | Mod: CPTII,S$GLB,, | Performed by: INTERNAL MEDICINE

## 2022-04-27 PROCEDURE — 99999 PR PBB SHADOW E&M-EST. PATIENT-LVL IV: CPT | Mod: PBBFAC,,, | Performed by: INTERNAL MEDICINE

## 2022-04-27 PROCEDURE — 3008F PR BODY MASS INDEX (BMI) DOCUMENTED: ICD-10-PCS | Mod: CPTII,S$GLB,, | Performed by: INTERNAL MEDICINE

## 2022-04-27 PROCEDURE — 1159F MED LIST DOCD IN RCRD: CPT | Mod: CPTII,S$GLB,, | Performed by: INTERNAL MEDICINE

## 2022-04-27 PROCEDURE — 3008F BODY MASS INDEX DOCD: CPT | Mod: CPTII,S$GLB,, | Performed by: INTERNAL MEDICINE

## 2022-04-27 PROCEDURE — 99999 PR PBB SHADOW E&M-EST. PATIENT-LVL IV: ICD-10-PCS | Mod: PBBFAC,,, | Performed by: INTERNAL MEDICINE

## 2022-04-27 PROCEDURE — 1126F PR PAIN SEVERITY QUANTIFIED, NO PAIN PRESENT: ICD-10-PCS | Mod: CPTII,S$GLB,, | Performed by: INTERNAL MEDICINE

## 2022-04-27 PROCEDURE — 3079F PR MOST RECENT DIASTOLIC BLOOD PRESSURE 80-89 MM HG: ICD-10-PCS | Mod: CPTII,S$GLB,, | Performed by: INTERNAL MEDICINE

## 2022-04-27 PROCEDURE — 3044F HG A1C LEVEL LT 7.0%: CPT | Mod: CPTII,S$GLB,, | Performed by: INTERNAL MEDICINE

## 2022-04-27 PROCEDURE — 1101F PR PT FALLS ASSESS DOC 0-1 FALLS W/OUT INJ PAST YR: ICD-10-PCS | Mod: CPTII,S$GLB,, | Performed by: INTERNAL MEDICINE

## 2022-04-27 PROCEDURE — 1159F PR MEDICATION LIST DOCUMENTED IN MEDICAL RECORD: ICD-10-PCS | Mod: CPTII,S$GLB,, | Performed by: INTERNAL MEDICINE

## 2022-04-27 PROCEDURE — 3288F FALL RISK ASSESSMENT DOCD: CPT | Mod: CPTII,S$GLB,, | Performed by: INTERNAL MEDICINE

## 2022-04-27 RX ORDER — SYRINGE WITH NEEDLE, 1 ML 25GX5/8"
SYRINGE, EMPTY DISPOSABLE MISCELLANEOUS
COMMUNITY
Start: 2022-03-11 | End: 2023-04-12

## 2022-04-27 RX ORDER — CYANOCOBALAMIN 1000 UG/ML
1000 INJECTION, SOLUTION INTRAMUSCULAR; SUBCUTANEOUS
Qty: 10 ML | Refills: 3 | Status: SHIPPED | OUTPATIENT
Start: 2022-04-27 | End: 2023-04-12 | Stop reason: SDUPTHER

## 2022-04-27 NOTE — PROGRESS NOTES
INTERNAL MEDICINE CLINIC  Follow-up Visit Progress Note     PRESENTING HISTORY     PCP: Srikanth Son MD    Last Clinic Visit with me: 1-    Current Chief Complaint/Problem: Follow up     History of Present Illness & ROS: Mr. Cristiano Cruz is a 67 y.o. male.    No chest pain or SOB.    Mild headache.    Could not get his B12 injection at [facility].    [facility].    PAST HISTORY:     Past Medical History:   Diagnosis Date    B12 nutritional deficiency 8/7/2019    COVID-19 12/24/2021    E. coli UTI 01/2019    During hospitalization for seizure    Generalized tonic-clonic seizure 1/26/2019 1-2019 admitted for new-onset seizures.Normal CT head.  His mental status normalized, and he had no recurrent seizures following keppra loading in the ED and twice-daily maintenance upon arrival to the floor. Workup into the etiology of his seizures remained negative. EEG was performed, with the preliminary interpretation being no epileptiform activity with normal background.     History of hepatitis C, s/p successful Rx w/ SVR12 (cure) - 11/2019 2/8/2019    S/p epclusa    Kidney stone on left side 6/28/2019    Mixed type COPD (chronic obstructive pulmonary disease) 8/15/2019    8-2019 PFT: Normal airflow. Airflow not improved after bronchodilator. Moderate restriction. Diffusion capacity is mildly decreased. Oximetry is normal.    Nuclear sclerotic cataract of left eye 8/11/2020    Nuclear sclerotic cataract of right eye 7/28/2020    Partial idiopathic epilepsy with seizures of localized onset, not intractable, without status epilepticus 1/26/2019    new-onset seizures (1/209). Normal CTH, MRI and routine EEG . On keppra 500 mg BID    Pterygium, bilateral 3/12/2019    Type 2 diabetes mellitus with microalbuminuria, without long-term current use of insulin 2/11/2019    Vitamin D insufficiency 7/29/2021       Past Surgical History:   Procedure Laterality Date     CATARACT EXTRACTION W/  INTRAOCULAR LENS IMPLANT Right 7/28/2020    Procedure: EXTRACTION, CATARACT, WITH IOL INSERTION;  Surgeon: Daryl Calloway MD;  [facility];  Service: Ophthalmology;  Laterality: Right;    CATARACT EXTRACTION W/  INTRAOCULAR LENS IMPLANT Left 8/11/2020    Procedure: EXTRACTION, CATARACT, WITH IOL INSERTION;  Surgeon: Daryl Calloway MD;  [facility];  Service: Ophthalmology;  Laterality: Left;    HERNIA REPAIR Right 2000    [facility]       Family History   Problem Relation Age of Onset    Hypertension Mother        Social History     Socioeconomic History    Marital status:     Number of children: 1   Tobacco Use    Smoking status: Former Smoker     Packs/day: 3.00     Years: 25.00     Pack years: 75.00     Quit date: 1/31/2012     Years since quitting: 10.2    Smokeless tobacco: Never Used   Substance and Sexual Activity    Alcohol use: No     Comment: Used to drink    Drug use: No    Sexual activity: Yes     Partners: Female   Social History Narrative     with 1 daughter (42 as of 2019).    Work in [employer]       MEDICATIONS & ALLERGIES:     Current Outpatient Medications on File Prior to Visit   Medication Sig Dispense Refill    blood sugar diagnostic Strp 1 strip by Misc.(Non-Drug; Combo Route) route 2 (two) times daily before meals. 200 strip 3    blood-glucose meter kit Use as instructed 1 each 0    cholecalciferol, vitamin D3, 125 mcg (5,000 unit) Tab Take 1 tablet (5,000 Units total) by mouth once daily. 90 tablet 3    lamoTRIgine (LAMICTAL) 150 MG Tab Take 2 tablets (300 mg total) by mouth once daily. NO FURTHER REFILLS WITHOUT APPOINTMENT 180 tablet 3    lancets (LANCETS,ULTRA THIN) Misc 1 application by Misc.(Non-Drug; Combo Route) route 2 (two) times daily before meals. 200 each 3    latanoprost 0.005 % ophthalmic solution INSTILL 1 DROP IN BOTH EYES EVERY EVENING 7.5 mL 4    needle, disp, 23 gauge (BD  "SPECIALTY USE NEEDLES) 23 gauge x 1 1/4" Ndle 1 Dose by Misc.(Non-Drug; Combo Route) route every 30 days. 15 each 1    pravastatin (PRAVACHOL) 10 MG tablet Take 1 tablet (10 mg total) by mouth once daily. 90 tablet 3    silodosin (RAPAFLO) 4 mg Cap capsule Take 1 capsule (4 mg total) by mouth once daily. 90 capsule 3    SITagliptin (JANUVIA) 25 MG Tab Take 1 tablet (25 mg total) by mouth once daily. 90 tablet 3    BD LUER-RUSTAM SYRINGE 3 mL 23 gauge x 1 1/2" Syrg USE TO INJECT B12 EVERY MONTH       cyanocobalamin 1,000 mcg/mL injection Inject 1 mL (1,000 mcg total) into the muscle every 30 days. (Patient not taking: Reported on 4/27/2022) 10 mL 3     No current facility-administered medications on file prior to visit.        Review of patient's allergies indicates:  No Known Allergies    Medications Reconciliation:   I have reconciled the patient's home medications and discharge medications with the patient/family. I have updated all changes.  Refer to After-Visit Medication List.    OBJECTIVE:     Vital Signs:  Vitals:    04/27/22 0950   BP: 138/80   Pulse: 67   Temp: 97.9 °F (36.6 °C)     Wt Readings from Last 3 Encounters:   04/27/22 0950 78.9 kg (173 lb 15.1 oz)   02/26/22 1607 70.3 kg (155 lb)   01/26/22 1614 68 kg (150 lb)     Body mass index is 26.45 kg/m².     Physical Exam:  General: Well developed, well nourished. No distress.  HEENT: Head is normocephalic, atraumatic   Eyes: Clear conjunctiva.  Neck: Supple, symmetrical neck; trachea midline.  Lungs: Clear to auscultation bilaterally and normal respiratory effort.  Cardiovascular: Heart with regular rate and rhythm.    Extremities: No LE edema.   Abdomen: Abdomen is soft, non-tender non-distended with normal bowel sounds.  Skin: Skin color, texture, turgor normal. No rashes.  Musculoskeletal: Normal gait.   Psychiatric: Normal affect. Alert.      Laboratory  Lab Results   Component Value Date    WBC 9.86 01/26/2022    HGB 13.3 (L) 01/26/2022    HCT " 41.6 01/26/2022     01/26/2022    CHOL 161 07/28/2021    TRIG 52 07/28/2021    HDL 50 07/28/2021    ALT 43 01/09/2022    AST 44 (H) 01/09/2022     01/26/2022    K 3.8 01/26/2022     01/26/2022    CREATININE 1.5 (H) 01/26/2022    BUN 10 01/26/2022    CO2 25 01/26/2022    TSH 1.486 07/28/2021    PSA 0.12 07/28/2021    INR 1.0 01/02/2022    HGBA1C 6.6 (H) 01/02/2022       ASSESSMENT & PLAN:     Type 2 diabetes mellitus without complication, without long-term current use of insulin  - A1c 6.6.  He does not change his blood sugar at home. Has glucometer.     -     pravastatin (PRAVACHOL) 10 MG tablet; Take 1 tablet (10 mg total) by mouth once daily.  Dispense: 90 tablet; Refill:3  -     SITagliptin (JANUVIA) 25 MG Tab; Take 1 tablet (25 mg total) by mouth once daily.       Mixed COPD  - History of heavy smoking.     8-2019 PFT:  Normal airflow. Airflow not improved after bronchodilator. Moderate restriction.  Diffusion capacity is mildly decreased. Oximetry is normal.     - No inhaler necessary at present.     B12 Deficiency  Poor memory  - On B12 injection monthly.  - Improved with B12 injection.     -     cyanocobalamin 1,000 mcg/mL injection; Inject 1 mL (1,000 mcg total) into the muscle every 30 days.  Dispense: 10 mL; Refill: 3 ([facility]     Vitamin D insufficiency  -     cholecalciferol, vitamin D3, 125 mcg (5,000 unit) Tab; Take 1 tablet (5,000 Units total) by mouth once daily.        History of hepatitis C, s/p successful Rx w/ SVR12 (cure) - 11/2019     Partial idiopathic epilepsy    5-2020: Seizure X 2    7-19-19 Neurology:              - Advised him to go to the ED in case of any seizures              - f/u in clinic in 6-9 months for monitoring recurrence of events and prescriptions              -  if remains seizure-free for up to 2 years, will consider weaning off of AEDs     - On Lamictal 150 mg BID.  -   -     lamoTRIgine (LAMICTAL) 150 MG Tab; Take 2 tablets  "(300 mg total) by mouth once daily.       Ex-very heavy cigarette smoker (more than 40 per day)  CT Chest Lung Screening Low Dose 3-: Negative  He has no time currently to CT scan due to issues in his life.     Benign prostatic hyperplasia with urinary frequency  On silodosin (RAPAFLO) 4 mg Cap capsule; Take 1 capsule (4 mg total) by mouth once daily.    (Has no ejaculation from this, but he is okay with it).     Preventive Health Maintenance:  Pneumovax 23  Flu HD     Screening for malignant neoplasm of colon  -     Cologuard Screening (Multitarget Stool DNA); Future; Expected date: 04/27/2022    Return to Clinic for Follow Up with me:     July 27 for annual with labs that day.    Scheduled Follow-up :  Future Appointments   Date Time Provider Department Center   7/27/2022 10:00 AM Srikanth Son MD [facility]       After Visit Medication List :     Medication List          Accurate as of April 27, 2022 10:09 AM. If you have any questions, ask your nurse or doctor.            CONTINUE taking these medications    BD LUER-RUSTAM SYRINGE 3 mL 23 gauge x 1 1/2" Syrg  Generic drug: syringe with needle     BD SPECIALTY USE NEEDLES 23 gauge x 1 1/4" Ndle  Generic drug: needle (disp) 23 gauge  1 Dose by Misc.(Non-Drug; Combo Route) route every 30 days.     blood sugar diagnostic Strp  1 strip by Misc.(Non-Drug; Combo Route) route 2 (two) times daily before meals.     blood-glucose meter kit  Use as instructed     cholecalciferol (vitamin D3) 125 mcg (5,000 unit) Tab  Take 1 tablet (5,000 Units total) by mouth once daily.     cyanocobalamin 1,000 mcg/mL injection  Inject 1 mL (1,000 mcg total) into the muscle every 30 days.     lamoTRIgine 150 MG Tab  Commonly known as: LAMICTAL  Take 2 tablets (300 mg total) by mouth once daily. NO FURTHER REFILLS WITHOUT APPOINTMENT     lancets Misc  Commonly known as: LANCETS,ULTRA THIN  1 application by Misc.(Non-Drug; Combo Route) route 2 (two) times daily before meals.   "   latanoprost 0.005 % ophthalmic solution  INSTILL 1 DROP IN BOTH EYES EVERY EVENING     pravastatin 10 MG tablet  Commonly known as: PRAVACHOL  Take 1 tablet (10 mg total) by mouth once daily.     silodosin 4 mg Cap capsule  Commonly known as: RAPAFLO  Take 1 capsule (4 mg total) by mouth once daily.     SITagliptin 25 MG Tab  Commonly known as: JANUVIA  Take 1 tablet (25 mg total) by mouth once daily.           Where to Get Your Medications      These medications were sent to [facility]  [address] [place] [id number]    Hours: Mon-Fri, 8a-5:30p Phone: [phone]   · cyanocobalamin 1,000 mcg/mL injection         Signing Physician:  Srikanth Son MD

## 2022-05-04 ENCOUNTER — PES CALL (OUTPATIENT)
Dept: ADMINISTRATIVE | Facility: CLINIC | Age: 67
End: 2022-05-04
Payer: MEDICARE

## 2022-05-30 ENCOUNTER — PES CALL (OUTPATIENT)
Dept: ADMINISTRATIVE | Facility: CLINIC | Age: 67
End: 2022-05-30
Payer: MEDICARE

## 2022-06-06 DIAGNOSIS — N40.1 BENIGN PROSTATIC HYPERPLASIA WITH URINARY FREQUENCY: ICD-10-CM

## 2022-06-06 DIAGNOSIS — R35.0 BENIGN PROSTATIC HYPERPLASIA WITH URINARY FREQUENCY: ICD-10-CM

## 2022-06-06 LAB — NONINV COLON CA DNA+OCC BLD SCRN STL QL: NEGATIVE

## 2022-06-06 RX ORDER — SILODOSIN 4 MG/1
4 CAPSULE ORAL DAILY
Qty: 90 CAPSULE | Refills: 3 | Status: SHIPPED | OUTPATIENT
Start: 2022-06-06 | End: 2022-12-21 | Stop reason: SDUPTHER

## 2022-06-06 NOTE — PROGRESS NOTES
Please let me know that his Coloquard is normal.  Normal colon cancer screening. This is good for 3 years.

## 2022-06-06 NOTE — TELEPHONE ENCOUNTER
Care Due:                  Date            Visit Type   Department     Provider  --------------------------------------------------------------------------------                                EP -                              PRIMARY      [facility] INTERNAL  Last Visit: 04-      CARE ([facility])   ROMINA Son                               -                              PRIMARY      [facility] INTERNAL  Next Visit: 07-      CARE ([facility])   MEDICINE       Srikanth Son                                                            Last  Test          Frequency    Reason                     Performed    Due Date  --------------------------------------------------------------------------------    HBA1C.......  6 months...  SITagliptin..............  01- 07-    Lipid Panel.  12 months..  pravastatin..............  07- 07-    Health [facility] Embedded Care Gaps. Reference number: [id number]. 6/06/2022   10:32:34 AM CDT

## 2022-06-06 NOTE — TELEPHONE ENCOUNTER
----- Message from Srikanth Son MD sent at 6/6/2022 10:16 AM CDT -----  Please let me know that his Coloquard is normal.  Normal colon cancer screening. This is good for 3 years.

## 2022-06-08 ENCOUNTER — OFFICE VISIT (OUTPATIENT)
Dept: INTERNAL MEDICINE | Facility: CLINIC | Age: 67
End: 2022-06-08
Payer: MEDICARE

## 2022-06-08 VITALS
OXYGEN SATURATION: 96 % | WEIGHT: 175.06 LBS | SYSTOLIC BLOOD PRESSURE: 114 MMHG | BODY MASS INDEX: 26.53 KG/M2 | HEART RATE: 67 BPM | DIASTOLIC BLOOD PRESSURE: 72 MMHG | HEIGHT: 68 IN

## 2022-06-08 DIAGNOSIS — E11.21 TYPE 2 DIABETES MELLITUS WITH DIABETIC NEPHROPATHY, WITHOUT LONG-TERM CURRENT USE OF INSULIN: ICD-10-CM

## 2022-06-08 DIAGNOSIS — J44.9 MIXED TYPE COPD (CHRONIC OBSTRUCTIVE PULMONARY DISEASE): ICD-10-CM

## 2022-06-08 DIAGNOSIS — E53.8 B12 NUTRITIONAL DEFICIENCY: ICD-10-CM

## 2022-06-08 DIAGNOSIS — Z00.00 ENCOUNTER FOR PREVENTIVE HEALTH EXAMINATION: Primary | ICD-10-CM

## 2022-06-08 DIAGNOSIS — Z86.19 HISTORY OF HEPATITIS C: ICD-10-CM

## 2022-06-08 DIAGNOSIS — N18.30 STAGE 3 CHRONIC KIDNEY DISEASE, UNSPECIFIED WHETHER STAGE 3A OR 3B CKD: ICD-10-CM

## 2022-06-08 DIAGNOSIS — G40.009 PARTIAL IDIOPATHIC EPILEPSY WITH SEIZURES OF LOCALIZED ONSET, NOT INTRACTABLE, WITHOUT STATUS EPILEPTICUS: ICD-10-CM

## 2022-06-08 DIAGNOSIS — I70.0 AORTIC ATHEROSCLEROSIS: ICD-10-CM

## 2022-06-08 DIAGNOSIS — N40.1 BENIGN PROSTATIC HYPERPLASIA WITH URINARY FREQUENCY: ICD-10-CM

## 2022-06-08 DIAGNOSIS — R35.0 BENIGN PROSTATIC HYPERPLASIA WITH URINARY FREQUENCY: ICD-10-CM

## 2022-06-08 DIAGNOSIS — E55.9 VITAMIN D INSUFFICIENCY: ICD-10-CM

## 2022-06-08 PROBLEM — E11.29 TYPE 2 DIABETES MELLITUS WITH KIDNEY COMPLICATION, WITHOUT LONG-TERM CURRENT USE OF INSULIN: Status: ACTIVE | Noted: 2022-06-08

## 2022-06-08 PROBLEM — E11.29 TYPE 2 DIABETES MELLITUS WITH KIDNEY COMPLICATION, WITHOUT LONG-TERM CURRENT USE OF INSULIN: Status: RESOLVED | Noted: 2019-02-11 | Resolved: 2022-06-08

## 2022-06-08 PROCEDURE — G0439 PPPS, SUBSEQ VISIT: HCPCS | Mod: S$GLB,,, | Performed by: NURSE PRACTITIONER

## 2022-06-08 PROCEDURE — 1170F FXNL STATUS ASSESSED: CPT | Mod: CPTII,S$GLB,, | Performed by: NURSE PRACTITIONER

## 2022-06-08 PROCEDURE — 3008F PR BODY MASS INDEX (BMI) DOCUMENTED: ICD-10-PCS | Mod: CPTII,S$GLB,, | Performed by: NURSE PRACTITIONER

## 2022-06-08 PROCEDURE — 1126F AMNT PAIN NOTED NONE PRSNT: CPT | Mod: CPTII,S$GLB,, | Performed by: NURSE PRACTITIONER

## 2022-06-08 PROCEDURE — 1126F PR PAIN SEVERITY QUANTIFIED, NO PAIN PRESENT: ICD-10-PCS | Mod: CPTII,S$GLB,, | Performed by: NURSE PRACTITIONER

## 2022-06-08 PROCEDURE — 1101F PR PT FALLS ASSESS DOC 0-1 FALLS W/OUT INJ PAST YR: ICD-10-PCS | Mod: CPTII,S$GLB,, | Performed by: NURSE PRACTITIONER

## 2022-06-08 PROCEDURE — 1170F PR FUNCTIONAL STATUS ASSESSED: ICD-10-PCS | Mod: CPTII,S$GLB,, | Performed by: NURSE PRACTITIONER

## 2022-06-08 PROCEDURE — 99499 RISK ADDL DX/OHS AUDIT: ICD-10-PCS | Mod: S$GLB,,, | Performed by: NURSE PRACTITIONER

## 2022-06-08 PROCEDURE — 1160F RVW MEDS BY RX/DR IN RCRD: CPT | Mod: CPTII,S$GLB,, | Performed by: NURSE PRACTITIONER

## 2022-06-08 PROCEDURE — 99499 UNLISTED E&M SERVICE: CPT | Mod: S$GLB,,, | Performed by: NURSE PRACTITIONER

## 2022-06-08 PROCEDURE — 99999 PR PBB SHADOW E&M-EST. PATIENT-LVL V: ICD-10-PCS | Mod: PBBFAC,,, | Performed by: NURSE PRACTITIONER

## 2022-06-08 PROCEDURE — 3288F PR FALLS RISK ASSESSMENT DOCUMENTED: ICD-10-PCS | Mod: CPTII,S$GLB,, | Performed by: NURSE PRACTITIONER

## 2022-06-08 PROCEDURE — 1160F PR REVIEW ALL MEDS BY PRESCRIBER/CLIN PHARMACIST DOCUMENTED: ICD-10-PCS | Mod: CPTII,S$GLB,, | Performed by: NURSE PRACTITIONER

## 2022-06-08 PROCEDURE — 3008F BODY MASS INDEX DOCD: CPT | Mod: CPTII,S$GLB,, | Performed by: NURSE PRACTITIONER

## 2022-06-08 PROCEDURE — G0439 PR MEDICARE ANNUAL WELLNESS SUBSEQUENT VISIT: ICD-10-PCS | Mod: S$GLB,,, | Performed by: NURSE PRACTITIONER

## 2022-06-08 PROCEDURE — 1159F MED LIST DOCD IN RCRD: CPT | Mod: CPTII,S$GLB,, | Performed by: NURSE PRACTITIONER

## 2022-06-08 PROCEDURE — 3288F FALL RISK ASSESSMENT DOCD: CPT | Mod: CPTII,S$GLB,, | Performed by: NURSE PRACTITIONER

## 2022-06-08 PROCEDURE — 99999 PR PBB SHADOW E&M-EST. PATIENT-LVL V: CPT | Mod: PBBFAC,,, | Performed by: NURSE PRACTITIONER

## 2022-06-08 PROCEDURE — 1101F PT FALLS ASSESS-DOCD LE1/YR: CPT | Mod: CPTII,S$GLB,, | Performed by: NURSE PRACTITIONER

## 2022-06-08 PROCEDURE — 3044F PR MOST RECENT HEMOGLOBIN A1C LEVEL <7.0%: ICD-10-PCS | Mod: CPTII,S$GLB,, | Performed by: NURSE PRACTITIONER

## 2022-06-08 PROCEDURE — 1159F PR MEDICATION LIST DOCUMENTED IN MEDICAL RECORD: ICD-10-PCS | Mod: CPTII,S$GLB,, | Performed by: NURSE PRACTITIONER

## 2022-06-08 PROCEDURE — 3044F HG A1C LEVEL LT 7.0%: CPT | Mod: CPTII,S$GLB,, | Performed by: NURSE PRACTITIONER

## 2022-06-08 NOTE — PATIENT INSTRUCTIONS
Counseling and Referral of Other Preventative  (Italic type indicates deductible and co-insurance are waived)    Patient Name: Cristiano Cruz  Today's Date: 6/8/2022    Health Maintenance       Date Due Completion Date    Diabetes Urine Screening 07/28/2022 7/28/2021    Foot Exam 07/28/2022 7/28/2021 (Done)    Override on 7/28/2021: Done (Normal)    Override on 2/8/2019: Done (Normal)    LDCT Lung Screen 07/15/2022 (Originally 3/16/2021) 3/16/2020    Hemoglobin A1c 07/02/2022 1/2/2022    COVID-19 Vaccine (3 - Booster for Pfizer series) 07/25/2022 2/25/2022    Lipid Panel 07/28/2022 7/28/2021    Eye Exam 04/11/2023 4/11/2022    Low Dose Statin 04/27/2023 4/27/2022    Colorectal Cancer Screening 06/01/2025 6/1/2022    TETANUS VACCINE 02/11/2029 2/11/2019        Orders Placed This Encounter   Procedures    Ambulatory referral/consult to Podiatry     The following information is provided to all patients.  This information is to help you find resources for any of the problems found today that may be affecting your health:                Living healthy guide: www.[facility].[place].gov      Understanding Diabetes: www.diabetes.org      Eating healthy: www.cdc.gov/healthyweight      [facility] home safety checklist: www.cdc.gov/steadi/patient.html      Agency on Aging: www.goea.[place].[facility]      Alcoholics anonymous (AA): www.aa.org      Physical Activity: www.sanjay.nih.gov/ix4klly      Tobacco use: www.quitwithusla.org

## 2022-06-08 NOTE — PROGRESS NOTES
"  Cristiano Cruz presented for a  Medicare AWV and comprehensive Health Risk Assessment today. The following components were reviewed and updated:    · Medical history  · Family History  · Social history  · Allergies and Current Medications  · Health Risk Assessment  · Health Maintenance  · Care Team         ** See Completed Assessments for Annual Wellness Visit within the encounter summary.**         The following assessments were completed:  · Living Situation  · CAGE  · Depression Screening  · Timed Get Up and Go  · Whisper Test  · Cognitive Function Screening      ·   · Nutrition Screening  · ADL Screening  · PAQ Screening        Vitals:    06/08/22 0900 06/08/22 0908   BP:  114/72   BP Location:  Right arm   Patient Position:  Sitting   Pulse:  67   SpO2:  96%   Weight: 79.4 kg (175 lb 0.7 oz)    Height: 5' 8" (1.727 m)      Body mass index is 26.62 kg/m².  Physical Exam  Vitals and nursing note reviewed.   Constitutional:       Appearance: He is well-developed.   HENT:      Head: Normocephalic.   Cardiovascular:      Rate and Rhythm: Normal rate and regular rhythm.   Pulmonary:      Effort: Pulmonary effort is normal.      Breath sounds: Normal breath sounds.   Abdominal:      General: Bowel sounds are normal.      Palpations: Abdomen is soft.   Musculoskeletal:         General: Normal range of motion.   Skin:     General: Skin is warm and dry.   Neurological:      Mental Status: He is alert and oriented to person, place, and time.      Motor: No abnormal muscle tone.   Psychiatric:         Mood and Affect: Mood normal.               Diagnoses and health risks identified today and associated recommendations/orders:    1. Encounter for preventive health examination  Here for Health Risk Assessment/Annual Wellness Visit.  Health maintenance reviewed and updated. Follow up in one year.    2. Aortic atherosclerosis  Chronic, stable on current medications. Noted on CT Renal Stone Study 1/26/22. Followed by PCP.    3. " Type 2 diabetes mellitus with diabetic nephropathy, without long-term current use of insulin  Chronic, stable on current medication. Last a1c 6.6.  Followed by PCP.  - Ambulatory referral/consult to Podiatry; Future    4. Stage 3 chronic kidney disease, unspecified whether stage 3a or 3b CKD  Chronic, stable on current medications. Followed by PCP.    5. Partial idiopathic epilepsy with seizures of localized onset, not intractable, without status epilepticus  Chronic, stable on current medication. Followed by PCP,Neurology.    6. Mixed type COPD (chronic obstructive pulmonary disease)  Chronic, stable. Followed by PCP.    7. Benign prostatic hyperplasia with urinary frequency  Chronic, stable on current medications. Followed by PCP.    8. B12 nutritional deficiency  Chronic, stable on current medication. Followed by PCP.    9. Vitamin D insufficiency  Chronic, stable on current medication. Followed by PCP.    10. History of hepatitis C, s/p successful Rx w/ SVR12 (cure) - 11/2019  Stable. Followed by PCP.      Provided Cristiano with a 5-10 year written screening schedule and personal prevention plan. Recommendations were developed using the USPSTF age appropriate recommendations. Education, counseling, and referrals were provided as needed. After Visit Summary printed and given to patient which includes a list of additional screenings\tests needed.    Follow up in 7 weeks (on 7/27/2022).with PCP    Radha Vega NP

## 2022-06-08 NOTE — PROGRESS NOTES
Cyanocobalamin 1000mcg/ml was administered today on (6/8/22).   The patient recieved the shot in their (RD) at (around 10am )  Lot: [id number]  Exp: 01/24    Patient due to return for next one: 7/8/22  Administered by Valerie Harper PharmD.  MA:[id number]  Exp: 12/31/2023

## 2022-06-22 ENCOUNTER — OFFICE VISIT (OUTPATIENT)
Dept: PODIATRY | Facility: CLINIC | Age: 67
End: 2022-06-22
Payer: MEDICARE

## 2022-06-22 VITALS — HEART RATE: 78 BPM | SYSTOLIC BLOOD PRESSURE: 125 MMHG | DIASTOLIC BLOOD PRESSURE: 65 MMHG

## 2022-06-22 DIAGNOSIS — E11.21 TYPE 2 DIABETES MELLITUS WITH DIABETIC NEPHROPATHY, WITHOUT LONG-TERM CURRENT USE OF INSULIN: ICD-10-CM

## 2022-06-22 PROCEDURE — 3074F PR MOST RECENT SYSTOLIC BLOOD PRESSURE < 130 MM HG: ICD-10-PCS | Mod: CPTII,S$GLB,, | Performed by: PODIATRIST

## 2022-06-22 PROCEDURE — 3044F HG A1C LEVEL LT 7.0%: CPT | Mod: CPTII,S$GLB,, | Performed by: PODIATRIST

## 2022-06-22 PROCEDURE — 3066F PR DOCUMENTATION OF TREATMENT FOR NEPHROPATHY: ICD-10-PCS | Mod: CPTII,S$GLB,, | Performed by: PODIATRIST

## 2022-06-22 PROCEDURE — 3060F PR POS MICROALBUMINURIA RESULT DOCUMENTED/REVIEW: ICD-10-PCS | Mod: CPTII,S$GLB,, | Performed by: PODIATRIST

## 2022-06-22 PROCEDURE — 99999 PR PBB SHADOW E&M-EST. PATIENT-LVL III: CPT | Mod: PBBFAC,,, | Performed by: PODIATRIST

## 2022-06-22 PROCEDURE — 99499 RISK ADDL DX/OHS AUDIT: ICD-10-PCS | Mod: S$GLB,,, | Performed by: PODIATRIST

## 2022-06-22 PROCEDURE — 1126F AMNT PAIN NOTED NONE PRSNT: CPT | Mod: CPTII,S$GLB,, | Performed by: PODIATRIST

## 2022-06-22 PROCEDURE — 3066F NEPHROPATHY DOC TX: CPT | Mod: CPTII,S$GLB,, | Performed by: PODIATRIST

## 2022-06-22 PROCEDURE — 3060F POS MICROALBUMINURIA REV: CPT | Mod: CPTII,S$GLB,, | Performed by: PODIATRIST

## 2022-06-22 PROCEDURE — 3078F DIAST BP <80 MM HG: CPT | Mod: CPTII,S$GLB,, | Performed by: PODIATRIST

## 2022-06-22 PROCEDURE — 99999 PR PBB SHADOW E&M-EST. PATIENT-LVL III: ICD-10-PCS | Mod: PBBFAC,,, | Performed by: PODIATRIST

## 2022-06-22 PROCEDURE — 99499 UNLISTED E&M SERVICE: CPT | Mod: S$GLB,,, | Performed by: PODIATRIST

## 2022-06-22 PROCEDURE — 3074F SYST BP LT 130 MM HG: CPT | Mod: CPTII,S$GLB,, | Performed by: PODIATRIST

## 2022-06-22 PROCEDURE — 1126F PR PAIN SEVERITY QUANTIFIED, NO PAIN PRESENT: ICD-10-PCS | Mod: CPTII,S$GLB,, | Performed by: PODIATRIST

## 2022-06-22 PROCEDURE — 99213 OFFICE O/P EST LOW 20 MIN: CPT | Mod: S$GLB,,, | Performed by: PODIATRIST

## 2022-06-22 PROCEDURE — 3044F PR MOST RECENT HEMOGLOBIN A1C LEVEL <7.0%: ICD-10-PCS | Mod: CPTII,S$GLB,, | Performed by: PODIATRIST

## 2022-06-22 PROCEDURE — 3078F PR MOST RECENT DIASTOLIC BLOOD PRESSURE < 80 MM HG: ICD-10-PCS | Mod: CPTII,S$GLB,, | Performed by: PODIATRIST

## 2022-06-22 PROCEDURE — 99213 PR OFFICE/OUTPT VISIT, EST, LEVL III, 20-29 MIN: ICD-10-PCS | Mod: S$GLB,,, | Performed by: PODIATRIST

## 2022-07-20 DIAGNOSIS — E11.9 TYPE 2 DIABETES MELLITUS WITHOUT COMPLICATION: ICD-10-CM

## 2022-07-27 ENCOUNTER — TELEPHONE (OUTPATIENT)
Dept: OTOLARYNGOLOGY | Facility: CLINIC | Age: 67
End: 2022-07-27
Payer: MEDICARE

## 2022-07-27 ENCOUNTER — OFFICE VISIT (OUTPATIENT)
Dept: INTERNAL MEDICINE | Facility: CLINIC | Age: 67
End: 2022-07-27
Payer: MEDICARE

## 2022-07-27 VITALS
WEIGHT: 178.56 LBS | HEART RATE: 81 BPM | OXYGEN SATURATION: 98 % | BODY MASS INDEX: 27.06 KG/M2 | RESPIRATION RATE: 18 BRPM | DIASTOLIC BLOOD PRESSURE: 62 MMHG | SYSTOLIC BLOOD PRESSURE: 128 MMHG | HEIGHT: 68 IN

## 2022-07-27 DIAGNOSIS — R35.0 BENIGN PROSTATIC HYPERPLASIA WITH URINARY FREQUENCY: ICD-10-CM

## 2022-07-27 DIAGNOSIS — Z86.19 HISTORY OF HEPATITIS C: ICD-10-CM

## 2022-07-27 DIAGNOSIS — E11.9 TYPE 2 DIABETES MELLITUS WITHOUT COMPLICATION, WITHOUT LONG-TERM CURRENT USE OF INSULIN: ICD-10-CM

## 2022-07-27 DIAGNOSIS — Z00.00 ANNUAL PHYSICAL EXAM: Primary | ICD-10-CM

## 2022-07-27 DIAGNOSIS — I70.0 AORTIC ATHEROSCLEROSIS: ICD-10-CM

## 2022-07-27 DIAGNOSIS — H61.23 EXCESSIVE EAR WAX, BILATERAL: ICD-10-CM

## 2022-07-27 DIAGNOSIS — G40.009 PARTIAL IDIOPATHIC EPILEPSY WITH SEIZURES OF LOCALIZED ONSET, NOT INTRACTABLE, WITHOUT STATUS EPILEPTICUS: ICD-10-CM

## 2022-07-27 DIAGNOSIS — E53.8 B12 NUTRITIONAL DEFICIENCY: ICD-10-CM

## 2022-07-27 DIAGNOSIS — Z12.5 SCREENING FOR MALIGNANT NEOPLASM OF PROSTATE: ICD-10-CM

## 2022-07-27 DIAGNOSIS — E55.9 VITAMIN D INSUFFICIENCY: ICD-10-CM

## 2022-07-27 DIAGNOSIS — J44.9 MIXED TYPE COPD (CHRONIC OBSTRUCTIVE PULMONARY DISEASE): ICD-10-CM

## 2022-07-27 DIAGNOSIS — Z87.891 EX-VERY HEAVY CIGARETTE SMOKER (MORE THAN 40 PER DAY): ICD-10-CM

## 2022-07-27 DIAGNOSIS — R41.3 POOR MEMORY: ICD-10-CM

## 2022-07-27 DIAGNOSIS — N40.1 BENIGN PROSTATIC HYPERPLASIA WITH URINARY FREQUENCY: ICD-10-CM

## 2022-07-27 LAB
ALBUMIN/CREAT UR: 37.1 UG/MG (ref 0–30)
BILIRUB UR QL STRIP: NEGATIVE
CLARITY UR REFRACT.AUTO: CLEAR
COLOR UR AUTO: YELLOW
CREAT UR-MCNC: 159 MG/DL (ref 23–375)
GLUCOSE UR QL STRIP: NEGATIVE
HGB UR QL STRIP: NEGATIVE
KETONES UR QL STRIP: NEGATIVE
LEUKOCYTE ESTERASE UR QL STRIP: NEGATIVE
MICROALBUMIN UR DL<=1MG/L-MCNC: 59 UG/ML
NITRITE UR QL STRIP: NEGATIVE
PH UR STRIP: 5 [PH] (ref 5–8)
PROT UR QL STRIP: NEGATIVE
SP GR UR STRIP: 1.02 (ref 1–1.03)
URN SPEC COLLECT METH UR: NORMAL

## 2022-07-27 PROCEDURE — 1126F PR PAIN SEVERITY QUANTIFIED, NO PAIN PRESENT: ICD-10-PCS | Mod: CPTII,S$GLB,, | Performed by: INTERNAL MEDICINE

## 2022-07-27 PROCEDURE — 1101F PT FALLS ASSESS-DOCD LE1/YR: CPT | Mod: CPTII,S$GLB,, | Performed by: INTERNAL MEDICINE

## 2022-07-27 PROCEDURE — 1101F PR PT FALLS ASSESS DOC 0-1 FALLS W/OUT INJ PAST YR: ICD-10-PCS | Mod: CPTII,S$GLB,, | Performed by: INTERNAL MEDICINE

## 2022-07-27 PROCEDURE — 81003 URINALYSIS AUTO W/O SCOPE: CPT | Performed by: INTERNAL MEDICINE

## 2022-07-27 PROCEDURE — 99999 PR PBB SHADOW E&M-EST. PATIENT-LVL IV: ICD-10-PCS | Mod: PBBFAC,,, | Performed by: INTERNAL MEDICINE

## 2022-07-27 PROCEDURE — 3008F BODY MASS INDEX DOCD: CPT | Mod: CPTII,S$GLB,, | Performed by: INTERNAL MEDICINE

## 2022-07-27 PROCEDURE — 82043 UR ALBUMIN QUANTITATIVE: CPT | Performed by: INTERNAL MEDICINE

## 2022-07-27 PROCEDURE — 1159F PR MEDICATION LIST DOCUMENTED IN MEDICAL RECORD: ICD-10-PCS | Mod: CPTII,S$GLB,, | Performed by: INTERNAL MEDICINE

## 2022-07-27 PROCEDURE — 99999 PR PBB SHADOW E&M-EST. PATIENT-LVL IV: CPT | Mod: PBBFAC,,, | Performed by: INTERNAL MEDICINE

## 2022-07-27 PROCEDURE — 99214 OFFICE O/P EST MOD 30 MIN: CPT | Mod: S$GLB,,, | Performed by: INTERNAL MEDICINE

## 2022-07-27 PROCEDURE — 3074F PR MOST RECENT SYSTOLIC BLOOD PRESSURE < 130 MM HG: ICD-10-PCS | Mod: CPTII,S$GLB,, | Performed by: INTERNAL MEDICINE

## 2022-07-27 PROCEDURE — 3008F PR BODY MASS INDEX (BMI) DOCUMENTED: ICD-10-PCS | Mod: CPTII,S$GLB,, | Performed by: INTERNAL MEDICINE

## 2022-07-27 PROCEDURE — 3044F PR MOST RECENT HEMOGLOBIN A1C LEVEL <7.0%: ICD-10-PCS | Mod: CPTII,S$GLB,, | Performed by: INTERNAL MEDICINE

## 2022-07-27 PROCEDURE — 3044F HG A1C LEVEL LT 7.0%: CPT | Mod: CPTII,S$GLB,, | Performed by: INTERNAL MEDICINE

## 2022-07-27 PROCEDURE — 3288F FALL RISK ASSESSMENT DOCD: CPT | Mod: CPTII,S$GLB,, | Performed by: INTERNAL MEDICINE

## 2022-07-27 PROCEDURE — 99214 PR OFFICE/OUTPT VISIT, EST, LEVL IV, 30-39 MIN: ICD-10-PCS | Mod: S$GLB,,, | Performed by: INTERNAL MEDICINE

## 2022-07-27 PROCEDURE — 3078F DIAST BP <80 MM HG: CPT | Mod: CPTII,S$GLB,, | Performed by: INTERNAL MEDICINE

## 2022-07-27 PROCEDURE — 3078F PR MOST RECENT DIASTOLIC BLOOD PRESSURE < 80 MM HG: ICD-10-PCS | Mod: CPTII,S$GLB,, | Performed by: INTERNAL MEDICINE

## 2022-07-27 PROCEDURE — 3288F PR FALLS RISK ASSESSMENT DOCUMENTED: ICD-10-PCS | Mod: CPTII,S$GLB,, | Performed by: INTERNAL MEDICINE

## 2022-07-27 PROCEDURE — 82570 ASSAY OF URINE CREATININE: CPT | Performed by: INTERNAL MEDICINE

## 2022-07-27 PROCEDURE — 3074F SYST BP LT 130 MM HG: CPT | Mod: CPTII,S$GLB,, | Performed by: INTERNAL MEDICINE

## 2022-07-27 PROCEDURE — 1126F AMNT PAIN NOTED NONE PRSNT: CPT | Mod: CPTII,S$GLB,, | Performed by: INTERNAL MEDICINE

## 2022-07-27 PROCEDURE — 1159F MED LIST DOCD IN RCRD: CPT | Mod: CPTII,S$GLB,, | Performed by: INTERNAL MEDICINE

## 2022-07-27 RX ORDER — ACETAMINOPHEN 500 MG
5000 TABLET ORAL DAILY
Qty: 90 TABLET | Refills: 3 | Status: SHIPPED | OUTPATIENT
Start: 2022-07-27 | End: 2023-04-12 | Stop reason: SDUPTHER

## 2022-07-27 NOTE — PROGRESS NOTES
INTERNAL MEDICINE CLINIC  Follow-up Visit Progress Note     PRESENTING HISTORY     PCP: Srikanth Son MD    Last Clinic Visit with me:  4-    Current Chief Complaint/Problem:  Annual    History of Present Illness & ROS: Mr. Cristiano Cruz is a 67 y.o. male.    Blood sugar 109 recently.    Review of Systems:  Review of Systems   Constitutional: Negative for chills and fever.   Respiratory: Negative for shortness of breath.    Cardiovascular: Negative for chest pain.   Gastrointestinal: Negative for abdominal pain and blood in stool.   Genitourinary: Negative for frequency.   Musculoskeletal: Negative for back pain.   Neurological: Positive for headaches (top of headache).     Headache is mild and resolves with Tylenol.    PAST HISTORY:     Past Medical History:   Diagnosis Date    B12 nutritional deficiency 8/7/2019    COVID-19 12/24/2021    E. coli UTI 01/2019    During hospitalization for seizure    Generalized tonic-clonic seizure 1/26/2019 1-2019 admitted for new-onset seizures.Normal CT head.  His mental status normalized, and he had no recurrent seizures following keppra loading in the ED and twice-daily maintenance upon arrival to the floor. Workup into the etiology of his seizures remained negative. EEG was performed, with the preliminary interpretation being no epileptiform activity with normal background.     History of hepatitis C, s/p successful Rx w/ SVR12 (cure) - 11/2019 2/8/2019    S/p epclusa    Kidney stone on left side 6/28/2019    Mixed type COPD (chronic obstructive pulmonary disease) 8/15/2019    8-2019 PFT: Normal airflow. Airflow not improved after bronchodilator. Moderate restriction. Diffusion capacity is mildly decreased. Oximetry is normal.   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 Normal CTH, MRI and routine EEG . On keppra 500 mg BID    Pterygium, bilateral 3/12/2019    Type 2 diabetes mellitus with microalbuminuria, without long-term current use of insulin 2/11/2019    Vitamin D insufficiency 7/29/2021       Past Surgical History:   Procedure Laterality Date    CATARACT EXTRACTION W/  INTRAOCULAR LENS IMPLANT Right 7/28/2020    Procedure: EXTRACTION, CATARACT, WITH IOL INSERTION;  Surgeon: Daryl Calloway MD;  Location: [facility];  Service: Ophthalmology;  Laterality: Right;    CATARACT EXTRACTION W/  INTRAOCULAR LENS IMPLANT Left 8/11/2020    Procedure: EXTRACTION, CATARACT, WITH IOL INSERTION;  Surgeon: Daryl Calloway MD;  [facility] OR;  Service: Ophthalmology;  Laterality: Left;    HERNIA REPAIR Right 2000    [facility]       Family History   Problem Relation Age of Onset    Hypertension Mother     No Known Problems Daughter        Social History     Socioeconomic History    Marital status:     Number of children: 1   Tobacco Use    Smoking status: Former Smoker     Packs/day: 3.00     Years: 25.00     Pack years: 75.00     Quit date: 1/31/2012     Years since quitting: 10.4    Smokeless tobacco: Never Used   Substance and Sexual Activity    Alcohol use: No    Drug use: Not Currently     Types: Cocaine     Comment: H/O abuse    Sexual activity: Yes     Partners: Female   Social History Narrative     with 1 daughter (42 as of 2019).    Work in [employer]     Social Determinants of Health     Financial Resource Strain: Low Risk     Difficulty of Paying Living Expenses: Not hard at all   Food Insecurity: Food Insecurity Present    Worried About Running Out of Food in the Last Year: Sometimes true    Ran Out of Food in the Last Year: Sometimes true   Transportation Needs: No Transportation Needs    Lack of Transportation (Medical): No    Lack of Transportation (Non-Medical): No   Physical Activity:  "Sufficiently Active    Days of Exercise per Week: 7 days    Minutes of Exercise per Session: 60 min   Stress: No Stress Concern Present    Feeling of Stress : Not at all   Social Connections: Socially Integrated    Frequency of Communication with Friends and Family: More than three times a week    Frequency of Social Gatherings with Friends and Family: Once a week    Attends Anabaptist Services: 1 to 4 times per year    Active Member of Clubs or Organizations: Yes    Attends Club or Organization Meetings: 1 to 4 times per year    Marital Status:    Housing Stability: Unknown    Unable to Pay for Housing in the Last Year: No    Unstable Housing in the Last Year: No       MEDICATIONS & ALLERGIES:     Current Outpatient Medications on File Prior to Visit   Medication Sig Dispense Refill    BD LUER-RUSTAM SYRINGE 3 mL 23 gauge x 1 1/2" Syrg USE TO INJECT B12 EVERY MONTH      blood sugar diagnostic Strp 1 strip by Misc.(Non-Drug; Combo Route) route 2 (two) times daily before meals. 200 strip 3    blood-glucose meter kit Use as instructed 1 each 0    cyanocobalamin 1,000 mcg/mL injection Inject 1 mL (1,000 mcg total) into the muscle every 30 days. 10 mL 3    lamoTRIgine (LAMICTAL) 150 MG Tab Take 2 tablets (300 mg total) by mouth once daily. NO FURTHER REFILLS WITHOUT APPOINTMENT 180 tablet 3    lancets (LANCETS,ULTRA THIN) Misc 1 application by Misc.(Non-Drug; Combo Route) route 2 (two) times daily before meals. 200 each 3    latanoprost 0.005 % ophthalmic solution INSTILL 1 DROP IN BOTH EYES EVERY EVENING 7.5 mL 4    needle, disp, 23 gauge (BD SPECIALTY USE NEEDLES) 23 gauge x 1 1/4" Ndle 1 Dose by Misc.(Non-Drug; Combo Route) route every 30 days. 15 each 1    pravastatin (PRAVACHOL) 10 MG tablet Take 1 tablet (10 mg total) by mouth once daily. 90 tablet 3    silodosin (RAPAFLO) 4 mg Cap capsule Take 1 capsule (4 mg total) by mouth once daily. 90 capsule 3    SITagliptin (JANUVIA) 25 MG Tab Take " 1 tablet (25 mg total) by mouth once daily. 90 tablet 3    cholecalciferol, vitamin D3, 125 mcg (5,000 unit) Tab Take 1 tablet (5,000 Units total) by mouth once daily. (Patient not taking: No sig reported) 90 tablet 3     No current facility-administered medications on file prior to visit.        Review of patient's allergies indicates:  No Known Allergies    Medications Reconciliation:   I have reconciled the patient's home medications and discharge medications with the patient/family. I have updated all changes.  Refer to After-Visit Medication List.    OBJECTIVE:     Vital Signs:  Vitals:    07/27/22 0949   BP: 128/62   Pulse: 81   Resp: 18     Wt Readings from Last 3 Encounters:   07/27/22 0949 81 kg (178 lb 9.2 oz)   06/08/22 0900 79.4 kg (175 lb 0.7 oz)   04/27/22 0950 78.9 kg (173 lb 15.1 oz)     Body mass index is 27.15 kg/m².     Physical Exam:  .General: Well developed, well nourished. No distress.  HEENT: Head is normocephalic, atraumatic; ears - ear wax bilaterally.  Eyes: Clear conjunctiva.  Neck: Supple, symmetrical neck; trachea midline.  Lungs: Clear to auscultation bilaterally and normal respiratory effort.  Cardiovascular: Heart with regular rate and rhythm.    Extremities: No LE edema.    Abdomen: Abdomen is soft, non-tender non-distended with normal bowel sounds.  Musculoskeletal: Normal gait.   Genital:  Normal penis.   No rash in the genital area.  Scrotum and epididymis normal. No inguinal hernia.  No inguinal nodes.   Rectal: No perianal lesions or rash. Digital exam: Prostate 20 cc smooth, normal rectum.  Lymph Nodes: No cervical, supraclavicular or axillary adenopathy.  Psychiatric: Normal affect. Alert.    Laboratory  Lab Results   Component Value Date    WBC 9.86 01/26/2022    HGB 13.3 (L) 01/26/2022    HCT 41.6 01/26/2022     01/26/2022    CHOL 161 07/28/2021    TRIG 52 07/28/2021    HDL 50 07/28/2021    ALT 43 01/09/2022    AST 44 (H) 01/09/2022     01/26/2022    K 3.8  01/26/2022     01/26/2022    CREATININE 1.5 (H) 01/26/2022    BUN 10 01/26/2022    CO2 25 01/26/2022    TSH 1.486 07/28/2021    PSA 0.12 07/28/2021    INR 1.0 01/02/2022    HGBA1C 6.6 (H) 01/02/2022       ASSESSMENT & PLAN:     Annual physical exam  - Reviewed and updated past and current medical problems.  Discussed treatment of current medical problems    -     Lipid Panel; Future; Expected date: 07/27/2022  -     CBC Auto Differential; Future; Expected date: 07/27/2022  -     Comprehensive Metabolic Panel; Future; Expected date: 07/27/2022  -     TSH; Future; Expected date: 07/27/2022  -     Hemoglobin A1C; Future; Expected date: 07/27/2022  -     PSA, Screening; Future; Expected date: 07/28/2022  -     Vitamin D; Future; Expected date: 01/23/2023  -     Vitamin B12; Future; Expected date: 07/27/2022    Type 2 diabetes mellitus without complication, without long-term current use of insulin  Aortic atherosclerosis  - A1c 6.6.  He does not change his blood sugar at home. Has glucometer.     -     pravastatin (PRAVACHOL) 10 MG tablet; Take 1 tablet (10 mg total) by mouth once daily.  Dispense: 90 tablet; Refill:3  -     SITagliptin (JANUVIA) 25 MG Tab; Take 1 tablet (25 mg total) by mouth once daily.         Mixed COPD  - History of heavy smoking.     8-2019 PFT:  Normal airflow. Airflow not improved after bronchodilator. Moderate restriction.  Diffusion capacity is mildly decreased. Oximetry is normal.     - No inhaler necessary at present.     B12 Deficiency  Poor memory  - On B12 injection monthly.  - Improved with B12 injection.     -     cyanocobalamin 1,000 mcg/mL injection; Inject 1 mL (1,000 mcg total) into the muscle every 30 days.  Dispense: 10 mL; Refill: 3 ([facility])     Vitamin D insufficiency  - Did not get Vit D because pharmacy did not fill it.  Told him it is OTC.  -     cholecalciferol, vitamin D3, 125 mcg (5,000 unit) Tab; Take 1 tablet (5,000 Units total) by mouth  "once daily.        History of hepatitis C, s/p successful Rx w/ SVR12 (cure) - 11/2019     Partial idiopathic epilepsy    5-2020: Seizure X 2    7-19-19 Neurology:              - Advised him to go to the ED in case of any seizures              - f/u in clinic in 6-9 months for monitoring recurrence of events and prescriptions              -  if remains seizure-free for up to 2 years, will consider weaning off of AEDs     - On Lamictal 150 mg BID.  -   -     lamoTRIgine (LAMICTAL) 150 MG Tab; Take 2 tablets (300 mg total) by mouth once daily.       Ex-very heavy cigarette smoker (more than 40 per day)  CT Chest Lung Screening Low Dose 3-: Negative  He had no time currently to CT scan due to issues in his life.    Now ready for resume CT screening:  -     CT Chest Lung Screening Low Dose; Future; Expected date: 07/27/2022     Benign prostatic hyperplasia with urinary frequency  On silodosin (RAPAFLO) 4 mg Cap capsule; Take 1 capsule (4 mg total) by mouth once daily.    (Has no ejaculation from this, but he is okay with it).     Excessive ear wax, bilateral  -     Ambulatory referral/consult to ENT; Future; Expected date: 08/03/2022    Preventive Health Maintenance:  Up to date.     Return to Clinic for Follow Up with me:    6 months     Scheduled Follow-up :  Future Appointments   Date Time Provider Department Center   9/23/2022  9:30 AM Dalia Marrufo DPM [facility]   10/11/2022  1:30 PM Allegra Scanlon OD [facility]       After Visit Medication List :     Medication List          Accurate as of July 27, 2022 10:06 AM. If you have any questions, ask your nurse or doctor.            CONTINUE taking these medications    BD LUER-RUSTAM SYRINGE 3 mL 23 gauge x 1 1/2" Syrg  Generic drug: syringe with needle     BD SPECIALTY USE NEEDLES 23 gauge x 1 1/4" Ndle  Generic drug: needle (disp) 23 gauge  1 Dose by Misc.(Non-Drug; Combo Route) route every 30 days.     blood sugar diagnostic Strp  1 " strip by Misc.(Non-Drug; Combo Route) route 2 (two) times daily before meals.     blood-glucose meter kit  Use as instructed     cholecalciferol (vitamin D3) 125 mcg (5,000 unit) Tab  Take 1 tablet (5,000 Units total) by mouth once daily.     cyanocobalamin 1,000 mcg/mL injection  Inject 1 mL (1,000 mcg total) into the muscle every 30 days.     lamoTRIgine 150 MG Tab  Commonly known as: LAMICTAL  Take 2 tablets (300 mg total) by mouth once daily. NO FURTHER REFILLS WITHOUT APPOINTMENT     lancets Misc  Commonly known as: LANCETS,ULTRA THIN  1 application by Misc.(Non-Drug; Combo Route) route 2 (two) times daily before meals.     latanoprost 0.005 % ophthalmic solution  INSTILL 1 DROP IN BOTH EYES EVERY EVENING     pravastatin 10 MG tablet  Commonly known as: PRAVACHOL  Take 1 tablet (10 mg total) by mouth once daily.     silodosin 4 mg Cap capsule  Commonly known as: RAPAFLO  Take 1 capsule (4 mg total) by mouth once daily.     SITagliptin 25 MG Tab  Commonly known as: JANUVIA  Take 1 tablet (25 mg total) by mouth once daily.           Where to Get Your Medications      These medications were sent to [id number] - [place] [address]  [facility] AT [facility] [address]  [facility] [address] [address]    Phone: [phone]   · cholecalciferol (vitamin D3) 125 mcg (5,000 unit) Tab         Signing Physician:  Srikanth Son MD

## 2022-07-28 ENCOUNTER — OFFICE VISIT (OUTPATIENT)
Dept: OTOLARYNGOLOGY | Facility: CLINIC | Age: 67
End: 2022-07-28
Payer: MEDICARE

## 2022-07-28 VITALS — DIASTOLIC BLOOD PRESSURE: 94 MMHG | SYSTOLIC BLOOD PRESSURE: 168 MMHG | HEART RATE: 56 BPM

## 2022-07-28 DIAGNOSIS — H61.23 HEARING LOSS DUE TO CERUMEN IMPACTION, BILATERAL: Primary | ICD-10-CM

## 2022-07-28 PROCEDURE — 99499 NO LOS: ICD-10-PCS | Mod: S$GLB,,, | Performed by: OTOLARYNGOLOGY

## 2022-07-28 PROCEDURE — 3060F POS MICROALBUMINURIA REV: CPT | Mod: CPTII,S$GLB,, | Performed by: OTOLARYNGOLOGY

## 2022-07-28 PROCEDURE — 99999 PR PBB SHADOW E&M-EST. PATIENT-LVL III: CPT | Mod: PBBFAC,,, | Performed by: OTOLARYNGOLOGY

## 2022-07-28 PROCEDURE — 1126F PR PAIN SEVERITY QUANTIFIED, NO PAIN PRESENT: ICD-10-PCS | Mod: CPTII,S$GLB,, | Performed by: OTOLARYNGOLOGY

## 2022-07-28 PROCEDURE — 1159F PR MEDICATION LIST DOCUMENTED IN MEDICAL RECORD: ICD-10-PCS | Mod: CPTII,S$GLB,, | Performed by: OTOLARYNGOLOGY

## 2022-07-28 PROCEDURE — 69209 PR REMOVAL IMPACTED CERUMEN USING IRRIGATION/LAVAGE, UNILATERAL: ICD-10-PCS | Mod: 50,S$GLB,, | Performed by: OTOLARYNGOLOGY

## 2022-07-28 PROCEDURE — 3044F PR MOST RECENT HEMOGLOBIN A1C LEVEL <7.0%: ICD-10-PCS | Mod: CPTII,S$GLB,, | Performed by: OTOLARYNGOLOGY

## 2022-07-28 PROCEDURE — 1159F MED LIST DOCD IN RCRD: CPT | Mod: CPTII,S$GLB,, | Performed by: OTOLARYNGOLOGY

## 2022-07-28 PROCEDURE — 1126F AMNT PAIN NOTED NONE PRSNT: CPT | Mod: CPTII,S$GLB,, | Performed by: OTOLARYNGOLOGY

## 2022-07-28 PROCEDURE — 3077F PR MOST RECENT SYSTOLIC BLOOD PRESSURE >= 140 MM HG: ICD-10-PCS | Mod: CPTII,S$GLB,, | Performed by: OTOLARYNGOLOGY

## 2022-07-28 PROCEDURE — 3288F FALL RISK ASSESSMENT DOCD: CPT | Mod: CPTII,S$GLB,, | Performed by: OTOLARYNGOLOGY

## 2022-07-28 PROCEDURE — 3288F PR FALLS RISK ASSESSMENT DOCUMENTED: ICD-10-PCS | Mod: CPTII,S$GLB,, | Performed by: OTOLARYNGOLOGY

## 2022-07-28 PROCEDURE — 3060F PR POS MICROALBUMINURIA RESULT DOCUMENTED/REVIEW: ICD-10-PCS | Mod: CPTII,S$GLB,, | Performed by: OTOLARYNGOLOGY

## 2022-07-28 PROCEDURE — 3077F SYST BP >= 140 MM HG: CPT | Mod: CPTII,S$GLB,, | Performed by: OTOLARYNGOLOGY

## 2022-07-28 PROCEDURE — 3066F NEPHROPATHY DOC TX: CPT | Mod: CPTII,S$GLB,, | Performed by: OTOLARYNGOLOGY

## 2022-07-28 PROCEDURE — 1101F PR PT FALLS ASSESS DOC 0-1 FALLS W/OUT INJ PAST YR: ICD-10-PCS | Mod: CPTII,S$GLB,, | Performed by: OTOLARYNGOLOGY

## 2022-07-28 PROCEDURE — 3080F PR MOST RECENT DIASTOLIC BLOOD PRESSURE >= 90 MM HG: ICD-10-PCS | Mod: CPTII,S$GLB,, | Performed by: OTOLARYNGOLOGY

## 2022-07-28 PROCEDURE — 3080F DIAST BP >= 90 MM HG: CPT | Mod: CPTII,S$GLB,, | Performed by: OTOLARYNGOLOGY

## 2022-07-28 PROCEDURE — 3066F PR DOCUMENTATION OF TREATMENT FOR NEPHROPATHY: ICD-10-PCS | Mod: CPTII,S$GLB,, | Performed by: OTOLARYNGOLOGY

## 2022-07-28 PROCEDURE — 1101F PT FALLS ASSESS-DOCD LE1/YR: CPT | Mod: CPTII,S$GLB,, | Performed by: OTOLARYNGOLOGY

## 2022-07-28 PROCEDURE — 3044F HG A1C LEVEL LT 7.0%: CPT | Mod: CPTII,S$GLB,, | Performed by: OTOLARYNGOLOGY

## 2022-07-28 PROCEDURE — 99499 UNLISTED E&M SERVICE: CPT | Mod: S$GLB,,, | Performed by: OTOLARYNGOLOGY

## 2022-07-28 PROCEDURE — 99999 PR PBB SHADOW E&M-EST. PATIENT-LVL III: ICD-10-PCS | Mod: PBBFAC,,, | Performed by: OTOLARYNGOLOGY

## 2022-07-28 PROCEDURE — 69209 REMOVE IMPACTED EAR WAX UNI: CPT | Mod: 50,S$GLB,, | Performed by: OTOLARYNGOLOGY

## 2022-07-28 NOTE — PATIENT INSTRUCTIONS
Cerumen impactions removed from both hairy eacs with suction,curette and water irrigation after peroxide instillation  RTC 6 months for ear cleaning  Consider audiometry on return prn

## 2022-07-28 NOTE — PROGRESS NOTES
CC:  HPI:Mr. Cruz is a 67 year old AAM  who comleted an annual exam with Dr. YAYA Son  yesterday. He was diagnosed with type 2 diabetes, mixed COPD/history of heavy smoking, vitamin B12 deficiency, poor memory, vitamin-D deficiency, history of successful treatment of hepatitis C, partial idiopathic epilepsy, benign prostatic hyperplasia and bilateral excessive ear wax for which he was referred to the ENT service for ear cleaning.  He does not remember having his ears cleaned before.  He denies any significant history of hearing loss.  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    Past Medical History:   Diagnosis Date    B12 nutritional deficiency 8/7/2019    COVID-19 12/24/2021    E. coli UTI 01/2019    During hospitalization for seizure    Generalized tonic-clonic seizure 1/26/2019 1-2019 admitted for new-onset seizures.Normal CT head.  His mental status normalized, and he had no recurrent seizures following keppra loading in the ED and twice-daily maintenance upon arrival to the floor. Workup into the etiology of his seizures remained negative. EEG was performed, with the preliminary interpretation being no epileptiform activity with normal background.     History of hepatitis C, s/p successful Rx w/ SVR12 (cure) - 11/2019 2/8/2019    S/p epclusa    Kidney stone on left side 6/28/2019    Mixed type COPD (chronic obstructive pulmonary disease) 8/15/2019    8-2019 PFT: Normal airflow. Airflow not improved after bronchodilator. Moderate restriction. Diffusion capacity is mildly decreased. Oximetry is normal.    Nuclear sclerotic cataract of left eye 8/11/2020    Nuclear sclerotic cataract of right eye 7/28/2020    Partial idiopathic epilepsy with seizures of localized onset, not intractable, without status epilepticus 1/26/2019    new-onset seizures (1/209). Normal CTH, MRI and routine EEG . On keppra 500 mg BID    Pterygium, bilateral 3/12/2019    Type 2 diabetes mellitus with  microalbuminuria, without long-term current use of insulin 2/11/2019    Vitamin D insufficiency 7/29/2021       PE: Gen: alert and oriented gentleman in no acute distress  Both ears are examined under the microscope.  Procedures:  A large volume of occlusive viscous cerumen is carefully extracted from the hairy right ear canal with the use of suction, a curette and water irrigation after peroxide installation.  A larger volume of viscous cerumen is carefully removed from the hairy left ear canal a similar fashion.  A large plug of hair and cerumen is removed from the length of this ear canal.  Both TMs are clear and intact as visualized.  The patient reports significant improvement in his hearing after the procedures.    Audiometry was not performed today.        DIAGNOSIS:     ICD-10-CM ICD-9-CM    1. Hearing loss due to cerumen impaction, bilateral  H61.23 389.8      380.4    2.      Hairy ear canals    PLAN: Cerumen impactions removed from both hairy eacs with suction,curette and water irrigation after peroxide instillation  RTC 6 months for ear cleaning  Consider audiometry on return prn

## 2022-08-01 NOTE — PROGRESS NOTES
Subjective:      Patient ID: Cristiano Cruz is a 67 y.o. male.    Chief Complaint: Diabetic Foot Exam    Cristiano is a 67 y.o. male who presents to the clinic upon referral from Dr. Vega  for evaluation and treatment of diabetic feet. Cristiano has a past medical history of B12 nutritional deficiency (8/7/2019), COVID-19 (12/24/2021), E. coli UTI (01/2019), Generalized tonic-clonic seizure (1/26/2019), History of hepatitis C, s/p successful Rx w/ SVR12 (cure) - 11/2019 (2/8/2019), Kidney stone on left side (6/28/2019), Mixed type COPD (chronic obstructive pulmonary disease) (8/15/2019), Nuclear sclerotic cataract of left eye (8/11/2020), Nuclear sclerotic cataract of right eye (7/28/2020), Partial idiopathic epilepsy with seizures of localized onset, not intractable, without status epilepticus (1/26/2019), Pterygium, bilateral (3/12/2019), Type 2 diabetes mellitus with microalbuminuria, without long-term current use of insulin (2/11/2019), and Vitamin D insufficiency (7/29/2021). Patient relates no major problem with feet. Only complaints today consist of diabetic foot exam.    PCP: Srikanth Son MD    Date Last Seen by PCP: pcp Dr. Son 5/29/20    Current shoe gear: Tennis shoes    Hemoglobin A1C   Date Value Ref Range Status   01/02/2022 6.6 (H) 4.0 - 5.6 % Final     Comment:     ADA Screening Guidelines:  5.7-6.4%  Consistent with prediabetes  >or=6.5%  Consistent with diabetes    High levels of fetal hemoglobin interfere with the HbA1C  assay. Heterozygous hemoglobin variants (HbS, HgC, etc)do  not significantly interfere with this assay.   However, presence of multiple variants may affect accuracy.     07/28/2021 6.3 (H) 4.0 - 5.6 % Final     Comment:     ADA Screening Guidelines:  5.7-6.4%  Consistent with prediabetes  >or=6.5%  Consistent with diabetes    High levels of fetal hemoglobin interfere with the HbA1C  assay. Heterozygous hemoglobin variants (HbS, HgC, etc)do  not significantly interfere with this assay.    However, presence of multiple variants may affect accuracy.     03/05/2020 6.6 (H) 4.0 - 5.6 % Final     Comment:     ADA Screening Guidelines:  5.7-6.4%  Consistent with prediabetes  >or=6.5%  Consistent with diabetes  High levels of fetal hemoglobin interfere with the HbA1C  assay. Heterozygous hemoglobin variants (HbS, HgC, etc)do  not significantly interfere with this assay.   However, presence of multiple variants may affect accuracy.             Review of Systems   Constitutional: Negative for chills, fever and malaise/fatigue.   HENT: Negative for hearing loss.    Cardiovascular: Negative for claudication.   Respiratory: Negative for shortness of breath.    Skin: Positive for dry skin. Negative for flushing and rash.   Musculoskeletal: Negative for joint pain and myalgias.   Gastrointestinal: Negative for nausea and vomiting.   Neurological: Negative for loss of balance, numbness, paresthesias and sensory change.   Psychiatric/Behavioral: Negative for altered mental status.           Objective:      Physical Exam  Vitals reviewed.   Cardiovascular:      Pulses:           Dorsalis pedis pulses are 2+ on the right side and 2+ on the left side.        Posterior tibial pulses are 2+ on the right side and 2+ on the left side.      Comments: No edema noted to b/L LEs  Musculoskeletal:         General: Normal range of motion.      Comments: Adequate joint ROM noted to all lower extremity muscle groups with no pain or crepitation noted. Muscle strength is 5/5 in all groups bilaterally.     Feet:      Right foot:      Protective Sensation: 5 sites tested. 5 sites sensed.      Left foot:      Protective Sensation: 5 sites tested. 5 sites sensed.   Skin:     Comments: Normal skin tugor noted.   No open lesion noted b/L  Skin temp is warm to warm from proximal to distal b/L.  Webspaces clean, dry, and intact  Nails x10 short   Neurological:      Mental Status: He is alert.      Comments: Intact gross sensation noted to  b/L LEs               Assessment:       Encounter Diagnosis   Name Primary?    Type 2 diabetes mellitus with diabetic nephropathy, without long-term current use of insulin          Plan:       Cristiano was seen today for diabetic foot exam.    Diagnoses and all orders for this visit:    Type 2 diabetes mellitus with diabetic nephropathy, without long-term current use of insulin  -     Ambulatory referral/consult to Podiatry      I counseled the patient on his conditions, their implications and medical management.        -Discussed DM foot care:  Wear comfortable, proper fitting shoes. Wash feet daily. Dry well. After drying, apply moisturizer to feet (no lotion to webspaces). Inspect feet daily for skin breaks, blisters, swelling, or redness. Wear cotton socks (preferably white)  Change socks every day. Do NOT walk barefoot. Do NOT use heating pads or warm/hot water soaks      - Discussed importance of daily moisturizer to the feet such as Gold bonds diabetic foot cream     - Patient is low risk for developing lower extremity issues secondary to diabetes     - Advised the patient that fungus likes warm, dark, and moist environments such as bathrooms, gyms, and pools. Advised to spray shower, shower mat , and any shoes w/ Lysol periodically     RTC in 1 year or sooner if any new pedal problems should arise.

## 2022-08-04 ENCOUNTER — LAB VISIT (OUTPATIENT)
Dept: LAB | Facility: HOSPITAL | Age: 67
End: 2022-08-04
Attending: INTERNAL MEDICINE
Payer: MEDICARE

## 2022-08-04 DIAGNOSIS — I70.0 AORTIC ATHEROSCLEROSIS: ICD-10-CM

## 2022-08-04 DIAGNOSIS — E53.8 B12 NUTRITIONAL DEFICIENCY: ICD-10-CM

## 2022-08-04 DIAGNOSIS — Z12.5 SCREENING FOR MALIGNANT NEOPLASM OF PROSTATE: ICD-10-CM

## 2022-08-04 DIAGNOSIS — E11.9 TYPE 2 DIABETES MELLITUS WITHOUT COMPLICATION, WITHOUT LONG-TERM CURRENT USE OF INSULIN: ICD-10-CM

## 2022-08-04 DIAGNOSIS — Z00.00 ANNUAL PHYSICAL EXAM: ICD-10-CM

## 2022-08-04 DIAGNOSIS — E55.9 VITAMIN D INSUFFICIENCY: ICD-10-CM

## 2022-08-04 LAB
25(OH)D3+25(OH)D2 SERPL-MCNC: 42 NG/ML (ref 30–96)
ALBUMIN SERPL BCP-MCNC: 4.3 G/DL (ref 3.5–5.2)
ALP SERPL-CCNC: 47 U/L (ref 55–135)
ALT SERPL W/O P-5'-P-CCNC: 12 U/L (ref 10–44)
ANION GAP SERPL CALC-SCNC: 5 MMOL/L (ref 8–16)
AST SERPL-CCNC: 12 U/L (ref 10–40)
BASOPHILS # BLD AUTO: 0.03 K/UL (ref 0–0.2)
BASOPHILS NFR BLD: 0.5 % (ref 0–1.9)
BILIRUB SERPL-MCNC: 0.6 MG/DL (ref 0.1–1)
BUN SERPL-MCNC: 9 MG/DL (ref 8–23)
CALCIUM SERPL-MCNC: 9.4 MG/DL (ref 8.7–10.5)
CHLORIDE SERPL-SCNC: 106 MMOL/L (ref 95–110)
CHOLEST SERPL-MCNC: 171 MG/DL (ref 120–199)
CHOLEST/HDLC SERPL: 3.4 {RATIO} (ref 2–5)
CO2 SERPL-SCNC: 29 MMOL/L (ref 23–29)
COMPLEXED PSA SERPL-MCNC: 0.11 NG/ML (ref 0–4)
CREAT SERPL-MCNC: 1 MG/DL (ref 0.5–1.4)
DIFFERENTIAL METHOD: ABNORMAL
EOSINOPHIL # BLD AUTO: 0.1 K/UL (ref 0–0.5)
EOSINOPHIL NFR BLD: 2.2 % (ref 0–8)
ERYTHROCYTE [DISTWIDTH] IN BLOOD BY AUTOMATED COUNT: 15.5 % (ref 11.5–14.5)
EST. GFR  (NO RACE VARIABLE): >60 ML/MIN/1.73 M^2
ESTIMATED AVG GLUCOSE: 137 MG/DL (ref 68–131)
GLUCOSE SERPL-MCNC: 115 MG/DL (ref 70–110)
HBA1C MFR BLD: 6.4 % (ref 4–5.6)
HCT VFR BLD AUTO: 40.5 % (ref 40–54)
HDLC SERPL-MCNC: 50 MG/DL (ref 40–75)
HDLC SERPL: 29.2 % (ref 20–50)
HGB BLD-MCNC: 13.3 G/DL (ref 14–18)
IMM GRANULOCYTES # BLD AUTO: 0.02 K/UL (ref 0–0.04)
IMM GRANULOCYTES NFR BLD AUTO: 0.3 % (ref 0–0.5)
LDLC SERPL CALC-MCNC: 107.2 MG/DL (ref 63–159)
LYMPHOCYTES # BLD AUTO: 2 K/UL (ref 1–4.8)
LYMPHOCYTES NFR BLD: 31.8 % (ref 18–48)
MCH RBC QN AUTO: 27.7 PG (ref 27–31)
MCHC RBC AUTO-ENTMCNC: 32.8 G/DL (ref 32–36)
MCV RBC AUTO: 84 FL (ref 82–98)
MONOCYTES # BLD AUTO: 0.5 K/UL (ref 0.3–1)
MONOCYTES NFR BLD: 7.5 % (ref 4–15)
NEUTROPHILS # BLD AUTO: 3.7 K/UL (ref 1.8–7.7)
NEUTROPHILS NFR BLD: 57.7 % (ref 38–73)
NONHDLC SERPL-MCNC: 121 MG/DL
NRBC BLD-RTO: 0 /100 WBC
PLATELET # BLD AUTO: 188 K/UL (ref 150–450)
PMV BLD AUTO: 11.9 FL (ref 9.2–12.9)
POTASSIUM SERPL-SCNC: 3.7 MMOL/L (ref 3.5–5.1)
PROT SERPL-MCNC: 7.4 G/DL (ref 6–8.4)
RBC # BLD AUTO: 4.8 M/UL (ref 4.6–6.2)
SODIUM SERPL-SCNC: 140 MMOL/L (ref 136–145)
TRIGL SERPL-MCNC: 69 MG/DL (ref 30–150)
TSH SERPL DL<=0.005 MIU/L-ACNC: 1.53 UIU/ML (ref 0.4–4)
VIT B12 SERPL-MCNC: 336 PG/ML (ref 210–950)
WBC # BLD AUTO: 6.36 K/UL (ref 3.9–12.7)

## 2022-08-04 PROCEDURE — 84443 ASSAY THYROID STIM HORMONE: CPT | Performed by: INTERNAL MEDICINE

## 2022-08-04 PROCEDURE — 83036 HEMOGLOBIN GLYCOSYLATED A1C: CPT | Performed by: INTERNAL MEDICINE

## 2022-08-04 PROCEDURE — 82306 VITAMIN D 25 HYDROXY: CPT | Performed by: INTERNAL MEDICINE

## 2022-08-04 PROCEDURE — 80053 COMPREHEN METABOLIC PANEL: CPT | Performed by: INTERNAL MEDICINE

## 2022-08-04 PROCEDURE — 36415 COLL VENOUS BLD VENIPUNCTURE: CPT | Performed by: INTERNAL MEDICINE

## 2022-08-04 PROCEDURE — 85025 COMPLETE CBC W/AUTO DIFF WBC: CPT | Performed by: INTERNAL MEDICINE

## 2022-08-04 PROCEDURE — 84153 ASSAY OF PSA TOTAL: CPT | Performed by: INTERNAL MEDICINE

## 2022-08-04 PROCEDURE — 82607 VITAMIN B-12: CPT | Performed by: INTERNAL MEDICINE

## 2022-08-04 PROCEDURE — 80061 LIPID PANEL: CPT | Performed by: INTERNAL MEDICINE

## 2022-08-10 ENCOUNTER — DOCUMENT SCAN (OUTPATIENT)
Dept: HOME HEALTH SERVICES | Facility: HOSPITAL | Age: 67
End: 2022-08-10
Payer: MEDICARE

## 2022-08-23 ENCOUNTER — HOSPITAL ENCOUNTER (EMERGENCY)
Facility: HOSPITAL | Age: 67
Discharge: HOME | End: 2022-08-23
Attending: STUDENT IN AN ORGANIZED HEALTH CARE EDUCATION/TRAINING PROGRAM
Payer: MEDICARE

## 2022-08-23 VITALS
RESPIRATION RATE: 20 BRPM | WEIGHT: 177.69 LBS | HEIGHT: 68 IN | DIASTOLIC BLOOD PRESSURE: 91 MMHG | TEMPERATURE: 98 F | OXYGEN SATURATION: 95 % | BODY MASS INDEX: 26.93 KG/M2 | HEART RATE: 56 BPM | SYSTOLIC BLOOD PRESSURE: 155 MMHG

## 2022-08-23 DIAGNOSIS — R10.9 ABDOMINAL PAIN: ICD-10-CM

## 2022-08-23 DIAGNOSIS — I77.4 MEDIAN ARCUATE LIGAMENT SYNDROME: Primary | ICD-10-CM

## 2022-08-23 LAB
ALBUMIN SERPL BCP-MCNC: 4.1 G/DL (ref 3.5–5.2)
ALP SERPL-CCNC: 51 U/L (ref 55–135)
ALT SERPL W/O P-5'-P-CCNC: 10 U/L (ref 10–44)
ANION GAP SERPL CALC-SCNC: 6 MMOL/L (ref 8–16)
AST SERPL-CCNC: 12 U/L (ref 10–40)
BACTERIA #/AREA URNS AUTO: ABNORMAL /HPF
BASOPHILS # BLD AUTO: 0.06 K/UL (ref 0–0.2)
BASOPHILS NFR BLD: 0.8 % (ref 0–1.9)
BILIRUB SERPL-MCNC: 0.4 MG/DL (ref 0.1–1)
BILIRUB UR QL STRIP: NEGATIVE
BNP SERPL-MCNC: <10 PG/ML (ref 0–99)
BUN SERPL-MCNC: 14 MG/DL (ref 8–23)
BUN SERPL-MCNC: 15 MG/DL (ref 6–30)
CALCIUM SERPL-MCNC: 9.3 MG/DL (ref 8.7–10.5)
CHLORIDE SERPL-SCNC: 104 MMOL/L (ref 95–110)
CHLORIDE SERPL-SCNC: 105 MMOL/L (ref 95–110)
CLARITY UR REFRACT.AUTO: CLEAR
CO2 SERPL-SCNC: 24 MMOL/L (ref 23–29)
COLOR UR AUTO: YELLOW
CREAT SERPL-MCNC: 1.2 MG/DL (ref 0.5–1.4)
CREAT SERPL-MCNC: 1.3 MG/DL (ref 0.5–1.4)
DIFFERENTIAL METHOD: ABNORMAL
EOSINOPHIL # BLD AUTO: 0.1 K/UL (ref 0–0.5)
EOSINOPHIL NFR BLD: 1.7 % (ref 0–8)
ERYTHROCYTE [DISTWIDTH] IN BLOOD BY AUTOMATED COUNT: 15.5 % (ref 11.5–14.5)
EST. GFR  (NO RACE VARIABLE): >60 ML/MIN/1.73 M^2
GLUCOSE SERPL-MCNC: 129 MG/DL (ref 70–110)
GLUCOSE SERPL-MCNC: 130 MG/DL (ref 70–110)
GLUCOSE UR QL STRIP: NEGATIVE
HCT VFR BLD AUTO: 39.4 % (ref 40–54)
HCT VFR BLD CALC: 41 %PCV (ref 36–54)
HGB BLD-MCNC: 13 G/DL (ref 14–18)
HGB UR QL STRIP: ABNORMAL
HIV 1+2 AB+HIV1 P24 AG SERPL QL IA: NEGATIVE
HYALINE CASTS UR QL AUTO: 0 /LPF
IMM GRANULOCYTES # BLD AUTO: 0.01 K/UL (ref 0–0.04)
IMM GRANULOCYTES NFR BLD AUTO: 0.1 % (ref 0–0.5)
KETONES UR QL STRIP: NEGATIVE
LACTATE SERPL-SCNC: 1.2 MMOL/L (ref 0.5–2.2)
LEUKOCYTE ESTERASE UR QL STRIP: ABNORMAL
LIPASE SERPL-CCNC: 59 U/L (ref 4–60)
LYMPHOCYTES # BLD AUTO: 1.1 K/UL (ref 1–4.8)
LYMPHOCYTES NFR BLD: 14.9 % (ref 18–48)
MAGNESIUM SERPL-MCNC: 1.9 MG/DL (ref 1.6–2.6)
MCH RBC QN AUTO: 28.8 PG (ref 27–31)
MCHC RBC AUTO-ENTMCNC: 33 G/DL (ref 32–36)
MCV RBC AUTO: 87 FL (ref 82–98)
MICROSCOPIC COMMENT: ABNORMAL
MONOCYTES # BLD AUTO: 0.5 K/UL (ref 0.3–1)
MONOCYTES NFR BLD: 6.3 % (ref 4–15)
NEUTROPHILS # BLD AUTO: 5.7 K/UL (ref 1.8–7.7)
NEUTROPHILS NFR BLD: 76.2 % (ref 38–73)
NITRITE UR QL STRIP: NEGATIVE
NRBC BLD-RTO: 0 /100 WBC
PH UR STRIP: 6 [PH] (ref 5–8)
PLATELET # BLD AUTO: 163 K/UL (ref 150–450)
PMV BLD AUTO: 11.4 FL (ref 9.2–12.9)
POC IONIZED CALCIUM: 1.19 MMOL/L (ref 1.06–1.42)
POC TCO2 (MEASURED): 27 MMOL/L (ref 23–29)
POTASSIUM BLD-SCNC: 4.3 MMOL/L (ref 3.5–5.1)
POTASSIUM SERPL-SCNC: 4.2 MMOL/L (ref 3.5–5.1)
PROT SERPL-MCNC: 7.4 G/DL (ref 6–8.4)
PROT UR QL STRIP: ABNORMAL
RBC # BLD AUTO: 4.52 M/UL (ref 4.6–6.2)
RBC #/AREA URNS AUTO: 5 /HPF (ref 0–4)
SAMPLE: ABNORMAL
SARS-COV-2 RDRP RESP QL NAA+PROBE: NEGATIVE
SODIUM BLD-SCNC: 142 MMOL/L (ref 136–145)
SODIUM SERPL-SCNC: 135 MMOL/L (ref 136–145)
SP GR UR STRIP: 1.03 (ref 1–1.03)
SQUAMOUS #/AREA URNS AUTO: 1 /HPF
TROPONIN I SERPL DL<=0.01 NG/ML-MCNC: <0.006 NG/ML (ref 0–0.03)
TROPONIN I SERPL DL<=0.01 NG/ML-MCNC: <0.006 NG/ML (ref 0–0.03)
URN SPEC COLLECT METH UR: ABNORMAL
WBC # BLD AUTO: 7.47 K/UL (ref 3.9–12.7)
WBC #/AREA URNS AUTO: 14 /HPF (ref 0–5)

## 2022-08-23 PROCEDURE — 93010 ELECTROCARDIOGRAM REPORT: CPT | Mod: ,,, | Performed by: INTERNAL MEDICINE

## 2022-08-23 PROCEDURE — 87389 HIV-1 AG W/HIV-1&-2 AB AG IA: CPT | Performed by: STUDENT IN AN ORGANIZED HEALTH CARE EDUCATION/TRAINING PROGRAM

## 2022-08-23 PROCEDURE — 87086 URINE CULTURE/COLONY COUNT: CPT | Performed by: STUDENT IN AN ORGANIZED HEALTH CARE EDUCATION/TRAINING PROGRAM

## 2022-08-23 PROCEDURE — 81001 URINALYSIS AUTO W/SCOPE: CPT | Performed by: STUDENT IN AN ORGANIZED HEALTH CARE EDUCATION/TRAINING PROGRAM

## 2022-08-23 PROCEDURE — 83880 ASSAY OF NATRIURETIC PEPTIDE: CPT | Performed by: STUDENT IN AN ORGANIZED HEALTH CARE EDUCATION/TRAINING PROGRAM

## 2022-08-23 PROCEDURE — 87186 SC STD MICRODIL/AGAR DIL: CPT | Performed by: STUDENT IN AN ORGANIZED HEALTH CARE EDUCATION/TRAINING PROGRAM

## 2022-08-23 PROCEDURE — 83690 ASSAY OF LIPASE: CPT | Performed by: STUDENT IN AN ORGANIZED HEALTH CARE EDUCATION/TRAINING PROGRAM

## 2022-08-23 PROCEDURE — 85025 COMPLETE CBC W/AUTO DIFF WBC: CPT | Performed by: STUDENT IN AN ORGANIZED HEALTH CARE EDUCATION/TRAINING PROGRAM

## 2022-08-23 PROCEDURE — 87088 URINE BACTERIA CULTURE: CPT | Performed by: STUDENT IN AN ORGANIZED HEALTH CARE EDUCATION/TRAINING PROGRAM

## 2022-08-23 PROCEDURE — 93005 ELECTROCARDIOGRAM TRACING: CPT

## 2022-08-23 PROCEDURE — 25500020 PHARM REV CODE 255: Performed by: STUDENT IN AN ORGANIZED HEALTH CARE EDUCATION/TRAINING PROGRAM

## 2022-08-23 PROCEDURE — 99285 PR EMERGENCY DEPT VISIT,LEVEL V: ICD-10-PCS | Mod: CS,,, | Performed by: STUDENT IN AN ORGANIZED HEALTH CARE EDUCATION/TRAINING PROGRAM

## 2022-08-23 PROCEDURE — 84484 ASSAY OF TROPONIN QUANT: CPT

## 2022-08-23 PROCEDURE — 99285 EMERGENCY DEPT VISIT HI MDM: CPT | Mod: CS,,, | Performed by: STUDENT IN AN ORGANIZED HEALTH CARE EDUCATION/TRAINING PROGRAM

## 2022-08-23 PROCEDURE — 93010 EKG 12-LEAD: ICD-10-PCS | Mod: ,,, | Performed by: INTERNAL MEDICINE

## 2022-08-23 PROCEDURE — 99285 EMERGENCY DEPT VISIT HI MDM: CPT | Mod: 25

## 2022-08-23 PROCEDURE — 84484 ASSAY OF TROPONIN QUANT: CPT | Performed by: STUDENT IN AN ORGANIZED HEALTH CARE EDUCATION/TRAINING PROGRAM

## 2022-08-23 PROCEDURE — 87077 CULTURE AEROBIC IDENTIFY: CPT | Performed by: STUDENT IN AN ORGANIZED HEALTH CARE EDUCATION/TRAINING PROGRAM

## 2022-08-23 PROCEDURE — 80053 COMPREHEN METABOLIC PANEL: CPT | Performed by: STUDENT IN AN ORGANIZED HEALTH CARE EDUCATION/TRAINING PROGRAM

## 2022-08-23 PROCEDURE — 80048 BASIC METABOLIC PNL TOTAL CA: CPT | Mod: XB

## 2022-08-23 PROCEDURE — 83605 ASSAY OF LACTIC ACID: CPT | Performed by: STUDENT IN AN ORGANIZED HEALTH CARE EDUCATION/TRAINING PROGRAM

## 2022-08-23 PROCEDURE — 83735 ASSAY OF MAGNESIUM: CPT | Performed by: STUDENT IN AN ORGANIZED HEALTH CARE EDUCATION/TRAINING PROGRAM

## 2022-08-23 PROCEDURE — U0002 COVID-19 LAB TEST NON-CDC: HCPCS | Performed by: STUDENT IN AN ORGANIZED HEALTH CARE EDUCATION/TRAINING PROGRAM

## 2022-08-23 RX ADMIN — IOHEXOL 100 ML: 350 INJECTION, SOLUTION INTRAVENOUS at 08:08

## 2022-08-23 NOTE — ED PROVIDER NOTES
"ED Resident HAND-OFF NOTE:  7:10 AM 8/23/2022  Cristiano Cruz is a 67 y.o. male who presented to the ED on 8/23/2022 and has been managed by , who reports patient C/O abdominal pain. I assumed care of patient from off-going ED physician team at 7:10 AM pending 2nd troponin and CTA results.    On my evaluation, Cristiano Cruz appears well, hemodynamically stable and in NAD. Thus far, Cristiano Cruz has received:  Medications   iohexoL (OMNIPAQUE 350) injection 100 mL (100 mLs Intravenous Given 8/23/22 0813)       On my exam, I appreciate:  BP (!) 155/91 (BP Location: Right arm, Patient Position: Sitting)   Pulse (!) 56   Temp 97.7 °F (36.5 °C) (Oral)   Resp 20   Ht 5' 8" (1.727 m)   Wt 80.6 kg (177 lb 11.1 oz)   SpO2 95%   BMI 27.02 kg/m²         Disposition: I anticipate patient will be discharged home if troponin and CTA are negative.  I have discussed and counseled Cristiano Cruz regarding exam, results, diagnosis, treatment, and plan.  ______________________  Vickie Davis MD   Emergency Medicine Resident  4:01 PM 8/23/2022      UPDATE:   Second troponin was normal.  Low concern for ACS.  CTA was significant for median arcuate ligament syndrome.  Discussed lab results with patient.  He voices understanding.  Provided patient with outpatient referral to General surgery for median arcuate ligament syndrome.  Discussed strict return precautions, patient voices understanding.  Patient is hemodynamically stable and appropriate for discharge to home.      :  Abdominal pain  Median arcuate ligament syndrome (Primary)     Vickie Davis MD  Resident  08/23/22 1603    "

## 2022-08-23 NOTE — ED PROVIDER NOTES
Encounter Date: 8/23/2022       History     Chief Complaint   Patient presents with    Abdominal Pain     Pt c/o abd pain in RLQ starting at 0300, denies N/V/D. States the pain has resolved but wants to get checked out.      Mr. Cruz is a 66 y.o. male with a prior medical history of DM type 2, COPD, and seizures who presents to the emergency department due to abdominal pain. He states that around 3:00 a.m. this morning he was woken up and had abdominal pain which he describes as an aching sensation in his right lower quadrant. He says that the pain was pretty severe and so he wanted to come to the emergency department but right before he got here the  pain appeared to completely resolve. He also states that he  has been feeling short of breath since yesterday.  He denies fever, chills, chest pain or recent illness.         Review of patient's allergies indicates:  No Known Allergies  Past Medical History:   Diagnosis Date    B12 nutritional deficiency 8/7/2019    COVID-19 12/24/2021    E. coli UTI 01/2019    During hospitalization for seizure    Generalized tonic-clonic seizure 1/26/2019 1-2019 admitted for new-onset seizures.Normal CT head.  His mental status normalized, and he had no recurrent seizures following keppra loading in the ED and twice-daily maintenance upon arrival to the floor. Workup into the etiology of his seizures remained negative. EEG was performed, with the preliminary interpretation being no epileptiform activity with normal background.     History of hepatitis C, s/p successful Rx w/ SVR12 (cure) - 11/2019 2/8/2019    S/p epclusa    Kidney stone on left side 6/28/2019    Mixed type COPD (chronic obstructive pulmonary disease) 8/15/2019    8-2019 PFT: Normal airflow. Airflow not improved after bronchodilator. Moderate restriction. Diffusion capacity is mildly decreased. Oximetry is normal.    Nuclear sclerotic cataract of left eye 8/11/2020    Nuclear sclerotic cataract of right  eye 7/28/2020    Partial idiopathic epilepsy with seizures of localized onset, not intractable, without status epilepticus 1/26/2019    new-onset seizures (1/209). Normal CTH, MRI and routine EEG . On keppra 500 mg BID    Pterygium, bilateral 3/12/2019    Type 2 diabetes mellitus with microalbuminuria, without long-term current use of insulin 2/11/2019    Vitamin D insufficiency 7/29/2021     Past Surgical History:   Procedure Laterality Date    CATARACT EXTRACTION W/  INTRAOCULAR LENS IMPLANT Right 7/28/2020    Procedure: EXTRACTION, CATARACT, WITH IOL INSERTION;  Surgeon: Darly Calloway MD;  Location: [facility];  Service: Ophthalmology;  Laterality: Right;    CATARACT EXTRACTION W/  INTRAOCULAR LENS IMPLANT Left 8/11/2020    Procedure: EXTRACTION, CATARACT, WITH IOL INSERTION;  Surgeon: Daryl Calloway MD;  Location: [facility];  Service: Ophthalmology;  Laterality: Left;    HERNIA REPAIR Right 2000    [facility]     Family History   Problem Relation Age of Onset    Hypertension Mother     No Known Problems Daughter      Social History     Tobacco Use    Smoking status: Former Smoker     Packs/day: 3.00     Years: 25.00     Pack years: 75.00     Quit date: 1/31/2012     Years since quitting: 10.5    Smokeless tobacco: Never Used   Substance Use Topics    Alcohol use: No    Drug use: Not Currently     Types: Cocaine     Comment: H/O abuse     Review of Systems   Constitutional: Negative for activity change, appetite change, chills, fatigue and fever.   HENT: Negative for congestion, ear discharge, postnasal drip, rhinorrhea, sinus pressure and sinus pain.    Eyes: Negative for photophobia, pain and visual disturbance.   Respiratory: Positive for shortness of breath. Negative for cough, chest tightness and wheezing.    Cardiovascular: Negative for chest pain, palpitations and leg swelling.   Gastrointestinal: Positive for abdominal pain. Negative for diarrhea, nausea and vomiting.    Genitourinary: Negative for difficulty urinating, dysuria, flank pain and urgency.   Musculoskeletal: Negative for arthralgias, back pain, joint swelling and myalgias.   Skin: Negative for color change and wound.   Neurological: Negative for dizziness, seizures, weakness, light-headedness, numbness and headaches.   Psychiatric/Behavioral: Negative for agitation and confusion.       Physical Exam     Initial Vitals [08/23/22 0538]   BP Pulse Resp Temp SpO2   (!) 159/82 66 16 98 °F (36.7 °C) 96 %      MAP       --         Physical Exam    Nursing note and vitals reviewed.  Constitutional: He appears well-developed and well-nourished. He is not diaphoretic. No distress.   HENT:   Head: Normocephalic and atraumatic.   Mouth/Throat: Oropharynx is clear and moist. No oropharyngeal exudate.   Eyes: Conjunctivae and EOM are normal. Pupils are equal, round, and reactive to light. Right eye exhibits no discharge. Left eye exhibits no discharge. No scleral icterus.   Neck: No tracheal deviation present. No JVD present.   Normal range of motion.  Cardiovascular: Normal rate, normal heart sounds and intact distal pulses. Exam reveals no gallop.    No murmur heard.  Pulmonary/Chest: Breath sounds normal. No respiratory distress. He has no wheezes. He exhibits no tenderness.   Abdominal: Abdomen is soft. Bowel sounds are normal. He exhibits no distension. There is abdominal tenderness.   Mild RLQ tenderness There is no guarding.   Musculoskeletal:         General: No tenderness or edema. Normal range of motion.      Cervical back: Normal range of motion.     Neurological: He is alert and oriented to person, place, and time.   Skin: Skin is warm. Capillary refill takes less than 2 seconds.   Psychiatric: He has a normal mood and affect.         ED Course   Procedures  Labs Reviewed   CBC W/ AUTO DIFFERENTIAL - Abnormal; Notable for the following components:       Result Value    RBC 4.52 (*)     Hemoglobin 13.0 (*)     Hematocrit  39.4 (*)     RDW 15.5 (*)     Gran % 76.2 (*)     Lymph % 14.9 (*)     All other components within normal limits    Narrative:     Release to patient->Immediate   COMPREHENSIVE METABOLIC PANEL - Abnormal; Notable for the following components:    Sodium 135 (*)     Glucose 130 (*)     Alkaline Phosphatase 51 (*)     Anion Gap 6 (*)     All other components within normal limits    Narrative:     Release to patient->Immediate   URINALYSIS, REFLEX TO URINE CULTURE - Abnormal; Notable for the following components:    Protein, UA 1+ (*)     Occult Blood UA 1+ (*)     Leukocytes, UA 2+ (*)     All other components within normal limits    Narrative:     Specimen Source->Urine   URINALYSIS MICROSCOPIC - Abnormal; Notable for the following components:    RBC, UA 5 (*)     WBC, UA 14 (*)     All other components within normal limits    Narrative:     Specimen Source->Urine   ISTAT PROCEDURE - Abnormal; Notable for the following components:    POC Glucose 129 (*)     All other components within normal limits   CULTURE, URINE   TROPONIN I    Narrative:     Release to patient->Immediate   B-TYPE NATRIURETIC PEPTIDE    Narrative:     Release to patient->Immediate   LACTIC ACID, PLASMA    Narrative:     Release to patient->Immediate   LIPASE    Narrative:     Release to patient->Immediate   MAGNESIUM    Narrative:     Release to patient->Immediate   SARS-COV-2 RNA AMPLIFICATION, QUAL   TROPONIN I   HIV 1 / 2 ANTIBODY          Imaging Results          CTA Chest Abdomen Pelvis (Final result)  Result time 08/23/22 08:49:34    Final result by Francis Moore MD (08/23/22 08:49:34)                 Impression:      1. No definite evidence of acute aortic pathology, as questioned.  2. Additional details of chronic and incidental findings, as provided in the body of report.      Electronically signed by: Francis Moore  Date:    08/23/2022  Time:    08:49             Narrative:    EXAMINATION:  CTA CHEST ABDOMEN PELVIS    CLINICAL  HISTORY:  Aortic aneurysm, known or suspected;    TECHNIQUE:  Low dose axial, sagittal and coronal reformations were performed from the thoracic inlet to the pubic symphysis following the IV administration of 100 mL of Omnipaque 350.  The chest images are with  contrast and the abdomen images are with and without contrast.    COMPARISON:  CT abdomen and pelvis performed 01/26/2022. CT of the chest performed 03/16/2020.    FINDINGS:  Findings:    VASCULATURE    Thoracic aorta: No intramural hematoma, dissection, or aneurysm.    Aortic arch and brachiocephalic vessels: No high-grade stenosis, aneurysm, or dissection.  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    Abdominal aorta and iliac arteries: No high-grade stenosis or aneurysmal dilatation the abdominal aorta..  Multifocal atherosclerotic disease involves the bilateral common, external, and internal iliac arteries.  Multifocal moderate and severe stenoses of the bilateral internal and left external iliac arteries.    Visceral arteries:    Question short segment mild narrowing at the origin of the celiac artery with poststenotic dilatation, and configuration which may be seen in the setting of median arcuate ligament syndrome.    Superior mesenteric, bilateral renal, and inferior mesenteric arteries appear patent.    Inferior vena cava: Normal caliber.    CHEST    Support tubes and lines: None.    Heart: Question enlargement of the heart.    Coronary arteries: Limited assessment    Pericardium: Normal. No effusion, thickening, or calcification.    Central pulmonary arteries: Normal caliber.    Base of neck/thyroid: Normal.    Lymph nodes: No supraclavicular, axillary, internal mammary, mediastinal, or hilar adenopathy.    Esophagus: Normal.    Pleura: No effusion, thickening, or calcification.    Body wall: Unremarkable.    Airways: The central airways appear patent.   Areas of cystic bronchiectasis in the upper lobes, as before.    Lungs: Dependent atelectasis and questioned areas of scar noted.    Bones: No definite acute change.  Degenerative change in the spine.  Anterior flowing osteophytes at multiple contiguous thoracic levels may be seen in the setting of diffuse idiopathic skeletal hyperostosis.  Mild wedging of several midthoracic vertebra, similar configuration to 03/16/2020 CT.    ABDOMEN/PELVIS    Liver: Unremarkable.    Gallbladder/bile ducsts: Unremarkable. No intra or extrahepatic biliary ductal dilitation.    Pancreas: Unremarkable.    Spleen: Unremarkable.    Adrenals: Unremarkable.    Kidneys: Unremarkable.    Lymph nodes: No abdominal or pelvic lymphadenopathy.    Bowel and mesentery: No definite evidence of acute bowel obstruction.  Normal caliber appendix.    Free fluid or free air: None.    Pelvis: Unremarkable.    Urinary bladder: Unremarkable.    Body wall: Questioned remote operative sequela right inguinal region, relatively similar to prior CT of 01/26/2022. fat containing umbilical hernia.    Bones: No definite acute change.  Degenerative findings are again noted in the spine, sacroiliac joints, and pubic symphysis.                               X-Ray Chest AP Portable (Final result)  Result time 08/23/22 07:31:35    Final result by Dimitry Marr MD (08/23/22 07:31:35)                 Impression:      No acute findings      Electronically signed by: Dimitry Marr MD  Date:    08/23/2022  Time:    07:31             Narrative:    EXAMINATION:  XR CHEST AP PORTABLE    CLINICAL HISTORY:  Unspecified abdominal pain    TECHNIQUE:  Single frontal view of the chest was performed.    COMPARISON:  01/09/2022    FINDINGS:  Cardiac size is normal.  The lungs are clear and the previously noted increased markings seen in the lung fields in January are no longer identified.  Few fibrotic streaks are present.                                 Medications    iohexoL (OMNIPAQUE 350) injection 100 mL (100 mLs Intravenous Given 8/23/22 0813)     Medical Decision Making:   History:   Old Medical Records: I decided to obtain old medical records.  Old Records Summarized: records from previous admission(s).  Initial Assessment:   Mr. Cruz is a 66 y.o. male with a prior medical history of DM type 2, COPD, and seizures who presents to the emergency department due to abdominal pain.  Differential Diagnosis:   Appendicitis  ACS  Aortic Dissection   Covid pneumonia   Clinical Tests:   Lab Tests: Ordered and Reviewed  Radiological Study: Ordered and Reviewed  ED Management:  Patient was examined,   Labs were ordered which did not show significant abnormalities  Given patients EKG and presentation I wanted to ensure he did not have a dissection or abdominal pathology so a CTA chest abdomen and pelvis was ordered.   Patient was signed out to incoming team pending his imaging studies and repeat troponin.                       Clinical Impression:   Final diagnoses:  [R10.9] Abdominal pain  [I77.4] Median arcuate ligament syndrome (Primary)          ED Disposition Condition    Discharge Stable        ED Prescriptions     None        Follow-up Information     Follow up With Specialties Details Why Contact Info    Srikanth Son MD Internal Medicine, Hospitalist Schedule an appointment as soon as possible for a visit   [address]  [place] [address]  [phone]      [facility] - Emergency Dept Emergency Medicine  As needed, If symptoms worsen [address] [facility] [id number] [phone]           Tim Trent MD  Resident  08/23/22 2474

## 2022-08-23 NOTE — DISCHARGE INSTRUCTIONS
Diagnosis:  Median arcuate ligament syndrome    Please follow-up with the referral to General surgery outpatient.  Patient constantly in the next few days to schedule an appointment.    Home Care Instructions:  - Medications: Continue taking your home medications as prescribed    Follow-Up Plan:  - Follow-up with: Primary care doctor within 3 - 5  days  - Additional testing and/or evaluation will be directed by your primary doctor    Return to the Emergency Department for symptoms including but not limited to: worsening symptoms, severe back pain, shortness of breath or chest pain, vomiting with inability to hold down fluids, blood in vomit or poop, fevers greater than 100.4°F, passing out/fainting/unconsciousness, or other concerning symptoms.

## 2022-08-23 NOTE — EKG INTERPRETATIONS - EMERGENCY DEPT.
EKG with NSR, rate 60, normal intervals, TWI in the inferolateral leads with depression in III and AVF, no STEMI.

## 2022-08-25 LAB — BACTERIA UR CULT: ABNORMAL

## 2022-08-29 ENCOUNTER — TELEPHONE (OUTPATIENT)
Dept: SURGERY | Facility: CLINIC | Age: 67
End: 2022-08-29
Payer: MEDICARE

## 2022-09-14 ENCOUNTER — HOSPITAL ENCOUNTER (EMERGENCY)
Facility: HOSPITAL | Age: 67
Discharge: HOME | End: 2022-09-14
Attending: EMERGENCY MEDICINE
Payer: MEDICARE

## 2022-09-14 VITALS
RESPIRATION RATE: 16 BRPM | BODY MASS INDEX: 28.41 KG/M2 | TEMPERATURE: 99 F | SYSTOLIC BLOOD PRESSURE: 179 MMHG | WEIGHT: 181 LBS | HEIGHT: 67 IN | OXYGEN SATURATION: 96 % | DIASTOLIC BLOOD PRESSURE: 90 MMHG | HEART RATE: 78 BPM

## 2022-09-14 DIAGNOSIS — R21 RASH AND NONSPECIFIC SKIN ERUPTION: Primary | ICD-10-CM

## 2022-09-14 PROCEDURE — 99284 PR EMERGENCY DEPT VISIT,LEVEL IV: ICD-10-PCS | Mod: ,,, | Performed by: EMERGENCY MEDICINE

## 2022-09-14 PROCEDURE — 99284 EMERGENCY DEPT VISIT MOD MDM: CPT | Mod: ,,, | Performed by: EMERGENCY MEDICINE

## 2022-09-14 PROCEDURE — 99282 EMERGENCY DEPT VISIT SF MDM: CPT

## 2022-09-14 RX ORDER — TRIAMCINOLONE ACETONIDE 1 MG/G
CREAM TOPICAL 2 TIMES DAILY
Qty: 1 EACH | Refills: 0 | Status: SHIPPED | OUTPATIENT
Start: 2022-09-14

## 2022-09-14 NOTE — ED PROVIDER NOTES
Encounter Date: 9/14/2022       History     Chief Complaint   Patient presents with    Rash     Pt c/o itchy rash on arm for about 1 week. Denies pustules or blisters.      67-year-old male presenting to the ED with rash on has right upper extremity, right foot and posterior right leg times 1-2 weeks.  Has been pruritic.  Has not had any changes in soap or detergent.  He is reportedly used to medications over-the-counter that he does not know the name of without any improvement in his symptoms.  Due to the persistent pruritic nature, ED emergency department for further evaluation.  Denies any fevers, chills, abdominal pain, nausea, vomiting, diarrhea.  Has not had any new medications.      Review of patient's allergies indicates:  No Known Allergies  Past Medical History:   Diagnosis Date    B12 nutritional deficiency 8/7/2019    COVID-19 12/24/2021    E. coli UTI 01/2019    During hospitalization for seizure    Generalized tonic-clonic seizure 1/26/2019 1-2019 admitted for new-onset seizures.Normal CT head.  His mental status normalized, and he had no recurrent seizures following keppra loading in the ED and twice-daily maintenance upon arrival to the floor. Workup into the etiology of his seizures remained negative. EEG was performed, with the preliminary interpretation being no epileptiform activity with normal background.     History of hepatitis C, s/p successful Rx w/ SVR12 (cure) - 11/2019 2/8/2019    S/p epclusa    Kidney stone on left side 6/28/2019    Mixed type COPD (chronic obstructive pulmonary disease) 8/15/2019    8-2019 PFT: Normal airflow. Airflow not improved after bronchodilator. Moderate restriction. Diffusion capacity is mildly decreased. Oximetry is normal.    Nuclear sclerotic cataract of left eye 8/11/2020    Nuclear sclerotic cataract of right eye 7/28/2020    Partial idiopathic epilepsy with seizures of localized onset, not intractable, without status epilepticus 1/26/2019    new-onset  seizures (1/209). Normal CTH, MRI and routine EEG . On keppra 500 mg BID    Pterygium, bilateral 3/12/2019    Type 2 diabetes mellitus with microalbuminuria, without long-term current use of insulin 2/11/2019    Vitamin D insufficiency 7/29/2021     Past Surgical History:   Procedure Laterality Date    CATARACT EXTRACTION W/  INTRAOCULAR LENS IMPLANT Right 7/28/2020    Procedure: EXTRACTION, CATARACT, WITH IOL INSERTION;  Surgeon: Daryl Calloway MD;  Location: [facility];  Service: Ophthalmology;  Laterality: Right;    CATARACT EXTRACTION W/  INTRAOCULAR LENS IMPLANT Left 8/11/2020    Procedure: EXTRACTION, CATARACT, WITH IOL INSERTION;  Surgeon: Daryl Calloway MD;  Location: [facility] OR;  Service: Ophthalmology;  Laterality: Left;    HERNIA REPAIR Right 2000    [facility]     Family History   Problem Relation Age of Onset    Hypertension Mother     No Known Problems Daughter      Social History     Tobacco Use    Smoking status: Former     Packs/day: 3.00     Years: 25.00     Pack years: 75.00     Types: Cigarettes     Quit date: 1/31/2012     Years since quitting: 10.6    Smokeless tobacco: Never   Substance Use Topics    Alcohol use: No    Drug use: Not Currently     Types: Cocaine     Comment: H/O abuse     Review of Systems   Constitutional:  Negative for fever.   HENT:  Negative for sore throat.    Respiratory:  Negative for shortness of breath.    Cardiovascular:  Negative for chest pain.   Gastrointestinal:  Negative for nausea and vomiting.   Skin:  Positive for rash.     Physical Exam     Initial Vitals [09/14/22 0547]   BP Pulse Resp Temp SpO2   (!) 179/90 78 16 98.8 °F (37.1 °C) 96 %      MAP       --         Physical Exam    Nursing note and vitals reviewed.  Constitutional: Vital signs are normal. He appears well-developed and well-nourished. He is not diaphoretic. He does not appear ill. No distress.   HENT:   Head: Normocephalic and atraumatic.   Eyes: Conjunctivae and EOM are  normal. Right conjunctiva is not injected. Left conjunctiva is not injected.   Neck:   Normal range of motion.  Cardiovascular:  Normal rate, regular rhythm, S1 normal and S2 normal.     Exam reveals no gallop and no friction rub.       No murmur heard.  Pulmonary/Chest: No respiratory distress. He has no wheezes. He has no rhonchi. He has no rales. He exhibits no tenderness.   Abdominal: Abdomen is soft. He exhibits no distension and no mass. There is no abdominal tenderness. There is no rebound and no guarding.   Musculoskeletal:         General: No edema.      Cervical back: Normal range of motion.     Neurological: He is alert.   Skin: Skin is warm and dry. No rash noted. No erythema. No pallor.   Papular raised lesions noted in right upper extremity that is blanches.  Excoriations are noted.         ED Course   Procedures  Labs Reviewed - No data to display       Imaging Results    None          Medications - No data to display  Medical Decision Making:   History:   Old Medical Records: I decided to obtain old medical records.  Initial Assessment:   67-year-old male presenting to the ED with his pruritic rash in right upper extremity, right foot.  No new changes in soap or detergent.    Differential includes but not limited to eczema, contact dermatitis, less likely scabies, doubt allergic reaction    Suspect eczema, will treat with triamcinolone cream, referred to Dermatology for further management.    Dermatology follow-up within 1 week was recommended.    After taking into careful account the patient's history, physical exam findings, as well as empirical and objective data obtained throughout ED workup, I feel no emergent medical condition has been identified. No further evaluation or admission was felt to be required, and the patient is stable for discharge from the ED. The patient and any additional family present were updated with test results, overall clinical impression, and recommended further plan of  care, including discharge instructions as provided and outpatient follow-up for continued evaluation and management as needed. All questions were answered. The patient expressed understanding and agreed with current plan for discharge and follow-up plan of care. Strict ED return precautions were provided, including return/worsening of current symptoms or any other concerns.                          Clinical Impression:   Final diagnoses:  [R21] Rash and nonspecific skin eruption (Primary)        ED Disposition Condition    Discharge Stable          ED Prescriptions       Medication Sig Dispense Start Date End Date Auth. Provider    triamcinolone acetonide 0.1% (KENALOG) 0.1 % cream Apply topically 2 (two) times daily. 1 each 9/14/2022 -- Jose G Ballesteros MD          Follow-up Information       Follow up With Specialties Details Why Contact Info Additional Information    [facility] [address] [place] [address] Dermatology  for re-evaluation of your symptoms, follow up with a specialist [address]  [facility] [id number] [phone] Dermatology - Main Building, Clinic 11th Floor Please park in [facility]. Use Clinic elevators 12 & 13 to get to the 11th floor    [facility] - Emergency Dept Emergency Medicine  If symptoms worsen [address]  [facility] [id number] [phone]              Jose G Ballesteros MD  Resident  09/14/22 0718

## 2022-10-11 ENCOUNTER — TELEPHONE (OUTPATIENT)
Dept: SURGERY | Facility: CLINIC | Age: 67
End: 2022-10-11
Payer: MEDICARE

## 2022-10-11 ENCOUNTER — OFFICE VISIT (OUTPATIENT)
Dept: OPTOMETRY | Facility: CLINIC | Age: 67
End: 2022-10-11
Payer: MEDICARE

## 2022-10-11 ENCOUNTER — OFFICE VISIT (OUTPATIENT)
Dept: SURGERY | Facility: CLINIC | Age: 67
End: 2022-10-11
Payer: MEDICARE

## 2022-10-11 ENCOUNTER — HOSPITAL ENCOUNTER (OUTPATIENT)
Dept: RADIOLOGY | Facility: HOSPITAL | Age: 67
Discharge: HOME | End: 2022-10-11
Attending: SURGERY
Payer: MEDICARE

## 2022-10-11 VITALS
DIASTOLIC BLOOD PRESSURE: 96 MMHG | BODY MASS INDEX: 28.29 KG/M2 | HEART RATE: 63 BPM | SYSTOLIC BLOOD PRESSURE: 178 MMHG | WEIGHT: 180.25 LBS | HEIGHT: 67 IN

## 2022-10-11 DIAGNOSIS — H40.023 OAG (OPEN ANGLE GLAUCOMA) SUSPECT, HIGH RISK, BILATERAL: ICD-10-CM

## 2022-10-11 DIAGNOSIS — R10.9 RIGHT LATERAL ABDOMINAL PAIN: Primary | ICD-10-CM

## 2022-10-11 DIAGNOSIS — H04.123 DRY EYE SYNDROME OF BOTH EYES: ICD-10-CM

## 2022-10-11 DIAGNOSIS — H40.053 OCULAR HYPERTENSION, BILATERAL: Primary | ICD-10-CM

## 2022-10-11 DIAGNOSIS — R10.9 RIGHT LATERAL ABDOMINAL PAIN: ICD-10-CM

## 2022-10-11 DIAGNOSIS — I77.4 MEDIAN ARCUATE LIGAMENT SYNDROME: ICD-10-CM

## 2022-10-11 PROCEDURE — 76700 US EXAM ABDOM COMPLETE: CPT | Mod: 26,,, | Performed by: RADIOLOGY

## 2022-10-11 PROCEDURE — 3066F PR DOCUMENTATION OF TREATMENT FOR NEPHROPATHY: ICD-10-PCS | Mod: CPTII,S$GLB,, | Performed by: SURGERY

## 2022-10-11 PROCEDURE — 92012 PR EYE EXAM, EST PATIENT,INTERMED: ICD-10-PCS | Mod: S$GLB,,, | Performed by: OPTOMETRIST

## 2022-10-11 PROCEDURE — 1101F PT FALLS ASSESS-DOCD LE1/YR: CPT | Mod: CPTII,S$GLB,, | Performed by: OPTOMETRIST

## 2022-10-11 PROCEDURE — 3066F NEPHROPATHY DOC TX: CPT | Mod: CPTII,S$GLB,, | Performed by: SURGERY

## 2022-10-11 PROCEDURE — 1126F PR PAIN SEVERITY QUANTIFIED, NO PAIN PRESENT: ICD-10-PCS | Mod: CPTII,S$GLB,, | Performed by: OPTOMETRIST

## 2022-10-11 PROCEDURE — 3060F PR POS MICROALBUMINURIA RESULT DOCUMENTED/REVIEW: ICD-10-PCS | Mod: CPTII,S$GLB,, | Performed by: SURGERY

## 2022-10-11 PROCEDURE — 3044F HG A1C LEVEL LT 7.0%: CPT | Mod: CPTII,S$GLB,, | Performed by: SURGERY

## 2022-10-11 PROCEDURE — 1159F PR MEDICATION LIST DOCUMENTED IN MEDICAL RECORD: ICD-10-PCS | Mod: CPTII,S$GLB,, | Performed by: OPTOMETRIST

## 2022-10-11 PROCEDURE — 3288F PR FALLS RISK ASSESSMENT DOCUMENTED: ICD-10-PCS | Mod: CPTII,S$GLB,, | Performed by: OPTOMETRIST

## 2022-10-11 PROCEDURE — 76700 US ABDOMEN COMPLETE: ICD-10-PCS | Mod: 26,,, | Performed by: RADIOLOGY

## 2022-10-11 PROCEDURE — 1101F PR PT FALLS ASSESS DOC 0-1 FALLS W/OUT INJ PAST YR: ICD-10-PCS | Mod: CPTII,S$GLB,, | Performed by: OPTOMETRIST

## 2022-10-11 PROCEDURE — 99999 PR PBB SHADOW E&M-EST. PATIENT-LVL II: CPT | Mod: PBBFAC,,, | Performed by: OPTOMETRIST

## 2022-10-11 PROCEDURE — 3066F NEPHROPATHY DOC TX: CPT | Mod: CPTII,S$GLB,, | Performed by: OPTOMETRIST

## 2022-10-11 PROCEDURE — 3008F BODY MASS INDEX DOCD: CPT | Mod: CPTII,S$GLB,, | Performed by: SURGERY

## 2022-10-11 PROCEDURE — 3044F PR MOST RECENT HEMOGLOBIN A1C LEVEL <7.0%: ICD-10-PCS | Mod: CPTII,S$GLB,, | Performed by: OPTOMETRIST

## 2022-10-11 PROCEDURE — 3060F PR POS MICROALBUMINURIA RESULT DOCUMENTED/REVIEW: ICD-10-PCS | Mod: CPTII,S$GLB,, | Performed by: OPTOMETRIST

## 2022-10-11 PROCEDURE — 3044F HG A1C LEVEL LT 7.0%: CPT | Mod: CPTII,S$GLB,, | Performed by: OPTOMETRIST

## 2022-10-11 PROCEDURE — 3080F PR MOST RECENT DIASTOLIC BLOOD PRESSURE >= 90 MM HG: ICD-10-PCS | Mod: CPTII,S$GLB,, | Performed by: SURGERY

## 2022-10-11 PROCEDURE — 3077F SYST BP >= 140 MM HG: CPT | Mod: CPTII,S$GLB,, | Performed by: SURGERY

## 2022-10-11 PROCEDURE — 1126F PR PAIN SEVERITY QUANTIFIED, NO PAIN PRESENT: ICD-10-PCS | Mod: CPTII,S$GLB,, | Performed by: SURGERY

## 2022-10-11 PROCEDURE — 3008F PR BODY MASS INDEX (BMI) DOCUMENTED: ICD-10-PCS | Mod: CPTII,S$GLB,, | Performed by: SURGERY

## 2022-10-11 PROCEDURE — 99999 PR PBB SHADOW E&M-EST. PATIENT-LVL IV: CPT | Mod: PBBFAC,,, | Performed by: SURGERY

## 2022-10-11 PROCEDURE — 3077F PR MOST RECENT SYSTOLIC BLOOD PRESSURE >= 140 MM HG: ICD-10-PCS | Mod: CPTII,S$GLB,, | Performed by: SURGERY

## 2022-10-11 PROCEDURE — 99999 PR PBB SHADOW E&M-EST. PATIENT-LVL IV: ICD-10-PCS | Mod: PBBFAC,,, | Performed by: SURGERY

## 2022-10-11 PROCEDURE — 3044F PR MOST RECENT HEMOGLOBIN A1C LEVEL <7.0%: ICD-10-PCS | Mod: CPTII,S$GLB,, | Performed by: SURGERY

## 2022-10-11 PROCEDURE — 1101F PT FALLS ASSESS-DOCD LE1/YR: CPT | Mod: CPTII,S$GLB,, | Performed by: SURGERY

## 2022-10-11 PROCEDURE — 3288F FALL RISK ASSESSMENT DOCD: CPT | Mod: CPTII,S$GLB,, | Performed by: OPTOMETRIST

## 2022-10-11 PROCEDURE — 3060F POS MICROALBUMINURIA REV: CPT | Mod: CPTII,S$GLB,, | Performed by: SURGERY

## 2022-10-11 PROCEDURE — 3288F FALL RISK ASSESSMENT DOCD: CPT | Mod: CPTII,S$GLB,, | Performed by: SURGERY

## 2022-10-11 PROCEDURE — 3066F PR DOCUMENTATION OF TREATMENT FOR NEPHROPATHY: ICD-10-PCS | Mod: CPTII,S$GLB,, | Performed by: OPTOMETRIST

## 2022-10-11 PROCEDURE — 3080F DIAST BP >= 90 MM HG: CPT | Mod: CPTII,S$GLB,, | Performed by: SURGERY

## 2022-10-11 PROCEDURE — 1159F MED LIST DOCD IN RCRD: CPT | Mod: CPTII,S$GLB,, | Performed by: OPTOMETRIST

## 2022-10-11 PROCEDURE — 92012 INTRM OPH EXAM EST PATIENT: CPT | Mod: S$GLB,,, | Performed by: OPTOMETRIST

## 2022-10-11 PROCEDURE — 99999 PR PBB SHADOW E&M-EST. PATIENT-LVL II: ICD-10-PCS | Mod: PBBFAC,,, | Performed by: OPTOMETRIST

## 2022-10-11 PROCEDURE — 1126F AMNT PAIN NOTED NONE PRSNT: CPT | Mod: CPTII,S$GLB,, | Performed by: OPTOMETRIST

## 2022-10-11 PROCEDURE — 76700 US EXAM ABDOM COMPLETE: CPT | Mod: TC

## 2022-10-11 PROCEDURE — 99203 PR OFFICE/OUTPT VISIT, NEW, LEVL III, 30-44 MIN: ICD-10-PCS | Mod: S$GLB,,, | Performed by: SURGERY

## 2022-10-11 PROCEDURE — 3288F PR FALLS RISK ASSESSMENT DOCUMENTED: ICD-10-PCS | Mod: CPTII,S$GLB,, | Performed by: SURGERY

## 2022-10-11 PROCEDURE — 3060F POS MICROALBUMINURIA REV: CPT | Mod: CPTII,S$GLB,, | Performed by: OPTOMETRIST

## 2022-10-11 PROCEDURE — 1126F AMNT PAIN NOTED NONE PRSNT: CPT | Mod: CPTII,S$GLB,, | Performed by: SURGERY

## 2022-10-11 PROCEDURE — 1101F PR PT FALLS ASSESS DOC 0-1 FALLS W/OUT INJ PAST YR: ICD-10-PCS | Mod: CPTII,S$GLB,, | Performed by: SURGERY

## 2022-10-11 PROCEDURE — 99203 OFFICE O/P NEW LOW 30 MIN: CPT | Mod: S$GLB,,, | Performed by: SURGERY

## 2022-10-11 RX ORDER — LATANOPROST 50 UG/ML
1 SOLUTION/ DROPS OPHTHALMIC NIGHTLY
Qty: 2.5 ML | Refills: 5 | Status: SHIPPED | OUTPATIENT
Start: 2022-10-11 | End: 2023-02-06 | Stop reason: ALTCHOICE

## 2022-10-11 NOTE — PROGRESS NOTES
I have seen the patient, reviewed the Resident's history and physical, assessment and plan. I have personally interviewed and examined the patient at bedside and: agree with the findings.     66y/o with bmi 28.23, heavy smoker, copd, seizures, dm2 without complication and without insulin, aortic atherosclerosis and possible mals.  He has been fine abdominally until a month ago when he woke up with an uncomfortable feeling in his right abdomen (he says right but ER note states rlq), improved with bending over and worse with laying down.  This lasted about 1-2 hours and he has not had it since.    Vs reviewed, htn identified    Cbc, cmp reviewed, ok  A1c reviewed, dm in control  Cta reviewed, films viewed, only mild hook at celiac  Ekg reviewed, multiple possibly significant abnormalities    Right sided abdominal pain and htn.  He has no symptoms to indicate MALS.  Obtain u/s for possible gallstones.  To see pcp today for htn.

## 2022-10-11 NOTE — PROGRESS NOTES
Administered 1ML B12 Shot on 10/11/22 @ [facility] in Left deltoid  Lot: [id number]  Exp: 02/2024  Dimitry Easley, PharmD

## 2022-10-11 NOTE — LETTER
October 11, 2022        Srikanth Son MD  [address]  [facility] [address]             [facility] 2nd Fl  [address]  [facility] [address]  Phone: [phone]   Patient: Cristiano Cruz   MR Number: [id number]   YOB: 1955   Date of Visit: 10/11/2022       Dear Dr. Son:    Thank you for referring Cristiano Cruz to me for evaluation. Below are the relevant portions of my assessment and plan of care.            If you have questions, please do not hesitate to call me. I look forward to following Cristiano along with you.    Sincerely,      Zack Graham MD           CC    No Recipients

## 2022-10-11 NOTE — PROGRESS NOTES
HPI    DLS: 4/11/2022 Dr. Scanlon    67 y.o. male is here for 6 months IOP check. Patient was to have OCT/HVF   but it was not scheduled. Denies eye pain and flashes/floaters. Occasional   tearing, right eye for a couple of days. No noticeable VA changes since   last visit. Pt using latanoprost but not consistently.     Eye Med's: Latanoprost qhs OU   Last edited by Allegra Scanlon, OD on 10/11/2022  1:53 PM.            Assessment /Plan     For exam results, see Encounter Report.    Ocular hypertension, bilateral    OAG (open angle glaucoma) suspect, high risk, bilateral    Dry eye syndrome of both eyes    Other orders  -     latanoprost 0.005 % ophthalmic solution; Place 1 drop into both eyes every evening.  Dispense: 2.5 mL; Refill: 5    Educated pt on findings. Patient using latanoprost gtt occasionally. IOP higher today than it was last visit when he was off gtt Tx. Will have patient re-start latanoprost 1 gtt OU QHS. Refills ordered. Discussed importance of gtt consistency. Pt notes understanding. Patient also needs repeat testing (RNFL OCT and 24-2 HVF). Will monitor IOP on gtt Tx and repeat testing in ~1 month. Patient starting a new job this week and will call back to schedule f/u once he follows-up with his job. Monitor 1 month with RNFL OCT, 24-2 HVF, and IOP check.     ATs prn for dryness.       RTC in 1 month for RNFL OCT, 24-2 HVF, and IOP check or sooner if needed.

## 2022-10-13 ENCOUNTER — TELEPHONE (OUTPATIENT)
Dept: SURGERY | Facility: CLINIC | Age: 67
End: 2022-10-13
Payer: MEDICARE

## 2022-12-05 ENCOUNTER — CLINICAL SUPPORT (OUTPATIENT)
Dept: OPHTHALMOLOGY | Facility: CLINIC | Age: 67
End: 2022-12-05
Payer: MEDICARE

## 2022-12-05 ENCOUNTER — OFFICE VISIT (OUTPATIENT)
Dept: OPTOMETRY | Facility: CLINIC | Age: 67
End: 2022-12-05
Payer: MEDICARE

## 2022-12-05 DIAGNOSIS — H40.013 OAG (OPEN ANGLE GLAUCOMA) SUSPECT, LOW RISK, BILATERAL: ICD-10-CM

## 2022-12-05 DIAGNOSIS — H40.053 OCULAR HYPERTENSION, BILATERAL: Primary | ICD-10-CM

## 2022-12-05 DIAGNOSIS — R21 RASH AND NONSPECIFIC SKIN ERUPTION: ICD-10-CM

## 2022-12-05 DIAGNOSIS — H40.1131 PRIMARY OPEN ANGLE GLAUCOMA (POAG) OF BOTH EYES, MILD STAGE: ICD-10-CM

## 2022-12-05 PROCEDURE — 1159F PR MEDICATION LIST DOCUMENTED IN MEDICAL RECORD: ICD-10-PCS | Mod: CPTII,S$GLB,, | Performed by: OPTOMETRIST

## 2022-12-05 PROCEDURE — 92133 OCT, OPTIC NERVE - OU - BOTH EYES: ICD-10-PCS | Mod: S$GLB,,, | Performed by: OPTOMETRIST

## 2022-12-05 PROCEDURE — 1126F PR PAIN SEVERITY QUANTIFIED, NO PAIN PRESENT: ICD-10-PCS | Mod: CPTII,S$GLB,, | Performed by: OPTOMETRIST

## 2022-12-05 PROCEDURE — 99999 PR PBB SHADOW E&M-EST. PATIENT-LVL II: CPT | Mod: PBBFAC,,, | Performed by: OPTOMETRIST

## 2022-12-05 PROCEDURE — 3066F NEPHROPATHY DOC TX: CPT | Mod: CPTII,S$GLB,, | Performed by: OPTOMETRIST

## 2022-12-05 PROCEDURE — 3060F PR POS MICROALBUMINURIA RESULT DOCUMENTED/REVIEW: ICD-10-PCS | Mod: CPTII,S$GLB,, | Performed by: OPTOMETRIST

## 2022-12-05 PROCEDURE — 92014 COMPRE OPH EXAM EST PT 1/>: CPT | Mod: S$GLB,,, | Performed by: OPTOMETRIST

## 2022-12-05 PROCEDURE — 3044F HG A1C LEVEL LT 7.0%: CPT | Mod: CPTII,S$GLB,, | Performed by: OPTOMETRIST

## 2022-12-05 PROCEDURE — 3044F PR MOST RECENT HEMOGLOBIN A1C LEVEL <7.0%: ICD-10-PCS | Mod: CPTII,S$GLB,, | Performed by: OPTOMETRIST

## 2022-12-05 PROCEDURE — 1126F AMNT PAIN NOTED NONE PRSNT: CPT | Mod: CPTII,S$GLB,, | Performed by: OPTOMETRIST

## 2022-12-05 PROCEDURE — 99999 PR PBB SHADOW E&M-EST. PATIENT-LVL II: ICD-10-PCS | Mod: PBBFAC,,, | Performed by: OPTOMETRIST

## 2022-12-05 PROCEDURE — 92014 PR EYE EXAM, EST PATIENT,COMPREHESV: ICD-10-PCS | Mod: S$GLB,,, | Performed by: OPTOMETRIST

## 2022-12-05 PROCEDURE — 3288F PR FALLS RISK ASSESSMENT DOCUMENTED: ICD-10-PCS | Mod: CPTII,S$GLB,, | Performed by: OPTOMETRIST

## 2022-12-05 PROCEDURE — 1159F MED LIST DOCD IN RCRD: CPT | Mod: CPTII,S$GLB,, | Performed by: OPTOMETRIST

## 2022-12-05 PROCEDURE — 92133 CPTRZD OPH DX IMG PST SGM ON: CPT | Mod: S$GLB,,, | Performed by: OPTOMETRIST

## 2022-12-05 PROCEDURE — 3066F PR DOCUMENTATION OF TREATMENT FOR NEPHROPATHY: ICD-10-PCS | Mod: CPTII,S$GLB,, | Performed by: OPTOMETRIST

## 2022-12-05 PROCEDURE — 3288F FALL RISK ASSESSMENT DOCD: CPT | Mod: CPTII,S$GLB,, | Performed by: OPTOMETRIST

## 2022-12-05 PROCEDURE — 1101F PT FALLS ASSESS-DOCD LE1/YR: CPT | Mod: CPTII,S$GLB,, | Performed by: OPTOMETRIST

## 2022-12-05 PROCEDURE — 1101F PR PT FALLS ASSESS DOC 0-1 FALLS W/OUT INJ PAST YR: ICD-10-PCS | Mod: CPTII,S$GLB,, | Performed by: OPTOMETRIST

## 2022-12-05 PROCEDURE — 3060F POS MICROALBUMINURIA REV: CPT | Mod: CPTII,S$GLB,, | Performed by: OPTOMETRIST

## 2022-12-05 NOTE — PROGRESS NOTES
HPI    IOP Check    KOURTNEY: 10/11/22    67 y.o. is here for IOP check   Pt states OS is weak  Pt states he stopped taking the latanoprost so we could check baseline IOP  Pt wants to know how the result were from previous test he took today  Last edited by Allegra Scanlon, OD on 12/5/2022  2:45 PM.            Assessment /Plan     For exam results, see Encounter Report.    Ocular hypertension, bilateral    Primary open angle glaucoma (POAG) of both eyes, mild stage      Educated pt on findings. IOP raised off of gtt Tx. Patient to re-start latanoprost, 1 gtt OU QHS. No refills needed per pt. Discussed importance of gtt compliance. RNFL changes noted OS>OD. Thin pachy OU. See testing results below. HVF WNL OD, changes noted OS but testing unreliable. Will look for repeatability in the future. Repeat testing in 3-4 months. IOP check in 1 month.       RNFL OCT (compared to 04/22)  OD: Borderline RNFL thinning TS (91 ---> 95)  OS: Significant RNFL thinning T (90 ---> 93); Borderline RNFL thinning TS (99 ---> 96) and G (77 ---> 75)    24-2 HVF  OD: Reliable testing (FL: 1/10 FP: 2% FN: 0% GHT: WNL VFI: 99%). WNL. (-)VF defects.    OS: Unreliable testing (FL: 3/14xx FP: 1% FN: 11% GHT: ONL VFI: 84%). Overall depression, worse superior temporal. Continue to monitor for repeatability/progression. Assume poor test taker. No correlation to OCT      RTC in 1 month for IOP check or sooner if needed.

## 2022-12-05 NOTE — PROGRESS NOTES
Visual field test done.  Patient stated no latex allergies used coverlet        Glasses Prescription     Sphere Cylinder Axis Dist VA Add   Right [place] +0.50 010 20/20- +2.50   Left -0.25 +0.75 110 20/30-2 +2.50   Expiration Date: 4/11/2023

## 2022-12-18 ENCOUNTER — HOSPITAL ENCOUNTER (EMERGENCY)
Facility: HOSPITAL | Age: 67
Discharge: HOME | End: 2022-12-18
Attending: EMERGENCY MEDICINE
Payer: MEDICARE

## 2022-12-18 VITALS
WEIGHT: 183.19 LBS | RESPIRATION RATE: 18 BRPM | SYSTOLIC BLOOD PRESSURE: 169 MMHG | OXYGEN SATURATION: 96 % | BODY MASS INDEX: 28.75 KG/M2 | TEMPERATURE: 98 F | HEART RATE: 98 BPM | DIASTOLIC BLOOD PRESSURE: 85 MMHG | HEIGHT: 67 IN

## 2022-12-18 DIAGNOSIS — S05.02XA LEFT CORNEAL ABRASION, INITIAL ENCOUNTER: Primary | ICD-10-CM

## 2022-12-18 PROCEDURE — 99284 EMERGENCY DEPT VISIT MOD MDM: CPT | Mod: ,,, | Performed by: EMERGENCY MEDICINE

## 2022-12-18 PROCEDURE — 99284 PR EMERGENCY DEPT VISIT,LEVEL IV: ICD-10-PCS | Mod: ,,, | Performed by: EMERGENCY MEDICINE

## 2022-12-18 PROCEDURE — 25000003 PHARM REV CODE 250: Performed by: EMERGENCY MEDICINE

## 2022-12-18 PROCEDURE — 99284 EMERGENCY DEPT VISIT MOD MDM: CPT | Mod: 25

## 2022-12-18 RX ORDER — PROPARACAINE HYDROCHLORIDE 5 MG/ML
1 SOLUTION/ DROPS OPHTHALMIC
Status: COMPLETED | OUTPATIENT
Start: 2022-12-18 | End: 2022-12-18

## 2022-12-18 RX ORDER — IBUPROFEN 400 MG/1
400 TABLET ORAL EVERY 6 HOURS PRN
Qty: 28 TABLET | Refills: 0 | Status: SHIPPED | OUTPATIENT
Start: 2022-12-18 | End: 2022-12-25

## 2022-12-18 RX ORDER — ERYTHROMYCIN 5 MG/G
OINTMENT OPHTHALMIC
Qty: 1 G | Refills: 0 | Status: SHIPPED | OUTPATIENT
Start: 2022-12-18 | End: 2022-12-18 | Stop reason: SDUPTHER

## 2022-12-18 RX ORDER — ACETAMINOPHEN 500 MG
500 TABLET ORAL
Qty: 42 TABLET | Refills: 0 | Status: SHIPPED | OUTPATIENT
Start: 2022-12-18 | End: 2022-12-25

## 2022-12-18 RX ORDER — ERYTHROMYCIN 5 MG/G
OINTMENT OPHTHALMIC
Qty: 1 G | Refills: 0 | Status: SHIPPED | OUTPATIENT
Start: 2022-12-18 | End: 2023-04-12

## 2022-12-18 RX ADMIN — PROPARACAINE HYDROCHLORIDE 1 DROP: 5 SOLUTION/ DROPS OPHTHALMIC at 07:12

## 2022-12-18 RX ADMIN — FLUORESCEIN SODIUM 1 EACH: 1 STRIP OPHTHALMIC at 07:12

## 2022-12-19 NOTE — ED NOTES
General: Awake and alert. Holding a rag and dabbing at his left eye.  Eyes: Left eye with mild periorbital swelling, injected sclera, leaking tears. No visible blood. Right eye WNL.  Neck: Supple  Respiratory: Nonlabored respirations. No audible wheeze. Speaking in full sentences.  Cardiac: Well-perfused. No acrocyanosis.  Abdomen: Supple  Neurological: Moves all extremities symmetrically and equally. Answers questions appropriately.

## 2022-12-19 NOTE — ED PROVIDER NOTES
SCRIBE #1 NOTE: I, Landy Quintanilla, am scribing for, and in the presence of,  Penny Suresh MD. I have scribed the following sections: HPI, ROS, PE.    CC: Eye Pain (L eye burning since this morning, and eye running)      History provided by:   Patient     HPI: Cristiano Cruz is a 67 y.o. year old male past medical history of kidney stones, hep C, glaucoma, cataract, diabetes, who presents to the ED for left eye pain with onset this morning. He rubbed his left eye this morning and feels like he has something in the medial corner of his eye. He endorses a burning pain and watery drainage. He also endorses rhinorrhea. He uses eye drops daily for his glaucoma and used them this morning. He does not wear contacts. He denies any vision changes, cough, chest pain, or SOB. Last tetanus vaccine was 2019.     Past Medical History:   Diagnosis Date    B12 nutritional deficiency 08/07/2019    COVID-19 12/24/2021    E. coli UTI 01/2019    During hospitalization for seizure    Generalized tonic-clonic seizure 01/26/2019 1-2019 admitted for new-onset seizures.Normal CT head.  His mental status normalized, and he had no recurrent seizures following keppra loading in the ED and twice-daily maintenance upon arrival to the floor. Workup into the etiology of his seizures remained negative. EEG was performed, with the preliminary interpretation being no epileptiform activity with normal background.     Glaucoma     History of hepatitis C, s/p successful Rx w/ SVR12 (cure) - 11/2019 02/08/2019    S/p epclusa    Kidney stone on left side 06/28/2019    Mixed type COPD (chronic obstructive pulmonary disease) 08/15/2019    8-2019 PFT: Normal airflow. Airflow not improved after bronchodilator. Moderate restriction. Diffusion capacity is mildly decreased. Oximetry is normal.    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 Normal CTH, MRI and routine EEG . On keppra 500 mg BID    Pterygium, bilateral 03/12/2019    Type 2 diabetes mellitus with microalbuminuria, without long-term current use of insulin 02/11/2019    Vitamin D insufficiency 07/29/2021     Past Surgical History:   Procedure Laterality Date    CATARACT EXTRACTION W/  INTRAOCULAR LENS IMPLANT Right 07/28/2020    Procedure: EXTRACTION, CATARACT, WITH IOL INSERTION;  Surgeon: Daryl Calloway MD;  Location: [facility];  Service: Ophthalmology;  Laterality: Right;    CATARACT EXTRACTION W/  INTRAOCULAR LENS IMPLANT Left 08/11/2020    Procedure: EXTRACTION, CATARACT, WITH IOL INSERTION;  Surgeon: Daryl Calloway MD;  Location: [facility] OR;  Service: Ophthalmology;  Laterality: Left;    HERNIA REPAIR Right 2000    [facility]     Family History   Problem Relation Age of Onset    Hypertension Mother     Glaucoma Brother     No Known Problems Daughter     Amblyopia Neg Hx     Blindness Neg Hx     Macular degeneration Neg Hx     Retinal detachment Neg Hx     Strabismus Neg Hx     Cataracts Neg Hx      No current facility-administered medications on file prior to encounter.     Current Outpatient Medications on File Prior to Encounter   Medication Sig Dispense Refill    BD LUER-RUSTAM SYRINGE 3 mL 23 gauge x 1 1/2" Syrg USE TO INJECT B12 EVERY MONTH      blood sugar diagnostic Strp 1 strip by Misc.(Non-Drug; Combo Route) route 2 (two) times daily before meals. 200 strip 3    blood-glucose meter kit Use as instructed 1 each 0    cholecalciferol, vitamin D3, 125 mcg (5,000 unit) Tab Take 1 tablet (5,000 Units total) by mouth once daily. 90 tablet 3    cyanocobalamin 1,000 mcg/mL injection Inject 1 mL (1,000 mcg total) into the muscle every 30 days. 10 mL 3    lamoTRIgine (LAMICTAL) 150 MG Tab Take 2 tablets (300 mg total) by mouth once daily. NO FURTHER REFILLS WITHOUT APPOINTMENT " "(Patient taking differently: Take 300 mg by mouth every morning. NO FURTHER REFILLS WITHOUT APPOINTMENT) 180 tablet 3    lancets (LANCETS,ULTRA THIN) Misc 1 application by Misc.(Non-Drug; Combo Route) route 2 (two) times daily before meals. 200 each 3    latanoprost 0.005 % ophthalmic solution Place 1 drop into both eyes every evening. 2.5 mL 5    needle, disp, 23 gauge (BD SPECIALTY USE NEEDLES) 23 gauge x 1 1/4" Ndle 1 Dose by Misc.(Non-Drug; Combo Route) route every 30 days. 15 each 1    pravastatin (PRAVACHOL) 10 MG tablet Take 1 tablet (10 mg total) by mouth once daily. (Patient taking differently: Take 10 mg by mouth every morning.) 90 tablet 3    silodosin (RAPAFLO) 4 mg Cap capsule Take 1 capsule (4 mg total) by mouth once daily. (Patient taking differently: Take 4 mg by mouth every morning.) 90 capsule 3    SITagliptin (JANUVIA) 25 MG Tab Take 1 tablet (25 mg total) by mouth once daily. (Patient taking differently: Take 25 mg by mouth every morning.) 90 tablet 3    triamcinolone acetonide 0.1% (KENALOG) 0.1 % cream Apply topically 2 (two) times daily. 1 each 0     Patient has no known allergies.  Social History     Socioeconomic History    Marital status:     Number of children: 1   Occupational History    Occupation: Retired   Tobacco Use    Smoking status: Former     Packs/day: 3.00     Years: 25.00     Pack years: 75.00     Types: Cigarettes     Quit date: 1/31/2012     Years since quitting: 10.8    Smokeless tobacco: Never   Substance and Sexual Activity    Alcohol use: No    Drug use: Not Currently     Types: Cocaine     Comment: H/O abuse    Sexual activity: Yes     Partners: Female   Social History Narrative     with 1 daughter (42 as of 2019).    Work in [employer] - Retired     Social Determinants of Health     Financial Resource Strain: Low Risk     Difficulty of Paying Living Expenses: Not hard at all   Food Insecurity: Food Insecurity Present    Worried About Running Out " of Food in the Last Year: Sometimes true    Ran Out of Food in the Last Year: Sometimes true   Transportation Needs: No Transportation Needs    Lack of Transportation (Medical): No    Lack of Transportation (Non-Medical): No   Physical Activity: Sufficiently Active    Days of Exercise per Week: 7 days    Minutes of Exercise per Session: 60 min   Stress: No Stress Concern Present    Feeling of Stress : Not at all   Social Connections: Socially Integrated    Frequency of Communication with Friends and Family: More than three times a week    Frequency of Social Gatherings with Friends and Family: Once a week    Attends Jainism Services: 1 to 4 times per year    Active Member of Clubs or Organizations: Yes    Attends Club or Organization Meetings: 1 to 4 times per year    Marital Status:    Housing Stability: Unknown    Unable to Pay for Housing in the Last Year: No    Unstable Housing in the Last Year: No       ROS:     Constitutional : neg for fever, neg for weakness  HENT neg for head injury, neg for sore throat, rhinorrhea  Eyes: neg for visual changes, pos for left eye pain and drainage  Resp neg for SOB, neg for cough  Cardiac  neg for chest pain, neg for palpitations  GI neg for abd pain, neg for nausea, neg for vomiting   neg for urinary changes  Neuro neg for focal weakness or numbness  Skin neg for skin rash  MSK: neg for myalgia, neg for arthralgia  ALL: Patient has no known allergies.    PHYSICAL EXAM:  Vitals:    12/18/22 1715   BP: (!) 169/85   Pulse: 98   Resp: 18   Temp: 98 °F (36.7 °C)     VS: triage VS reviewed    general: comfortable, in no acute distress, pleasant, well nourished  HENT: neck symmetric, trachea midline  Eyes: PERRL, EOMI, symmetrical eyelids. Left eye with corneal injection. Clear tears, no purulent discharge.  Mild swelling of the eyelids Left IOP: 19. Negative lashell sign.  Positive for left corneal abrasion (see picture below) Visual acuity: Right eye 20/20, left eye  20/30, both 20/20.  CV: RRR  Resp: comfortable breathing, speaks in full sentences  Neuro: AAO x 3,  face symmetric, speech normal  Skin: warm, dry          DATA & INTERVENTIONS:    LABS reviewed:  Labs Reviewed - No data to display    RADIOLOGY reviewed:  Imaging Results    None         MEDICATIONS/FLUIDS:  Medications   proparacaine 0.5 % ophthalmic solution 1 drop (1 drop Both Eyes Given 12/18/22 1915)   fluorescein ophthalmic strip 1 each (1 each Both Eyes Given 12/18/22 1915)         MDM:  Cristiano Cruz is a 67 y.o. year old male who presents to the ED for left eye pain tearing since he rubbed his left eye this morning  Left eye IOP 19   Corneal abrasion on fluorescein staining, negative Rafia sign   tetanus up-to-date   started on erythromycin ophthalmic ointment, pain control with Tylenol and ibuprofen   ambulatory referral to Ophthalmology placed in [facility].   The patient has been carefully educated about symptoms and conditions that should prompt immediate return to the ED for recheck or further evaluation. Told to return immediately for any new or worsening or progressive symptoms.         Old records obtained and reviewed:   Optometry note from December 5, 2022 patient was found to have raised IOP however in the picture of him stopping latanoprost        IMPRESSION:  1.) left eye corneal abrasion      Dispo: Discharge    Critical Care Time: N/A      I, Dr. Suresh, personally performed the services described in this documentation. All medical record entries made by the scribe were at my direction and in my presence.  I have reviewed the chart and agree that the record reflects my personal performance and is accurate and complete.    Scribe Attestation:  Scribe #1: I performed the above scribed service and the documentation accurately describes the services I performed. I attest to the accuracy of the note.       Penny Suresh MD  12/19/22 [id number]

## 2022-12-19 NOTE — DISCHARGE INSTRUCTIONS
Return to the emergency department if you develop visual changes, worsening pain, purulent discharge from the left eye or any other concerns

## 2022-12-19 NOTE — ED TRIAGE NOTES
Cristiano Cruz, a 67 y.o. male presents to the ED w/ complaint of left eye pain and swelling. He reports he was rubbing his eyes normally this morning and then he suddenly felt a burning sensation in the left eye that hasn't resolved. Reports mildly more blurred vision in his left eye than the right. Denies fever, blindness, chest pain, shortness of breath, nausea, vomiting.    Triage note:  Chief Complaint   Patient presents with    Eye Pain     L eye burning since this morning, and eye running     Review of patient's allergies indicates:  No Known Allergies  Past Medical History:   Diagnosis Date    B12 nutritional deficiency 08/07/2019    COVID-19 12/24/2021    E. coli UTI 01/2019    During hospitalization for seizure    Generalized tonic-clonic seizure 01/26/2019 1-2019 admitted for new-onset seizures.Normal CT head.  His mental status normalized, and he had no recurrent seizures following keppra loading in the ED and twice-daily maintenance upon arrival to the floor. Workup into the etiology of his seizures remained negative. EEG was performed, with the preliminary interpretation being no epileptiform activity with normal background.     Glaucoma     History of hepatitis C, s/p successful Rx w/ SVR12 (cure) - 11/2019 02/08/2019    S/p epclusa    Kidney stone on left side 06/28/2019    Mixed type COPD (chronic obstructive pulmonary disease) 08/15/2019    8-2019 PFT: Normal airflow. Airflow not improved after bronchodilator. Moderate restriction. Diffusion capacity is mildly decreased. Oximetry is normal.    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 Normal CTH, MRI and routine EEG . On keppra 500 mg BID    Pterygium, bilateral 03/12/2019    Type 2 diabetes mellitus with microalbuminuria, without  long-term current use of insulin 02/11/2019    Vitamin D insufficiency 07/29/2021

## 2022-12-19 NOTE — ED NOTES
General: Awake and alert.  Eyes: Right eye WNL: conjunctiva not injected, no swelling, no drainage.  Neck: Supple  Respiratory: Nonlabored respirations. No audible wheeze. Speaking in full sentences.  Cardiac: Well-perfused. No acrocyanosis.  Abdomen: Supple  Neurological: Moves all extremities symmetrically and equally. Answers questions appropriately.

## 2022-12-21 ENCOUNTER — IMMUNIZATION (OUTPATIENT)
Dept: INTERNAL MEDICINE | Facility: CLINIC | Age: 67
End: 2022-12-21
Payer: MEDICARE

## 2022-12-21 ENCOUNTER — OFFICE VISIT (OUTPATIENT)
Dept: INTERNAL MEDICINE | Facility: CLINIC | Age: 67
End: 2022-12-21
Payer: MEDICARE

## 2022-12-21 VITALS
WEIGHT: 193.13 LBS | HEIGHT: 67 IN | BODY MASS INDEX: 30.31 KG/M2 | SYSTOLIC BLOOD PRESSURE: 130 MMHG | DIASTOLIC BLOOD PRESSURE: 76 MMHG | OXYGEN SATURATION: 95 % | HEART RATE: 80 BPM

## 2022-12-21 DIAGNOSIS — E11.29 TYPE 2 DIABETES MELLITUS WITH MICROALBUMINURIA, WITHOUT LONG-TERM CURRENT USE OF INSULIN: Primary | Chronic | ICD-10-CM

## 2022-12-21 DIAGNOSIS — R80.9 TYPE 2 DIABETES MELLITUS WITH MICROALBUMINURIA, WITHOUT LONG-TERM CURRENT USE OF INSULIN: Primary | Chronic | ICD-10-CM

## 2022-12-21 DIAGNOSIS — E53.8 B12 NUTRITIONAL DEFICIENCY: ICD-10-CM

## 2022-12-21 DIAGNOSIS — R41.3 POOR MEMORY: ICD-10-CM

## 2022-12-21 DIAGNOSIS — Z86.19 HISTORY OF HEPATITIS C: ICD-10-CM

## 2022-12-21 DIAGNOSIS — E55.9 VITAMIN D INSUFFICIENCY: ICD-10-CM

## 2022-12-21 DIAGNOSIS — J44.9 MIXED TYPE COPD (CHRONIC OBSTRUCTIVE PULMONARY DISEASE): ICD-10-CM

## 2022-12-21 DIAGNOSIS — Z87.891 EX-VERY HEAVY CIGARETTE SMOKER (MORE THAN 40 PER DAY): ICD-10-CM

## 2022-12-21 DIAGNOSIS — N52.8 OTHER MALE ERECTILE DYSFUNCTION: ICD-10-CM

## 2022-12-21 DIAGNOSIS — G40.009 PARTIAL IDIOPATHIC EPILEPSY WITH SEIZURES OF LOCALIZED ONSET, NOT INTRACTABLE, WITHOUT STATUS EPILEPTICUS: ICD-10-CM

## 2022-12-21 DIAGNOSIS — N40.1 BENIGN PROSTATIC HYPERPLASIA WITH URINARY FREQUENCY: ICD-10-CM

## 2022-12-21 DIAGNOSIS — R35.0 BENIGN PROSTATIC HYPERPLASIA WITH URINARY FREQUENCY: ICD-10-CM

## 2022-12-21 DIAGNOSIS — I70.0 AORTIC ATHEROSCLEROSIS: ICD-10-CM

## 2022-12-21 PROBLEM — R10.9 RIGHT LATERAL ABDOMINAL PAIN: Status: RESOLVED | Noted: 2022-10-11 | Resolved: 2022-12-21

## 2022-12-21 PROCEDURE — 3044F PR MOST RECENT HEMOGLOBIN A1C LEVEL <7.0%: ICD-10-PCS | Mod: CPTII,S$GLB,, | Performed by: INTERNAL MEDICINE

## 2022-12-21 PROCEDURE — 1126F PR PAIN SEVERITY QUANTIFIED, NO PAIN PRESENT: ICD-10-PCS | Mod: CPTII,S$GLB,, | Performed by: INTERNAL MEDICINE

## 2022-12-21 PROCEDURE — 90694 VACC AIIV4 NO PRSRV 0.5ML IM: CPT | Mod: S$GLB,,, | Performed by: INTERNAL MEDICINE

## 2022-12-21 PROCEDURE — 99999 PR PBB SHADOW E&M-EST. PATIENT-LVL III: CPT | Mod: PBBFAC,,, | Performed by: INTERNAL MEDICINE

## 2022-12-21 PROCEDURE — 3060F POS MICROALBUMINURIA REV: CPT | Mod: CPTII,S$GLB,, | Performed by: INTERNAL MEDICINE

## 2022-12-21 PROCEDURE — 1101F PT FALLS ASSESS-DOCD LE1/YR: CPT | Mod: CPTII,S$GLB,, | Performed by: INTERNAL MEDICINE

## 2022-12-21 PROCEDURE — 3288F FALL RISK ASSESSMENT DOCD: CPT | Mod: CPTII,S$GLB,, | Performed by: INTERNAL MEDICINE

## 2022-12-21 PROCEDURE — 99214 OFFICE O/P EST MOD 30 MIN: CPT | Mod: S$GLB,,, | Performed by: INTERNAL MEDICINE

## 2022-12-21 PROCEDURE — 3075F PR MOST RECENT SYSTOLIC BLOOD PRESS GE 130-139MM HG: ICD-10-PCS | Mod: CPTII,S$GLB,, | Performed by: INTERNAL MEDICINE

## 2022-12-21 PROCEDURE — 1159F MED LIST DOCD IN RCRD: CPT | Mod: CPTII,S$GLB,, | Performed by: INTERNAL MEDICINE

## 2022-12-21 PROCEDURE — 99999 PR PBB SHADOW E&M-EST. PATIENT-LVL III: ICD-10-PCS | Mod: PBBFAC,,, | Performed by: INTERNAL MEDICINE

## 2022-12-21 PROCEDURE — 1101F PR PT FALLS ASSESS DOC 0-1 FALLS W/OUT INJ PAST YR: ICD-10-PCS | Mod: CPTII,S$GLB,, | Performed by: INTERNAL MEDICINE

## 2022-12-21 PROCEDURE — 3060F PR POS MICROALBUMINURIA RESULT DOCUMENTED/REVIEW: ICD-10-PCS | Mod: CPTII,S$GLB,, | Performed by: INTERNAL MEDICINE

## 2022-12-21 PROCEDURE — G0008 FLU VACCINE - QUADRIVALENT - ADJUVANTED: ICD-10-PCS | Mod: S$GLB,,, | Performed by: INTERNAL MEDICINE

## 2022-12-21 PROCEDURE — 99499 UNLISTED E&M SERVICE: CPT | Mod: S$GLB,,, | Performed by: INTERNAL MEDICINE

## 2022-12-21 PROCEDURE — 3078F DIAST BP <80 MM HG: CPT | Mod: CPTII,S$GLB,, | Performed by: INTERNAL MEDICINE

## 2022-12-21 PROCEDURE — 3066F PR DOCUMENTATION OF TREATMENT FOR NEPHROPATHY: ICD-10-PCS | Mod: CPTII,S$GLB,, | Performed by: INTERNAL MEDICINE

## 2022-12-21 PROCEDURE — 1159F PR MEDICATION LIST DOCUMENTED IN MEDICAL RECORD: ICD-10-PCS | Mod: CPTII,S$GLB,, | Performed by: INTERNAL MEDICINE

## 2022-12-21 PROCEDURE — 1126F AMNT PAIN NOTED NONE PRSNT: CPT | Mod: CPTII,S$GLB,, | Performed by: INTERNAL MEDICINE

## 2022-12-21 PROCEDURE — 90694 FLU VACCINE - QUADRIVALENT - ADJUVANTED: ICD-10-PCS | Mod: S$GLB,,, | Performed by: INTERNAL MEDICINE

## 2022-12-21 PROCEDURE — 3044F HG A1C LEVEL LT 7.0%: CPT | Mod: CPTII,S$GLB,, | Performed by: INTERNAL MEDICINE

## 2022-12-21 PROCEDURE — 3288F PR FALLS RISK ASSESSMENT DOCUMENTED: ICD-10-PCS | Mod: CPTII,S$GLB,, | Performed by: INTERNAL MEDICINE

## 2022-12-21 PROCEDURE — 3078F PR MOST RECENT DIASTOLIC BLOOD PRESSURE < 80 MM HG: ICD-10-PCS | Mod: CPTII,S$GLB,, | Performed by: INTERNAL MEDICINE

## 2022-12-21 PROCEDURE — 3066F NEPHROPATHY DOC TX: CPT | Mod: CPTII,S$GLB,, | Performed by: INTERNAL MEDICINE

## 2022-12-21 PROCEDURE — 3008F PR BODY MASS INDEX (BMI) DOCUMENTED: ICD-10-PCS | Mod: CPTII,S$GLB,, | Performed by: INTERNAL MEDICINE

## 2022-12-21 PROCEDURE — 3075F SYST BP GE 130 - 139MM HG: CPT | Mod: CPTII,S$GLB,, | Performed by: INTERNAL MEDICINE

## 2022-12-21 PROCEDURE — G0008 ADMIN INFLUENZA VIRUS VAC: HCPCS | Mod: S$GLB,,, | Performed by: INTERNAL MEDICINE

## 2022-12-21 PROCEDURE — 99499 RISK ADDL DX/OHS AUDIT: ICD-10-PCS | Mod: S$GLB,,, | Performed by: INTERNAL MEDICINE

## 2022-12-21 PROCEDURE — 3008F BODY MASS INDEX DOCD: CPT | Mod: CPTII,S$GLB,, | Performed by: INTERNAL MEDICINE

## 2022-12-21 PROCEDURE — 99214 PR OFFICE/OUTPT VISIT, EST, LEVL IV, 30-39 MIN: ICD-10-PCS | Mod: S$GLB,,, | Performed by: INTERNAL MEDICINE

## 2022-12-21 RX ORDER — SILODOSIN 4 MG/1
4 CAPSULE ORAL EVERY MORNING
Qty: 90 CAPSULE | Refills: 3 | Status: SHIPPED | OUTPATIENT
Start: 2022-12-21 | End: 2023-04-12 | Stop reason: SDUPTHER

## 2022-12-21 RX ORDER — LAMOTRIGINE 150 MG/1
300 TABLET ORAL EVERY MORNING
Qty: 180 TABLET | Refills: 3 | Status: SHIPPED | OUTPATIENT
Start: 2022-12-21 | End: 2023-04-12 | Stop reason: SDUPTHER

## 2022-12-21 RX ORDER — SILDENAFIL 50 MG/1
50 TABLET, FILM COATED ORAL DAILY PRN
Qty: 30 TABLET | Refills: 11 | Status: SHIPPED | OUTPATIENT
Start: 2022-12-21 | End: 2023-04-12 | Stop reason: SDUPTHER

## 2022-12-21 RX ORDER — PRAVASTATIN SODIUM 10 MG/1
10 TABLET ORAL EVERY MORNING
Qty: 90 TABLET | Refills: 3 | Status: SHIPPED | OUTPATIENT
Start: 2022-12-21 | End: 2023-04-12 | Stop reason: SDUPTHER

## 2022-12-21 NOTE — PROGRESS NOTES
INTERNAL MEDICINE CLINIC  Follow-up Visit Progress Note     PRESENTING HISTORY     PCP: Srikanth Son MD    Last Clinic Visit with me:  7-    Current Chief Complaint/Problem:  Follow up    History of Present Illness & ROS: Mr. Cristiano Cruz is a 67 y.o. male.    Review of Systems:  Review of Systems   Constitutional:  Negative for chills and fever.   Respiratory:  Negative for cough and shortness of breath.    Cardiovascular:  Negative for chest pain and orthopnea.   Gastrointestinal:  Negative for abdominal pain.   Genitourinary:  Negative for frequency.   Musculoskeletal:  Negative for joint pain.       PAST HISTORY:     Past Medical History:   Diagnosis Date    B12 nutritional deficiency 08/07/2019    COVID-19 12/24/2021    E. coli UTI 01/2019    During hospitalization for seizure    Generalized tonic-clonic seizure 01/26/2019 1-2019 admitted for new-onset seizures.Normal CT head.  His mental status normalized, and he had no recurrent seizures following keppra loading in the ED and twice-daily maintenance upon arrival to the floor. Workup into the etiology of his seizures remained negative. EEG was performed, with the preliminary interpretation being no epileptiform activity with normal background.     Glaucoma     History of hepatitis C, s/p successful Rx w/ SVR12 (cure) - 11/2019 02/08/2019    S/p epclusa    Kidney stone on left side 06/28/2019    Mixed type COPD (chronic obstructive pulmonary disease) 08/15/2019    8-2019 PFT: Normal airflow. Airflow not improved after bronchodilator. Moderate restriction. Diffusion capacity is mildly decreased. Oximetry is normal.    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 Normal CTH, MRI and routine EEG . On keppra 500 mg BID    Pterygium, bilateral  03/12/2019    Type 2 diabetes mellitus with microalbuminuria, without long-term current use of insulin 02/11/2019    Vitamin D insufficiency 07/29/2021       Past Surgical History:   Procedure Laterality Date    CATARACT EXTRACTION W/  INTRAOCULAR LENS IMPLANT Right 07/28/2020    Procedure: EXTRACTION, CATARACT, WITH IOL INSERTION;  Surgeon: Daryl Calloway MD;  Location: [facility];  Service: Ophthalmology;  Laterality: Right;    CATARACT EXTRACTION W/  INTRAOCULAR LENS IMPLANT Left 08/11/2020    Procedure: EXTRACTION, CATARACT, WITH IOL INSERTION;  Surgeon: Daryl Calloway MD;  Location: [facility];  Service: Ophthalmology;  Laterality: Left;    HERNIA REPAIR Right 2000    [facility]       Family History   Problem Relation Age of Onset    Hypertension Mother     Glaucoma Brother     No Known Problems Daughter     Amblyopia Neg Hx     Blindness Neg Hx     Macular degeneration Neg Hx     Retinal detachment Neg Hx     Strabismus Neg Hx     Cataracts Neg Hx        Social History     Socioeconomic History    Marital status:     Number of children: 1   Occupational History    Occupation: Retired   Tobacco Use    Smoking status: Former     Packs/day: 3.00     Years: 25.00     Pack years: 75.00     Types: Cigarettes     Quit date: 1/31/2012     Years since quitting: 10.8    Smokeless tobacco: Never   Substance and Sexual Activity    Alcohol use: No    Drug use: Not Currently     Types: Cocaine     Comment: H/O abuse    Sexual activity: Yes     Partners: Female   Social History Narrative     with 1 daughter (42 as of 2019).    Work in [employer] - Retired     Social Determinants of Health     Financial Resource Strain: Low Risk     Difficulty of Paying Living Expenses: Not hard at all   Food Insecurity: Food Insecurity Present    Worried About Running Out of Food in the Last Year: Sometimes true    Ran Out of Food in the Last Year: Sometimes true   Transportation Needs: No  "Transportation Needs    Lack of Transportation (Medical): No    Lack of Transportation (Non-Medical): No   Physical Activity: Sufficiently Active    Days of Exercise per Week: 7 days    Minutes of Exercise per Session: 60 min   Stress: No Stress Concern Present    Feeling of Stress : Not at all   Social Connections: Socially Integrated    Frequency of Communication with Friends and Family: More than three times a week    Frequency of Social Gatherings with Friends and Family: Once a week    Attends Shinto Services: 1 to 4 times per year    Active Member of Clubs or Organizations: Yes    Attends Club or Organization Meetings: 1 to 4 times per year    Marital Status:    Housing Stability: Unknown    Unable to Pay for Housing in the Last Year: No    Unstable Housing in the Last Year: No       MEDICATIONS & ALLERGIES:     Current Outpatient Medications on File Prior to Visit   Medication Sig Dispense Refill    acetaminophen (TYLENOL) 500 MG tablet Take 1 tablet (500 mg total) by mouth every 4 to 6 hours as needed for Pain. 42 tablet 0    BD LUER-RUSTAM SYRINGE 3 mL 23 gauge x 1 1/2" Syrg USE TO INJECT B12 EVERY MONTH      blood sugar diagnostic Strp 1 strip by Misc.(Non-Drug; Combo Route) route 2 (two) times daily before meals. 200 strip 3    cholecalciferol, vitamin D3, 125 mcg (5,000 unit) Tab Take 1 tablet (5,000 Units total) by mouth once daily. 90 tablet 3    cyanocobalamin 1,000 mcg/mL injection Inject 1 mL (1,000 mcg total) into the muscle every 30 days. 10 mL 3    erythromycin (ROMYCIN) ophthalmic ointment Place a 1/2 inch ribbon of ointment into the left lower eyelid 4 times daily for 7 days 1 g 0    ibuprofen (ADVIL,MOTRIN) 400 MG tablet Take 1 tablet (400 mg total) by mouth every 6 (six) hours as needed for Other (pain). 28 tablet 0    lancets (LANCETS,ULTRA THIN) Misc 1 application by Misc.(Non-Drug; Combo Route) route 2 (two) times daily before meals. 200 each 3    latanoprost 0.005 % ophthalmic " "solution Place 1 drop into both eyes every evening. 2.5 mL 5    needle, disp, 23 gauge (BD SPECIALTY USE NEEDLES) 23 gauge x 1 1/4" Ndle 1 Dose by Misc.(Non-Drug; Combo Route) route every 30 days. 15 each 1    triamcinolone acetonide 0.1% (KENALOG) 0.1 % cream Apply topically 2 (two) times daily. 1 each 0           lamoTRIgine (LAMICTAL) 150 MG Tab Take 2 tablets (300 mg total) by mouth once daily.  180 tablet 3    ] pravastatin (PRAVACHOL) 10 MG tablet Take 1 tablet (10 mg total) by mouth once daily.  90 tablet 3    silodosin (RAPAFLO) 4 mg Cap capsule Take 1 capsule (4 mg total) by mouth once daily.  90 capsule 3     SITagliptin (JANUVIA) 25 MG Tab Take 1 tablet (25 mg total) by mouth once daily.  90 tablet 3     No current facility-administered medications on file prior to visit.        Review of patient's allergies indicates:  No Known Allergies    Medications Reconciliation:   I have reconciled the patient's home medications and discharge medications with the patient/family. I have updated all changes.  Refer to After-Visit Medication List.    OBJECTIVE:     Vital Signs:  Vitals:    12/21/22 1310   BP: 130/76   Pulse: 80     Wt Readings from Last 3 Encounters:   12/21/22 1310 87.6 kg (193 lb 2 oz)   12/18/22 1715 83.1 kg (183 lb 3.2 oz)   10/11/22 0816 81.8 kg (180 lb 3.6 oz)     Body mass index is 30.25 kg/m².     Physical Exam:  General: Well developed, well nourished. No distress.  HEENT: Head is normocephalic, atraumatic;  Eyes: Clear conjunctiva.  Neck: Supple, symmetrical neck; trachea midline.  Lungs: Clear to auscultation bilaterally and normal respiratory effort.  Cardiovascular: Heart with regular rate and rhythm.    Extremities: No LE edema. Pulses 2+ and symmetric.   Abdomen: Abdomen is soft, non-tender non-distended with normal bowel sounds.  Skin: Skin color, texture, turgor normal. No rashes.  Musculoskeletal: Normal gait.   Psychiatric: Normal affect. Alert.      Laboratory  Lab Results "   Component Value Date    WBC 7.47 08/23/2022    HGB 13.0 (L) 08/23/2022    HCT 41 08/23/2022     08/23/2022    CHOL 171 08/04/2022    TRIG 69 08/04/2022    HDL 50 08/04/2022    ALT 10 08/23/2022    AST 12 08/23/2022     (L) 08/23/2022    K 4.2 08/23/2022     08/23/2022    CREATININE 1.2 08/23/2022    BUN 14 08/23/2022    CO2 24 08/23/2022    TSH 1.533 08/04/2022    PSA 0.11 08/04/2022    INR 1.0 01/02/2022    HGBA1C 6.4 (H) 08/04/2022     ASSESSMENT & PLAN:     Type 2 diabetes mellitus with microalbuminuria, without long-term current use of insulin  Aortic atherosclerosis  - A1c 6.4.  He does not change his blood sugar at home. Has glucometer.     -     pravastatin (PRAVACHOL) 10 MG tablet; Take 1 tablet (10 mg total) by mouth once daily.     -     SITagliptin (JANUVIA) 25 MG Tab; Take 1 tablet (25 mg total) by mouth once daily.        Consider ACE-I or ARB in the future. (Avoid polypharmacy due to memory issues)     Mixed COPD  - History of heavy smoking.     8-2019 PFT:  Normal airflow. Airflow not improved after bronchodilator. Moderate restriction.  Diffusion capacity is mildly decreased. Oximetry is normal.     - No inhaler necessary at present.     B12 Deficiency  Poor memory  - On B12 injection monthly.  - Improved with B12 injection.     -     cyanocobalamin 1,000 mcg/mL injection; Inject 1 mL (1,000 mcg total) into the muscle every 30 days.  Dispense: 10 mL; Refill: 3 ([facility] FOR [facility]CTION)     Vitamin D insufficiency  -     cholecalciferol, vitamin D3, 125 mcg (5,000 unit) Tab; Take 1 tablet (5,000 Units total) by mouth once daily.        History of hepatitis C, s/p successful Rx w/ SVR12 (cure) - 11/2019     Partial idiopathic epilepsy    5-2020: Seizure X 2    7-19-19 Neurology:              - Advised him to go to the ED in case of any seizures              - f/u in clinic in 6-9 months for monitoring recurrence of events and prescriptions              -  if remains  seizure-free for up to 2 years, will consider weaning off of AEDs     - On Lamictal 150 mg BID.  -   -     lamoTRIgine (LAMICTAL) 150 MG Tab; Take 2 tablets (300 mg total) by mouth once daily.       Ex-very heavy cigarette smoker (more than 40 per day)  Last CT Chest Lung Screening Low Dose 8- Negative    Benign prostatic hyperplasia with urinary frequency  On silodosin (RAPAFLO) 4 mg Cap capsule; Take 1 capsule (4 mg total) by mouth once daily.    (Has no ejaculation from this, but he is okay with it).     Other male erectile dysfunction  -     sildenafiL (VIAGRA) 50 MG tablet; Take 1 tablet (50 mg total) by mouth daily as needed for Erectile Dysfunction.  Dispense: 30 tablet; Refill: 11 ([facility]IMARY [facility]ARE PHARMACY)    Preventive Health Maintenance:  Flu HD  COVID booster     Return to Clinic for Follow Up with me:    April 12 for annual exam.    Scheduled Follow-up :  Future Appointments   Date Time Provider Department Center   1/6/2023 11:00 AM Allegra Scanlon OD [facility] [place] [facility]   1/10/2023  9:00 AM Cristian Sosa MD [facility] ENT [place]   4/12/2023  9:30 AM Srikanth Son MD [facility] [facility]       After Visit Medication List :     Medication List            Accurate as of December 21, 2022  1:50 PM. If you have any questions, ask your nurse or doctor.                START taking these medications      sildenafiL 50 MG tablet  Commonly known as: VIAGRA  Take 1 tablet (50 mg total) by mouth daily as needed for Erectile Dysfunction.  Started by: Srikanth Son MD            CHANGE how you take these medications      lamoTRIgine 150 MG Tab  Commonly known as: LAMICTAL  Take 2 tablets (300 mg total) by mouth every morning.  What changed:   when to take this  additional instructions  Changed by: Srikanth Son MD            CONTINUE taking these medications      acetaminophen 500 MG tablet  Commonly known as: TYLENOL  Take 1 tablet (500 mg total) by mouth every 4 to 6 hours as needed for Pain.     BD  "LUER-RUSTAM SYRINGE 3 mL 23 gauge x 1 1/2" Syrg  Generic drug: syringe with needle     BD SPECIALTY USE NEEDLES 23 gauge x 1 1/4" Ndle  Generic drug: needle (disp) 23 gauge  1 Dose by Misc.(Non-Drug; Combo Route) route every 30 days.     blood sugar diagnostic Strp  1 strip by Misc.(Non-Drug; Combo Route) route 2 (two) times daily before meals.     cholecalciferol (vitamin D3) 125 mcg (5,000 unit) Tab  Take 1 tablet (5,000 Units total) by mouth once daily.     cyanocobalamin 1,000 mcg/mL injection  Inject 1 mL (1,000 mcg total) into the muscle every 30 days.     erythromycin ophthalmic ointment  Commonly known as: ROMYCIN  Place a 1/2 inch ribbon of ointment into the left lower eyelid 4 times daily for 7 days     ibuprofen 400 MG tablet  Commonly known as: ADVIL,MOTRIN  Take 1 tablet (400 mg total) by mouth every 6 (six) hours as needed for Other (pain).     lancets Misc  Commonly known as: LANCETS,ULTRA THIN  1 application by Misc.(Non-Drug; Combo Route) route 2 (two) times daily before meals.     latanoprost 0.005 % ophthalmic solution  Place 1 drop into both eyes every evening.     pravastatin 10 MG tablet  Commonly known as: PRAVACHOL  Take 1 tablet (10 mg total) by mouth every morning.     silodosin 4 mg Cap capsule  Commonly known as: RAPAFLO  Take 1 capsule (4 mg total) by mouth every morning.     SITagliptin phosphate 25 MG Tab  Commonly known as: JANUVIA  Take 1 tablet (25 mg total) by mouth every morning.     triamcinolone acetonide 0.1% 0.1 % cream  Commonly known as: KENALOG  Apply topically 2 (two) times daily.            STOP taking these medications      blood-glucose meter kit  Stopped by: Srikanth Son MD               Where to Get Your Medications        These medications were sent to [facility]  [address] [place] [id number]      Hours: Mon-Fri, 8a-5:30p Phone: [phone]   sildenafiL 50 MG tablet       These medications were sent to [id number] - " [facility] - [address] AT [facility] [address]  [address] [address]      Phone: [phone]   lamoTRIgine 150 MG Tab  pravastatin 10 MG tablet  silodosin 4 mg Cap capsule  SITagliptin phosphate 25 MG Tab         Signing Physician:  Srikanth Son MD

## 2023-01-06 ENCOUNTER — OFFICE VISIT (OUTPATIENT)
Dept: OPTOMETRY | Facility: CLINIC | Age: 68
End: 2023-01-06
Payer: MEDICARE

## 2023-01-06 ENCOUNTER — DOCUMENTATION ONLY (OUTPATIENT)
Dept: PHARMACY | Facility: CLINIC | Age: 68
End: 2023-01-06
Payer: MEDICARE

## 2023-01-06 DIAGNOSIS — H40.053 OCULAR HYPERTENSION, BILATERAL: Primary | ICD-10-CM

## 2023-01-06 DIAGNOSIS — H40.1131 PRIMARY OPEN ANGLE GLAUCOMA (POAG) OF BOTH EYES, MILD STAGE: ICD-10-CM

## 2023-01-06 DIAGNOSIS — H10.812 PINGUECULITIS OF LEFT EYE: ICD-10-CM

## 2023-01-06 PROCEDURE — 1101F PR PT FALLS ASSESS DOC 0-1 FALLS W/OUT INJ PAST YR: ICD-10-PCS | Mod: CPTII,S$GLB,, | Performed by: OPTOMETRIST

## 2023-01-06 PROCEDURE — 92012 PR EYE EXAM, EST PATIENT,INTERMED: ICD-10-PCS | Mod: S$GLB,,, | Performed by: OPTOMETRIST

## 2023-01-06 PROCEDURE — 3288F PR FALLS RISK ASSESSMENT DOCUMENTED: ICD-10-PCS | Mod: CPTII,S$GLB,, | Performed by: OPTOMETRIST

## 2023-01-06 PROCEDURE — 1159F PR MEDICATION LIST DOCUMENTED IN MEDICAL RECORD: ICD-10-PCS | Mod: CPTII,S$GLB,, | Performed by: OPTOMETRIST

## 2023-01-06 PROCEDURE — 99499 UNLISTED E&M SERVICE: CPT | Mod: S$GLB,,, | Performed by: OPTOMETRIST

## 2023-01-06 PROCEDURE — 3288F FALL RISK ASSESSMENT DOCD: CPT | Mod: CPTII,S$GLB,, | Performed by: OPTOMETRIST

## 2023-01-06 PROCEDURE — 99999 PR PBB SHADOW E&M-EST. PATIENT-LVL II: ICD-10-PCS | Mod: PBBFAC,,, | Performed by: OPTOMETRIST

## 2023-01-06 PROCEDURE — 92012 INTRM OPH EXAM EST PATIENT: CPT | Mod: S$GLB,,, | Performed by: OPTOMETRIST

## 2023-01-06 PROCEDURE — 99499 RISK ADDL DX/OHS AUDIT: ICD-10-PCS | Mod: S$GLB,,, | Performed by: OPTOMETRIST

## 2023-01-06 PROCEDURE — 1159F MED LIST DOCD IN RCRD: CPT | Mod: CPTII,S$GLB,, | Performed by: OPTOMETRIST

## 2023-01-06 PROCEDURE — 1101F PT FALLS ASSESS-DOCD LE1/YR: CPT | Mod: CPTII,S$GLB,, | Performed by: OPTOMETRIST

## 2023-01-06 PROCEDURE — 99999 PR PBB SHADOW E&M-EST. PATIENT-LVL II: CPT | Mod: PBBFAC,,, | Performed by: OPTOMETRIST

## 2023-01-06 NOTE — PROGRESS NOTES
Patient received IM cyanocobalamin to the  left deltoid on 1/6/23        Lot Number:   Expiration Date: 11/2023  BY Layla Armstrong

## 2023-01-06 NOTE — PROGRESS NOTES
HPI    IOP Check    KOURTNEY: 12/05/22    67 y.o. is here for IOP check  Pt states he has a bump in the corner of OS, will occassionally tear  Pt not using latanoprost consistently   Last edited by Allegra Scanlon, OD on 1/6/2023 12:45 PM.            Assessment /Plan     For exam results, see Encounter Report.    Ocular hypertension, bilateral    Primary open angle glaucoma (POAG) of both eyes, mild stage    Pingueculitis of left eye        1-2. Educated pt on findings. IOP still raised due to inconsistent gtt use. Stressed importance of proper gtt use and risk for LOV/blindness if IOP not controlled. Patient to use latanoprost, 1 gtt OU QHS. No refills needed per pt. Monitor 1 month with IOP check.     3. Educated pt on findings. Will try ATs for now --- recommend 3-4x a day. If symptoms persist, will Rx steroid gtt and monitor IOP closely. If symptoms worsen or dont improve, RTC. Monitor.       RTC in 1 month for IOP check or sooner if needed.

## 2023-02-06 ENCOUNTER — OFFICE VISIT (OUTPATIENT)
Dept: OPTOMETRY | Facility: CLINIC | Age: 68
End: 2023-02-06
Payer: MEDICARE

## 2023-02-06 DIAGNOSIS — H40.1131 PRIMARY OPEN ANGLE GLAUCOMA (POAG) OF BOTH EYES, MILD STAGE: Primary | ICD-10-CM

## 2023-02-06 PROCEDURE — 3288F PR FALLS RISK ASSESSMENT DOCUMENTED: ICD-10-PCS | Mod: CPTII,S$GLB,, | Performed by: OPTOMETRIST

## 2023-02-06 PROCEDURE — 1101F PR PT FALLS ASSESS DOC 0-1 FALLS W/OUT INJ PAST YR: ICD-10-PCS | Mod: CPTII,S$GLB,, | Performed by: OPTOMETRIST

## 2023-02-06 PROCEDURE — 99999 PR PBB SHADOW E&M-EST. PATIENT-LVL I: ICD-10-PCS | Mod: PBBFAC,,, | Performed by: OPTOMETRIST

## 2023-02-06 PROCEDURE — 1101F PT FALLS ASSESS-DOCD LE1/YR: CPT | Mod: CPTII,S$GLB,, | Performed by: OPTOMETRIST

## 2023-02-06 PROCEDURE — 3288F FALL RISK ASSESSMENT DOCD: CPT | Mod: CPTII,S$GLB,, | Performed by: OPTOMETRIST

## 2023-02-06 PROCEDURE — 1126F AMNT PAIN NOTED NONE PRSNT: CPT | Mod: CPTII,S$GLB,, | Performed by: OPTOMETRIST

## 2023-02-06 PROCEDURE — 92012 PR EYE EXAM, EST PATIENT,INTERMED: ICD-10-PCS | Mod: S$GLB,,, | Performed by: OPTOMETRIST

## 2023-02-06 PROCEDURE — 92012 INTRM OPH EXAM EST PATIENT: CPT | Mod: S$GLB,,, | Performed by: OPTOMETRIST

## 2023-02-06 PROCEDURE — 99999 PR PBB SHADOW E&M-EST. PATIENT-LVL I: CPT | Mod: PBBFAC,,, | Performed by: OPTOMETRIST

## 2023-02-06 PROCEDURE — 1126F PR PAIN SEVERITY QUANTIFIED, NO PAIN PRESENT: ICD-10-PCS | Mod: CPTII,S$GLB,, | Performed by: OPTOMETRIST

## 2023-02-06 NOTE — PROGRESS NOTES
HPI     IOP ck   DLS-01/06/2023 Dr. Scanlon     Pt sts vision stable no changes. Still using drops QHS     EYEMEDS:   LATANOPROST QHS  Last edited by Betty Sheth on 2/6/2023 10:56 AM.            Assessment /Plan     For exam results, see Encounter Report.    Primary open angle glaucoma (POAG) of both eyes, mild stage  -     netarsudiL-latanoprost 0.02-0.005 % Drop; Place 1 drop into both eyes every evening.  Dispense: 2.5 mL; Refill: 2      Educated pt on findings. IOP still raised OU. Patient reports being more consistent with gtt use. Timolol contraindicated due to COPD. Will d/c latanoprost and try rocklatan. Patient to use 1 gtt OU QHS. Monitor 1 month with IOP check.       RTC in 1 month for IOP check.

## 2023-02-07 ENCOUNTER — TELEPHONE (OUTPATIENT)
Dept: OPHTHALMOLOGY | Facility: CLINIC | Age: 68
End: 2023-02-07
Payer: MEDICARE

## 2023-02-07 DIAGNOSIS — H40.1131 PRIMARY OPEN ANGLE GLAUCOMA (POAG) OF BOTH EYES, MILD STAGE: Primary | ICD-10-CM

## 2023-02-07 RX ORDER — BRIMONIDINE TARTRATE 2 MG/ML
1 SOLUTION/ DROPS OPHTHALMIC 3 TIMES DAILY
Qty: 5 ML | Refills: 3 | Status: SHIPPED | OUTPATIENT
Start: 2023-02-07 | End: 2023-04-21 | Stop reason: SDUPTHER

## 2023-02-07 NOTE — TELEPHONE ENCOUNTER
----- Message from Allegra Scanlon OD sent at 2/7/2023  4:37 PM CST -----  Lets try brimonidine. Can you call him back and let him know that this new gtt is 1 gtt OU TID. Stress importance of using gtt TID every day. Thank you!   ----- Message -----  From: Betty Sheth  Sent: 2/7/2023   4:03 PM CST  To: Allegra Scanlon OD    Would you want to try something else?  ----- Message -----  From: Lily Ross  Sent: 2/7/2023   3:53 PM CST  To: Ghislaine Dinh Staff    Consult/Advisory    Name Of Caller:Dayami      Contact Preference?: [phone]      Nature of Call? Pt called regarding his rx  netarsudiL-latanoprost 0.02-0.005 % Drop. He said he was told if the rx is to expensive to give a call      Pharmacy number [phone]/fax [phone]

## 2023-02-07 NOTE — TELEPHONE ENCOUNTER
Rocklatan too expensive. Will have patient try brimonidine. Rx Brimonidine 1 gtt OU TID. Will f/u on IOP in ~1 month.     Allegra Scanlon, BETH  02/07/2023

## 2023-02-08 ENCOUNTER — TELEPHONE (OUTPATIENT)
Dept: OPTOMETRY | Facility: CLINIC | Age: 68
End: 2023-02-08
Payer: MEDICARE

## 2023-02-08 NOTE — TELEPHONE ENCOUNTER
----- Message from Allegra Scanlon, BETH sent at 2/8/2023 11:55 AM CST -----  Can you f/u and see which eye gtt is too expensive? I first called in rocklatan and then called in [facility]. Thank you!  ----- Message -----  From: Adele Pro MA  Sent: 2/8/2023  11:49 AM CST  To: Allegra Scanlon OD      ----- Message -----  From: Lily Ross  Sent: 2/8/2023   9:48 AM CST  To: Ghislaine Dinh Staff    Consult/Advisory    Name Of Caller:Dayami      Contact Preference?: [phone]      Nature of Call? Pt is calling about a rx that was prescribe to him. He mentioned that the rx is $400 he was told to call if it was to expensive. Please call pt to further assist

## 2023-03-03 ENCOUNTER — DOCUMENTATION ONLY (OUTPATIENT)
Dept: PHARMACY | Facility: CLINIC | Age: 68
End: 2023-03-03
Payer: MEDICARE

## 2023-03-10 PROCEDURE — 99284 PR EMERGENCY DEPT VISIT,LEVEL IV: ICD-10-PCS | Mod: ,,, | Performed by: EMERGENCY MEDICINE

## 2023-03-10 PROCEDURE — 99284 EMERGENCY DEPT VISIT MOD MDM: CPT | Mod: 25

## 2023-03-10 PROCEDURE — 99284 EMERGENCY DEPT VISIT MOD MDM: CPT | Mod: ,,, | Performed by: EMERGENCY MEDICINE

## 2023-03-11 ENCOUNTER — HOSPITAL ENCOUNTER (EMERGENCY)
Facility: HOSPITAL | Age: 68
Discharge: HOME | End: 2023-03-11
Attending: EMERGENCY MEDICINE
Payer: MEDICARE

## 2023-03-11 VITALS
TEMPERATURE: 98 F | SYSTOLIC BLOOD PRESSURE: 164 MMHG | OXYGEN SATURATION: 98 % | WEIGHT: 184 LBS | RESPIRATION RATE: 18 BRPM | BODY MASS INDEX: 28.88 KG/M2 | HEIGHT: 67 IN | HEART RATE: 69 BPM | DIASTOLIC BLOOD PRESSURE: 82 MMHG

## 2023-03-11 DIAGNOSIS — R31.9 HEMATURIA, UNSPECIFIED TYPE: Primary | ICD-10-CM

## 2023-03-11 DIAGNOSIS — N30.01 ACUTE CYSTITIS WITH HEMATURIA: ICD-10-CM

## 2023-03-11 LAB
ALBUMIN SERPL BCP-MCNC: 4.1 G/DL (ref 3.5–5.2)
ALP SERPL-CCNC: 68 U/L (ref 55–135)
ALT SERPL W/O P-5'-P-CCNC: 10 U/L (ref 10–44)
ANION GAP SERPL CALC-SCNC: 10 MMOL/L (ref 8–16)
AST SERPL-CCNC: 12 U/L (ref 10–40)
BACTERIA #/AREA URNS AUTO: ABNORMAL /HPF
BASOPHILS # BLD AUTO: 0.06 K/UL (ref 0–0.2)
BASOPHILS NFR BLD: 0.6 % (ref 0–1.9)
BILIRUB SERPL-MCNC: 0.2 MG/DL (ref 0.1–1)
BILIRUB UR QL STRIP: NEGATIVE
BUN SERPL-MCNC: 14 MG/DL (ref 8–23)
CALCIUM SERPL-MCNC: 9.8 MG/DL (ref 8.7–10.5)
CHLORIDE SERPL-SCNC: 106 MMOL/L (ref 95–110)
CLARITY UR REFRACT.AUTO: CLEAR
CO2 SERPL-SCNC: 24 MMOL/L (ref 23–29)
COLOR UR AUTO: YELLOW
CREAT SERPL-MCNC: 1.1 MG/DL (ref 0.5–1.4)
DIFFERENTIAL METHOD: ABNORMAL
EOSINOPHIL # BLD AUTO: 0.2 K/UL (ref 0–0.5)
EOSINOPHIL NFR BLD: 1.5 % (ref 0–8)
ERYTHROCYTE [DISTWIDTH] IN BLOOD BY AUTOMATED COUNT: 14.8 % (ref 11.5–14.5)
EST. GFR  (NO RACE VARIABLE): >60 ML/MIN/1.73 M^2
GLUCOSE SERPL-MCNC: 122 MG/DL (ref 70–110)
GLUCOSE UR QL STRIP: NEGATIVE
HCT VFR BLD AUTO: 43.7 % (ref 40–54)
HGB BLD-MCNC: 14.1 G/DL (ref 14–18)
HGB UR QL STRIP: ABNORMAL
HYALINE CASTS UR QL AUTO: 0 /LPF
IMM GRANULOCYTES # BLD AUTO: 0.01 K/UL (ref 0–0.04)
IMM GRANULOCYTES NFR BLD AUTO: 0.1 % (ref 0–0.5)
KETONES UR QL STRIP: NEGATIVE
LEUKOCYTE ESTERASE UR QL STRIP: ABNORMAL
LYMPHOCYTES # BLD AUTO: 1.9 K/UL (ref 1–4.8)
LYMPHOCYTES NFR BLD: 18.8 % (ref 18–48)
MCH RBC QN AUTO: 28.2 PG (ref 27–31)
MCHC RBC AUTO-ENTMCNC: 32.3 G/DL (ref 32–36)
MCV RBC AUTO: 87 FL (ref 82–98)
MICROSCOPIC COMMENT: ABNORMAL
MONOCYTES # BLD AUTO: 0.5 K/UL (ref 0.3–1)
MONOCYTES NFR BLD: 5.3 % (ref 4–15)
NEUTROPHILS # BLD AUTO: 7.5 K/UL (ref 1.8–7.7)
NEUTROPHILS NFR BLD: 73.7 % (ref 38–73)
NITRITE UR QL STRIP: NEGATIVE
NRBC BLD-RTO: 0 /100 WBC
PH UR STRIP: 6 [PH] (ref 5–8)
PLATELET # BLD AUTO: 190 K/UL (ref 150–450)
PMV BLD AUTO: 11.4 FL (ref 9.2–12.9)
POTASSIUM SERPL-SCNC: 4.1 MMOL/L (ref 3.5–5.1)
PROT SERPL-MCNC: 7.7 G/DL (ref 6–8.4)
PROT UR QL STRIP: ABNORMAL
RBC # BLD AUTO: 5 M/UL (ref 4.6–6.2)
RBC #/AREA URNS AUTO: >100 /HPF (ref 0–4)
SODIUM SERPL-SCNC: 140 MMOL/L (ref 136–145)
SP GR UR STRIP: 1.02 (ref 1–1.03)
URN SPEC COLLECT METH UR: ABNORMAL
WBC # BLD AUTO: 10.21 K/UL (ref 3.9–12.7)
WBC #/AREA URNS AUTO: 16 /HPF (ref 0–5)

## 2023-03-11 PROCEDURE — 81001 URINALYSIS AUTO W/SCOPE: CPT | Performed by: EMERGENCY MEDICINE

## 2023-03-11 PROCEDURE — 87086 URINE CULTURE/COLONY COUNT: CPT | Performed by: EMERGENCY MEDICINE

## 2023-03-11 PROCEDURE — 25000003 PHARM REV CODE 250: Performed by: EMERGENCY MEDICINE

## 2023-03-11 PROCEDURE — 96365 THER/PROPH/DIAG IV INF INIT: CPT

## 2023-03-11 PROCEDURE — 85025 COMPLETE CBC W/AUTO DIFF WBC: CPT | Performed by: EMERGENCY MEDICINE

## 2023-03-11 PROCEDURE — 63600175 PHARM REV CODE 636 W HCPCS: Performed by: EMERGENCY MEDICINE

## 2023-03-11 PROCEDURE — 80053 COMPREHEN METABOLIC PANEL: CPT | Performed by: EMERGENCY MEDICINE

## 2023-03-11 RX ADMIN — GENTAMICIN SULFATE 365.6 MG: 40 INJECTION, SOLUTION INTRAMUSCULAR; INTRAVENOUS at 03:03

## 2023-03-11 NOTE — DISCHARGE INSTRUCTIONS
Drink plenty of fluids Tylenol Motrin as needed for pain and discomfort.  Follow-up with primary care return the ER for any concerning reason.  You have been given a long-acting IV antibiotic.

## 2023-03-11 NOTE — ED PROVIDER NOTES
"Encounter Date: 3/10/2023       History     Chief Complaint   Patient presents with    Hematuria     Burning with urination/frequency and "rust" color urine.      HPI  This is a pleasant 67-year-old male with a PMHx of diabetes on metformin, seizure disorder (stable, on lamotrigine), and hyperlipidemia who presents with complaint of dysuria and red urine. States the dysuria started yesterday and then today noticed "rust-colored" urine. In ER the urine is grossly red. Reports having some left flank pain last week that resolved after he took motrin. Denies any back pain or abdominal pain today. No fevers, chills, SOB, CP, headache, changes in bowels. Patient was seen last year and found to have UTI with culture that grew proteus mirabilus. He also has a history of kidney stones found on renal ultrasound, denies ever passing a stone. Former smoker, quit 13 years ago. Denies alcohol or drug use      Review of patient's allergies indicates:  No Known Allergies  Past Medical History:   Diagnosis Date    B12 nutritional deficiency 08/07/2019    COVID-19 12/24/2021    E. coli UTI 01/2019    During hospitalization for seizure    Generalized tonic-clonic seizure 01/26/2019 1-2019 admitted for new-onset seizures.Normal CT head.  His mental status normalized, and he had no recurrent seizures following keppra loading in the ED and twice-daily maintenance upon arrival to the floor. Workup into the etiology of his seizures remained negative. EEG was performed, with the preliminary interpretation being no epileptiform activity with normal background.     Glaucoma     History of hepatitis C, s/p successful Rx w/ SVR12 (cure) - 11/2019 02/08/2019    S/p epclusa    Kidney stone on left side 06/28/2019    Mixed type COPD (chronic obstructive pulmonary disease) 08/15/2019    8-2019 PFT: Normal airflow. Airflow not improved after bronchodilator. Moderate restriction. Diffusion capacity is mildly decreased. Oximetry is normal.    " Nuclear sclerotic cataract of left eye 2020    Nuclear sclerotic cataract of right eye 2020    Partial idiopathic epilepsy with seizures of localized onset, not intractable, without status epilepticus 2019    new-onset seizures (). Normal CTH, MRI and routine EEG . On keppra 500 mg BID    Pterygium, bilateral 2019    Type 2 diabetes mellitus with microalbuminuria, without long-term current use of insulin 2019    Vitamin D insufficiency 2021     Past Surgical History:   Procedure Laterality Date    CATARACT EXTRACTION W/  INTRAOCULAR LENS IMPLANT Right 2020    Procedure: EXTRACTION, CATARACT, WITH IOL INSERTION;  Surgeon: Daryl Calloway MD;  Location: [facility];  Service: Ophthalmology;  Laterality: Right;    CATARACT EXTRACTION W/  INTRAOCULAR LENS IMPLANT Left 2020    Procedure: EXTRACTION, CATARACT, WITH IOL INSERTION;  Surgeon: Daryl Calloway MD;  Location: [facility];  Service: Ophthalmology;  Laterality: Left;    HERNIA REPAIR Right     [facility]     Family History   Problem Relation Age of Onset    Hypertension Mother     Glaucoma Brother     No Known Problems Daughter     Amblyopia Neg Hx     Blindness Neg Hx     Macular degeneration Neg Hx     Retinal detachment Neg Hx     Strabismus Neg Hx     Cataracts Neg Hx      Social History     Tobacco Use    Smoking status: Former     Packs/day: 3.00     Years: 25.00     Pack years: 75.00     Types: Cigarettes     Quit date: 2012     Years since quittin.1    Smokeless tobacco: Never   Substance Use Topics    Alcohol use: No    Drug use: Not Currently     Types: Cocaine     Comment: H/O abuse     Review of Systems   Genitourinary:  Positive for dysuria and hematuria.   All other systems reviewed and are negative.    Physical Exam     Initial Vitals [23 0007]   BP Pulse Resp Temp SpO2   (!) 173/96 70 15 97.7 °F (36.5 °C) 96 %      MAP       --         Physical Exam    Nursing  note and vitals reviewed.  Constitutional: He appears well-developed and well-nourished. No distress.   HENT:   Head: Normocephalic and atraumatic.   Eyes: Pupils are equal, round, and reactive to light.   Neck:   Normal range of motion.  Pulmonary/Chest: No respiratory distress.   Abdominal: Abdomen is soft. He exhibits no distension. There is no abdominal tenderness. There is no rebound.   Musculoskeletal:         General: Normal range of motion.      Cervical back: Normal range of motion.     Neurological: He is alert and oriented to person, place, and time. He has normal strength. GCS score is 15. GCS eye subscore is 4. GCS verbal subscore is 5. GCS motor subscore is 6.   Skin: Skin is warm and dry. Capillary refill takes less than 2 seconds.   Psychiatric: He has a normal mood and affect. Thought content normal.       ED Course   Procedures  Labs Reviewed   CBC W/ AUTO DIFFERENTIAL - Abnormal; Notable for the following components:       Result Value    RDW 14.8 (*)     Gran % 73.7 (*)     All other components within normal limits   COMPREHENSIVE METABOLIC PANEL - Abnormal; Notable for the following components:    Glucose 122 (*)     All other components within normal limits   URINALYSIS, REFLEX TO URINE CULTURE - Abnormal; Notable for the following components:    Protein, UA 1+ (*)     Occult Blood UA 2+ (*)     Leukocytes, UA 3+ (*)     All other components within normal limits    Narrative:     Specimen Source->Urine   URINALYSIS MICROSCOPIC - Abnormal; Notable for the following components:    RBC, UA >100 (*)     WBC, UA 16 (*)     All other components within normal limits    Narrative:     Specimen Source->Urine   CULTURE, URINE          Imaging Results    None          Medications   gentamicin (GARAMYCIN) 365.6 mg in sodium chloride 0.9% 100 mL IVPB (0 mg Intravenous Stopped 3/11/23 2350)     Medical Decision Making:   Initial Assessment:   Pleasant 67-year-old male presents the ER for evaluation of  dysuria and hematuria onset 1 day.  Came the ER for further evaluation.  He is overall well appearing no acute distress, his abdomen is chronically distended but soft without any fluid thrill.  No CVA tenderness.  Concern for UTI, cystitis pyelonephritis sepsis other cause.  Will plan blood work UA reassess.  ED Management:  Resting in bed no acute distress.  Labs reviewed.  CBC CMP reassuring UA consistent with cystitis.  Will plan 1 and done antibiotics and discharge return precautions discussed.                        Clinical Impression:   Final diagnoses:  [R31.9] Hematuria, unspecified type (Primary)  [N30.01] Acute cystitis with hematuria        ED Disposition Condition    Discharge Stable          ED Prescriptions    None       Follow-up Information       Follow up With Specialties Details Why Contact Info    Srikanth Son MD Internal Medicine, Hospitalist Schedule an appointment as soon as possible for a visit  As needed [address]  [place] [address]  [phone]               Cindy Ray MD  03/11/23 0665

## 2023-03-11 NOTE — ED TRIAGE NOTES
"Cristiano Cruz, an 67 y.o. male presents to the ED from home. C/o hematuria since last night with "a little" pain upon urination. Pt denies hesitancy or retention.      Chief Complaint   Patient presents with    Hematuria     Burning with urination/frequency and "rust" color urine.      Review of patient's allergies indicates:  No Known Allergies  Past Medical History:   Diagnosis Date    B12 nutritional deficiency 08/07/2019    COVID-19 12/24/2021    E. coli UTI 01/2019    During hospitalization for seizure    Generalized tonic-clonic seizure 01/26/2019 1-2019 admitted for new-onset seizures.Normal CT head.  His mental status normalized, and he had no recurrent seizures following keppra loading in the ED and twice-daily maintenance upon arrival to the floor. Workup into the etiology of his seizures remained negative. EEG was performed, with the preliminary interpretation being no epileptiform activity with normal background.     Glaucoma     History of hepatitis C, s/p successful Rx w/ SVR12 (cure) - 11/2019 02/08/2019    S/p epclusa    Kidney stone on left side 06/28/2019    Mixed type COPD (chronic obstructive pulmonary disease) 08/15/2019    8-2019 PFT: Normal airflow. Airflow not improved after bronchodilator. Moderate restriction. Diffusion capacity is mildly decreased. Oximetry is normal.    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 Normal CTH, MRI and routine EEG . On keppra 500 mg BID    Pterygium, bilateral 03/12/2019    Type 2 diabetes mellitus with microalbuminuria, without long-term current use of insulin 02/11/2019    Vitamin D insufficiency 07/29/2021      "

## 2023-03-12 LAB
BACTERIA UR CULT: NORMAL
BACTERIA UR CULT: NORMAL

## 2023-04-12 ENCOUNTER — LAB VISIT (OUTPATIENT)
Dept: LAB | Facility: HOSPITAL | Age: 68
End: 2023-04-12
Attending: INTERNAL MEDICINE
Payer: MEDICARE

## 2023-04-12 ENCOUNTER — OFFICE VISIT (OUTPATIENT)
Dept: INTERNAL MEDICINE | Facility: CLINIC | Age: 68
End: 2023-04-12
Payer: MEDICARE

## 2023-04-12 VITALS
HEART RATE: 75 BPM | WEIGHT: 184.5 LBS | SYSTOLIC BLOOD PRESSURE: 138 MMHG | OXYGEN SATURATION: 92 % | HEIGHT: 67 IN | RESPIRATION RATE: 16 BRPM | BODY MASS INDEX: 28.96 KG/M2 | DIASTOLIC BLOOD PRESSURE: 80 MMHG

## 2023-04-12 DIAGNOSIS — E55.9 VITAMIN D INSUFFICIENCY: ICD-10-CM

## 2023-04-12 DIAGNOSIS — H61.23 BILATERAL IMPACTED CERUMEN: ICD-10-CM

## 2023-04-12 DIAGNOSIS — I70.0 AORTIC ATHEROSCLEROSIS: ICD-10-CM

## 2023-04-12 DIAGNOSIS — J44.9 MIXED TYPE COPD (CHRONIC OBSTRUCTIVE PULMONARY DISEASE): ICD-10-CM

## 2023-04-12 DIAGNOSIS — R80.9 TYPE 2 DIABETES MELLITUS WITH MICROALBUMINURIA, WITHOUT LONG-TERM CURRENT USE OF INSULIN: ICD-10-CM

## 2023-04-12 DIAGNOSIS — G40.009 PARTIAL IDIOPATHIC EPILEPSY WITH SEIZURES OF LOCALIZED ONSET, NOT INTRACTABLE, WITHOUT STATUS EPILEPTICUS: ICD-10-CM

## 2023-04-12 DIAGNOSIS — Z00.00 ANNUAL PHYSICAL EXAM: Primary | ICD-10-CM

## 2023-04-12 DIAGNOSIS — E11.29 TYPE 2 DIABETES MELLITUS WITH MICROALBUMINURIA, WITHOUT LONG-TERM CURRENT USE OF INSULIN: ICD-10-CM

## 2023-04-12 DIAGNOSIS — N52.8 OTHER MALE ERECTILE DYSFUNCTION: ICD-10-CM

## 2023-04-12 DIAGNOSIS — R41.3 POOR MEMORY: ICD-10-CM

## 2023-04-12 DIAGNOSIS — R35.0 BENIGN PROSTATIC HYPERPLASIA WITH URINARY FREQUENCY: ICD-10-CM

## 2023-04-12 DIAGNOSIS — Z86.19 HISTORY OF HEPATITIS C: ICD-10-CM

## 2023-04-12 DIAGNOSIS — Z87.891 EX-VERY HEAVY CIGARETTE SMOKER (MORE THAN 40 PER DAY): ICD-10-CM

## 2023-04-12 DIAGNOSIS — N40.1 BENIGN PROSTATIC HYPERPLASIA WITH URINARY FREQUENCY: ICD-10-CM

## 2023-04-12 DIAGNOSIS — E53.8 B12 NUTRITIONAL DEFICIENCY: ICD-10-CM

## 2023-04-12 LAB
CHOLEST SERPL-MCNC: 166 MG/DL (ref 120–199)
CHOLEST/HDLC SERPL: 3.3 {RATIO} (ref 2–5)
ESTIMATED AVG GLUCOSE: 140 MG/DL (ref 68–131)
HBA1C MFR BLD: 6.5 % (ref 4–5.6)
HDLC SERPL-MCNC: 50 MG/DL (ref 40–75)
HDLC SERPL: 30.1 % (ref 20–50)
LDLC SERPL CALC-MCNC: 94.2 MG/DL (ref 63–159)
NONHDLC SERPL-MCNC: 116 MG/DL
TRIGL SERPL-MCNC: 109 MG/DL (ref 30–150)

## 2023-04-12 PROCEDURE — 99214 OFFICE O/P EST MOD 30 MIN: CPT | Mod: S$GLB,,, | Performed by: INTERNAL MEDICINE

## 2023-04-12 PROCEDURE — 3075F PR MOST RECENT SYSTOLIC BLOOD PRESS GE 130-139MM HG: ICD-10-PCS | Mod: CPTII,S$GLB,, | Performed by: INTERNAL MEDICINE

## 2023-04-12 PROCEDURE — 1159F PR MEDICATION LIST DOCUMENTED IN MEDICAL RECORD: ICD-10-PCS | Mod: CPTII,S$GLB,, | Performed by: INTERNAL MEDICINE

## 2023-04-12 PROCEDURE — 99999 PR PBB SHADOW E&M-EST. PATIENT-LVL III: CPT | Mod: PBBFAC,,, | Performed by: INTERNAL MEDICINE

## 2023-04-12 PROCEDURE — 3075F SYST BP GE 130 - 139MM HG: CPT | Mod: CPTII,S$GLB,, | Performed by: INTERNAL MEDICINE

## 2023-04-12 PROCEDURE — 3008F BODY MASS INDEX DOCD: CPT | Mod: CPTII,S$GLB,, | Performed by: INTERNAL MEDICINE

## 2023-04-12 PROCEDURE — 36415 COLL VENOUS BLD VENIPUNCTURE: CPT | Performed by: INTERNAL MEDICINE

## 2023-04-12 PROCEDURE — 1126F AMNT PAIN NOTED NONE PRSNT: CPT | Mod: CPTII,S$GLB,, | Performed by: INTERNAL MEDICINE

## 2023-04-12 PROCEDURE — 3288F FALL RISK ASSESSMENT DOCD: CPT | Mod: CPTII,S$GLB,, | Performed by: INTERNAL MEDICINE

## 2023-04-12 PROCEDURE — 3008F PR BODY MASS INDEX (BMI) DOCUMENTED: ICD-10-PCS | Mod: CPTII,S$GLB,, | Performed by: INTERNAL MEDICINE

## 2023-04-12 PROCEDURE — 83036 HEMOGLOBIN GLYCOSYLATED A1C: CPT | Performed by: INTERNAL MEDICINE

## 2023-04-12 PROCEDURE — 80061 LIPID PANEL: CPT | Performed by: INTERNAL MEDICINE

## 2023-04-12 PROCEDURE — 99214 PR OFFICE/OUTPT VISIT, EST, LEVL IV, 30-39 MIN: ICD-10-PCS | Mod: S$GLB,,, | Performed by: INTERNAL MEDICINE

## 2023-04-12 PROCEDURE — 1159F MED LIST DOCD IN RCRD: CPT | Mod: CPTII,S$GLB,, | Performed by: INTERNAL MEDICINE

## 2023-04-12 PROCEDURE — 1126F PR PAIN SEVERITY QUANTIFIED, NO PAIN PRESENT: ICD-10-PCS | Mod: CPTII,S$GLB,, | Performed by: INTERNAL MEDICINE

## 2023-04-12 PROCEDURE — 1101F PR PT FALLS ASSESS DOC 0-1 FALLS W/OUT INJ PAST YR: ICD-10-PCS | Mod: CPTII,S$GLB,, | Performed by: INTERNAL MEDICINE

## 2023-04-12 PROCEDURE — 3288F PR FALLS RISK ASSESSMENT DOCUMENTED: ICD-10-PCS | Mod: CPTII,S$GLB,, | Performed by: INTERNAL MEDICINE

## 2023-04-12 PROCEDURE — 3079F DIAST BP 80-89 MM HG: CPT | Mod: CPTII,S$GLB,, | Performed by: INTERNAL MEDICINE

## 2023-04-12 PROCEDURE — 3079F PR MOST RECENT DIASTOLIC BLOOD PRESSURE 80-89 MM HG: ICD-10-PCS | Mod: CPTII,S$GLB,, | Performed by: INTERNAL MEDICINE

## 2023-04-12 PROCEDURE — 1101F PT FALLS ASSESS-DOCD LE1/YR: CPT | Mod: CPTII,S$GLB,, | Performed by: INTERNAL MEDICINE

## 2023-04-12 PROCEDURE — 99999 PR PBB SHADOW E&M-EST. PATIENT-LVL III: ICD-10-PCS | Mod: PBBFAC,,, | Performed by: INTERNAL MEDICINE

## 2023-04-12 RX ORDER — CYANOCOBALAMIN 1000 UG/ML
1000 INJECTION, SOLUTION INTRAMUSCULAR; SUBCUTANEOUS
Qty: 10 ML | Refills: 3 | Status: SHIPPED | OUTPATIENT
Start: 2023-04-12

## 2023-04-12 RX ORDER — SILODOSIN 4 MG/1
4 CAPSULE ORAL EVERY MORNING
Qty: 90 CAPSULE | Refills: 3 | Status: SHIPPED | OUTPATIENT
Start: 2023-04-12

## 2023-04-12 RX ORDER — PRAVASTATIN SODIUM 10 MG/1
10 TABLET ORAL EVERY MORNING
Qty: 90 TABLET | Refills: 3 | Status: SHIPPED | OUTPATIENT
Start: 2023-04-12

## 2023-04-12 RX ORDER — ACETAMINOPHEN 500 MG
5000 TABLET ORAL DAILY
Qty: 90 TABLET | Refills: 3 | Status: SHIPPED | OUTPATIENT
Start: 2023-04-12

## 2023-04-12 RX ORDER — LAMOTRIGINE 150 MG/1
300 TABLET ORAL EVERY MORNING
Qty: 180 TABLET | Refills: 3 | Status: SHIPPED | OUTPATIENT
Start: 2023-04-12

## 2023-04-12 RX ORDER — NEEDLES, DISPOSABLE 25GX5/8"
1 NEEDLE, DISPOSABLE MISCELLANEOUS
Qty: 15 EACH | Refills: 1 | Status: SHIPPED | OUTPATIENT
Start: 2023-04-12

## 2023-04-12 RX ORDER — LANCETS
1 EACH MISCELLANEOUS
Qty: 200 EACH | Refills: 3 | Status: SHIPPED | OUTPATIENT
Start: 2023-04-12

## 2023-04-12 RX ORDER — SILDENAFIL 50 MG/1
50 TABLET, FILM COATED ORAL DAILY PRN
Qty: 30 TABLET | Refills: 11 | Status: SHIPPED | OUTPATIENT
Start: 2023-04-12 | End: 2024-04-11

## 2023-04-12 NOTE — PROGRESS NOTES
Mr desir received his cyanocobalamin 1000mcg/ml shot in his LD @ 10:15 am 4/12/23   Lot [id number]  Exp 08/24

## 2023-04-12 NOTE — PROGRESS NOTES
INTERNAL MEDICINE CLINIC  Follow-up Visit Progress Note     PRESENTING HISTORY     PCP: Srikanth Son MD    Last Clinic Visit with me:  12-    Current Chief Complaint/Problem:    Chief Complaint   Patient presents with    Annual Exam      History of Present Illness & ROS: Mr. Cristiano Cruz is a 68 y.o. male.    Just turned 68.    Review of Systems:  Review of Systems   Constitutional:  Negative for chills and fever.   Respiratory:  Positive for shortness of breath (occasional. does not want inhaler yet).    Cardiovascular:  Negative for chest pain.   Gastrointestinal:  Negative for abdominal pain.   Genitourinary:  Negative for dysuria.   Musculoskeletal:  Negative for joint pain.   Skin:  Negative for rash.   Neurological:  Negative for dizziness.   Psychiatric/Behavioral:  Negative for depression.          PAST HISTORY:     Past Medical History:   Diagnosis Date    B12 nutritional deficiency 08/07/2019    COVID-19 12/24/2021    E. coli UTI 01/2019    During hospitalization for seizure    Generalized tonic-clonic seizure 01/26/2019 1-2019 admitted for new-onset seizures.Normal CT head.  His mental status normalized, and he had no recurrent seizures following keppra loading in the ED and twice-daily maintenance upon arrival to the floor. Workup into the etiology of his seizures remained negative. EEG was performed, with the preliminary interpretation being no epileptiform activity with normal background.     Glaucoma     History of hepatitis C, s/p successful Rx w/ SVR12 (cure) - 11/2019 02/08/2019    S/p epclusa    Kidney stone on left side 06/28/2019    Mixed type COPD (chronic obstructive pulmonary disease) 08/15/2019    8-2019 PFT: Normal airflow. Airflow not improved after bronchodilator. Moderate restriction. Diffusion capacity is mildly decreased. Oximetry is normal.    Nuclear sclerotic cataract of left eye 08/11/2020    Nuclear sclerotic cataract of right eye 07/28/2020    Partial idiopathic  epilepsy with seizures of localized onset, not intractable, without status epilepticus 2019    new-onset seizures (). Normal CTH, MRI and routine EEG . On keppra 500 mg BID    Pterygium, bilateral 2019    Type 2 diabetes mellitus with microalbuminuria, without long-term current use of insulin 2019    Vitamin D insufficiency 2021       Past Surgical History:   Procedure Laterality Date    CATARACT EXTRACTION W/  INTRAOCULAR LENS IMPLANT Right 2020    Procedure: EXTRACTION, CATARACT, WITH IOL INSERTION;  Surgeon: Daryl Calloway MD;  Location: [facility];  Service: Ophthalmology;  Laterality: Right;    CATARACT EXTRACTION W/  INTRAOCULAR LENS IMPLANT Left 2020    Procedure: EXTRACTION, CATARACT, WITH IOL INSERTION;  Surgeon: Daryl Calloway MD;  Location: [facility];  Service: Ophthalmology;  Laterality: Left;    HERNIA REPAIR Right     [facility]       Family History   Problem Relation Age of Onset    Hypertension Mother     Glaucoma Brother     No Known Problems Daughter     Amblyopia Neg Hx     Blindness Neg Hx     Macular degeneration Neg Hx     Retinal detachment Neg Hx     Strabismus Neg Hx     Cataracts Neg Hx        Social History     Socioeconomic History    Marital status:     Number of children: 1   Occupational History    Occupation: Retired   Tobacco Use    Smoking status: Former     Packs/day: 3.00     Years: 25.00     Pack years: 75.00     Types: Cigarettes     Quit date: 2012     Years since quittin.2    Smokeless tobacco: Never   Substance and Sexual Activity    Alcohol use: No    Drug use: Not Currently     Types: Cocaine     Comment: H/O abuse    Sexual activity: Yes     Partners: Female   Social History Narrative     with 1 daughter (42 as of ).    Work in [employer] - Retired     Social Determinants of Health     Financial Resource Strain: Low Risk     Difficulty of Paying Living Expenses: Not hard at  "all   Food Insecurity: Food Insecurity Present    Worried About Running Out of Food in the Last Year: Sometimes true    Ran Out of Food in the Last Year: Sometimes true   Transportation Needs: No Transportation Needs    Lack of Transportation (Medical): No    Lack of Transportation (Non-Medical): No   Physical Activity: Sufficiently Active    Days of Exercise per Week: 7 days    Minutes of Exercise per Session: 60 min   Stress: No Stress Concern Present    Feeling of Stress : Not at all   Social Connections: Socially Integrated    Frequency of Communication with Friends and Family: More than three times a week    Frequency of Social Gatherings with Friends and Family: Once a week    Attends Hinduism Services: 1 to 4 times per year    Active Member of Clubs or Organizations: Yes    Attends Club or Organization Meetings: 1 to 4 times per year    Marital Status:    Housing Stability: Unknown    Unable to Pay for Housing in the Last Year: No    Unstable Housing in the Last Year: No       MEDICATIONS & ALLERGIES:     Current Outpatient Medications on File Prior to Visit   Medication Sig Dispense Refill    BD LUER-RUSTAM SYRINGE 3 mL 23 gauge x 1 1/2" Syrg USE TO INJECT B12 EVERY MONTH      blood sugar diagnostic Strp 1 strip by Misc.(Non-Drug; Combo Route) route 2 (two) times daily before meals. 200 strip 3    brimonidine 0.2% (ALPHAGAN) 0.2 % Drop Place 1 drop into both eyes 3 (three) times daily. 5 mL 3    cholecalciferol, vitamin D3, 125 mcg (5,000 unit) Tab Take 1 tablet (5,000 Units total) by mouth once daily. 90 tablet 3    cyanocobalamin 1,000 mcg/mL injection Inject 1 mL (1,000 mcg total) into the muscle every 30 days. 10 mL 3    erythromycin (ROMYCIN) ophthalmic ointment Place a 1/2 inch ribbon of ointment into the left lower eyelid 4 times daily for 7 days 1 g 0    lamoTRIgine (LAMICTAL) 150 MG Tab Take 2 tablets (300 mg total) by mouth every morning. 180 tablet 3    lancets (LANCETS,ULTRA THIN) Misc 1 " "application by Misc.(Non-Drug; Combo Route) route 2 (two) times daily before meals. 200 each 3    needle, disp, 23 gauge (BD SPECIALTY USE NEEDLES) 23 gauge x 1 1/4" Ndle 1 Dose by Misc.(Non-Drug; Combo Route) route every 30 days. 15 each 1    netarsudiL-latanoprost 0.02-0.005 % Drop Place 1 drop into both eyes every evening. 2.5 mL 2    pravastatin (PRAVACHOL) 10 MG tablet Take 1 tablet (10 mg total) by mouth every morning. 90 tablet 3    sildenafiL (VIAGRA) 50 MG tablet Take 1 tablet (50 mg total) by mouth daily as needed for Erectile Dysfunction. 30 tablet 11    silodosin (RAPAFLO) 4 mg Cap capsule Take 1 capsule (4 mg total) by mouth every morning. 90 capsule 3    SITagliptin phosphate (JANUVIA) 25 MG Tab Take 1 tablet (25 mg total) by mouth every morning. 90 tablet 3    triamcinolone acetonide 0.1% (KENALOG) 0.1 % cream Apply topically 2 (two) times daily. 1 each 0     No current facility-administered medications on file prior to visit.        Review of patient's allergies indicates:  No Known Allergies    Medications Reconciliation:   I have reconciled the patient's home medications and discharge medications with the patient/family. I have updated all changes.  Refer to After-Visit Medication List.    OBJECTIVE:     Vital Signs:  Vitals:    04/12/23 0902   BP: 138/80   Pulse: 75   Resp: 16     Wt Readings from Last 3 Encounters:   04/12/23 0902 83.7 kg (184 lb 8.4 oz)   03/11/23 0007 83.5 kg (184 lb)   12/21/22 1310 87.6 kg (193 lb 2 oz)     Body mass index is 28.9 kg/m².     Physical Exam:  General: Well developed, well nourished. No distress.  HEENT: Head is normocephalic, atraumatic  Eyes: Clear conjunctiva.  Neck: Supple, symmetrical neck; trachea midline.  Lungs: Clear to auscultation bilaterally and normal respiratory effort.  Cardiovascular: Heart with regular rate and rhythm.    Extremities: No LE edema.    Abdomen: Abdomen is soft, non-tender non-distended with normal bowel sounds.  Musculoskeletal: " Normal gait.   Genital:  Normal penis. Foreskin retractable.  No rash in the genital area.  Scrotum and epididymis normal. No inguinal hernia.  No inguinal nodes.   Rectal: No perianal lesions or rash. Digital exam: deferred.  Lymph Nodes: No cervical, supraclavicular or axillary adenopathy.  Psychiatric: Normal affect. Alert.    Laboratory  Lab Results   Component Value Date    WBC 10.21 03/11/2023    HGB 14.1 03/11/2023    HCT 43.7 03/11/2023     03/11/2023    CHOL 171 08/04/2022    TRIG 69 08/04/2022    HDL 50 08/04/2022    ALT 10 03/11/2023    AST 12 03/11/2023     03/11/2023    K 4.1 03/11/2023     03/11/2023    CREATININE 1.1 03/11/2023    BUN 14 03/11/2023    CO2 24 03/11/2023    TSH 1.533 08/04/2022    PSA 0.11 08/04/2022    INR 1.0 01/02/2022    HGBA1C 6.4 (H) 08/04/2022       ASSESSMENT & PLAN:     Annual physical exam  - Reviewed and updated past and current medical problems.  Discussed treatment of current medical problems    -     Hemoglobin A1C; Future; Expected date: 04/12/2023  -     Microalbumin/Creatinine Ratio, Urine  -     Urinalysis, Reflex to Urine Culture Urine, Clean Catch  -     Lipid Panel; Future; Expected date: 04/12/2023    Type 2 diabetes mellitus with microalbuminuria, without long-term current use of insulin  Aortic atherosclerosis  - A1c 6.4.  He does not change his blood sugar at home. Has glucometer.     -     pravastatin (PRAVACHOL) 10 MG tablet; Take 1 tablet (10 mg total) by mouth once daily.     -     SITagliptin (JANUVIA) 25 MG Tab; Take 1 tablet (25 mg total) by mouth once daily.         Consider ACE-I or ARB in the future. (Avoid polypharmacy due to memory issues)     Mixed COPD  - History of heavy smoking.     8-2019 PFT:  Normal airflow. Airflow not improved after bronchodilator. Moderate restriction.  Diffusion capacity is mildly decreased. Oximetry is normal.     - No inhaler necessary at present.     B12 Deficiency  Poor memory  - On B12 injection  monthly.  - Improved with B12 injection.     -     cyanocobalamin 1,000 mcg/mL injection; Inject 1 mL (1,000 mcg total) into the muscle every 30 days.  Dispense: 10 mL; Refill: 3 ([facility])     Vitamin D insufficiency  -     cholecalciferol, vitamin D3, 125 mcg (5,000 unit) Tab; Take 1 tablet (5,000 Units total) by mouth once daily.        History of hepatitis C, s/p successful Rx w/ SVR12 (cure) - 11/2019     Partial idiopathic epilepsy    5-2020: Seizure X 2    7-19-19 Neurology:              - Advised him to go to the ED in case of any seizures              - f/u in clinic in 6-9 months for monitoring recurrence of events and prescriptions              -  if remains seizure-free for up to 2 years, will consider weaning off of AEDs     - On Lamictal 150 mg BID.  -   -     lamoTRIgine (LAMICTAL) 150 MG Tab; Take 2 tablets (300 mg total) by mouth once daily.       Ex-very heavy cigarette smoker (more than 40 per day)  Last CT Chest Lung  8- Negative    Benign prostatic hyperplasia with urinary frequency  On silodosin (RAPAFLO) 4 mg Cap capsule; Take 1 capsule (4 mg total) by mouth once daily.    (Has no ejaculation from this, but he is okay with it).     Other male erectile dysfunction  -     sildenafiL (VIAGRA) 50 MG tablet; Take 1 tablet (50 mg total) by mouth daily as needed for Erectile Dysfunction.  Dispense: 30 tablet; Refill: 11 ([facility])       Bilateral impacted cerumen  -     Ambulatory referral/consult to ENT; Future; Expected date: 04/19/2023    Preventive Health Maintenance:    Up to date     Return to Clinic for Follow Up with me:   Oct 18    Scheduled Follow-up :  Future Appointments   Date Time Provider Department Center   7/3/2023  3:30 PM Allegra Scanlon OD [facility]   10/18/2023 10:30 AM Srikanth Son MD [facility]       After Visit Medication List :     Medication List            Accurate as of April 12, 2023  9:35 AM. If  "you have any questions, ask your nurse or doctor.                CONTINUE taking these medications      BD SPECIALTY USE NEEDLES 23 gauge x 1 1/4" Ndle  Generic drug: needle (disp) 23 gauge  1 Dose by Misc.(Non-Drug; Combo Route) route every 30 days.     blood sugar diagnostic Strp  1 strip by Misc.(Non-Drug; Combo Route) route 2 (two) times daily before meals.     brimonidine 0.2% 0.2 % Drop  Commonly known as: ALPHAGAN  Place 1 drop into both eyes 3 (three) times daily.     cholecalciferol (vitamin D3) 125 mcg (5,000 unit) Tab  Take 1 tablet (5,000 Units total) by mouth once daily.     cyanocobalamin 1,000 mcg/mL injection  Inject 1 mL (1,000 mcg total) into the muscle every 30 days.     lamoTRIgine 150 MG Tab  Commonly known as: LAMICTAL  Take 2 tablets (300 mg total) by mouth every morning.     lancets Misc  Commonly known as: LANCETS,ULTRA THIN  1 application by Misc.(Non-Drug; Combo Route) route 2 (two) times daily before meals.     netarsudiL-latanoprost 0.02-0.005 % Drop  Place 1 drop into both eyes every evening.     pravastatin 10 MG tablet  Commonly known as: PRAVACHOL  Take 1 tablet (10 mg total) by mouth every morning.     sildenafiL 50 MG tablet  Commonly known as: VIAGRA  Take 1 tablet (50 mg total) by mouth daily as needed for Erectile Dysfunction.     silodosin 4 mg Cap capsule  Commonly known as: RAPAFLO  Take 1 capsule (4 mg total) by mouth every morning.     SITagliptin phosphate 25 MG Tab  Commonly known as: JANUVIA  Take 1 tablet (25 mg total) by mouth every morning.     triamcinolone acetonide 0.1% 0.1 % cream  Commonly known as: KENALOG  Apply topically 2 (two) times daily.            STOP taking these medications      BD LUER-RUSTAM SYRINGE 3 mL 23 gauge x 1 1/2" Syrg  Generic drug: syringe with needle  Stopped by: Srikanth Son MD     erythromycin ophthalmic ointment  Commonly known as: ROMYCIN  Stopped by: Srikanth Son MD               Where to Get Your Medications        These medications " "were sent to [facility]  [address] [facility] [address]      Hours: Mon-Fri, 8a-5:30p Phone: [phone]   BD SPECIALTY USE NEEDLES 23 gauge x 1 1/4" Ndle  cyanocobalamin 1,000 mcg/mL injection  sildenafiL 50 MG tablet       These medications were sent to [id number] - [place] - [address] AT [facility] [address]  [facility] [place] [address]      Phone: [phone]   blood sugar diagnostic Strp  cholecalciferol (vitamin D3) 125 mcg (5,000 unit) Tab  lamoTRIgine 150 MG Tab  lancets Misc  pravastatin 10 MG tablet  silodosin 4 mg Cap capsule  SITagliptin phosphate 25 MG Tab         Signing Physician:  Srikanth Son MD  "

## 2023-04-13 ENCOUNTER — LAB VISIT (OUTPATIENT)
Dept: LAB | Facility: HOSPITAL | Age: 68
End: 2023-04-13
Attending: INTERNAL MEDICINE
Payer: MEDICARE

## 2023-04-13 DIAGNOSIS — E11.29 TYPE 2 DIABETES MELLITUS WITH MICROALBUMINURIA, WITHOUT LONG-TERM CURRENT USE OF INSULIN: ICD-10-CM

## 2023-04-13 DIAGNOSIS — R80.9 TYPE 2 DIABETES MELLITUS WITH MICROALBUMINURIA, WITHOUT LONG-TERM CURRENT USE OF INSULIN: ICD-10-CM

## 2023-04-13 LAB
ALBUMIN/CREAT UR: 72.8 UG/MG (ref 0–30)
BACTERIA #/AREA URNS AUTO: NORMAL /HPF
BILIRUB UR QL STRIP: NEGATIVE
CLARITY UR REFRACT.AUTO: CLEAR
COLOR UR AUTO: YELLOW
CREAT UR-MCNC: 228 MG/DL (ref 23–375)
GLUCOSE UR QL STRIP: NEGATIVE
HGB UR QL STRIP: NEGATIVE
HYALINE CASTS UR QL AUTO: 0 /LPF
KETONES UR QL STRIP: NEGATIVE
LEUKOCYTE ESTERASE UR QL STRIP: NEGATIVE
MICROALBUMIN UR DL<=1MG/L-MCNC: 166 UG/ML
MICROSCOPIC COMMENT: NORMAL
NITRITE UR QL STRIP: NEGATIVE
PH UR STRIP: 5 [PH] (ref 5–8)
PROT UR QL STRIP: ABNORMAL
RBC #/AREA URNS AUTO: 1 /HPF (ref 0–4)
SP GR UR STRIP: 1.02 (ref 1–1.03)
URN SPEC COLLECT METH UR: ABNORMAL
WBC #/AREA URNS AUTO: 1 /HPF (ref 0–5)

## 2023-04-13 PROCEDURE — 81001 URINALYSIS AUTO W/SCOPE: CPT | Performed by: INTERNAL MEDICINE

## 2023-04-13 PROCEDURE — 82570 ASSAY OF URINE CREATININE: CPT | Performed by: INTERNAL MEDICINE

## 2023-04-21 DIAGNOSIS — H40.1131 PRIMARY OPEN ANGLE GLAUCOMA (POAG) OF BOTH EYES, MILD STAGE: ICD-10-CM

## 2023-04-21 RX ORDER — BRIMONIDINE TARTRATE 2 MG/ML
1 SOLUTION/ DROPS OPHTHALMIC 3 TIMES DAILY
Qty: 5 ML | Refills: 3 | Status: SHIPPED | OUTPATIENT
Start: 2023-04-21 | End: 2023-07-18 | Stop reason: SDUPTHER

## 2023-06-01 ENCOUNTER — HOSPITAL ENCOUNTER (EMERGENCY)
Facility: HOSPITAL | Age: 68
Discharge: HOME | End: 2023-06-01
Attending: EMERGENCY MEDICINE
Payer: MEDICARE

## 2023-06-01 VITALS
TEMPERATURE: 98 F | WEIGHT: 175 LBS | OXYGEN SATURATION: 97 % | BODY MASS INDEX: 27.41 KG/M2 | SYSTOLIC BLOOD PRESSURE: 168 MMHG | HEART RATE: 57 BPM | RESPIRATION RATE: 16 BRPM | DIASTOLIC BLOOD PRESSURE: 95 MMHG

## 2023-06-01 DIAGNOSIS — H11.002 PTERYGIUM OF LEFT EYE: ICD-10-CM

## 2023-06-01 DIAGNOSIS — S05.02XA ABRASION OF LEFT CORNEA, INITIAL ENCOUNTER: Primary | ICD-10-CM

## 2023-06-01 PROCEDURE — 99284 PR EMERGENCY DEPT VISIT,LEVEL IV: ICD-10-PCS | Mod: ,,, | Performed by: EMERGENCY MEDICINE

## 2023-06-01 PROCEDURE — 25000003 PHARM REV CODE 250: Performed by: PHYSICIAN ASSISTANT

## 2023-06-01 PROCEDURE — 99283 EMERGENCY DEPT VISIT LOW MDM: CPT

## 2023-06-01 PROCEDURE — 99284 EMERGENCY DEPT VISIT MOD MDM: CPT | Mod: ,,, | Performed by: EMERGENCY MEDICINE

## 2023-06-01 RX ORDER — PROPARACAINE HYDROCHLORIDE 5 MG/ML
1 SOLUTION/ DROPS OPHTHALMIC
Status: COMPLETED | OUTPATIENT
Start: 2023-06-01 | End: 2023-06-01

## 2023-06-01 RX ORDER — ERYTHROMYCIN 5 MG/G
OINTMENT OPHTHALMIC
Status: COMPLETED | OUTPATIENT
Start: 2023-06-01 | End: 2023-06-01

## 2023-06-01 RX ORDER — ERYTHROMYCIN 5 MG/G
OINTMENT OPHTHALMIC
Qty: 1 G | Refills: 0 | Status: SHIPPED | OUTPATIENT
Start: 2023-06-01 | End: 2023-12-19

## 2023-06-01 RX ADMIN — FLUORESCEIN SODIUM 1 EACH: 1 STRIP OPHTHALMIC at 08:06

## 2023-06-01 RX ADMIN — ERYTHROMYCIN 1 INCH: 5 OINTMENT OPHTHALMIC at 09:06

## 2023-06-01 RX ADMIN — PROPARACAINE HYDROCHLORIDE 1 DROP: 5 SOLUTION/ DROPS OPHTHALMIC at 08:06

## 2023-06-01 NOTE — ED NOTES
Cristiano Cruz, an 68 y.o. male presents to the ED with c/o left eye pain, currently rates pain 10/10, started yesterday. Denies any other s/s.      Review of patient's allergies indicates:  No Known Allergies  Chief Complaint   Patient presents with    Eye Problem     Left eye redness and drainage since yesterday      Past Medical History:   Diagnosis Date    B12 nutritional deficiency 08/07/2019    COVID-19 12/24/2021    E. coli UTI 01/2019    During hospitalization for seizure    Generalized tonic-clonic seizure 01/26/2019 1-2019 admitted for new-onset seizures.Normal CT head.  His mental status normalized, and he had no recurrent seizures following keppra loading in the ED and twice-daily maintenance upon arrival to the floor. Workup into the etiology of his seizures remained negative. EEG was performed, with the preliminary interpretation being no epileptiform activity with normal background.     Glaucoma     History of hepatitis C, s/p successful Rx w/ SVR12 (cure) - 11/2019 02/08/2019    S/p epclusa    Kidney stone on left side 06/28/2019    Mixed type COPD (chronic obstructive pulmonary disease) 08/15/2019    8-2019 PFT: Normal airflow. Airflow not improved after bronchodilator. Moderate restriction. Diffusion capacity is mildly decreased. Oximetry is normal.    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 Normal CTH, MRI and routine EEG . On keppra 500 mg BID    Pterygium, bilateral 03/12/2019    Type 2 diabetes mellitus with microalbuminuria, without long-term current use of insulin 02/11/2019    Vitamin D insufficiency 07/29/2021

## 2023-06-01 NOTE — DISCHARGE INSTRUCTIONS
Take Tylenol 650 mg every 8 hours as needed for discomfort, apply erythromycin ointment every 6 hours to the left eye.  Return to the ER for any change or worsening of symptoms including visual changes, sensitivity to light, new drainage from the eye, severe eye pain, headache or vomiting, fever or other new concerning symptoms.

## 2023-06-01 NOTE — ED PROVIDER NOTES
Encounter Date: 6/1/2023       History     Chief Complaint   Patient presents with    Eye Problem     Left eye redness and drainage since yesterday      67 y/o male with history of B12 deficiency, pterygium, COPD, presents with left eye irritation since 5 pm yesterday.  History of cataract removal. He reports watery drainage, burning and scratching/foreign body sensation in the eye.  He denies trauma to the eye or suspected foreign body.  He notes redness in the left eye since yesterday evening. He denies photophobia, headache, vomiting, changes in vision.  He does not wear contacts. No other complaints at this time.     The history is provided by the patient.   Review of patient's allergies indicates:  No Known Allergies  Past Medical History:   Diagnosis Date    B12 nutritional deficiency 08/07/2019    COVID-19 12/24/2021    E. coli UTI 01/2019    During hospitalization for seizure    Generalized tonic-clonic seizure 01/26/2019 1-2019 admitted for new-onset seizures.Normal CT head.  His mental status normalized, and he had no recurrent seizures following keppra loading in the ED and twice-daily maintenance upon arrival to the floor. Workup into the etiology of his seizures remained negative. EEG was performed, with the preliminary interpretation being no epileptiform activity with normal background.     Glaucoma     History of hepatitis C, s/p successful Rx w/ SVR12 (cure) - 11/2019 02/08/2019    S/p epclusa    Kidney stone on left side 06/28/2019    Mixed type COPD (chronic obstructive pulmonary disease) 08/15/2019    8-2019 PFT: Normal airflow. Airflow not improved after bronchodilator. Moderate restriction. Diffusion capacity is mildly decreased. Oximetry is normal.    Nuclear sclerotic cataract of left eye 08/11/2020    Nuclear sclerotic cataract of right eye 07/28/2020    Partial idiopathic epilepsy with seizures of localized onset, not intractable, without status epilepticus 01/26/2019    new-onset  seizures (). Normal CTH, MRI and routine EEG . On keppra 500 mg BID    Pterygium, bilateral 2019    Type 2 diabetes mellitus with microalbuminuria, without long-term current use of insulin 2019    Vitamin D insufficiency 2021     Past Surgical History:   Procedure Laterality Date    CATARACT EXTRACTION W/  INTRAOCULAR LENS IMPLANT Right 2020    Procedure: EXTRACTION, CATARACT, WITH IOL INSERTION;  Surgeon: Daryl Calloway MD;  Location: [facility];  Service: Ophthalmology;  Laterality: Right;    CATARACT EXTRACTION W/  INTRAOCULAR LENS IMPLANT Left 2020    Procedure: EXTRACTION, CATARACT, WITH IOL INSERTION;  Surgeon: Daryl Calloway MD;  Location: [facility] [place];  Service: Ophthalmology;  Laterality: Left;    HERNIA REPAIR Right     [facility]     Family History   Problem Relation Age of Onset    Hypertension Mother     Glaucoma Brother     No Known Problems Daughter     Amblyopia Neg Hx     Blindness Neg Hx     Macular degeneration Neg Hx     Retinal detachment Neg Hx     Strabismus Neg Hx     Cataracts Neg Hx      Social History     Tobacco Use    Smoking status: Former     Packs/day: 3.00     Years: 25.00     Pack years: 75.00     Types: Cigarettes     Quit date: 2012     Years since quittin.3    Smokeless tobacco: Never   Substance Use Topics    Alcohol use: No    Drug use: Not Currently     Types: Cocaine     Comment: H/O abuse     Review of Systems   Constitutional:  Negative for chills and fever.   HENT:  Negative for sore throat.    Eyes:  Positive for pain, discharge (watery), redness and itching. Negative for photophobia and visual disturbance.   Respiratory:  Negative for shortness of breath.    Cardiovascular:  Negative for chest pain.   Gastrointestinal:  Negative for nausea.   Genitourinary:  Negative for dysuria.   Musculoskeletal:  Negative for back pain.   Skin:  Negative for rash.   Neurological:  Negative for weakness.   Hematological:   Does not bruise/bleed easily.     Physical Exam     Initial Vitals [06/01/23 0803]   BP Pulse Resp Temp SpO2   136/72 69 18 97.5 °F (36.4 °C) 96 %      MAP       --         Physical Exam    Constitutional: He appears well-developed and well-nourished.   HENT:   Head: Atraumatic.   Eyes: EOM and lids are normal. Pupils are equal, round, and reactive to light. Lids are everted and swept, no foreign bodies found. Right eye exhibits no chemosis. Left eye exhibits no chemosis and no discharge (watery drainage, no discharge). No foreign body present in the left eye. Left conjunctiva is injected.   Slit lamp exam:       The left eye shows corneal abrasion and fluorescein uptake. The left eye shows no foreign body, no hyphema and no hypopyon.   Left eye tonopen pressure, 13, 15, 16  Right eye tonopen pressure 15, 17, 19   Neck: Neck supple.   Normal range of motion.  Cardiovascular:  Normal rate, regular rhythm and intact distal pulses.           Pulmonary/Chest: Breath sounds normal. No respiratory distress. He has no wheezes. He has no rales.   Abdominal: Abdomen is soft. Bowel sounds are normal. There is no abdominal tenderness.   Musculoskeletal:      Cervical back: Normal range of motion and neck supple.     Neurological: He is alert and oriented to person, place, and time. He has normal strength. GCS score is 15. GCS eye subscore is 4. GCS verbal subscore is 5. GCS motor subscore is 6.   Skin: Capillary refill takes less than 2 seconds. No rash noted.   Psychiatric: He has a normal mood and affect.       Left pterygium       Left eye corneal abrasion, does not cross over pupil    ED Course   Procedures  Labs Reviewed - No data to display       Imaging Results    None          Medications   proparacaine 0.5 % ophthalmic solution 1 drop (1 drop Both Eyes Given by Provider 6/1/23 1989)   fluorescein ophthalmic strip 1 each (1 each Left Eye Given by Provider 6/1/23 1044)   erythromycin 5 mg/gram (0.5 %) ophthalmic  ointment (1 inch Left Eye Given 6/1/23 0919)           APC / Resident Notes:   The patient has a corneal abrasion.  The patient has no signs of corneal ulcer, retained foreign body, rust ring, iritis, ruptured globe, or significant visual acuity deficit.  The patient will be treated with antibiotic ointment.  He has known pterygium and corneal abrasion is adjacent to the left pterygium.  I will refer the patient for Ophthalmology follow up.   Patient's pain completely resolves with proparacaine administration, he is advised to take Tylenol as needed for pain at home.  I have discussed with Ophthalmology triage nurse and patient is scheduled for follow-up visit in 5 days in optometry clinic.  He is given strict ER return precautions.  Patient is comfortable with discharge plan.                     Clinical Impression:   Final diagnoses:  [S05.02XA] Abrasion of left cornea, initial encounter (Primary)  [H11.002] Pterygium of left eye        ED Disposition Condition    Discharge Stable          ED Prescriptions       Medication Sig Dispense Start Date End Date Auth. Provider    erythromycin (ROMYCIN) ophthalmic ointment Place a 1/2 inch ribbon of ointment into the lower eyelid every 6 hours for 5 days 1 g 6/1/2023 -- SACHIN Ward          Follow-up Information       Follow up With Specialties Details Why Contact Info    Srikanth Son MD Internal Medicine, Hospitalist Schedule an appointment as soon as possible for a visit in 1 week  [address]  [place] [address]  [phone]      Santino Shepherd, OD Optometry Go on 6/6/2023 follow up with eye doctor on 6/6 as scheduled or sooner if your opthalmologist is available [address] [facility]  [address]  [place] [address]  [phone]      [facility] - Emergency Dept Emergency Medicine  if symptoms worsen [address] [address] [phone]             SACHIN Ward  06/01/23 1030

## 2023-06-02 ENCOUNTER — PATIENT OUTREACH (OUTPATIENT)
Dept: EMERGENCY MEDICINE | Facility: HOSPITAL | Age: 68
End: 2023-06-02
Payer: MEDICARE

## 2023-06-02 NOTE — PROGRESS NOTES
Unable to contact pt. Pt has a specialty appt on 6/6/2023 at 3:15pm with Santino Shepherd OD. Appt reminder set for 6/5/2023.    YAEL Villagomez

## 2023-06-05 ENCOUNTER — PATIENT OUTREACH (OUTPATIENT)
Dept: EMERGENCY MEDICINE | Facility: HOSPITAL | Age: 68
End: 2023-06-05
Payer: MEDICARE

## 2023-06-05 ENCOUNTER — OFFICE VISIT (OUTPATIENT)
Dept: OTOLARYNGOLOGY | Facility: CLINIC | Age: 68
End: 2023-06-05
Payer: MEDICARE

## 2023-06-05 DIAGNOSIS — H61.23 BILATERAL IMPACTED CERUMEN: ICD-10-CM

## 2023-06-05 PROCEDURE — 69210 PR REMOVAL IMPACTED CERUMEN REQUIRING INSTRUMENTATION, UNILATERAL: ICD-10-PCS | Mod: S$GLB,,, | Performed by: OTOLARYNGOLOGY

## 2023-06-05 PROCEDURE — 99499 NO LOS: ICD-10-PCS | Mod: S$GLB,,, | Performed by: OTOLARYNGOLOGY

## 2023-06-05 PROCEDURE — 69210 REMOVE IMPACTED EAR WAX UNI: CPT | Mod: S$GLB,,, | Performed by: OTOLARYNGOLOGY

## 2023-06-05 PROCEDURE — 99999 PR PBB SHADOW E&M-EST. PATIENT-LVL III: ICD-10-PCS | Mod: PBBFAC,,, | Performed by: OTOLARYNGOLOGY

## 2023-06-05 PROCEDURE — 99499 UNLISTED E&M SERVICE: CPT | Mod: S$GLB,,, | Performed by: OTOLARYNGOLOGY

## 2023-06-05 PROCEDURE — 99999 PR PBB SHADOW E&M-EST. PATIENT-LVL III: CPT | Mod: PBBFAC,,, | Performed by: OTOLARYNGOLOGY

## 2023-06-05 NOTE — PROGRESS NOTES
Appt reminder successful- reminded appt with Santino Shepherd OD on 6/6/2023 at 3:15pm.    YAEL Villagomez

## 2023-06-05 NOTE — PROGRESS NOTES
Has Karan CI.    AD>AS.    Procedure Note:    Patient was brought to the minor procedure room and using the operating microscope the right ear canal  was cleaned of ceruminous debris. There was a significant cerumen impaction.  The forceps and suction were both used to perform this. Tympanic membrane intact. Pt tolerated well. There were no complications.    Procedure Note:    The patient was brought to the minor procedure room and placed under the operating microscope. Using a combination of suction, curettes and cup forceps the patient's cerumen impaction was removed. The tympanic membrane was evaluated and was unremarkable. The patient tolerated the procedure well. There were no complications.    P; RTC prn.

## 2023-06-26 ENCOUNTER — OFFICE VISIT (OUTPATIENT)
Dept: OPTOMETRY | Facility: CLINIC | Age: 68
End: 2023-06-26
Payer: MEDICARE

## 2023-06-26 DIAGNOSIS — H40.1131 PRIMARY OPEN ANGLE GLAUCOMA (POAG) OF BOTH EYES, MILD STAGE: Primary | ICD-10-CM

## 2023-06-26 PROCEDURE — 3060F PR POS MICROALBUMINURIA RESULT DOCUMENTED/REVIEW: ICD-10-PCS | Mod: CPTII,S$GLB,, | Performed by: OPTOMETRIST

## 2023-06-26 PROCEDURE — 3066F NEPHROPATHY DOC TX: CPT | Mod: CPTII,S$GLB,, | Performed by: OPTOMETRIST

## 2023-06-26 PROCEDURE — 1101F PR PT FALLS ASSESS DOC 0-1 FALLS W/OUT INJ PAST YR: ICD-10-PCS | Mod: CPTII,S$GLB,, | Performed by: OPTOMETRIST

## 2023-06-26 PROCEDURE — 92012 INTRM OPH EXAM EST PATIENT: CPT | Mod: S$GLB,,, | Performed by: OPTOMETRIST

## 2023-06-26 PROCEDURE — 3288F FALL RISK ASSESSMENT DOCD: CPT | Mod: CPTII,S$GLB,, | Performed by: OPTOMETRIST

## 2023-06-26 PROCEDURE — 1159F PR MEDICATION LIST DOCUMENTED IN MEDICAL RECORD: ICD-10-PCS | Mod: CPTII,S$GLB,, | Performed by: OPTOMETRIST

## 2023-06-26 PROCEDURE — 99999 PR PBB SHADOW E&M-EST. PATIENT-LVL II: CPT | Mod: PBBFAC,,, | Performed by: OPTOMETRIST

## 2023-06-26 PROCEDURE — 3066F PR DOCUMENTATION OF TREATMENT FOR NEPHROPATHY: ICD-10-PCS | Mod: CPTII,S$GLB,, | Performed by: OPTOMETRIST

## 2023-06-26 PROCEDURE — 92012 PR EYE EXAM, EST PATIENT,INTERMED: ICD-10-PCS | Mod: S$GLB,,, | Performed by: OPTOMETRIST

## 2023-06-26 PROCEDURE — 99999 PR PBB SHADOW E&M-EST. PATIENT-LVL II: ICD-10-PCS | Mod: PBBFAC,,, | Performed by: OPTOMETRIST

## 2023-06-26 PROCEDURE — 3288F PR FALLS RISK ASSESSMENT DOCUMENTED: ICD-10-PCS | Mod: CPTII,S$GLB,, | Performed by: OPTOMETRIST

## 2023-06-26 PROCEDURE — 1101F PT FALLS ASSESS-DOCD LE1/YR: CPT | Mod: CPTII,S$GLB,, | Performed by: OPTOMETRIST

## 2023-06-26 PROCEDURE — 3060F POS MICROALBUMINURIA REV: CPT | Mod: CPTII,S$GLB,, | Performed by: OPTOMETRIST

## 2023-06-26 PROCEDURE — 1159F MED LIST DOCD IN RCRD: CPT | Mod: CPTII,S$GLB,, | Performed by: OPTOMETRIST

## 2023-06-26 PROCEDURE — 3044F HG A1C LEVEL LT 7.0%: CPT | Mod: CPTII,S$GLB,, | Performed by: OPTOMETRIST

## 2023-06-26 PROCEDURE — 3044F PR MOST RECENT HEMOGLOBIN A1C LEVEL <7.0%: ICD-10-PCS | Mod: CPTII,S$GLB,, | Performed by: OPTOMETRIST

## 2023-06-26 NOTE — PROGRESS NOTES
HPI    IOP Check  KOURTNEY: 02/06/23    68 y.o. is here for iop check  Pt states he take rx drops every night, pt is unsure of the name of his   drop  Last edited by Allegra Scanlon, OD on 6/26/2023  2:22 PM.            Assessment /Plan     For exam results, see Encounter Report.    Primary open angle glaucoma (POAG) of both eyes, mild stage  -     OCT, Optic Nerve - OU - Both Eyes; Future  -     Hay Visual Field - OU - Extended - Both Eyes; Future      Educated pt on findings. IOP was raised on latanoprost at last visit. Switched to rocklatan, but gtt was too expensive. Switched to brimonidine. Pt able to confirm in office that he is using gtt with purple top. Discussed drop should be used TID instead of QHS. IOP still raised OU today, but pt not using gtt appropriately. Discussed for pt to continue brimonidine but use TID OU. Pt notes understanding. Timolol contraindicated due to COPD. Pt also due for testing. Will have pt f/u in 1 month for IOP check and RNFL OCT/24-2 HVF. Monitor 1 month.       RTC in 1 month for RNFL OCT, 24-2 HVF, and IOP check or sooner if needed.

## 2023-07-18 DIAGNOSIS — H40.1131 PRIMARY OPEN ANGLE GLAUCOMA (POAG) OF BOTH EYES, MILD STAGE: ICD-10-CM

## 2023-07-18 RX ORDER — BRIMONIDINE TARTRATE 2 MG/ML
1 SOLUTION/ DROPS OPHTHALMIC 3 TIMES DAILY
Qty: 5 ML | Refills: 3 | Status: SHIPPED | OUTPATIENT
Start: 2023-07-18 | End: 2024-07-17

## 2023-08-04 NOTE — PROGRESS NOTES
Administered cyanocobalamin 1000mcg/ml injection:   NDC:61497-699-61  Site:   Lot [id number]  Exp 03/2025  Rx# [id number]

## 2023-11-08 DIAGNOSIS — E11.9 TYPE 2 DIABETES MELLITUS WITHOUT COMPLICATION: ICD-10-CM

## 2023-11-14 ENCOUNTER — PATIENT OUTREACH (OUTPATIENT)
Dept: ADMINISTRATIVE | Facility: HOSPITAL | Age: 68
End: 2023-11-14
Payer: MEDICARE

## 2023-11-14 NOTE — PROGRESS NOTES

## 2023-12-18 NOTE — PROGRESS NOTES
INTERNAL MEDICINE CLINIC  Follow-up Visit Progress Note     PRESENTING HISTORY     PCP: Srikanth Son MD    Last Clinic Visit with me: 4- (Annual)    Current Chief Complaint/Problem:    Chief Complaint   Patient presents with    Follow-up      History of Present Illness & ROS: Mr. Cristiano Cruz is a 68 y.o. male.    Doing well. No complaints.    No chest pain or SOB.     Good memory now.    PAST HISTORY:     Past Medical History:   Diagnosis Date    B12 nutritional deficiency 08/07/2019    COVID-19 12/24/2021    E. coli UTI 01/2019    During hospitalization for seizure    Generalized tonic-clonic seizure 01/26/2019 1-2019 admitted for new-onset seizures.Normal CT head.  His mental status normalized, and he had no recurrent seizures following keppra loading in the ED and twice-daily maintenance upon arrival to the floor. Workup into the etiology of his seizures remained negative. EEG was performed, with the preliminary interpretation being no epileptiform activity with normal background.     Glaucoma     History of hepatitis C, s/p successful Rx w/ SVR12 (cure) - 11/2019 02/08/2019    S/p epclusa    Kidney stone on left side 06/28/2019    Mixed type COPD (chronic obstructive pulmonary disease) 08/15/2019    8-2019 PFT: Normal airflow. Airflow not improved after bronchodilator. Moderate restriction. Diffusion capacity is mildly decreased. Oximetry is normal.    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 Normal CTH, MRI and routine EEG . On keppra 500 mg BID    Pterygium, bilateral 03/12/2019    Type 2 diabetes mellitus with microalbuminuria, without long-term current use of insulin 02/11/2019    Vitamin D insufficiency 07/29/2021       Past Surgical History:   Procedure Laterality Date    CATARACT  EXTRACTION W/  INTRAOCULAR LENS IMPLANT Right 2020    Procedure: EXTRACTION, CATARACT, WITH IOL INSERTION;  Surgeon: Daryl Calloway MD;  Location: [facility];  Service: Ophthalmology;  Laterality: Right;    CATARACT EXTRACTION W/  INTRAOCULAR LENS IMPLANT Left 2020    Procedure: EXTRACTION, CATARACT, WITH IOL INSERTION;  Surgeon: Daryl Calloway MD;  Location: [facility];  Service: Ophthalmology;  Laterality: Left;    HERNIA REPAIR Right     [facility]       Family History   Problem Relation Age of Onset    Hypertension Mother     Glaucoma Brother     No Known Problems Daughter     Amblyopia Neg Hx     Blindness Neg Hx     Macular degeneration Neg Hx     Retinal detachment Neg Hx     Strabismus Neg Hx     Cataracts Neg Hx        Social History     Socioeconomic History    Marital status:     Number of children: 1   Occupational History    Occupation: Retired   Tobacco Use    Smoking status: Former     Current packs/day: 0.00     Average packs/day: 3.0 packs/day for 25.0 years (75.0 ttl pk-yrs)     Types: Cigarettes     Start date: 1987     Quit date: 2012     Years since quittin.8    Smokeless tobacco: Never   Substance and Sexual Activity    Alcohol use: No    Drug use: Not Currently     Types: Cocaine     Comment: H/O abuse    Sexual activity: Yes     Partners: Female   Social History Narrative     with 1 daughter (42 as of 2019).    Work in [employer] - Retired     Social Determinants of Health     Financial Resource Strain: Low Risk  (2022)    Overall Financial Resource Strain (CARDIA)     Difficulty of Paying Living Expenses: Not hard at all   Food Insecurity: Food Insecurity Present (2022)    Hunger Vital Sign     Worried About Running Out of Food in the Last Year: Sometimes true     Ran Out of Food in the Last Year: Sometimes true   Transportation Needs: No Transportation Needs (2022)    PRAPARE - Transportation     Lack of  "Transportation (Medical): No     Lack of Transportation (Non-Medical): No   Physical Activity: Sufficiently Active (6/8/2022)    Exercise Vital Sign     Days of Exercise per Week: 7 days     Minutes of Exercise per Session: 60 min   Stress: No Stress Concern Present (6/8/2022)    [employer] [employer] - Occupational Stress Questionnaire     Feeling of Stress : Not at all   Social Connections: Socially Integrated (6/8/2022)    Social Connection and Isolation Panel [NHANES]     Frequency of Communication with Friends and Family: More than three times a week     Frequency of Social Gatherings with Friends and Family: Once a week     Attends [employer] Services: 1 to 4 times per year     Active Member of Clubs or Organizations: Yes     Attends Club or Organization Meetings: 1 to 4 times per year     Marital Status:    Housing Stability: Unknown (6/8/2022)    Housing Stability Vital Sign     Unable to Pay for Housing in the Last Year: No     Unstable Housing in the Last Year: No       MEDICATIONS & ALLERGIES:     Current Outpatient Medications on File Prior to Visit   Medication Sig Dispense Refill    blood sugar diagnostic Strp 1 strip by Misc.(Non-Drug; Combo Route) route 2 (two) times daily before meals. 200 strip 3    brimonidine 0.2% (ALPHAGAN) 0.2 % Drop Place 1 drop into both eyes 3 (three) times daily. 5 mL 3    cholecalciferol, vitamin D3, 125 mcg (5,000 unit) Tab Take 1 tablet (5,000 Units total) by mouth once daily. 90 tablet 3    cyanocobalamin 1,000 mcg/mL injection Inject 1 mL (1,000 mcg total) into the muscle every 30 days. 10 mL 3    lamoTRIgine (LAMICTAL) 150 MG Tab Take 2 tablets (300 mg total) by mouth every morning. 180 tablet 3    lancets (LANCETS,ULTRA THIN) Misc 1 application by Misc.(Non-Drug; Combo Route) route 2 (two) times daily before meals. 200 each 3    needle, disp, 23 gauge (BD SPECIALTY USE NEEDLES) 23 gauge x 1 1/4" Ndle 1 Dose by Misc.(Non-Drug; Combo Route) " route every 30 days. 15 each 1    pravastatin (PRAVACHOL) 10 MG tablet Take 1 tablet (10 mg total) by mouth every morning. 90 tablet 3    sildenafiL (VIAGRA) 50 MG tablet Take 1 tablet (50 mg total) by mouth daily as needed for Erectile Dysfunction. 30 tablet 11    silodosin (RAPAFLO) 4 mg Cap capsule Take 1 capsule (4 mg total) by mouth every morning. 90 capsule 3    SITagliptin phosphate (JANUVIA) 25 MG Tab Take 1 tablet (25 mg total) by mouth every morning. 90 tablet 3    triamcinolone acetonide 0.1% (KENALOG) 0.1 % cream Apply topically 2 (two) times daily. 1 each 0    [DISCONTINUED] erythromycin (ROMYCIN) ophthalmic ointment Place a 1/2 inch ribbon of ointment into the lower eyelid every 6 hours for 5 days 1 g 0     No current facility-administered medications on file prior to visit.        Review of patient's allergies indicates:  No Known Allergies    Medications Reconciliation:   I have reconciled the patient's home medications and discharge medications with the patient/family. I have updated all changes.  Refer to After-Visit Medication List.    OBJECTIVE:     Vital Signs:  Vitals:    12/19/23 1018   BP: 130/70   Pulse: 75     Wt Readings from Last 3 Encounters:   12/19/23 1018 79 kg (174 lb 2.6 oz)   06/01/23 0803 79.4 kg (175 lb)   04/12/23 0902 83.7 kg (184 lb 8.4 oz)     Body mass index is 27.28 kg/m².     Physical Exam:  General: Well developed, well nourished. No distress.  HEENT: Head is normocephalic, atraumatic   Eyes: Clear conjunctiva.  Neck: Supple, symmetrical neck; trachea midline.  Lungs: Clear to auscultation bilaterally and normal respiratory effort.  Cardiovascular: Heart with regular rate and rhythm.    Extremities: No LE edema.   Abdomen: Abdomen is soft, non-tender non-distended with normal bowel sounds.  Skin: Skin color, texture, turgor normal. No rashes.  Musculoskeletal: Normal gait.   Psychiatric: Normal affect. Alert.      Laboratory  Lab Results   Component Value Date    WBC  10.21 03/11/2023    HGB 14.1 03/11/2023    HCT 43.7 03/11/2023     03/11/2023    CHOL 166 04/12/2023    TRIG 109 04/12/2023    HDL 50 04/12/2023    ALT 10 03/11/2023    AST 12 03/11/2023     03/11/2023    K 4.1 03/11/2023     03/11/2023    CREATININE 1.1 03/11/2023    BUN 14 03/11/2023    CO2 24 03/11/2023    TSH 1.533 08/04/2022    PSA 0.11 08/04/2022    INR 1.0 01/02/2022    HGBA1C 6.5 (H) 04/12/2023       ASSESSMENT & PLAN:     Type 2 diabetes mellitus with microalbuminuria, without long-term current use of insulin  Aortic atherosclerosis  - A1c 6.5.  He does not change his blood sugar at home. Has glucometer.     -     pravastatin (PRAVACHOL) 10 MG tablet; Take 1 tablet (10 mg total) by mouth once daily.     -     SITagliptin (JANUVIA) 25 MG Tab; Take 1 tablet (25 mg total) by mouth once daily.         Consider ACE-I or ARB in the future. (Avoid polypharmacy due to memory issues)     -     HEMOGLOBIN A1C; Future; Expected date: 12/19/2023    Mixed COPD  - History of heavy smoking.     8-2019 PFT:  Normal airflow. Airflow not improved after bronchodilator. Moderate restriction.  Diffusion capacity is mildly decreased. Oximetry is normal.     - No inhaler necessary at present.     B12 Deficiency  Poor memory  - On B12 injection monthly.  - Improved with B12 injection.     -     cyanocobalamin 1,000 mcg/mL injection; Inject 1 mL (1,000 mcg total) into the muscle every 30 days.  ([facility] FOR INJECTION)     Vitamin D insufficiency  -     cholecalciferol, vitamin D3, 125 mcg (5,000 unit) Tab; Take 1 tablet (5,000 Units total) by mouth once daily.        History of hepatitis C, s/p successful Rx w/ SVR12 (cure) - 11/2019     Partial idiopathic epilepsy    5-2020: Seizure X 2    7-19-19 Neurology:              - Advised him to go to the ED in case of any seizures              - f/u in clinic in 6-9 months for monitoring recurrence of events and prescriptions              -  if remains  "seizure-free for up to 2 years, will consider weaning off of AEDs     - On Lamictal 150 mg BID.  -   -     lamoTRIgine (LAMICTAL) 150 MG Tab; Take 2 tablets (300 mg total) by mouth once daily.       Ex-very heavy cigarette smoker (more than 40 per day)  Last CT Chest Lung  8- Negative     Benign prostatic hyperplasia with urinary frequency  On silodosin (RAPAFLO) 4 mg Cap capsule; Take 1 capsule (4 mg total) by mouth once daily.    (Has no ejaculation from this, but he is okay with it).     Other male erectile dysfunction  -     sildenafiL (VIAGRA) 50 MG tablet; Take 1 tablet (50 mg total) by mouth daily as needed for Erectile Dysfunction. [facility])     Preventive Health Maintenance:     Flu  vaccine  RSV vaccine  COVID vaccine     Return to Clinic for Follow Up with me:   April 10, 2024 Annual     Scheduled Follow-up :  Future Appointments   Date Time Provider Department Center   12/19/2023 11:00 AM FLU, IN CLINIC [facility]   12/19/2023 11:10 AM LAB, APPOINTMENT [facility] INTMED [facility] LAB [facility]   1/16/2024  3:00 PM MAHAN, VISUAL FIELDS [facility] OPHTHAL [facility]   1/16/2024  3:30 PM Allegra Scanlon OD [facility] OPTICLB [facility]   4/10/2024 10:30 AM Srikanth Son MD [facility]       After Visit Medication List :     Medication List            Accurate as of December 19, 2023 10:43 AM. If you have any questions, ask your nurse or doctor.                CONTINUE taking these medications      BD SPECIALTY USE NEEDLES 23 gauge x 1 1/4" Ndle  Generic drug: needle (disp) 23 gauge  1 Dose by Misc.(Non-Drug; Combo Route) route every 30 days.     blood sugar diagnostic Strp  1 strip by Misc.(Non-Drug; Combo Route) route 2 (two) times daily before meals.     brimonidine 0.2% 0.2 % Drop  Commonly known as: ALPHAGAN  Place 1 drop into both eyes 3 (three) times daily.     cholecalciferol (vitamin D3) 125 mcg (5,000 unit) Tab  Take 1 tablet (5,000 Units total) by mouth " once daily.     cyanocobalamin 1,000 mcg/mL injection  Inject 1 mL (1,000 mcg total) into the muscle every 30 days.     lamoTRIgine 150 MG Tab  Commonly known as: LAMICTAL  Take 2 tablets (300 mg total) by mouth every morning.     lancets Misc  Commonly known as: LANCETS,ULTRA THIN  1 application by Misc.(Non-Drug; Combo Route) route 2 (two) times daily before meals.     pravastatin 10 MG tablet  Commonly known as: PRAVACHOL  Take 1 tablet (10 mg total) by mouth every morning.     sildenafiL 50 MG tablet  Commonly known as: VIAGRA  Take 1 tablet (50 mg total) by mouth daily as needed for Erectile Dysfunction.     silodosin 4 mg Cap capsule  Commonly known as: RAPAFLO  Take 1 capsule (4 mg total) by mouth every morning.     SITagliptin phosphate 25 MG Tab  Commonly known as: JANUVIA  Take 1 tablet (25 mg total) by mouth every morning.     triamcinolone acetonide 0.1% 0.1 % cream  Commonly known as: KENALOG  Apply topically 2 (two) times daily.            STOP taking these medications      erythromycin ophthalmic ointment  Commonly known as: ROMYCIN  Stopped by: Srikanth Son MD              Signing Physician:  Srikanth Son MD

## 2023-12-19 ENCOUNTER — LAB VISIT (OUTPATIENT)
Dept: LAB | Facility: HOSPITAL | Age: 68
End: 2023-12-19
Attending: INTERNAL MEDICINE
Payer: MEDICARE

## 2023-12-19 ENCOUNTER — OFFICE VISIT (OUTPATIENT)
Dept: INTERNAL MEDICINE | Facility: CLINIC | Age: 68
End: 2023-12-19
Payer: MEDICARE

## 2023-12-19 VITALS
WEIGHT: 174.19 LBS | SYSTOLIC BLOOD PRESSURE: 130 MMHG | HEIGHT: 67 IN | DIASTOLIC BLOOD PRESSURE: 70 MMHG | HEART RATE: 75 BPM | BODY MASS INDEX: 27.34 KG/M2 | OXYGEN SATURATION: 95 %

## 2023-12-19 DIAGNOSIS — E53.8 B12 NUTRITIONAL DEFICIENCY: ICD-10-CM

## 2023-12-19 DIAGNOSIS — J44.9 MIXED TYPE COPD (CHRONIC OBSTRUCTIVE PULMONARY DISEASE): ICD-10-CM

## 2023-12-19 DIAGNOSIS — R35.0 BENIGN PROSTATIC HYPERPLASIA WITH URINARY FREQUENCY: ICD-10-CM

## 2023-12-19 DIAGNOSIS — Z86.19 HISTORY OF HEPATITIS C: ICD-10-CM

## 2023-12-19 DIAGNOSIS — N40.1 BENIGN PROSTATIC HYPERPLASIA WITH URINARY FREQUENCY: ICD-10-CM

## 2023-12-19 DIAGNOSIS — Z87.891 EX-VERY HEAVY CIGARETTE SMOKER (MORE THAN 40 PER DAY): ICD-10-CM

## 2023-12-19 DIAGNOSIS — R80.9 TYPE 2 DIABETES MELLITUS WITH MICROALBUMINURIA, WITHOUT LONG-TERM CURRENT USE OF INSULIN: ICD-10-CM

## 2023-12-19 DIAGNOSIS — E11.29 TYPE 2 DIABETES MELLITUS WITH MICROALBUMINURIA, WITHOUT LONG-TERM CURRENT USE OF INSULIN: ICD-10-CM

## 2023-12-19 DIAGNOSIS — R41.3 POOR MEMORY: ICD-10-CM

## 2023-12-19 DIAGNOSIS — I70.0 AORTIC ATHEROSCLEROSIS: ICD-10-CM

## 2023-12-19 DIAGNOSIS — E55.9 VITAMIN D INSUFFICIENCY: ICD-10-CM

## 2023-12-19 DIAGNOSIS — R80.9 TYPE 2 DIABETES MELLITUS WITH MICROALBUMINURIA, WITHOUT LONG-TERM CURRENT USE OF INSULIN: Primary | ICD-10-CM

## 2023-12-19 DIAGNOSIS — G40.009 PARTIAL IDIOPATHIC EPILEPSY WITH SEIZURES OF LOCALIZED ONSET, NOT INTRACTABLE, WITHOUT STATUS EPILEPTICUS: ICD-10-CM

## 2023-12-19 DIAGNOSIS — N52.8 OTHER MALE ERECTILE DYSFUNCTION: ICD-10-CM

## 2023-12-19 DIAGNOSIS — E11.29 TYPE 2 DIABETES MELLITUS WITH MICROALBUMINURIA, WITHOUT LONG-TERM CURRENT USE OF INSULIN: Primary | ICD-10-CM

## 2023-12-19 LAB
ESTIMATED AVG GLUCOSE: 143 MG/DL (ref 68–131)
HBA1C MFR BLD: 6.6 % (ref 4–5.6)

## 2023-12-19 PROCEDURE — 3044F HG A1C LEVEL LT 7.0%: CPT | Mod: CPTII,S$GLB,, | Performed by: INTERNAL MEDICINE

## 2023-12-19 PROCEDURE — 3078F PR MOST RECENT DIASTOLIC BLOOD PRESSURE < 80 MM HG: ICD-10-PCS | Mod: CPTII,S$GLB,, | Performed by: INTERNAL MEDICINE

## 2023-12-19 PROCEDURE — 1159F PR MEDICATION LIST DOCUMENTED IN MEDICAL RECORD: ICD-10-PCS | Mod: CPTII,S$GLB,, | Performed by: INTERNAL MEDICINE

## 2023-12-19 PROCEDURE — 1126F PR PAIN SEVERITY QUANTIFIED, NO PAIN PRESENT: ICD-10-PCS | Mod: CPTII,S$GLB,, | Performed by: INTERNAL MEDICINE

## 2023-12-19 PROCEDURE — 3066F NEPHROPATHY DOC TX: CPT | Mod: CPTII,S$GLB,, | Performed by: INTERNAL MEDICINE

## 2023-12-19 PROCEDURE — 36415 COLL VENOUS BLD VENIPUNCTURE: CPT | Performed by: INTERNAL MEDICINE

## 2023-12-19 PROCEDURE — 99214 PR OFFICE/OUTPT VISIT, EST, LEVL IV, 30-39 MIN: ICD-10-PCS | Mod: S$GLB,,, | Performed by: INTERNAL MEDICINE

## 2023-12-19 PROCEDURE — 3060F POS MICROALBUMINURIA REV: CPT | Mod: CPTII,S$GLB,, | Performed by: INTERNAL MEDICINE

## 2023-12-19 PROCEDURE — 99999 PR PBB SHADOW E&M-EST. PATIENT-LVL III: ICD-10-PCS | Mod: PBBFAC,,, | Performed by: INTERNAL MEDICINE

## 2023-12-19 PROCEDURE — 3075F PR MOST RECENT SYSTOLIC BLOOD PRESS GE 130-139MM HG: ICD-10-PCS | Mod: CPTII,S$GLB,, | Performed by: INTERNAL MEDICINE

## 2023-12-19 PROCEDURE — 3008F BODY MASS INDEX DOCD: CPT | Mod: CPTII,S$GLB,, | Performed by: INTERNAL MEDICINE

## 2023-12-19 PROCEDURE — 99999 PR PBB SHADOW E&M-EST. PATIENT-LVL III: CPT | Mod: PBBFAC,,, | Performed by: INTERNAL MEDICINE

## 2023-12-19 PROCEDURE — 3044F PR MOST RECENT HEMOGLOBIN A1C LEVEL <7.0%: ICD-10-PCS | Mod: CPTII,S$GLB,, | Performed by: INTERNAL MEDICINE

## 2023-12-19 PROCEDURE — 3288F FALL RISK ASSESSMENT DOCD: CPT | Mod: CPTII,S$GLB,, | Performed by: INTERNAL MEDICINE

## 2023-12-19 PROCEDURE — 3288F PR FALLS RISK ASSESSMENT DOCUMENTED: ICD-10-PCS | Mod: CPTII,S$GLB,, | Performed by: INTERNAL MEDICINE

## 2023-12-19 PROCEDURE — 1101F PR PT FALLS ASSESS DOC 0-1 FALLS W/OUT INJ PAST YR: ICD-10-PCS | Mod: CPTII,S$GLB,, | Performed by: INTERNAL MEDICINE

## 2023-12-19 PROCEDURE — 83036 HEMOGLOBIN GLYCOSYLATED A1C: CPT | Performed by: INTERNAL MEDICINE

## 2023-12-19 PROCEDURE — 1159F MED LIST DOCD IN RCRD: CPT | Mod: CPTII,S$GLB,, | Performed by: INTERNAL MEDICINE

## 2023-12-19 PROCEDURE — 99214 OFFICE O/P EST MOD 30 MIN: CPT | Mod: S$GLB,,, | Performed by: INTERNAL MEDICINE

## 2023-12-19 PROCEDURE — 3008F PR BODY MASS INDEX (BMI) DOCUMENTED: ICD-10-PCS | Mod: CPTII,S$GLB,, | Performed by: INTERNAL MEDICINE

## 2023-12-19 PROCEDURE — 3078F DIAST BP <80 MM HG: CPT | Mod: CPTII,S$GLB,, | Performed by: INTERNAL MEDICINE

## 2023-12-19 PROCEDURE — 1101F PT FALLS ASSESS-DOCD LE1/YR: CPT | Mod: CPTII,S$GLB,, | Performed by: INTERNAL MEDICINE

## 2023-12-19 PROCEDURE — 3060F PR POS MICROALBUMINURIA RESULT DOCUMENTED/REVIEW: ICD-10-PCS | Mod: CPTII,S$GLB,, | Performed by: INTERNAL MEDICINE

## 2023-12-19 PROCEDURE — 1126F AMNT PAIN NOTED NONE PRSNT: CPT | Mod: CPTII,S$GLB,, | Performed by: INTERNAL MEDICINE

## 2023-12-19 PROCEDURE — 3075F SYST BP GE 130 - 139MM HG: CPT | Mod: CPTII,S$GLB,, | Performed by: INTERNAL MEDICINE

## 2023-12-19 PROCEDURE — 3066F PR DOCUMENTATION OF TREATMENT FOR NEPHROPATHY: ICD-10-PCS | Mod: CPTII,S$GLB,, | Performed by: INTERNAL MEDICINE

## 2023-12-19 NOTE — PROGRESS NOTES
Let him know his A1c is 6.6 slightly increased from 6.5  Watch diet. Avoid sugar. Continue diabetes meds.

## 2023-12-21 ENCOUNTER — TELEPHONE (OUTPATIENT)
Dept: INTERNAL MEDICINE | Facility: CLINIC | Age: 68
End: 2023-12-21
Payer: MEDICARE

## 2023-12-21 NOTE — TELEPHONE ENCOUNTER
----- Message from Srikanth Son MD sent at 12/19/2023  4:30 PM CST -----  Let him know his A1c is 6.6 slightly increased from 6.5  Watch diet. Avoid sugar. Continue diabetes meds.

## 2023-12-21 NOTE — TELEPHONE ENCOUNTER
Called pt but there was no answer. I left a message for pt to return a call when he is available.

## 2023-12-27 ENCOUNTER — TELEPHONE (OUTPATIENT)
Dept: INTERNAL MEDICINE | Facility: CLINIC | Age: 68
End: 2023-12-27
Payer: MEDICARE

## 2024-03-20 DIAGNOSIS — R80.9 TYPE 2 DIABETES MELLITUS WITH MICROALBUMINURIA, WITHOUT LONG-TERM CURRENT USE OF INSULIN: ICD-10-CM

## 2024-03-20 DIAGNOSIS — E11.29 TYPE 2 DIABETES MELLITUS WITH MICROALBUMINURIA, WITHOUT LONG-TERM CURRENT USE OF INSULIN: ICD-10-CM

## 2024-04-11 ENCOUNTER — PATIENT OUTREACH (OUTPATIENT)
Dept: ADMINISTRATIVE | Facility: HOSPITAL | Age: 69
End: 2024-04-11
Payer: MEDICARE

## 2024-04-17 DIAGNOSIS — E11.9 TYPE 2 DIABETES MELLITUS WITHOUT COMPLICATION: ICD-10-CM

## 2024-04-25 ENCOUNTER — OFFICE VISIT (OUTPATIENT)
Dept: INTERNAL MEDICINE | Facility: CLINIC | Age: 69
End: 2024-04-25
Payer: MEDICARE

## 2024-04-25 VITALS
HEART RATE: 65 BPM | HEIGHT: 67 IN | WEIGHT: 171.5 LBS | BODY MASS INDEX: 26.92 KG/M2 | DIASTOLIC BLOOD PRESSURE: 74 MMHG | OXYGEN SATURATION: 96 % | SYSTOLIC BLOOD PRESSURE: 130 MMHG

## 2024-04-25 DIAGNOSIS — I70.0 AORTIC ATHEROSCLEROSIS: ICD-10-CM

## 2024-04-25 DIAGNOSIS — E55.9 VITAMIN D INSUFFICIENCY: ICD-10-CM

## 2024-04-25 DIAGNOSIS — Z86.19 HISTORY OF HEPATITIS C: ICD-10-CM

## 2024-04-25 DIAGNOSIS — J44.9 MIXED TYPE COPD (CHRONIC OBSTRUCTIVE PULMONARY DISEASE): ICD-10-CM

## 2024-04-25 DIAGNOSIS — R97.8 OTHER ABNORMAL TUMOR MARKERS: ICD-10-CM

## 2024-04-25 DIAGNOSIS — R80.9 TYPE 2 DIABETES MELLITUS WITH MICROALBUMINURIA, WITHOUT LONG-TERM CURRENT USE OF INSULIN: ICD-10-CM

## 2024-04-25 DIAGNOSIS — Z12.5 SCREENING FOR MALIGNANT NEOPLASM OF PROSTATE: ICD-10-CM

## 2024-04-25 DIAGNOSIS — R35.0 BENIGN PROSTATIC HYPERPLASIA WITH URINARY FREQUENCY: ICD-10-CM

## 2024-04-25 DIAGNOSIS — E53.8 B12 NUTRITIONAL DEFICIENCY: ICD-10-CM

## 2024-04-25 DIAGNOSIS — R41.3 POOR MEMORY: ICD-10-CM

## 2024-04-25 DIAGNOSIS — E11.29 TYPE 2 DIABETES MELLITUS WITH MICROALBUMINURIA, WITHOUT LONG-TERM CURRENT USE OF INSULIN: ICD-10-CM

## 2024-04-25 DIAGNOSIS — G40.009 PARTIAL IDIOPATHIC EPILEPSY WITH SEIZURES OF LOCALIZED ONSET, NOT INTRACTABLE, WITHOUT STATUS EPILEPTICUS: ICD-10-CM

## 2024-04-25 DIAGNOSIS — Z87.891 EX-VERY HEAVY CIGARETTE SMOKER (MORE THAN 40 PER DAY): ICD-10-CM

## 2024-04-25 DIAGNOSIS — N40.1 BENIGN PROSTATIC HYPERPLASIA WITH URINARY FREQUENCY: ICD-10-CM

## 2024-04-25 DIAGNOSIS — Z00.00 ANNUAL PHYSICAL EXAM: Primary | ICD-10-CM

## 2024-04-25 DIAGNOSIS — N52.8 OTHER MALE ERECTILE DYSFUNCTION: ICD-10-CM

## 2024-04-25 LAB
ALBUMIN/CREAT UR: 18.6 UG/MG (ref 0–30)
BILIRUB UR QL STRIP: NEGATIVE
CLARITY UR REFRACT.AUTO: ABNORMAL
COLOR UR AUTO: YELLOW
CREAT UR-MCNC: 161 MG/DL (ref 23–375)
GLUCOSE UR QL STRIP: NEGATIVE
HGB UR QL STRIP: NEGATIVE
KETONES UR QL STRIP: NEGATIVE
LEUKOCYTE ESTERASE UR QL STRIP: NEGATIVE
MICROALBUMIN UR DL<=1MG/L-MCNC: 30 UG/ML
NITRITE UR QL STRIP: NEGATIVE
PH UR STRIP: 6 [PH] (ref 5–8)
PROT UR QL STRIP: NEGATIVE
SP GR UR STRIP: 1.02 (ref 1–1.03)
URN SPEC COLLECT METH UR: ABNORMAL

## 2024-04-25 PROCEDURE — 81003 URINALYSIS AUTO W/O SCOPE: CPT | Performed by: INTERNAL MEDICINE

## 2024-04-25 PROCEDURE — 99999 PR PBB SHADOW E&M-EST. PATIENT-LVL III: CPT | Mod: PBBFAC,,, | Performed by: INTERNAL MEDICINE

## 2024-04-25 PROCEDURE — 3288F FALL RISK ASSESSMENT DOCD: CPT | Mod: CPTII,S$GLB,, | Performed by: INTERNAL MEDICINE

## 2024-04-25 PROCEDURE — 1126F AMNT PAIN NOTED NONE PRSNT: CPT | Mod: CPTII,S$GLB,, | Performed by: INTERNAL MEDICINE

## 2024-04-25 PROCEDURE — 3075F SYST BP GE 130 - 139MM HG: CPT | Mod: CPTII,S$GLB,, | Performed by: INTERNAL MEDICINE

## 2024-04-25 PROCEDURE — 82043 UR ALBUMIN QUANTITATIVE: CPT | Performed by: INTERNAL MEDICINE

## 2024-04-25 PROCEDURE — 1159F MED LIST DOCD IN RCRD: CPT | Mod: CPTII,S$GLB,, | Performed by: INTERNAL MEDICINE

## 2024-04-25 PROCEDURE — 3078F DIAST BP <80 MM HG: CPT | Mod: CPTII,S$GLB,, | Performed by: INTERNAL MEDICINE

## 2024-04-25 PROCEDURE — 3008F BODY MASS INDEX DOCD: CPT | Mod: CPTII,S$GLB,, | Performed by: INTERNAL MEDICINE

## 2024-04-25 PROCEDURE — 99214 OFFICE O/P EST MOD 30 MIN: CPT | Mod: S$GLB,,, | Performed by: INTERNAL MEDICINE

## 2024-04-25 PROCEDURE — 1101F PT FALLS ASSESS-DOCD LE1/YR: CPT | Mod: CPTII,S$GLB,, | Performed by: INTERNAL MEDICINE

## 2024-04-25 RX ORDER — SILDENAFIL 50 MG/1
50 TABLET, FILM COATED ORAL DAILY PRN
Qty: 30 TABLET | Refills: 11 | Status: SHIPPED | OUTPATIENT
Start: 2024-04-25 | End: 2025-04-25

## 2024-04-25 RX ORDER — PRAVASTATIN SODIUM 10 MG/1
10 TABLET ORAL EVERY MORNING
Qty: 90 TABLET | Refills: 3 | Status: SHIPPED | OUTPATIENT
Start: 2024-04-25

## 2024-04-25 RX ORDER — ACETAMINOPHEN 500 MG
5000 TABLET ORAL DAILY
Qty: 90 TABLET | Refills: 3 | Status: SHIPPED | OUTPATIENT
Start: 2024-04-25

## 2024-04-25 RX ORDER — SILODOSIN 4 MG/1
4 CAPSULE ORAL EVERY MORNING
Qty: 90 CAPSULE | Refills: 3 | Status: SHIPPED | OUTPATIENT
Start: 2024-04-25

## 2024-04-25 RX ORDER — LAMOTRIGINE 150 MG/1
300 TABLET ORAL EVERY MORNING
Qty: 180 TABLET | Refills: 3 | Status: SHIPPED | OUTPATIENT
Start: 2024-04-25

## 2024-04-25 RX ORDER — CYANOCOBALAMIN 1000 UG/ML
1000 INJECTION, SOLUTION INTRAMUSCULAR; SUBCUTANEOUS
Qty: 1 ML | Refills: 11 | Status: SHIPPED | OUTPATIENT
Start: 2024-04-25

## 2024-04-25 RX ORDER — LANCETS
1 EACH MISCELLANEOUS
Qty: 200 EACH | Refills: 3 | Status: SHIPPED | OUTPATIENT
Start: 2024-04-25

## 2024-04-25 NOTE — PROGRESS NOTES
INTERNAL MEDICINE CLINIC  Follow-up Visit Progress Note     PRESENTING HISTORY     PCP: Srikanth Son MD    Last Clinic Visit with me:  12-19-23    Current Chief Complaint/Problem:    Chief Complaint   Patient presents with    Annual Exam       History of Present Illness & ROS: Mr. Cristiano Cruz is a 69 y.o. male.    Doing well.  No problem with medication.     Will get B12 injection today.    PAST HISTORY:     Past Medical History:   Diagnosis Date    B12 nutritional deficiency 08/07/2019    COVID-19 12/24/2021    E. coli UTI 01/2019    During hospitalization for seizure    Generalized tonic-clonic seizure 01/26/2019 1-2019 admitted for new-onset seizures.Normal CT head.  His mental status normalized, and he had no recurrent seizures following keppra loading in the ED and twice-daily maintenance upon arrival to the floor. Workup into the etiology of his seizures remained negative. EEG was performed, with the preliminary interpretation being no epileptiform activity with normal background.     Glaucoma     History of hepatitis C, s/p successful Rx w/ SVR12 (cure) - 11/2019 02/08/2019    S/p epclusa    Kidney stone on left side 06/28/2019    Mixed type COPD (chronic obstructive pulmonary disease) 08/15/2019    8-2019 PFT: Normal airflow. Airflow not improved after bronchodilator. Moderate restriction. Diffusion capacity is mildly decreased. Oximetry is normal.    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 Normal CTH, MRI and routine EEG . On keppra 500 mg BID    Pterygium, bilateral 03/12/2019    Type 2 diabetes mellitus with microalbuminuria, without long-term current use of insulin 02/11/2019    Vitamin D insufficiency 07/29/2021       Past Surgical History:   Procedure Laterality Date    CATARACT  EXTRACTION W/  INTRAOCULAR LENS IMPLANT Right 2020    Procedure: EXTRACTION, CATARACT, WITH IOL INSERTION;  Surgeon: Daryl Calloway MD;  Location: [facility];  Service: Ophthalmology;  Laterality: Right;    CATARACT EXTRACTION W/  INTRAOCULAR LENS IMPLANT Left 2020    Procedure: EXTRACTION, CATARACT, WITH IOL INSERTION;  Surgeon: Daryl Calloway MD;  Location: [facility];  Service: Ophthalmology;  Laterality: Left;    HERNIA REPAIR Right     [facility]       Family History   Problem Relation Name Age of Onset    Hypertension Mother      Glaucoma Brother 2     No Known Problems Daughter      Amblyopia Neg Hx      Blindness Neg Hx      Macular degeneration Neg Hx      Retinal detachment Neg Hx      Strabismus Neg Hx      Cataracts Neg Hx         Social History     Socioeconomic History    Marital status:     Number of children: 1   Occupational History    Occupation: Retired   Tobacco Use    Smoking status: Former     Current packs/day: 0.00     Average packs/day: 3.0 packs/day for 25.0 years (75.0 ttl pk-yrs)     Types: Cigarettes     Start date: 1987     Quit date: 2012     Years since quittin.2    Smokeless tobacco: Never   Substance and Sexual Activity    Alcohol use: No    Drug use: Not Currently     Types: Cocaine     Comment: H/O abuse    Sexual activity: Yes     Partners: Female   Social History Narrative     with 1 daughter (42 as of 2019).    Work in [employer] - Retired     Social Determinants of Health     Financial Resource Strain: Low Risk  (2022)    Overall Financial Resource Strain (CARDIA)     Difficulty of Paying Living Expenses: Not hard at all   Food Insecurity: Food Insecurity Present (2022)    Hunger Vital Sign     Worried About Running Out of Food in the Last Year: Sometimes true     Ran Out of Food in the Last Year: Sometimes true   Transportation Needs: No Transportation Needs (2022)    PRAPARE - Transportation      Lack of Transportation (Medical): No     Lack of Transportation (Non-Medical): No   Physical Activity: Sufficiently Active (6/8/2022)    Exercise Vital Sign     Days of Exercise per Week: 7 days     Minutes of Exercise per Session: 60 min   Stress: No Stress Concern Present (6/8/2022)    [employer] - Occupational Stress Questionnaire     Feeling of Stress : Not at all   Social Connections: Socially Integrated (6/8/2022)    Social Connection and Isolation Panel [NHANES]     Frequency of Communication with Friends and Family: More than three times a week     Frequency of Social Gatherings with Friends and Family: Once a week     Attends Sabianism Services: 1 to 4 times per year     Active Member of Clubs or Organizations: Yes     Attends Club or Organization Meetings: 1 to 4 times per year     Marital Status:    Housing Stability: Unknown (6/8/2022)    Housing Stability Vital Sign     Unable to Pay for Housing in the Last Year: No     Unstable Housing in the Last Year: No       MEDICATIONS & ALLERGIES:     Current Outpatient Medications on File Prior to Visit   Medication Sig Dispense Refill    brimonidine 0.2% (ALPHAGAN) 0.2 % Drop Place 1 drop into both eyes 3 (three) times daily. 5 mL 3    [DISCONTINUED] blood sugar diagnostic Strp 1 strip by Misc.(Non-Drug; Combo Route) route 2 (two) times daily before meals. 200 strip 3    [DISCONTINUED] cholecalciferol, vitamin D3, 125 mcg (5,000 unit) Tab Take 1 tablet (5,000 Units total) by mouth once daily. 90 tablet 3    [DISCONTINUED] cyanocobalamin 1,000 mcg/mL injection Inject 1 mL (1,000 mcg total) into the muscle every 30 days. 10 mL 3    [DISCONTINUED] lamoTRIgine (LAMICTAL) 150 MG Tab Take 2 tablets (300 mg total) by mouth every morning. 180 tablet 3    [DISCONTINUED] lancets (LANCETS,ULTRA THIN) Misc 1 application by Misc.(Non-Drug; Combo Route) route 2 (two) times daily before meals. 200 each 3    [DISCONTINUED] needle, disp,  "23 gauge (BD SPECIALTY USE NEEDLES) 23 gauge x 1 1/4" Ndle 1 Dose by Misc.(Non-Drug; Combo Route) route every 30 days. 15 each 1    [DISCONTINUED] pravastatin (PRAVACHOL) 10 MG tablet Take 1 tablet (10 mg total) by mouth every morning. 90 tablet 3    [DISCONTINUED] silodosin (RAPAFLO) 4 mg Cap capsule Take 1 capsule (4 mg total) by mouth every morning. 90 capsule 3    [DISCONTINUED] SITagliptin phosphate (JANUVIA) 25 MG Tab Take 1 tablet (25 mg total) by mouth every morning. 90 tablet 3    [DISCONTINUED] triamcinolone acetonide 0.1% (KENALOG) 0.1 % cream Apply topically 2 (two) times daily. 1 each 0    [DISCONTINUED] sildenafiL (VIAGRA) 50 MG tablet Take 1 tablet (50 mg total) by mouth daily as needed for Erectile Dysfunction. 30 tablet 11     No current facility-administered medications on file prior to visit.        Review of patient's allergies indicates:  No Known Allergies    Medications Reconciliation:   I have reconciled the patient's home medications and discharge medications with the patient/family. I have updated all changes.  Refer to After-Visit Medication List.    OBJECTIVE:     Vital Signs:  Vitals:    04/25/24 1336   BP: 130/74   Pulse: 65     Wt Readings from Last 3 Encounters:   04/25/24 1336 77.8 kg (171 lb 8.3 oz)   12/19/23 1018 79 kg (174 lb 2.6 oz)   06/01/23 0803 79.4 kg (175 lb)     Body mass index is 26.86 kg/m².     Physical Exam:  General: Well developed, well nourished. No distress.  HEENT: Head is normocephalic, atraumatic; ears are normal.    Eyes: Clear conjunctiva.  Neck: Supple, symmetrical neck; trachea midline.  Lungs: Clear to auscultation bilaterally and normal respiratory effort.  Cardiovascular: Heart with regular rate and rhythm.    Extremities: No LE edema.    Abdomen: Abdomen is soft, non-tender non-distended with normal bowel sounds.  Musculoskeletal: Normal gait.   Genital:  Normal penis.    No rash in the genital area.  Scrotum and epididymis normal. No inguinal " hernia.  No inguinal nodes.   Rectal: No perianal lesions or rash. Digital exam: prostate 15 g, smooth; normal rectum.  Lymph Nodes: No cervical, supraclavicular or axillary adenopathy.  Psychiatric: Normal affect. Alert.      Laboratory  Lab Results   Component Value Date    WBC 10.21 03/11/2023    HGB 14.1 03/11/2023    HCT 43.7 03/11/2023     03/11/2023    CHOL 166 04/12/2023    TRIG 109 04/12/2023    HDL 50 04/12/2023    ALT 10 03/11/2023    AST 12 03/11/2023     03/11/2023    K 4.1 03/11/2023     03/11/2023    CREATININE 1.1 03/11/2023    BUN 14 03/11/2023    CO2 24 03/11/2023    TSH 1.533 08/04/2022    PSA 0.11 08/04/2022    INR 1.0 01/02/2022    HGBA1C 6.6 (H) 12/19/2023       ASSESSMENT & PLAN:     Annual physical exam  - Reviewed and updated past and current medical problems.  Discussed treatment of current medical problems    -     Lipid Panel; Future; Expected date: 04/25/2024  -     CBC Auto Differential; Future; Expected date: 04/25/2024  -     Comprehensive Metabolic Panel; Future; Expected date: 04/25/2024  -     TSH; Future; Expected date: 04/25/2024  -     Hemoglobin A1C; Future; Expected date: 04/25/2024  -     PSA, Screening; Future; Expected date: 04/25/2024  -     Vitamin D; Future; Expected date: 10/22/2024  -     Microalbumin/Creatinine Ratio, Urine  -     Vitamin B12; Future; Expected date: 04/25/2024    Type 2 diabetes mellitus with microalbuminuria, without long-term current use of insulin  Aortic atherosclerosis  - A1c 6.6.  He does not change his blood sugar at home. Has glucometer.     -     pravastatin (PRAVACHOL) 10 MG tablet; Take 1 tablet (10 mg total) by mouth once daily.     -     SITagliptin (JANUVIA) 25 MG Tab; Take 1 tablet (25 mg total) by mouth once daily.         Consider ACE-I or ARB in the future. (Avoid polypharmacy due to memory issues)     Mixed COPD  - History of heavy smoking.     8-2019 PFT:  Normal airflow. Airflow not improved after  bronchodilator. Moderate restriction.  Diffusion capacity is mildly decreased. Oximetry is normal.     - No inhaler necessary at present.     B12 Deficiency  Poor memory  - On B12 injection monthly.  - Improved with B12 injection.     -     cyanocobalamin 1,000 mcg/mL injection; Inject 1 mL (1,000 mcg total) into the muscle every 30 days.  ([facility] FOR INJECTION)     Vitamin D insufficiency  -     cholecalciferol, vitamin D3, 125 mcg (5,000 unit) Tab; Take 1 tablet (5,000 Units total) by mouth once daily.        History of hepatitis C, s/p successful Rx w/ SVR12 (cure) - 11/2019    - Screening for liver cancer: AFB and CEA.     Partial idiopathic epilepsy    5-2020: Seizure X 2    7-19-19 Neurology:              - Advised him to go to the ED in case of any seizures              - f/u in clinic in 6-9 months for monitoring recurrence of events and prescriptions              -  if remains seizure-free for up to 2 years, will consider weaning off of AEDs     - On Lamictal 150 mg BID.  -   -     lamoTRIgine (LAMICTAL) 150 MG Tab; Take 2 tablets (300 mg total) by mouth once daily.       Ex-very heavy cigarette smoker (more than 40 per day)  Last CT Chest Lung  8- Negative     -     CT Chest Lung Screening Low Dose; Future; Expected date: 04/25/2024    Benign prostatic hyperplasia with urinary frequency  On silodosin (RAPAFLO) 4 mg Cap capsule; Take 1 capsule (4 mg total) by mouth once daily.    (Has no ejaculation from this, but he is okay with it).     Other male erectile dysfunction  -     sildenafiL (VIAGRA) 50 MG tablet; Take 1 tablet (50 mg total) by mouth daily as needed for Erectile Dysfunction. [facility])     Preventive Health Maintenance:     Up to date     Return to Clinic for Follow Up with me:  6 month.    Scheduled Follow-up :  Future Appointments   Date Time Provider Department Center   5/6/2024  1:30 PM PERIMETJEANNETTE, ARACELIS [facility]   5/6/2024  2:00 PM Ghislaine  "Allegra BLOCK, OD [facility] [place]   10/29/2024  3:30 PM Srikanth Son MD [facility] IM [place] [facility]       After Visit Medication List :     Medication List            Accurate as of April 25, 2024  2:02 PM. If you have any questions, ask your nurse or doctor.                CONTINUE taking these medications      blood sugar diagnostic Strp  1 strip by Misc.(Non-Drug; Combo Route) route 2 (two) times daily before meals.     brimonidine 0.2% 0.2 % Drop  Commonly known as: ALPHAGAN  Place 1 drop into both eyes 3 (three) times daily.     cholecalciferol (vitamin D3) 125 mcg (5,000 unit) Tab  Take 1 tablet (5,000 Units total) by mouth once daily.     cyanocobalamin 1,000 mcg/mL injection  Inject 1 mL (1,000 mcg total) into the muscle every 30 days.     lamoTRIgine 150 MG Tab  Commonly known as: LAMICTAL  Take 2 tablets (300 mg total) by mouth every morning.     lancets Misc  Commonly known as: LANCETS,ULTRA THIN  1 application  by Misc.(Non-Drug; Combo Route) route 2 (two) times daily before meals.     pravastatin 10 MG tablet  Commonly known as: PRAVACHOL  Take 1 tablet (10 mg total) by mouth every morning.     sildenafiL 50 MG tablet  Commonly known as: VIAGRA  Take 1 tablet (50 mg total) by mouth daily as needed for Erectile Dysfunction.     silodosin 4 mg Cap capsule  Commonly known as: RAPAFLO  Take 1 capsule (4 mg total) by mouth every morning.     SITagliptin phosphate 25 MG Tab  Commonly known as: JANUVIA  Take 1 tablet (25 mg total) by mouth every morning.            STOP taking these medications      BD SPECIALTY USE NEEDLES 23 gauge x 1 1/4" Ndle  Generic drug: needle (disp) 23 gauge  Stopped by: Srikanth Son MD     triamcinolone acetonide 0.1% 0.1 % cream  Commonly known as: KENALOG  Stopped by: Srikanth Son MD               Where to Get Your Medications        These medications [facility]  [address] [facility] [address]      Hours: Mon-Fri, 8a-5:30p Phone: " [phone]   cyanocobalamin 1,000 mcg/mL injection  sildenafiL 50 MG tablet       These medications were sent to [id number] - [place], [place] - [address] AT [facility] [address] [place] [address]  [address] [place] [address] [address]      Phone: [phone]   blood sugar diagnostic Strp  cholecalciferol (vitamin D3) 125 mcg (5,000 unit) Tab  lamoTRIgine 150 MG Tab  lancets Misc  pravastatin 10 MG tablet  silodosin 4 mg Cap capsule  SITagliptin phosphate 25 MG Tab         Signing Physician:  Srikanth Son MD

## 2024-04-29 ENCOUNTER — LAB VISIT (OUTPATIENT)
Dept: LAB | Facility: HOSPITAL | Age: 69
End: 2024-04-29
Attending: INTERNAL MEDICINE
Payer: MEDICARE

## 2024-04-29 DIAGNOSIS — R97.8 OTHER ABNORMAL TUMOR MARKERS: ICD-10-CM

## 2024-04-29 DIAGNOSIS — Z00.00 ANNUAL PHYSICAL EXAM: ICD-10-CM

## 2024-04-29 DIAGNOSIS — E53.8 B12 NUTRITIONAL DEFICIENCY: ICD-10-CM

## 2024-04-29 DIAGNOSIS — Z86.19 HISTORY OF HEPATITIS C: ICD-10-CM

## 2024-04-29 DIAGNOSIS — E11.9 TYPE 2 DIABETES MELLITUS WITHOUT COMPLICATION: ICD-10-CM

## 2024-04-29 DIAGNOSIS — E55.9 VITAMIN D INSUFFICIENCY: ICD-10-CM

## 2024-04-29 DIAGNOSIS — E11.29 TYPE 2 DIABETES MELLITUS WITH MICROALBUMINURIA, WITHOUT LONG-TERM CURRENT USE OF INSULIN: ICD-10-CM

## 2024-04-29 DIAGNOSIS — R80.9 TYPE 2 DIABETES MELLITUS WITH MICROALBUMINURIA, WITHOUT LONG-TERM CURRENT USE OF INSULIN: ICD-10-CM

## 2024-04-29 DIAGNOSIS — Z12.5 SCREENING FOR MALIGNANT NEOPLASM OF PROSTATE: ICD-10-CM

## 2024-04-29 LAB
25(OH)D3+25(OH)D2 SERPL-MCNC: 21 NG/ML (ref 30–96)
AFP SERPL-MCNC: 4.9 NG/ML (ref 0–8.4)
ALBUMIN SERPL BCP-MCNC: 4.1 G/DL (ref 3.5–5.2)
ALP SERPL-CCNC: 50 U/L (ref 55–135)
ALT SERPL W/O P-5'-P-CCNC: 16 U/L (ref 10–44)
ANION GAP SERPL CALC-SCNC: 10 MMOL/L (ref 8–16)
AST SERPL-CCNC: 15 U/L (ref 10–40)
BASOPHILS # BLD AUTO: 0.04 K/UL (ref 0–0.2)
BASOPHILS NFR BLD: 0.5 % (ref 0–1.9)
BILIRUB SERPL-MCNC: 0.6 MG/DL (ref 0.1–1)
BUN SERPL-MCNC: 12 MG/DL (ref 8–23)
CALCIUM SERPL-MCNC: 9.3 MG/DL (ref 8.7–10.5)
CEA SERPL-MCNC: 3.9 NG/ML (ref 0–5)
CHLORIDE SERPL-SCNC: 109 MMOL/L (ref 95–110)
CHOLEST SERPL-MCNC: 178 MG/DL (ref 120–199)
CHOLEST SERPL-MCNC: 178 MG/DL (ref 120–199)
CHOLEST/HDLC SERPL: 3.5 {RATIO} (ref 2–5)
CHOLEST/HDLC SERPL: 3.5 {RATIO} (ref 2–5)
CO2 SERPL-SCNC: 22 MMOL/L (ref 23–29)
COMPLEXED PSA SERPL-MCNC: 0.13 NG/ML (ref 0–4)
CREAT SERPL-MCNC: 1 MG/DL (ref 0.5–1.4)
DIFFERENTIAL METHOD BLD: ABNORMAL
EOSINOPHIL # BLD AUTO: 0.1 K/UL (ref 0–0.5)
EOSINOPHIL NFR BLD: 1.5 % (ref 0–8)
ERYTHROCYTE [DISTWIDTH] IN BLOOD BY AUTOMATED COUNT: 15.2 % (ref 11.5–14.5)
EST. GFR  (NO RACE VARIABLE): >60 ML/MIN/1.73 M^2
ESTIMATED AVG GLUCOSE: 134 MG/DL (ref 68–131)
GLUCOSE SERPL-MCNC: 117 MG/DL (ref 70–110)
HBA1C MFR BLD: 6.3 % (ref 4–5.6)
HCT VFR BLD AUTO: 44.5 % (ref 40–54)
HDLC SERPL-MCNC: 51 MG/DL (ref 40–75)
HDLC SERPL-MCNC: 51 MG/DL (ref 40–75)
HDLC SERPL: 28.7 % (ref 20–50)
HDLC SERPL: 28.7 % (ref 20–50)
HGB BLD-MCNC: 14 G/DL (ref 14–18)
IMM GRANULOCYTES # BLD AUTO: 0.02 K/UL (ref 0–0.04)
IMM GRANULOCYTES NFR BLD AUTO: 0.2 % (ref 0–0.5)
LDLC SERPL CALC-MCNC: 114.8 MG/DL (ref 63–159)
LDLC SERPL CALC-MCNC: 114.8 MG/DL (ref 63–159)
LYMPHOCYTES # BLD AUTO: 2.4 K/UL (ref 1–4.8)
LYMPHOCYTES NFR BLD: 28.6 % (ref 18–48)
MCH RBC QN AUTO: 27.6 PG (ref 27–31)
MCHC RBC AUTO-ENTMCNC: 31.5 G/DL (ref 32–36)
MCV RBC AUTO: 88 FL (ref 82–98)
MONOCYTES # BLD AUTO: 0.5 K/UL (ref 0.3–1)
MONOCYTES NFR BLD: 6.2 % (ref 4–15)
NEUTROPHILS # BLD AUTO: 5.3 K/UL (ref 1.8–7.7)
NEUTROPHILS NFR BLD: 63 % (ref 38–73)
NONHDLC SERPL-MCNC: 127 MG/DL
NONHDLC SERPL-MCNC: 127 MG/DL
NRBC BLD-RTO: 0 /100 WBC
PLATELET # BLD AUTO: 171 K/UL (ref 150–450)
PMV BLD AUTO: 11.9 FL (ref 9.2–12.9)
POTASSIUM SERPL-SCNC: 3.9 MMOL/L (ref 3.5–5.1)
PROT SERPL-MCNC: 7.4 G/DL (ref 6–8.4)
RBC # BLD AUTO: 5.07 M/UL (ref 4.6–6.2)
SODIUM SERPL-SCNC: 141 MMOL/L (ref 136–145)
TRIGL SERPL-MCNC: 61 MG/DL (ref 30–150)
TRIGL SERPL-MCNC: 61 MG/DL (ref 30–150)
TSH SERPL DL<=0.005 MIU/L-ACNC: 1.86 UIU/ML (ref 0.4–4)
VIT B12 SERPL-MCNC: 513 PG/ML (ref 210–950)
WBC # BLD AUTO: 8.4 K/UL (ref 3.9–12.7)

## 2024-04-29 PROCEDURE — 82607 VITAMIN B-12: CPT | Performed by: INTERNAL MEDICINE

## 2024-04-29 PROCEDURE — 83036 HEMOGLOBIN GLYCOSYLATED A1C: CPT | Performed by: INTERNAL MEDICINE

## 2024-04-29 PROCEDURE — 82378 CARCINOEMBRYONIC ANTIGEN: CPT | Performed by: INTERNAL MEDICINE

## 2024-04-29 PROCEDURE — 82306 VITAMIN D 25 HYDROXY: CPT | Performed by: INTERNAL MEDICINE

## 2024-04-29 PROCEDURE — 36415 COLL VENOUS BLD VENIPUNCTURE: CPT | Performed by: INTERNAL MEDICINE

## 2024-04-29 PROCEDURE — 80061 LIPID PANEL: CPT | Performed by: INTERNAL MEDICINE

## 2024-04-29 PROCEDURE — 85025 COMPLETE CBC W/AUTO DIFF WBC: CPT | Performed by: INTERNAL MEDICINE

## 2024-04-29 PROCEDURE — 84443 ASSAY THYROID STIM HORMONE: CPT | Performed by: INTERNAL MEDICINE

## 2024-04-29 PROCEDURE — 84153 ASSAY OF PSA TOTAL: CPT | Performed by: INTERNAL MEDICINE

## 2024-04-29 PROCEDURE — 80053 COMPREHEN METABOLIC PANEL: CPT | Performed by: INTERNAL MEDICINE

## 2024-04-29 PROCEDURE — 82105 ALPHA-FETOPROTEIN SERUM: CPT | Performed by: INTERNAL MEDICINE

## 2024-05-03 ENCOUNTER — HOSPITAL ENCOUNTER (OUTPATIENT)
Dept: RADIOLOGY | Facility: HOSPITAL | Age: 69
Discharge: HOME | End: 2024-05-03
Attending: INTERNAL MEDICINE
Payer: MEDICARE

## 2024-05-03 DIAGNOSIS — Z87.891 EX-VERY HEAVY CIGARETTE SMOKER (MORE THAN 40 PER DAY): ICD-10-CM

## 2024-05-03 PROCEDURE — 71271 CT THORAX LUNG CANCER SCR C-: CPT | Mod: TC

## 2024-05-03 PROCEDURE — 71271 CT THORAX LUNG CANCER SCR C-: CPT | Mod: 26,,, | Performed by: STUDENT IN AN ORGANIZED HEALTH CARE EDUCATION/TRAINING PROGRAM

## 2024-05-06 ENCOUNTER — OFFICE VISIT (OUTPATIENT)
Dept: OPTOMETRY | Facility: CLINIC | Age: 69
End: 2024-05-06
Payer: MEDICARE

## 2024-05-06 ENCOUNTER — CLINICAL SUPPORT (OUTPATIENT)
Dept: OPHTHALMOLOGY | Facility: CLINIC | Age: 69
End: 2024-05-06
Payer: MEDICARE

## 2024-05-06 DIAGNOSIS — E11.9 TYPE 2 DIABETES MELLITUS WITHOUT RETINOPATHY: ICD-10-CM

## 2024-05-06 DIAGNOSIS — H04.123 DRY EYE SYNDROME OF BOTH EYES: ICD-10-CM

## 2024-05-06 DIAGNOSIS — H40.1131 PRIMARY OPEN ANGLE GLAUCOMA (POAG) OF BOTH EYES, MILD STAGE: ICD-10-CM

## 2024-05-06 DIAGNOSIS — H52.7 REFRACTIVE ERROR: ICD-10-CM

## 2024-05-06 DIAGNOSIS — Z96.1 PSEUDOPHAKIA OF BOTH EYES: ICD-10-CM

## 2024-05-06 DIAGNOSIS — H40.1131 PRIMARY OPEN ANGLE GLAUCOMA (POAG) OF BOTH EYES, MILD STAGE: Primary | ICD-10-CM

## 2024-05-06 PROCEDURE — 3288F FALL RISK ASSESSMENT DOCD: CPT | Mod: CPTII,S$GLB,, | Performed by: OPTOMETRIST

## 2024-05-06 PROCEDURE — 3044F HG A1C LEVEL LT 7.0%: CPT | Mod: CPTII,S$GLB,, | Performed by: OPTOMETRIST

## 2024-05-06 PROCEDURE — 3066F NEPHROPATHY DOC TX: CPT | Mod: CPTII,S$GLB,, | Performed by: OPTOMETRIST

## 2024-05-06 PROCEDURE — 92014 COMPRE OPH EXAM EST PT 1/>: CPT | Mod: S$GLB,,, | Performed by: OPTOMETRIST

## 2024-05-06 PROCEDURE — 1159F MED LIST DOCD IN RCRD: CPT | Mod: CPTII,S$GLB,, | Performed by: OPTOMETRIST

## 2024-05-06 PROCEDURE — 1126F AMNT PAIN NOTED NONE PRSNT: CPT | Mod: CPTII,S$GLB,, | Performed by: OPTOMETRIST

## 2024-05-06 PROCEDURE — 3061F NEG MICROALBUMINURIA REV: CPT | Mod: CPTII,S$GLB,, | Performed by: OPTOMETRIST

## 2024-05-06 PROCEDURE — 99999 PR PBB SHADOW E&M-EST. PATIENT-LVL II: CPT | Mod: PBBFAC,,, | Performed by: OPTOMETRIST

## 2024-05-06 PROCEDURE — 1101F PT FALLS ASSESS-DOCD LE1/YR: CPT | Mod: CPTII,S$GLB,, | Performed by: OPTOMETRIST

## 2024-05-06 PROCEDURE — 92015 DETERMINE REFRACTIVE STATE: CPT | Mod: S$GLB,,, | Performed by: OPTOMETRIST

## 2024-05-13 NOTE — PROGRESS NOTES
HPI     Glaucoma     Additional comments: Iop chk/hvf rev/oct/dfe           Comments    CC: Patient states that vision seems about the same as last visit in both   eyes. Pt here for annual eye exam as well as IOP check/testing review. He   is not using gtts at this time.     KOURTNEY: 6/2023    (-) Changes in vision   (-) Pain  (-) Irritation   (-) Itching   (-) Flashes  (-) Floaters  (+) Glasses wearer  (-) CL wearer  (-) Uses eye gtts    Does patient want a refraction today? Yes    (-) Eye injury  (-) Eye surgery   (+)POHx, POAG  (-)FOHx    (+)DM  Hemoglobin A1C       Date                     Value               Ref Range             Status                04/29/2024               6.3 (H)             4.0 - 5.6 %           Final                  12/19/2023               6.6 (H)             4.0 - 5.6 %           Final                  04/12/2023               6.5 (H)             4.0 - 5.6 %           Final                          Last edited by Allegra Scanlon, OD on 5/13/2024  9:58 AM.            Assessment /Plan     For exam results, see Encounter Report.    Primary open angle glaucoma (POAG) of both eyes, mild stage    Dry eye syndrome of both eyes    Pseudophakia of both eyes    Type 2 diabetes mellitus without retinopathy      Educated pt on findings. Minimal IOP change with latanoprost so drop was d/c. Switched to rocklatan, but gtt was too expensive. Switched to brimonidine and pt non-compliant with use. No gtt used currently. Timolol contraindicated due to COPD. IOP raised today. Will refer to glaucoma for possible SLT due to poor compliance with gtt use. OCT/HVF performed. See results below. RTC with glaucoma as directed.     RNFL OCT (compared to 12/22)  OD: Borderline RNFL thinning TS (95 --->94) and G (81 ---> 79)  OS: Significant RNFL thinning TI (93 --->91); Borderline RNFL thinning TS (96 ---> 97) and G (75 ---> 77)    24-2 HVF  OD: Reliable testing (FL: 0/10 FP: 1% FN: 0% GHT: ONL VFI: 98%). Roughly WNL.  Few scattered non-specific defects  OS: Reliable testing (FL: 0/10 FP: 0% FN: 2% GHT: ONL VFI: 96%). Superior arc, but improvement since last testing.      2. Educated pt on findings. Recommended ATs TID-QID for added lubrication and comfort. If symptoms worsen or dont improve, RTC. Monitor.      4. PCIOL clear and centered OU. Monitor yearly.      5. No retinopathy noted, OU. Continue proper BS control and annual diabetic eye exams. Monitor yearly.      6. Updated SRx. Monitor yearly.        RTC with glaucoma as directed, prn.

## 2024-06-13 NOTE — ASSESSMENT & PLAN NOTE
- Likely from the stress his seizure episodes  - No history of DM  - HbA1c ordered as add-on   Radhika Forbes

## 2024-07-25 ENCOUNTER — OFFICE VISIT (OUTPATIENT)
Dept: INTERNAL MEDICINE | Facility: CLINIC | Age: 69
End: 2024-07-25
Payer: MEDICARE

## 2024-07-25 VITALS
SYSTOLIC BLOOD PRESSURE: 120 MMHG | HEIGHT: 67 IN | WEIGHT: 170 LBS | DIASTOLIC BLOOD PRESSURE: 70 MMHG | HEART RATE: 77 BPM | BODY MASS INDEX: 26.68 KG/M2 | OXYGEN SATURATION: 96 %

## 2024-07-25 DIAGNOSIS — N40.1 BENIGN PROSTATIC HYPERPLASIA WITH URINARY FREQUENCY: ICD-10-CM

## 2024-07-25 DIAGNOSIS — G44.209 ACUTE NON INTRACTABLE TENSION-TYPE HEADACHE: Primary | ICD-10-CM

## 2024-07-25 DIAGNOSIS — R35.0 BENIGN PROSTATIC HYPERPLASIA WITH URINARY FREQUENCY: ICD-10-CM

## 2024-07-25 PROCEDURE — 1159F MED LIST DOCD IN RCRD: CPT | Mod: CPTII,S$GLB,, | Performed by: INTERNAL MEDICINE

## 2024-07-25 PROCEDURE — 3078F DIAST BP <80 MM HG: CPT | Mod: CPTII,S$GLB,, | Performed by: INTERNAL MEDICINE

## 2024-07-25 PROCEDURE — 99999 PR PBB SHADOW E&M-EST. PATIENT-LVL III: CPT | Mod: PBBFAC,,, | Performed by: INTERNAL MEDICINE

## 2024-07-25 PROCEDURE — 3074F SYST BP LT 130 MM HG: CPT | Mod: CPTII,S$GLB,, | Performed by: INTERNAL MEDICINE

## 2024-07-25 PROCEDURE — 3044F HG A1C LEVEL LT 7.0%: CPT | Mod: CPTII,S$GLB,, | Performed by: INTERNAL MEDICINE

## 2024-07-25 PROCEDURE — 99214 OFFICE O/P EST MOD 30 MIN: CPT | Mod: S$GLB,,, | Performed by: INTERNAL MEDICINE

## 2024-07-25 PROCEDURE — 3008F BODY MASS INDEX DOCD: CPT | Mod: CPTII,S$GLB,, | Performed by: INTERNAL MEDICINE

## 2024-07-25 PROCEDURE — 3066F NEPHROPATHY DOC TX: CPT | Mod: CPTII,S$GLB,, | Performed by: INTERNAL MEDICINE

## 2024-07-25 PROCEDURE — 3061F NEG MICROALBUMINURIA REV: CPT | Mod: CPTII,S$GLB,, | Performed by: INTERNAL MEDICINE

## 2024-07-25 PROCEDURE — 1101F PT FALLS ASSESS-DOCD LE1/YR: CPT | Mod: CPTII,S$GLB,, | Performed by: INTERNAL MEDICINE

## 2024-07-25 PROCEDURE — 3288F FALL RISK ASSESSMENT DOCD: CPT | Mod: CPTII,S$GLB,, | Performed by: INTERNAL MEDICINE

## 2024-07-25 RX ORDER — TAMSULOSIN HYDROCHLORIDE 0.4 MG/1
0.4 CAPSULE ORAL DAILY
Qty: 90 CAPSULE | Refills: 3 | Status: SHIPPED | OUTPATIENT
Start: 2024-07-25

## 2024-07-25 RX ORDER — NAPROXEN 500 MG/1
500 TABLET ORAL 2 TIMES DAILY PRN
Qty: 60 TABLET | Refills: 5 | Status: SHIPPED | OUTPATIENT
Start: 2024-07-25

## 2024-07-25 NOTE — PROGRESS NOTES
INTERNAL MEDICINE CLINIC  Follow-up Visit Progress Note     PRESENTING HISTORY     PCP: Srikanth Son MD    Last Clinic Visit with me:  4- annual    Current Chief Complaint/Problem:    Chief Complaint   Patient presents with    Headache      History of Present Illness & ROS: Mr. Cristiano Cruz is a 69 y.o. male.    Headache for 1-2 week.  Mostly in the occipital area.   Feel more at night.      Took aspirin with some relief.    Increase in stress.    No chest pain or SOB.     No visual changes.    PAST HISTORY:     Past Medical History:   Diagnosis Date    B12 nutritional deficiency 08/07/2019    COVID-19 12/24/2021    E. coli UTI 01/2019    During hospitalization for seizure    Generalized tonic-clonic seizure 01/26/2019 1-2019 admitted for new-onset seizures.Normal CT head.  His mental status normalized, and he had no recurrent seizures following keppra loading in the ED and twice-daily maintenance upon arrival to the floor. Workup into the etiology of his seizures remained negative. EEG was performed, with the preliminary interpretation being no epileptiform activity with normal background.     Glaucoma     History of hepatitis C, s/p successful Rx w/ SVR12 (cure) - 11/2019 02/08/2019    S/p epclusa    Kidney stone on left side 06/28/2019    Mixed type COPD (chronic obstructive pulmonary disease) 08/15/2019    8-2019 PFT: Normal airflow. Airflow not improved after bronchodilator. Moderate restriction. Diffusion capacity is mildly decreased. Oximetry is normal.    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 Normal CTH, MRI and routine EEG . On keppra 500 mg BID    Pterygium, bilateral 03/12/2019    Type 2 diabetes mellitus with microalbuminuria, without long-term current use of insulin 02/11/2019     Vitamin D insufficiency 2021       Past Surgical History:   Procedure Laterality Date    CATARACT EXTRACTION W/  INTRAOCULAR LENS IMPLANT Right 2020    Procedure: EXTRACTION, CATARACT, WITH IOL INSERTION;  Surgeon: Daryl Calloway MD;  Location: [facility];  Service: Ophthalmology;  Laterality: Right;    CATARACT EXTRACTION W/  INTRAOCULAR LENS IMPLANT Left 2020    Procedure: EXTRACTION, CATARACT, WITH IOL INSERTION;  Surgeon: Daryl Calloway MD;  Location: [facility];  Service: Ophthalmology;  Laterality: Left;    HERNIA REPAIR Right     [facility]       Family History   Problem Relation Name Age of Onset    Hypertension Mother      Glaucoma Brother 2     No Known Problems Daughter      Amblyopia Neg Hx      Blindness Neg Hx      Macular degeneration Neg Hx      Retinal detachment Neg Hx      Strabismus Neg Hx      Cataracts Neg Hx         Social History     Socioeconomic History    Marital status:     Number of children: 1   Occupational History    Occupation: Retired   Tobacco Use    Smoking status: Former     Current packs/day: 0.00     Average packs/day: 3.0 packs/day for 25.0 years (75.0 ttl pk-yrs)     Types: Cigarettes     Start date: 1987     Quit date: 2012     Years since quittin.4    Smokeless tobacco: Never   Substance and Sexual Activity    Alcohol use: No    Drug use: Not Currently     Types: Cocaine     Comment: H/O abuse    Sexual activity: Yes     Partners: Female   Social History Narrative     with 1 daughter (42 as of 2019).    Work in [employer] - Retired     Social Determinants of Health     Financial Resource Strain: Low Risk  (2022)    Overall Financial Resource Strain (CARDIA)     Difficulty of Paying Living Expenses: Not hard at all   Food Insecurity: Food Insecurity Present (2022)    Hunger Vital Sign     Worried About Running Out of Food in the Last Year: Sometimes true     Ran Out of Food in the Last  Year: Sometimes true   Transportation Needs: No Transportation Needs (6/8/2022)    PRAPARE - Transportation     Lack of Transportation (Medical): No     Lack of Transportation (Non-Medical): No   Physical Activity: Sufficiently Active (6/8/2022)    Exercise Vital Sign     Days of Exercise per Week: 7 days     Minutes of Exercise per Session: 60 min   Stress: No Stress Concern Present (6/8/2022)    [employer] [place] [employer] - Occupational Stress Questionnaire     Feeling of Stress : Not at all   Housing Stability: Unknown (6/8/2022)    Housing Stability Vital Sign     Unable to Pay for Housing in the Last Year: No     Unstable Housing in the Last Year: No       MEDICATIONS & ALLERGIES:     Current Outpatient Medications on File Prior to Visit   Medication Sig Dispense Refill    blood sugar diagnostic Strp 1 strip by Misc.(Non-Drug; Combo Route) route 2 (two) times daily before meals. 200 strip 3    cholecalciferol, vitamin D3, 125 mcg (5,000 unit) Tab Take 1 tablet (5,000 Units total) by mouth once daily. 90 tablet 3    cyanocobalamin 1,000 mcg/mL injection Inject 1 mL (1,000 mcg total) into the muscle every 30 days. 1 mL 11    lamoTRIgine (LAMICTAL) 150 MG Tab Take 2 tablets (300 mg total) by mouth every morning. 180 tablet 3    lancets (LANCETS,ULTRA THIN) Misc 1 application  by Misc.(Non-Drug; Combo Route) route 2 (two) times daily before meals. 200 each 3    pravastatin (PRAVACHOL) 10 MG tablet Take 1 tablet (10 mg total) by mouth every morning. 90 tablet 3    sildenafiL (VIAGRA) 50 MG tablet Take 1 tablet (50 mg total) by mouth daily as needed for Erectile Dysfunction. 30 tablet 11    SITagliptin phosphate (JANUVIA) 25 MG Tab Take 1 tablet (25 mg total) by mouth every morning. 90 tablet 3    brimonidine 0.2% (ALPHAGAN) 0.2 % Drop Place 1 drop into both eyes 3 (three) times daily. 5 mL 3    [DISCONTINUED] silodosin (RAPAFLO) 4 mg Cap capsule Take 1 capsule (4 mg total) by mouth every morning.  (Patient not taking: Reported on 7/25/2024) 90 capsule 3     No current facility-administered medications on file prior to visit.        Review of patient's allergies indicates:  No Known Allergies    Medications Reconciliation:   I have reconciled the patient's home medications and discharge medications with the patient/family. I have updated all changes.  Refer to After-Visit Medication List.    OBJECTIVE:     Vital Signs:  Vitals:    07/25/24 1320   BP: 120/70   Pulse: 77     Wt Readings from Last 3 Encounters:   07/25/24 1320 77.1 kg (169 lb 15.6 oz)   04/25/24 1336 77.8 kg (171 lb 8.3 oz)   12/19/23 1018 79 kg (174 lb 2.6 oz)     Body mass index is 26.62 kg/m².     Physical Exam:  General: Well developed, well nourished. No distress.  HEENT: Head is normocephalic, atraumatic  Eyes: Clear conjunctiva.  Neck: Supple, symmetrical neck; trachea midline.  Lungs: Clear to auscultation bilaterally and normal respiratory effort.  Cardiovascular: Heart with regular rate and rhythm.    Abdomen: Abdomen is soft, non-tender non-distended with normal bowel sounds.  Skin: Skin color, texture, turgor normal. No rashes.  Musculoskeletal: Normal gait.   Psychiatric: Normal affect. Alert.      Laboratory  Lab Results   Component Value Date    WBC 8.40 04/29/2024    HGB 14.0 04/29/2024    HCT 44.5 04/29/2024     04/29/2024    CHOL 178 04/29/2024    CHOL 178 04/29/2024    TRIG 61 04/29/2024    TRIG 61 04/29/2024    HDL 51 04/29/2024    HDL 51 04/29/2024    ALT 16 04/29/2024    AST 15 04/29/2024     04/29/2024    K 3.9 04/29/2024     04/29/2024    CREATININE 1.0 04/29/2024    BUN 12 04/29/2024    CO2 22 (L) 04/29/2024    TSH 1.856 04/29/2024    PSA 0.13 04/29/2024    INR 1.0 01/02/2022    HGBA1C 6.3 (H) 04/29/2024       ASSESSMENT & PLAN:     Acute non intractable tension-type headache  -     naproxen (NAPROSYN) 500 MG tablet; Take 1 tablet (500 mg total) by mouth 2 (two) times daily as needed (headache).   Dispense: 60 tablet; Refill: 5    Other Medical Problems Reviewed:    Type 2 diabetes mellitus with microalbuminuria, without long-term current use of insulin  Aortic atherosclerosis  - A1c 6.6.  He does not change his blood sugar at home. Has glucometer.     -     pravastatin (PRAVACHOL) 10 MG tablet; Take 1 tablet (10 mg total) by mouth once daily.     -     SITagliptin (JANUVIA) 25 MG Tab; Take 1 tablet (25 mg total) by mouth once daily.         Consider ACE-I or ARB in the future. (Avoid polypharmacy due to memory issues)     Mixed COPD  - History of heavy smoking.     8-2019 PFT:  Normal airflow. Airflow not improved after bronchodilator. Moderate restriction.  Diffusion capacity is mildly decreased. Oximetry is normal.     - No inhaler necessary at present.     B12 Deficiency  Poor memory  - On B12 injection monthly.  - Improved with B12 injection.     -     cyanocobalamin 1,000 mcg/mL injection; Inject 1 mL (1,000 mcg total) into the muscle every 30 days.  ([facility] FOR INJECTION)     Vitamin D insufficiency  -     cholecalciferol, vitamin D3, 125 mcg (5,000 unit) Tab; Take 1 tablet (5,000 Units total) by mouth once daily.        History of hepatitis C, s/p successful Rx w/ SVR12 (cure) - 11/2019     - Screening for liver cancer: AFB and CEA.     Partial idiopathic epilepsy    5-2020: Seizure X 2    7-19-19 Neurology:              - Advised him to go to the ED in case of any seizures              - f/u in clinic in 6-9 months for monitoring recurrence of events and prescriptions              -  if remains seizure-free for up to 2 years, will consider weaning off of AEDs     - On Lamictal 150 mg BID.  -   -     lamoTRIgine (LAMICTAL) 150 MG Tab; Take 2 tablets (300 mg total) by mouth once daily.       Ex-very heavy cigarette smoker (more than 40 per day)  Last CT Chest Lung 5/3/2024 Negative    Benign prostatic hyperplasia with urinary frequency  Change silodosin (RAPAFLO) to Flomax 0.4 mg daily  due to availability issues.  (Has no ejaculation from this, but he is okay with it).     Other male erectile dysfunction  -     sildenafiL (VIAGRA) 50 MG tablet; Take 1 tablet (50 mg total) by mouth daily as needed for Erectile Dysfunction. [facility])     Preventive Health Maintenance:     Up to date     Return to Clinic for Follow Up with me: October.    Scheduled Follow-up :  Future Appointments   Date Time Provider Department Center   7/30/2024  1:40 PM Sussy Cruz MD [facility]   10/29/2024  3:30 PM Srikanth Son MD [facility]       After Visit Medication List :     Medication List            Accurate as of July 25, 2024  1:38 PM. If you have any questions, ask your nurse or doctor.                START taking these medications      naproxen 500 MG tablet  Commonly known as: NAPROSYN  Take 1 tablet (500 mg total) by mouth 2 (two) times daily as needed (headache).  Started by: Srikanth Son MD     tamsulosin 0.4 mg Cap  Commonly known as: FLOMAX  Take 1 capsule (0.4 mg total) by mouth once daily.  Started by: Srikanth Son MD            CONTINUE taking these medications      blood sugar diagnostic Strp  1 strip by Misc.(Non-Drug; Combo Route) route 2 (two) times daily before meals.     brimonidine 0.2% 0.2 % Drop  Commonly known as: ALPHAGAN  Place 1 drop into both eyes 3 (three) times daily.     cholecalciferol (vitamin D3) 125 mcg (5,000 unit) Tab  Take 1 tablet (5,000 Units total) by mouth once daily.     cyanocobalamin 1,000 mcg/mL injection  Inject 1 mL (1,000 mcg total) into the muscle every 30 days.     lamoTRIgine 150 MG Tab  Commonly known as: LAMICTAL  Take 2 tablets (300 mg total) by mouth every morning.     lancets Misc  Commonly known as: LANCETS,ULTRA THIN  1 application  by Misc.(Non-Drug; Combo Route) route 2 (two) times daily before meals.     pravastatin 10 MG tablet  Commonly known as: PRAVACHOL  Take 1 tablet (10 mg total) by mouth every morning.      sildenafiL 50 MG tablet  Commonly known as: VIAGRA  Take 1 tablet (50 mg total) by mouth daily as needed for Erectile Dysfunction.     SITagliptin phosphate 25 MG Tab  Commonly known as: JANUVIA  Take 1 tablet (25 mg total) by mouth every morning.            STOP taking these medications      silodosin 4 mg Cap capsule  Commonly known as: RAPAFLO  Stopped by: Srikanth Son MD               Where to Get Your Medications        These medications were sent to [id number] - [place], [place] - [facility] [place] [address] [facility] [place] [address] [place] [address]      Phone: [phone]   naproxen 500 MG tablet  tamsulosin 0.4 mg Cap         Signing Physician:  Srikanth Son MD

## 2024-11-22 ENCOUNTER — OFFICE VISIT (OUTPATIENT)
Dept: INTERNAL MEDICINE | Facility: CLINIC | Age: 69
End: 2024-11-22
Payer: MEDICARE

## 2024-11-22 ENCOUNTER — LAB VISIT (OUTPATIENT)
Dept: LAB | Facility: HOSPITAL | Age: 69
End: 2024-11-22
Attending: INTERNAL MEDICINE
Payer: MEDICARE

## 2024-11-22 ENCOUNTER — IMMUNIZATION (OUTPATIENT)
Dept: INTERNAL MEDICINE | Facility: CLINIC | Age: 69
End: 2024-11-22
Payer: MEDICARE

## 2024-11-22 VITALS
SYSTOLIC BLOOD PRESSURE: 134 MMHG | HEART RATE: 88 BPM | OXYGEN SATURATION: 99 % | HEIGHT: 67 IN | BODY MASS INDEX: 27.2 KG/M2 | DIASTOLIC BLOOD PRESSURE: 82 MMHG | WEIGHT: 173.31 LBS

## 2024-11-22 DIAGNOSIS — R41.3 POOR MEMORY: ICD-10-CM

## 2024-11-22 DIAGNOSIS — E11.29 TYPE 2 DIABETES MELLITUS WITH MICROALBUMINURIA, WITHOUT LONG-TERM CURRENT USE OF INSULIN: ICD-10-CM

## 2024-11-22 DIAGNOSIS — G40.009 PARTIAL IDIOPATHIC EPILEPSY WITH SEIZURES OF LOCALIZED ONSET, NOT INTRACTABLE, WITHOUT STATUS EPILEPTICUS: ICD-10-CM

## 2024-11-22 DIAGNOSIS — Z86.19 HISTORY OF HEPATITIS C: ICD-10-CM

## 2024-11-22 DIAGNOSIS — N52.8 OTHER MALE ERECTILE DYSFUNCTION: ICD-10-CM

## 2024-11-22 DIAGNOSIS — E11.29 TYPE 2 DIABETES MELLITUS WITH MICROALBUMINURIA, WITHOUT LONG-TERM CURRENT USE OF INSULIN: Primary | ICD-10-CM

## 2024-11-22 DIAGNOSIS — I70.0 AORTIC ATHEROSCLEROSIS: ICD-10-CM

## 2024-11-22 DIAGNOSIS — R80.9 TYPE 2 DIABETES MELLITUS WITH MICROALBUMINURIA, WITHOUT LONG-TERM CURRENT USE OF INSULIN: ICD-10-CM

## 2024-11-22 DIAGNOSIS — R80.9 TYPE 2 DIABETES MELLITUS WITH MICROALBUMINURIA, WITHOUT LONG-TERM CURRENT USE OF INSULIN: Primary | ICD-10-CM

## 2024-11-22 DIAGNOSIS — Z87.891 EX-VERY HEAVY CIGARETTE SMOKER (MORE THAN 40 PER DAY): ICD-10-CM

## 2024-11-22 DIAGNOSIS — Z23 NEED FOR VACCINATION: Primary | ICD-10-CM

## 2024-11-22 DIAGNOSIS — E53.8 B12 NUTRITIONAL DEFICIENCY: ICD-10-CM

## 2024-11-22 DIAGNOSIS — R35.0 BENIGN PROSTATIC HYPERPLASIA WITH URINARY FREQUENCY: ICD-10-CM

## 2024-11-22 DIAGNOSIS — J44.9 MIXED TYPE COPD (CHRONIC OBSTRUCTIVE PULMONARY DISEASE): ICD-10-CM

## 2024-11-22 DIAGNOSIS — N40.1 BENIGN PROSTATIC HYPERPLASIA WITH URINARY FREQUENCY: ICD-10-CM

## 2024-11-22 DIAGNOSIS — E55.9 VITAMIN D INSUFFICIENCY: ICD-10-CM

## 2024-11-22 PROBLEM — H04.123 BILATERAL DRY EYES: Status: RESOLVED | Noted: 2019-03-12 | Resolved: 2024-11-22

## 2024-11-22 LAB
ALBUMIN SERPL BCP-MCNC: 4.2 G/DL (ref 3.5–5.2)
ALP SERPL-CCNC: 46 U/L (ref 40–150)
ALT SERPL W/O P-5'-P-CCNC: 10 U/L (ref 10–44)
ANION GAP SERPL CALC-SCNC: 8 MMOL/L (ref 8–16)
AST SERPL-CCNC: 13 U/L (ref 10–40)
BILIRUB SERPL-MCNC: 0.4 MG/DL (ref 0.1–1)
BUN SERPL-MCNC: 7 MG/DL (ref 8–23)
CALCIUM SERPL-MCNC: 9.3 MG/DL (ref 8.7–10.5)
CHLORIDE SERPL-SCNC: 109 MMOL/L (ref 95–110)
CO2 SERPL-SCNC: 26 MMOL/L (ref 23–29)
CREAT SERPL-MCNC: 1.2 MG/DL (ref 0.5–1.4)
EST. GFR  (NO RACE VARIABLE): >60 ML/MIN/1.73 M^2
ESTIMATED AVG GLUCOSE: 126 MG/DL (ref 68–131)
GLUCOSE SERPL-MCNC: 120 MG/DL (ref 70–110)
HBA1C MFR BLD: 6 % (ref 4–5.6)
POTASSIUM SERPL-SCNC: 4.2 MMOL/L (ref 3.5–5.1)
PROT SERPL-MCNC: 7.3 G/DL (ref 6–8.4)
SODIUM SERPL-SCNC: 143 MMOL/L (ref 136–145)

## 2024-11-22 PROCEDURE — 83036 HEMOGLOBIN GLYCOSYLATED A1C: CPT | Performed by: INTERNAL MEDICINE

## 2024-11-22 PROCEDURE — 99999 PR PBB SHADOW E&M-EST. PATIENT-LVL III: CPT | Mod: PBBFAC,,, | Performed by: INTERNAL MEDICINE

## 2024-11-22 PROCEDURE — 80053 COMPREHEN METABOLIC PANEL: CPT | Performed by: INTERNAL MEDICINE

## 2024-11-22 PROCEDURE — 36415 COLL VENOUS BLD VENIPUNCTURE: CPT | Performed by: INTERNAL MEDICINE

## 2024-11-22 NOTE — PROGRESS NOTES
INTERNAL MEDICINE CLINIC  Follow-up Visit Progress Note     PRESENTING HISTORY     PCP: Srikanth Son MD    Last Clinic Visit with me: 4/17 and 7/25/24    Current Chief Complaint/Problem:    Chief Complaint   Patient presents with    Follow-up      History of Present Illness & ROS: Mr. Cristiano Cruz is a 69 y.o. male.    Review of Systems:  Review of Systems   Constitutional:  Negative for fever and weight loss.   Respiratory:  Negative for shortness of breath.    Cardiovascular:  Negative for chest pain.   Gastrointestinal:  Negative for abdominal pain.   Neurological:  Negative for headaches.   Psychiatric/Behavioral:  Negative for depression.      PAST HISTORY:     Past Medical History:   Diagnosis Date    B12 nutritional deficiency 08/07/2019    Bilateral dry eyes 03/12/2019    COVID-19 12/24/2021    E. coli UTI 01/2019    During hospitalization for seizure    Generalized tonic-clonic seizure 01/26/2019 1-2019 admitted for new-onset seizures.Normal CT head.  His mental status normalized, and he had no recurrent seizures following keppra loading in the ED and twice-daily maintenance upon arrival to the floor. Workup into the etiology of his seizures remained negative. EEG was performed, with the preliminary interpretation being no epileptiform activity with normal background.     Glaucoma     History of hepatitis C, s/p successful Rx w/ SVR12 (cure) - 11/2019 02/08/2019    S/p epclusa    Kidney stone on left side 06/28/2019    Mixed type COPD (chronic obstructive pulmonary disease) 08/15/2019    8-2019 PFT: Normal airflow. Airflow not improved after bronchodilator. Moderate restriction. Diffusion capacity is mildly decreased. Oximetry is normal.    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 Normal CTH, MRI and routine EEG . On keppra 500 mg BID    Pterygium, bilateral 2019    Type 2 diabetes mellitus with microalbuminuria, without long-term current use of insulin 2019    Vitamin D insufficiency 2021       Past Surgical History:   Procedure Laterality Date    CATARACT EXTRACTION W/  INTRAOCULAR LENS IMPLANT Right 2020    Procedure: EXTRACTION, CATARACT, WITH IOL INSERTION;  Surgeon: Daryl Calloway MD;  Location: [facility];  Service: Ophthalmology;  Laterality: Right;    CATARACT EXTRACTION W/  INTRAOCULAR LENS IMPLANT Left 2020    Procedure: EXTRACTION, CATARACT, WITH IOL INSERTION;  Surgeon: Daryl Calloway MD;  Location: [facility] OR;  Service: Ophthalmology;  Laterality: Left;    HERNIA REPAIR Right     [facility]       Family History   Problem Relation Name Age of Onset    Hypertension Mother      Glaucoma Brother 2     No Known Problems Daughter      Amblyopia Neg Hx      Blindness Neg Hx      Macular degeneration Neg Hx      Retinal detachment Neg Hx      Strabismus Neg Hx      Cataracts Neg Hx         Social History     Socioeconomic History    Marital status:     Number of children: 1   Occupational History    Occupation: Retired   Tobacco Use    Smoking status: Former     Current packs/day: 0.00     Average packs/day: 3.0 packs/day for 25.0 years (75.0 ttl pk-yrs)     Types: Cigarettes     Start date: 1987     Quit date: 2012     Years since quittin.8    Smokeless tobacco: Never   Substance and Sexual Activity    Alcohol use: No    Drug use: Not Currently     Types: Cocaine     Comment: H/O abuse    Sexual activity: Yes     Partners: Female   Social History Narrative     with 1 daughter (42 as of ).    Work in [employer] - Retired     Social Drivers of Health     Financial Resource Strain: Low Risk  (2022)    Overall Financial Resource Strain (CARDIA)      Difficulty of Paying Living Expenses: Not hard at all   Food Insecurity: Food Insecurity Present (6/8/2022)    Hunger Vital Sign     Worried About Running Out of Food in the Last Year: Sometimes true     Ran Out of Food in the Last Year: Sometimes true   Transportation Needs: No Transportation Needs (6/8/2022)    PRAPARE - Transportation     Lack of Transportation (Medical): No     Lack of Transportation (Non-Medical): No   Physical Activity: Sufficiently Active (6/8/2022)    Exercise Vital Sign     Days of Exercise per Week: 7 days     Minutes of Exercise per Session: 60 min   Stress: No Stress Concern Present (6/8/2022)    [employer] [place] [employer] - Occupational Stress Questionnaire     Feeling of Stress : Not at all   Housing Stability: Unknown (6/8/2022)    Housing Stability Vital Sign     Unable to Pay for Housing in the Last Year: No     Unstable Housing in the Last Year: No       MEDICATIONS & ALLERGIES:     Current Outpatient Medications on File Prior to Visit   Medication Sig Dispense Refill    blood sugar diagnostic Strp 1 strip by Misc.(Non-Drug; Combo Route) route 2 (two) times daily before meals. 200 strip 3    brimonidine 0.2% (ALPHAGAN) 0.2 % Drop Place 1 drop into both eyes 3 (three) times daily. 5 mL 3    cholecalciferol, vitamin D3, 125 mcg (5,000 unit) Tab Take 1 tablet (5,000 Units total) by mouth once daily. 90 tablet 3    cyanocobalamin 1,000 mcg/mL injection Inject 1 mL (1,000 mcg total) into the muscle every 30 days. 1 mL 11    lamoTRIgine (LAMICTAL) 150 MG Tab Take 2 tablets (300 mg total) by mouth every morning. 180 tablet 3    lancets (LANCETS,ULTRA THIN) Misc 1 application  by Misc.(Non-Drug; Combo Route) route 2 (two) times daily before meals. 200 each 3    naproxen (NAPROSYN) 500 MG tablet Take 1 tablet (500 mg total) by mouth 2 (two) times daily as needed (headache). 60 tablet 5    pravastatin (PRAVACHOL) 10 MG tablet Take 1 tablet (10 mg total) by mouth every morning.  90 tablet 3    sildenafiL (VIAGRA) 50 MG tablet Take 1 tablet (50 mg total) by mouth daily as needed for Erectile Dysfunction. 30 tablet 11    SITagliptin phosphate (JANUVIA) 25 MG Tab Take 1 tablet (25 mg total) by mouth every morning. 90 tablet 3    tamsulosin (FLOMAX) 0.4 mg Cap Take 1 capsule (0.4 mg total) by mouth once daily. 90 capsule 3     No current facility-administered medications on file prior to visit.        Review of patient's allergies indicates:  No Known Allergies    Medications Reconciliation:   I have reconciled the patient's home medications and discharge medications with the patient/family. I have updated all changes.  Refer to After-Visit Medication List.    OBJECTIVE:     Vital Signs:  Vitals:    11/22/24 0939   BP: 134/82   Pulse: 88     Wt Readings from Last 3 Encounters:   11/22/24 0939 78.6 kg (173 lb 4.5 oz)   07/25/24 1320 77.1 kg (169 lb 15.6 oz)   04/25/24 1336 77.8 kg (171 lb 8.3 oz)     Body mass index is 27.14 kg/m².     Physical Exam:  General: Well developed, well nourished. No distress.  HEENT: Head is normocephalic, atraumatic  Eyes: Clear conjunctiva.  Neck: Supple, symmetrical neck; trachea midline.  Lungs: Clear to auscultation bilaterally and normal respiratory effort.  Cardiovascular: Heart with regular rate and rhythm.    Extremities: No LE edema. Pulses 2+ and symmetric.   Abdomen: Abdomen is soft, non-tender non-distended with normal bowel sounds.  Skin: Skin color, texture, turgor normal. No rashes.  Musculoskeletal: Normal gait.   Psychiatric: Normal affect. Alert.    Laboratory  Lab Results   Component Value Date    WBC 8.40 04/29/2024    HGB 14.0 04/29/2024    HCT 44.5 04/29/2024     04/29/2024    CHOL 178 04/29/2024    CHOL 178 04/29/2024    TRIG 61 04/29/2024    TRIG 61 04/29/2024    HDL 51 04/29/2024    HDL 51 04/29/2024    ALT 16 04/29/2024    AST 15 04/29/2024     04/29/2024    K 3.9 04/29/2024     04/29/2024    CREATININE 1.0 04/29/2024     BUN 12 04/29/2024    CO2 22 (L) 04/29/2024    TSH 1.856 04/29/2024    PSA 0.13 04/29/2024    INR 1.0 01/02/2022    HGBA1C 6.3 (H) 04/29/2024       ASSESSMENT & PLAN:     Type 2 diabetes mellitus with microalbuminuria, without long-term current use of insulin  Aortic atherosclerosis  - A1c 6.6.  He does not change his blood sugar at home. Has glucometer.     -     pravastatin (PRAVACHOL) 10 MG tablet; Take 1 tablet (10 mg total) by mouth once daily.     -     SITagliptin (JANUVIA) 25 MG Tab; Take 1 tablet (25 mg total) by mouth once daily.         Consider ACE-I or ARB in the future. (Avoid polypharmacy due to memory issues)    -     Hemoglobin A1C; Future; Expected date: 11/22/2024     Mixed COPD  - History of heavy smoking.     8-2019 PFT:  Normal airflow. Airflow not improved after bronchodilator. Moderate restriction.  Diffusion capacity is mildly decreased. Oximetry is normal.     - No inhaler necessary at present.     B12 Deficiency  Poor memory  - On B12 injection monthly.  - Improved with B12 injection.     -     cyanocobalamin 1,000 mcg/mL injection; Inject 1 mL (1,000 mcg total) into the muscle every 30 days.  ([facility] FOR INJECTION)     Vitamin D insufficiency  -     cholecalciferol, vitamin D3, 125 mcg (5,000 unit) Tab; Take 1 tablet (5,000 Units total) by mouth once daily.        History of hepatitis C, s/p successful Rx w/ SVR12 (cure) - 11/2019     - Screening for liver cancer: AFB and CEA.     Partial idiopathic epilepsy    5-2020: Seizure X 2    7-19-19 Neurology:              - Advised him to go to the ED in case of any seizures              - f/u in clinic in 6-9 months for monitoring recurrence of events and prescriptions              -  if remains seizure-free for up to 2 years, will consider weaning off of AEDs     - On Lamictal 150 mg BID.  -   -     lamoTRIgine (LAMICTAL) 150 MG Tab; Take 2 tablets (300 mg total) by mouth once daily.       Ex-very heavy cigarette smoker (more  than 40 per day)  Last CT Chest Lung 5/3/2024 Negative     Benign prostatic hyperplasia with urinary frequency  Change silodosin (RAPAFLO) to Flomax 0.4 mg daily due to availability issues.  (Has no ejaculation from this, but he is okay with it).     Other male erectile dysfunction  -     sildenafiL (VIAGRA) 50 MG tablet; Take 1 tablet (50 mg total) by mouth daily as needed for Erectile Dysfunction. [facility])     Preventive Health Maintenance:    Flu vaccine and COVID vaccine today.     Return to Clinic for Follow Up with me: April 2025 for annual    Scheduled Follow-up :  Future Appointments   Date Time Provider Department Center   11/22/2024 10:00 AM Srikanth Son MD [facility]   11/22/2024 10:30 AM FLU, IN CLINIC INTERNAL MEDICINE [facility]   2/18/2025  8:15 AM PERIMETRY, [facility]   2/18/2025  9:00 AM Sussy Curz MD [facility]   4/16/2025 10:00 AM Srikanth Son MD [facility]       After Visit Medication List :     Medication List            Accurate as of November 22, 2024  9:52 AM. If you have any questions, ask your nurse or doctor.                CONTINUE taking these medications      blood sugar diagnostic Strp  1 strip by Misc.(Non-Drug; Combo Route) route 2 (two) times daily before meals.     brimonidine 0.2% 0.2 % Drop  Commonly known as: ALPHAGAN  Place 1 drop into both eyes 3 (three) times daily.     cholecalciferol (vitamin D3) 125 mcg (5,000 unit) Tab  Take 1 tablet (5,000 Units total) by mouth once daily.     cyanocobalamin 1,000 mcg/mL injection  Inject 1 mL (1,000 mcg total) into the muscle every 30 days.     lamoTRIgine 150 MG Tab  Commonly known as: LAMICTAL  Take 2 tablets (300 mg total) by mouth every morning.     lancets Misc  Commonly known as: LANCETS,ULTRA THIN  1 application  by Misc.(Non-Drug; Combo Route) route 2 (two) times daily before meals.     naproxen 500 MG tablet  Commonly known as: NAPROSYN  Take  1 tablet (500 mg total) by mouth 2 (two) times daily as needed (headache).     pravastatin 10 MG tablet  Commonly known as: PRAVACHOL  Take 1 tablet (10 mg total) by mouth every morning.     sildenafiL 50 MG tablet  Commonly known as: VIAGRA  Take 1 tablet (50 mg total) by mouth daily as needed for Erectile Dysfunction.     SITagliptin phosphate 25 MG Tab  Commonly known as: JANUVIA  Take 1 tablet (25 mg total) by mouth every morning.     tamsulosin 0.4 mg Cap  Commonly known as: FLOMAX  Take 1 capsule (0.4 mg total) by mouth once daily.              Signing Physician:  Srikanth Son MD

## 2024-12-20 NOTE — TELEPHONE ENCOUNTER
[employer] was given to patient on 12/20/2019 3:40 PM          MALE ANNUAL EXAM NOTE        CHIEF COMPLAINT:    Chief Complaint   Patient presents with    Physical    Hyperlipidemia    Sleep Problem     Obstructive sleep apnea         History of Present Illness  The patient presents for evaluation of sleep issues, cholesterol management, allergy management, and health maintenance.    He reports a general sense of well-being with no significant complaints at present. He has been experiencing sleep disturbances, averaging 6 to 6.5 hours of sleep per night, and expresses a desire to increase this duration. He has been utilizing a CPAP machine nightly, which he finds beneficial. A recent sleep study was conducted last month.    He takes antihistamines as needed during allergy season, typically starting at the end of July. This year, he did not require much use of antihistamines.    He takes vitamins intermittently when he remembers to do so.    He has had two inguinal hernias repaired with mesh. He has been regular with his colonoscopies but can not remember when the last one was done. It was done at the [facility] by Dr. Sánchez. He has had at least two colonoscopies done.    Supplemental Information  He had a fracture of his foot.      MEDICAL HISTORY      Past Medical History:   Diagnosis Date    Hyperlipidemia     TEMI (obstructive sleep apnea)        SURGICAL HISTORY      Past Surgical History:   Procedure Laterality Date    Repair ing hernia,3+y/o,reducibl         SOCIAL HISTORY      Social History     Tobacco Use    Smoking status: Never    Smokeless tobacco: Never   Substance Use Topics    Alcohol use: Yes     Alcohol/week: 1.0 - 2.0 standard drink of alcohol     Types: 1 - 2 Glasses of wine per week     Comment: socially    Drug use: Never       FAMILY HISTORY      Family History   Problem Relation Age of Onset    Heart disease Mother     Lung Disease Father     Cancer, Breast Sister        MEDICATIONS      Current Outpatient Medications   Medication Sig    atorvastatin  (LIPITOR) 20 MG tablet Take 20 mg by mouth daily.    Loratadine 10 MG Cap Take 10 mg by mouth 1 day a week as needed (prn hay fever).     No current facility-administered medications for this visit.       ALLERGIES      ALLERGIES:  No Known Allergies    HEALTHCARE PROVIDERS    Patient Care Team:  Saad Wells MD as PCP - General (Internal Medicine)    REVIEW OF SYSTEMS      Constitutional:  Denies fevers, chills, weakness, fatigue, loss of appetite, abnormal weight gain or abnormal weight loss.    Eyes:  Denies blindness, blurred vision, double vision, pain, itching or burning.    HENT:  Denies facial pain, ear pain, hearing loss, tinnitus, nasal congestion, rhinorrhea, epistaxis, sinus pain, mouth lesions or sore throat.    Respiratory:  Denies shortness of breath, cough, sputum production, hemoptysis or wheezing.    Cardiovascular:  Denies chest pains, palpitations, tachycardia, edema, cyanosis or vertigo.    Gastrointestinal:  Denies abdominal pain, heartburn, nausea, vomiting, diarrhea, constipation or blood in stool.    Musculoskeletal:  Denies back pain, joint pain, joint swelling or tenderness, muscle pains or spasms.  Denies neck pain, stiffness or swelling.    Neurologic:  Denies numbness, tingling, other sensory changes, sudden weakness in arms or legs.  Denies confusion, headache, dizziness, memory loss or tremors.    Genitourinary:  Denies urinary frequency, nocturia, urgency, incontinence, dysuria or hematuria.    Hematologic/Lymphatic:  Denies easy bruising or bleeding, swollen lymph glands.    Endocrine:  Denies heat or cold intolerance, polydipsia or polyuria.  Denies changes in hair or skin texture.    Integument:  Denies new rashes or lesions, pruritus or dryness of skin.    Psychiatric:  Denies anxiety, depression, hallucinations, irritability or sleeping problems.   Allergic/Immunologic:  Denies recurrent infections, hypersensitivity.      All other Review of Systems negative.      PHYSICAL  EXAMINATION      Vital Signs:    Vitals:    12/20/24 0932   BP: 130/76   BP Location: LUE - Left upper extremity   Patient Position: Sitting   Cuff Size: Large Adult   Pulse: 71   Resp: 16   Temp: 97.4 °F (36.3 °C)   TempSrc: Temporal   SpO2: 95%   Weight: 90.9 kg (200 lb 6.4 oz)   Height: 5' 7\" (1.702 m)     General:  Well developed, well nourished. In no apparent distress.    Eyes:  PERRL(Pupils equal, round, reactive to light), EOMI(Extraocular movements intact). Conjunctivae pink. Sclerae anicteric.    Head, Ears, Nose, Throat:  Normocephalic, atraumatic. Bilateral external ears are normal. Mucosal membranes moist. External nose is normal. Oropharynx is clear.    Neck:  Supple. Nontender. Normal range of motion. No masses. No thyromegaly.  Trachea midline.  Respiratory:  Normal respiratory effort. No chest wall tenderness. Lungs clear to auscultation bilaterally. Symmetrical chest expansion.    Cardiovascular:  Regular rate and rhythm. No murmurs, rubs or gallops. Normal S1 and S2. No S3 or S4. No JVD(jugular venous distention). No carotid bruits. Good dorsalis pedis pulses bilaterally. No peripheral edema.   Gastrointestinal:  Soft. Nontender. Nondistended. Normal bowel sounds. No pulsatile or other abdominal masses. No hepatosplenomegaly or splenomegaly.    Genitourinary: No hernias normal testicular palpation  Musculoskeletal:  No clubbing or cyanosis. Full range of motion in all 4 extremities proximal and distal. No joint swelling or tenderness in right and left shoulders, elbows, wrists and fingers. No joint swelling or tenderness in left and right knees, ankles and toes.  Neurologic:  Alert and oriented times 3. Gait is normal. Normal sensory function. No motor deficits in all 4 extremities. Cranial nerves II-XII intact. Symmetrical bilateral knee deep tendon reflexes.  Negative Babinski.    Integumentary:  Warm. Dry. Pink. No rashes or lesions. No wounds.    Lymphatic:  No lymphadenopathy in submental,  submandibular or cervical chain. No supraclavicular or infraclavicular lymphadenopathy. No axillary or inguinal/groin lymphadenopathy.    Psychiatric: Cooperative. Appropriate mood and affect. Normal judgment.     Recent PHQ 2/9 Score    PHQ 2:  PHQ 2 Score Adult PHQ 2 Score Adult PHQ 2 Interpretation Little interest or pleasure in activity?   12/20/2024   9:38 AM 0 No further screening needed 0       PHQ 9:         RESULTS      Pertinent labs and imaging studies reviewed.      ASSESSMENT AND PLAN      1. Physical exam, routine  Examination today was normal. He was advised about exercise and physical activity, diet and weight control, prevention of skin cancer and use of sunscreens, intake of vitamin D3 regularly, drinking alcohol in moderation and avoiding smoking and other healthy lifestyle changes. Printed handouts were also provided.  His PSA level is normal, and a prostate exam showed no abnormalities. His electrolytes and kidney functions are normal. He has had regular colonoscopies, with the last one performed on 12/23/2023. He is advised to take 2000 units of vitamin D3 daily and consider a multivitamin like Centrum or One A Day.  - Comprehensive Metabolic Panel; Future  - Lipid Panel With Reflex; Future  - PSA; Future    2. Hyperlipidemia, unspecified hyperlipidemia type  Patient currently on Lipitor 20 mg 1 tab daily and his lipid panel came back good and we will continue with the same medications.  - Lipid Panel With Reflex; Future    3. TEMI (obstructive sleep apnea)  CPAP data was reviewed a few weeks ago.  Patient had an elevated AHI with central events.  We did a CPAP/BiPAP titration study and we did not see any improvement with the BiPAP auto BiPAP-ST device or have recommended that he continues to use his CPAP at the current settings.            Orders Placed This Encounter    Comprehensive Metabolic Panel    Lipid Panel With Reflex    PSA         Saad eWlls MD

## 2025-02-18 ENCOUNTER — CLINICAL SUPPORT (OUTPATIENT)
Dept: OPHTHALMOLOGY | Facility: CLINIC | Age: 70
End: 2025-02-18
Payer: MEDICARE

## 2025-02-18 ENCOUNTER — OFFICE VISIT (OUTPATIENT)
Dept: OPHTHALMOLOGY | Facility: CLINIC | Age: 70
End: 2025-02-18
Payer: MEDICARE

## 2025-02-18 DIAGNOSIS — H40.1121 PRIMARY OPEN ANGLE GLAUCOMA (POAG) OF LEFT EYE, MILD STAGE: ICD-10-CM

## 2025-02-18 DIAGNOSIS — H40.1111 PRIMARY OPEN ANGLE GLAUCOMA (POAG) OF RIGHT EYE, MILD STAGE: Primary | ICD-10-CM

## 2025-02-18 DIAGNOSIS — R80.9 TYPE 2 DIABETES MELLITUS WITH MICROALBUMINURIA, WITHOUT LONG-TERM CURRENT USE OF INSULIN: ICD-10-CM

## 2025-02-18 DIAGNOSIS — Z96.1 PSEUDOPHAKIA OF BOTH EYES: ICD-10-CM

## 2025-02-18 DIAGNOSIS — E11.29 TYPE 2 DIABETES MELLITUS WITH MICROALBUMINURIA, WITHOUT LONG-TERM CURRENT USE OF INSULIN: ICD-10-CM

## 2025-02-18 DIAGNOSIS — H04.123 DRY EYE SYNDROME OF BOTH EYES: ICD-10-CM

## 2025-02-18 NOTE — PROGRESS NOTES
OCT DONE OU      UNABLE TO GET BMO SCAN DO TO A LOT OF EYE MOVEMENT   RNFL SCAN OU     24-2  SF DONE OU     REL & FIX -  FAIR OU     COOP=      FAIR     PATIENT HAS NO ALLERGIES TO LATEX OR ADHESIVES AT THIS TIME  PATIENT HAD SOME FIXATION LOSS WITH OU    JTHOMAS    MRX    PL + .50 X 005   OD    PL + 1.00 X 115   OS

## 2025-02-18 NOTE — PROGRESS NOTES
Subjective:  HPI    Chief complaint: NP glaucoma evaluation    Past medical history? Diabetes type 2  Past ocular history? POAG OU and PCIOL OU    Glaucoma history:  Diagnosed with glaucoma when? About 2 yrs ago w/ Dr. Scanlon  Hx eye surgery? CE in 2019 OU  Hx eye lasers? no  History of low blood pressure? no  History of migraines? no  History of blunt trauma to eye? no  History of steroid use? no  Family history of glaucoma? In brother only   What is the highest your eye pressure has been? unknown    Eye drops? None at the time but has prev trialed latanoprost and   brimonindine which pt was not compliant with per Dr. Scanlon last note    Last edited by Vickie Johnson on 2/18/2025  8:48 AM.        Exam:  See encounter report for full exam    Assessment:  1. Primary open angle glaucoma (POAG) of right eye, mild stage  2. Primary open angle glaucoma (POAG) of left eye, mild stage  - Referral from: Dr. Scanlon. Establishing me 2/2025.  - PMH: DM2, COPD, B12 deficiency, hep C s/p Rx 11/2019, partial epilepsy, ex tobacco use, BPH  - [facility] hx[facility]   [facility]   - Laser hx: none  - Glaucoma FHx: brother  -  / 527  - Gonio: open/pseudophakic (Coulon 2/2025)  - Tmax: 32/33  - Target IOP: teens OU (prelim)  - Med adverse effects: BB (COPD)  - Medication hx: Latanoprost (no effect), Rocklatan ($$), Brimonidine (non-compliant)    Baseline photos pending    02/18/2025  IOP 21/21 off drops  HVF: reliable, full, VFI 99 OD  reliable, non-specific depression, VFI 93 OS -- overall stable OU, fluctuates OS  OCT RNFL: ST thinning, avg 76 OD  ST thinning, avg 75 OS -- stable compared with prior    3. Dry eye syndrome of both eyes  ATs QID OU    4. Pseudophakia of both eyes  Good lens position  Monitor    5. Type 2 diabetes mellitus with microalbuminuria, without long-term current use of insulin  No retinopathy on Dr. Scanlon DFE 5/2024  BP/BG control per PCP  Next DFE due 5/2025     Plan:  Mild POAG OU. Rec SLT OU.  -  Schedule SLT OD then OS    Today's visit is associated with current and anticipated ongoing medical care related to this patient's single serious/complex condition (glaucoma). Follow up is to be continued indefinitely to monitor the condition.    Return SLT OD then 4 weeks SLT OS  Then 6 weeks IOP    Sussy Cruz MD  [facility]

## 2025-03-25 ENCOUNTER — OFFICE VISIT (OUTPATIENT)
Dept: OPHTHALMOLOGY | Facility: CLINIC | Age: 70
End: 2025-03-25
Payer: MEDICARE

## 2025-03-25 DIAGNOSIS — H04.123 DRY EYE SYNDROME OF BOTH EYES: ICD-10-CM

## 2025-03-25 DIAGNOSIS — E11.29 TYPE 2 DIABETES MELLITUS WITH MICROALBUMINURIA, WITHOUT LONG-TERM CURRENT USE OF INSULIN: ICD-10-CM

## 2025-03-25 DIAGNOSIS — H40.1111 PRIMARY OPEN ANGLE GLAUCOMA (POAG) OF RIGHT EYE, MILD STAGE: Primary | ICD-10-CM

## 2025-03-25 DIAGNOSIS — R80.9 TYPE 2 DIABETES MELLITUS WITH MICROALBUMINURIA, WITHOUT LONG-TERM CURRENT USE OF INSULIN: ICD-10-CM

## 2025-03-25 DIAGNOSIS — H40.1121 PRIMARY OPEN ANGLE GLAUCOMA (POAG) OF LEFT EYE, MILD STAGE: ICD-10-CM

## 2025-03-25 DIAGNOSIS — Z96.1 PSEUDOPHAKIA OF BOTH EYES: ICD-10-CM

## 2025-03-25 PROCEDURE — 99999 PR PBB SHADOW E&M-EST. PATIENT-LVL II: CPT | Mod: PBBFAC,,, | Performed by: STUDENT IN AN ORGANIZED HEALTH CARE EDUCATION/TRAINING PROGRAM

## 2025-03-25 PROCEDURE — 65855 TRABECULOPLASTY LASER SURG: CPT | Mod: RT,S$GLB,, | Performed by: STUDENT IN AN ORGANIZED HEALTH CARE EDUCATION/TRAINING PROGRAM

## 2025-03-25 PROCEDURE — 99499 UNLISTED E&M SERVICE: CPT | Mod: S$GLB,,, | Performed by: STUDENT IN AN ORGANIZED HEALTH CARE EDUCATION/TRAINING PROGRAM

## 2025-03-25 NOTE — PROGRESS NOTES
Subjective:  HPI     Glaucoma     Additional comments: SLT OD           Comments    DLS: 2/18/2025    Here for SLT OD    1. Primary open angle glaucoma (POAG) of right eye, mild stage  2. Primary open angle glaucoma (POAG) of left eye, mild stage  3. Dry eye syndrome of both eyes  4. Pseudophakia of both eyes    AT's PRN OU               Last edited by Kit Bowie on 3/25/2025  1:16 PM.        Exam:  See encounter report for full exam    Assessment:  1. Primary open angle glaucoma (POAG) of right eye, mild stage  2. Primary open angle glaucoma (POAG) of left eye, mild stage  - Referral from: Dr. Scanlon. Establishing me 2/2025.  - PMH: DM2, COPD, B12 deficiency, hep C s/p Rx 11/2019, partial epilepsy, ex tobacco use, BPH  - Surg hx:   Phaco/IOL OU [place] 2019   - Laser hx: none  - Glaucoma FHx: brother  -  / 527  - Gonio: open/pseudophakic ([place] 2/2025)  - Tmax: 32/33  - Target IOP: teens OU (prelim)  - Med adverse effects: BB (COPD)  - Medication hx: Latanoprost (no effect), Rocklatan ($$), Brimonidine (non-compliant)    Baseline photos pending    Last:  HVF: reliable, full, VFI 99 OD  reliable, non-specific depression, VFI 93 OS -- overall stable OU, fluctuates OS  OCT RNFL: ST thinning, avg 76 OD  ST thinning, avg 75 OS -- stable compared with prior    03/25/2025  SLT OD completed without complication 03/25/2025    3. Dry eye syndrome of both eyes  ATs QID OU    4. Pseudophakia of both eyes  Good lens position  Monitor    5. Type 2 diabetes mellitus with microalbuminuria, without long-term current use of insulin  No retinopathy on Dr. Scanlon DFE 5/2024  BP/BG control per PCP  Next DFE due 5/2025     Plan:  Mild POAG OU. Rec SLT OU.    Today: SLT OD completed today without complication  - Acular 4/0 x 5 days then stop  - Schedule SLT OS    Today's visit is associated with current and anticipated ongoing medical care related to this patient's single serious/complex condition (glaucoma). Follow up is  to be continued indefinitely to monitor the condition.    Return SLT OS then 6 weeks    Sussy Cruz MD  [facility]

## 2025-04-16 ENCOUNTER — OFFICE VISIT (OUTPATIENT)
Dept: INTERNAL MEDICINE | Facility: CLINIC | Age: 70
End: 2025-04-16
Payer: MEDICARE

## 2025-04-16 ENCOUNTER — LAB VISIT (OUTPATIENT)
Dept: LAB | Facility: HOSPITAL | Age: 70
End: 2025-04-16
Attending: INTERNAL MEDICINE
Payer: MEDICARE

## 2025-04-16 VITALS
WEIGHT: 171.94 LBS | DIASTOLIC BLOOD PRESSURE: 84 MMHG | OXYGEN SATURATION: 95 % | SYSTOLIC BLOOD PRESSURE: 134 MMHG | HEART RATE: 53 BPM | HEIGHT: 67 IN | BODY MASS INDEX: 26.99 KG/M2

## 2025-04-16 DIAGNOSIS — N52.8 OTHER MALE ERECTILE DYSFUNCTION: ICD-10-CM

## 2025-04-16 DIAGNOSIS — E53.8 B12 NUTRITIONAL DEFICIENCY: ICD-10-CM

## 2025-04-16 DIAGNOSIS — Z00.00 ANNUAL PHYSICAL EXAM: Primary | ICD-10-CM

## 2025-04-16 DIAGNOSIS — Z00.00 ANNUAL PHYSICAL EXAM: ICD-10-CM

## 2025-04-16 DIAGNOSIS — E55.9 VITAMIN D INSUFFICIENCY: ICD-10-CM

## 2025-04-16 DIAGNOSIS — K73.8 OTHER CHRONIC HEPATITIS, NOT ELSEWHERE CLASSIFIED: ICD-10-CM

## 2025-04-16 DIAGNOSIS — R79.89 OTHER SPECIFIED ABNORMAL FINDINGS OF BLOOD CHEMISTRY: ICD-10-CM

## 2025-04-16 DIAGNOSIS — Z86.19 HISTORY OF HEPATITIS C: ICD-10-CM

## 2025-04-16 DIAGNOSIS — R80.9 TYPE 2 DIABETES MELLITUS WITH MICROALBUMINURIA, WITHOUT LONG-TERM CURRENT USE OF INSULIN: ICD-10-CM

## 2025-04-16 DIAGNOSIS — Z12.5 SCREENING FOR MALIGNANT NEOPLASM OF PROSTATE: ICD-10-CM

## 2025-04-16 DIAGNOSIS — E11.29 TYPE 2 DIABETES MELLITUS WITH MICROALBUMINURIA, WITHOUT LONG-TERM CURRENT USE OF INSULIN: ICD-10-CM

## 2025-04-16 DIAGNOSIS — G40.009 PARTIAL IDIOPATHIC EPILEPSY WITH SEIZURES OF LOCALIZED ONSET, NOT INTRACTABLE, WITHOUT STATUS EPILEPTICUS: ICD-10-CM

## 2025-04-16 DIAGNOSIS — R41.3 POOR MEMORY: ICD-10-CM

## 2025-04-16 DIAGNOSIS — N40.1 BENIGN PROSTATIC HYPERPLASIA WITH URINARY FREQUENCY: ICD-10-CM

## 2025-04-16 DIAGNOSIS — Z87.891 EX-VERY HEAVY CIGARETTE SMOKER (MORE THAN 40 PER DAY): ICD-10-CM

## 2025-04-16 DIAGNOSIS — J44.9 MIXED TYPE COPD (CHRONIC OBSTRUCTIVE PULMONARY DISEASE): ICD-10-CM

## 2025-04-16 DIAGNOSIS — I70.0 AORTIC ATHEROSCLEROSIS: ICD-10-CM

## 2025-04-16 DIAGNOSIS — R35.0 BENIGN PROSTATIC HYPERPLASIA WITH URINARY FREQUENCY: ICD-10-CM

## 2025-04-16 LAB
25(OH)D3+25(OH)D2 SERPL-MCNC: 17 NG/ML (ref 30–96)
ABSOLUTE EOSINOPHIL (OHS): 0.16 K/UL
ABSOLUTE MONOCYTE (OHS): 0.39 K/UL (ref 0.3–1)
ABSOLUTE NEUTROPHIL COUNT (OHS): 3.19 K/UL (ref 1.8–7.7)
AFP SERPL-MCNC: 4.4 NG/ML
ALBUMIN SERPL BCP-MCNC: 3.9 G/DL (ref 3.5–5.2)
ALBUMIN/CREAT UR: 56.5 UG/MG
ALP SERPL-CCNC: 59 UNIT/L (ref 40–150)
ALT SERPL W/O P-5'-P-CCNC: 14 UNIT/L (ref 10–44)
ANION GAP (OHS): 7 MMOL/L (ref 8–16)
AST SERPL-CCNC: 16 UNIT/L (ref 11–45)
BASOPHILS # BLD AUTO: 0.05 K/UL
BASOPHILS NFR BLD AUTO: 0.9 %
BILIRUB SERPL-MCNC: 0.3 MG/DL (ref 0.1–1)
BUN SERPL-MCNC: 9 MG/DL (ref 8–23)
CALCIUM SERPL-MCNC: 9 MG/DL (ref 8.7–10.5)
CARCINOEMBRYONIC ANTIGEN (OHS): 4.2 NG/ML
CHLORIDE SERPL-SCNC: 108 MMOL/L (ref 95–110)
CHOLEST SERPL-MCNC: 170 MG/DL (ref 120–199)
CHOLEST/HDLC SERPL: 3.2 {RATIO} (ref 2–5)
CO2 SERPL-SCNC: 28 MMOL/L (ref 23–29)
CREAT SERPL-MCNC: 1 MG/DL (ref 0.5–1.4)
CREAT UR-MCNC: 177 MG/DL (ref 23–375)
EAG (OHS): 131 MG/DL (ref 68–131)
ERYTHROCYTE [DISTWIDTH] IN BLOOD BY AUTOMATED COUNT: 14.7 % (ref 11.5–14.5)
GFR SERPLBLD CREATININE-BSD FMLA CKD-EPI: >60 ML/MIN/1.73/M2
GLUCOSE SERPL-MCNC: 104 MG/DL (ref 70–110)
HBA1C MFR BLD: 6.2 % (ref 4–5.6)
HCT VFR BLD AUTO: 43.9 % (ref 40–54)
HDLC SERPL-MCNC: 53 MG/DL (ref 40–75)
HDLC SERPL: 31.2 % (ref 20–50)
HGB BLD-MCNC: 13.7 GM/DL (ref 14–18)
IMM GRANULOCYTES # BLD AUTO: 0.01 K/UL (ref 0–0.04)
IMM GRANULOCYTES NFR BLD AUTO: 0.2 % (ref 0–0.5)
LDLC SERPL CALC-MCNC: 107.6 MG/DL (ref 63–159)
LYMPHOCYTES # BLD AUTO: 1.92 K/UL (ref 1–4.8)
MCH RBC QN AUTO: 28.2 PG (ref 27–31)
MCHC RBC AUTO-ENTMCNC: 31.2 G/DL (ref 32–36)
MCV RBC AUTO: 90 FL (ref 82–98)
MICROALBUMIN UR-MCNC: 100 UG/ML (ref ?–5000)
NONHDLC SERPL-MCNC: 117 MG/DL
NUCLEATED RBC (/100WBC) (OHS): 0 /100 WBC
PLATELET # BLD AUTO: 170 K/UL (ref 150–450)
PMV BLD AUTO: 11.7 FL (ref 9.2–12.9)
POTASSIUM SERPL-SCNC: 5 MMOL/L (ref 3.5–5.1)
PROT SERPL-MCNC: 7.4 GM/DL (ref 6–8.4)
PSA SERPL-MCNC: 0.14 NG/ML
RBC # BLD AUTO: 4.86 M/UL (ref 4.6–6.2)
RELATIVE EOSINOPHIL (OHS): 2.8 %
RELATIVE LYMPHOCYTE (OHS): 33.6 % (ref 18–48)
RELATIVE MONOCYTE (OHS): 6.8 % (ref 4–15)
RELATIVE NEUTROPHIL (OHS): 55.7 % (ref 38–73)
SODIUM SERPL-SCNC: 143 MMOL/L (ref 136–145)
TRIGL SERPL-MCNC: 47 MG/DL (ref 30–150)
VIT B12 SERPL-MCNC: 387 PG/ML (ref 210–950)
WBC # BLD AUTO: 5.72 K/UL (ref 3.9–12.7)

## 2025-04-16 PROCEDURE — 82607 VITAMIN B-12: CPT

## 2025-04-16 PROCEDURE — 84153 ASSAY OF PSA TOTAL: CPT

## 2025-04-16 PROCEDURE — 82306 VITAMIN D 25 HYDROXY: CPT

## 2025-04-16 PROCEDURE — 82310 ASSAY OF CALCIUM: CPT

## 2025-04-16 PROCEDURE — 36415 COLL VENOUS BLD VENIPUNCTURE: CPT

## 2025-04-16 PROCEDURE — 80061 LIPID PANEL: CPT

## 2025-04-16 PROCEDURE — 83036 HEMOGLOBIN GLYCOSYLATED A1C: CPT

## 2025-04-16 PROCEDURE — 82105 ALPHA-FETOPROTEIN SERUM: CPT

## 2025-04-16 PROCEDURE — 99999 PR PBB SHADOW E&M-EST. PATIENT-LVL III: CPT | Mod: PBBFAC,,, | Performed by: INTERNAL MEDICINE

## 2025-04-16 PROCEDURE — 82570 ASSAY OF URINE CREATININE: CPT

## 2025-04-16 PROCEDURE — 82378 CARCINOEMBRYONIC ANTIGEN: CPT

## 2025-04-16 PROCEDURE — 85025 COMPLETE CBC W/AUTO DIFF WBC: CPT

## 2025-04-16 RX ORDER — LAMOTRIGINE 150 MG/1
300 TABLET ORAL EVERY MORNING
Qty: 180 TABLET | Refills: 3 | Status: SHIPPED | OUTPATIENT
Start: 2025-04-16

## 2025-04-16 RX ORDER — LANCETS
1 EACH MISCELLANEOUS
Qty: 200 EACH | Refills: 3 | Status: SHIPPED | OUTPATIENT
Start: 2025-04-16

## 2025-04-16 RX ORDER — PRAVASTATIN SODIUM 10 MG/1
10 TABLET ORAL EVERY MORNING
Qty: 90 TABLET | Refills: 3 | Status: SHIPPED | OUTPATIENT
Start: 2025-04-16

## 2025-04-16 RX ORDER — NAPROXEN 500 MG/1
500 TABLET ORAL 2 TIMES DAILY PRN
Qty: 60 TABLET | Refills: 5 | Status: SHIPPED | OUTPATIENT
Start: 2025-04-16

## 2025-04-16 RX ORDER — SILDENAFIL 50 MG/1
50 TABLET, FILM COATED ORAL DAILY PRN
Qty: 30 TABLET | Refills: 11 | Status: SHIPPED | OUTPATIENT
Start: 2025-04-16 | End: 2026-04-16

## 2025-04-16 RX ORDER — CYANOCOBALAMIN 1000 UG/ML
1000 INJECTION, SOLUTION INTRAMUSCULAR; SUBCUTANEOUS
Qty: 1 ML | Refills: 11 | Status: SHIPPED | OUTPATIENT
Start: 2025-04-16

## 2025-04-16 RX ORDER — TAMSULOSIN HYDROCHLORIDE 0.4 MG/1
0.4 CAPSULE ORAL DAILY
Qty: 90 CAPSULE | Refills: 3 | Status: SHIPPED | OUTPATIENT
Start: 2025-04-16

## 2025-04-16 RX ORDER — ACETAMINOPHEN 500 MG
5000 TABLET ORAL DAILY
Qty: 90 TABLET | Refills: 3 | Status: SHIPPED | OUTPATIENT
Start: 2025-04-16

## 2025-04-16 NOTE — PROGRESS NOTES
INTERNAL MEDICINE CLINIC  Follow-up Visit Progress Note     PRESENTING HISTORY     PCP: Srikanth Son MD    Last Clinic Visit with me: 11-    Current Chief Complaint/Problem:  Annual      History of Present Illness & ROS: Mr. Cristiano Cruz is a 70 y.o. male.    Doing well.    Review of Systems   Constitutional:  Negative for chills and fever.   Respiratory:  Negative for shortness of breath.    Cardiovascular:  Negative for chest pain and leg swelling.   Gastrointestinal:  Negative for abdominal pain and blood in stool.   Skin:  Negative for rash.   Neurological:  Positive for headaches (once in a while).   Psychiatric/Behavioral:  Negative for depression.        PAST HISTORY:     Past Medical History:   Diagnosis Date    B12 nutritional deficiency 08/07/2019    Bilateral dry eyes 03/12/2019    COVID-19 12/24/2021    E. coli UTI 01/2019    During hospitalization for seizure    Generalized tonic-clonic seizure 01/26/2019 1-2019 admitted for new-onset seizures.Normal CT head.  His mental status normalized, and he had no recurrent seizures following keppra loading in the ED and twice-daily maintenance upon arrival to the floor. Workup into the etiology of his seizures remained negative. EEG was performed, with the preliminary interpretation being no epileptiform activity with normal background.     Glaucoma     History of hepatitis C, s/p successful Rx w/ SVR12 (cure) - 11/2019 02/08/2019    S/p epclusa    Kidney stone on left side 06/28/2019    Mixed type COPD (chronic obstructive pulmonary disease) 08/15/2019    8-2019 PFT: Normal airflow. Airflow not improved after bronchodilator. Moderate restriction. Diffusion capacity is mildly decreased. Oximetry is normal.    Nuclear sclerotic cataract of both eyes 03/12/2019    Nuclear sclerotic cataract of left eye 08/11/2020    Nuclear sclerotic cataract of right eye 07/28/2020    Partial idiopathic epilepsy with seizures of localized onset, not intractable, without  status epilepticus 2019    new-onset seizures (). Normal CTH, MRI and routine EEG . On keppra 500 mg BID    Pterygium, bilateral 2019    Type 2 diabetes mellitus with microalbuminuria, without long-term current use of insulin 2019    Vitamin D insufficiency 2021       Past Surgical History:   Procedure Laterality Date    CATARACT EXTRACTION W/  INTRAOCULAR LENS IMPLANT Right 2020    Procedure: EXTRACTION, CATARACT, WITH IOL INSERTION;  Surgeon: Daryl Calloway MD;  Location: [facility] OR;  Service: Ophthalmology;  Laterality: Right;    CATARACT EXTRACTION W/  INTRAOCULAR LENS IMPLANT Left 2020    Procedure: EXTRACTION, CATARACT, WITH IOL INSERTION;  Surgeon: Daryl Calloway MD;  Location: [facility];  Service: Ophthalmology;  Laterality: Left;    HERNIA REPAIR Right     [facility]       Family History   Problem Relation Name Age of Onset    Hypertension Mother      Glaucoma Brother 2     No Known Problems Daughter      Amblyopia Neg Hx      Blindness Neg Hx      Macular degeneration Neg Hx      Retinal detachment Neg Hx      Strabismus Neg Hx      Cataracts Neg Hx         Social History     Socioeconomic History    Marital status:     Number of children: 1   Occupational History    Occupation: Retired   Tobacco Use    Smoking status: Former     Current packs/day: 0.00     Average packs/day: 3.0 packs/day for 25.0 years (75.0 ttl pk-yrs)     Types: Cigarettes     Start date: 1987     Quit date: 2012     Years since quittin.2    Smokeless tobacco: Never   Substance and Sexual Activity    Alcohol use: No    Drug use: Not Currently     Types: Cocaine     Comment: H/O abuse    Sexual activity: Yes     Partners: Female   Social History Narrative     with 1 daughter (42 as of ).    Work in [employer] - Retired     Social Drivers of Health     Financial Resource Strain: Low Risk  (2022)    Overall Financial Resource  Strain (CARDIA)     Difficulty of Paying Living Expenses: Not hard at all   Food Insecurity: High Risk (5/17/2024)    Received from [facility] SDOH Screening     In the past 2 months, did you or others you live with eat smaller meals or skip meals because you didn't have money for food?: Yes   Transportation Needs: No Transportation Needs (6/8/2022)    PRAPARE - Transportation     Lack of Transportation (Medical): No     Lack of Transportation (Non-Medical): No   Physical Activity: Sufficiently Active (6/8/2022)    Exercise Vital Sign     Days of Exercise per Week: 7 days     Minutes of Exercise per Session: 60 min   Stress: No Stress Concern Present (6/8/2022)    [employer] [place] [employer] - Occupational Stress Questionnaire     Feeling of Stress : Not at all   Housing Stability: High Risk (5/17/2024)    Received from [facility] SDOH Screening     In the past year, have you been unable to get any of the following when you really needed them? choose all that apply.: Utilities (electric, gas, and water)       MEDICATIONS & ALLERGIES:     Medications Ordered Prior to Encounter[1]     Review of patient's allergies indicates:  No Known Allergies    Medications Reconciliation:   I have reconciled the patient's home medications and discharge medications with the patient/family. I have updated all changes.  Refer to After-Visit Medication List.    OBJECTIVE:     Vital Signs:  Vitals:    04/16/25 0955   BP: 134/84   Pulse: (!) 53     Wt Readings from Last 3 Encounters:   04/16/25 0955 78 kg (171 lb 15.3 oz)   11/22/24 0939 78.6 kg (173 lb 4.5 oz)   07/25/24 1320 77.1 kg (169 lb 15.6 oz)     Body mass index is 26.93 kg/m².     Physical Exam:  General: Well developed,  No distress.  HEENT: Head is normocephalic, atraumatic; ears are normal.    Eyes: Clear conjunctiva.  Neck: Supple, symmetrical neck; trachea midline.  Lungs: Clear to auscultation bilaterally and normal respiratory  effort.  Cardiovascular: Heart with regular rate and rhythm.    Extremities: No LE edema.    Abdomen: Abdomen is soft, non-tender non-distended with normal bowel sounds.  Musculoskeletal: Normal gait.   Genital:  Normal penis.  le.  No rash in the genital area.  Scrotum and epididymis normal. No inguinal hernia.  No inguinal nodes.   Rectal: No perianal lesions or rash. Digital exam: deferred.   Lymph Nodes: No cervical, supraclavicular or axillary adenopathy.  Psychiatric: Normal affect. Alert.      Laboratory  Lab Results   Component Value Date    WBC 8.40 04/29/2024    HGB 14.0 04/29/2024    HCT 44.5 04/29/2024     04/29/2024    CHOL 178 04/29/2024    CHOL 178 04/29/2024    TRIG 61 04/29/2024    TRIG 61 04/29/2024    HDL 51 04/29/2024    HDL 51 04/29/2024    ALT 10 11/22/2024    AST 13 11/22/2024     11/22/2024    K 4.2 11/22/2024     11/22/2024    CREATININE 1.2 11/22/2024    BUN 7 (L) 11/22/2024    CO2 26 11/22/2024    TSH 1.856 04/29/2024    PSA 0.13 04/29/2024    INR 1.0 01/02/2022    HGBA1C 6.0 (H) 11/22/2024       ASSESSMENT & PLAN:     Annual physical exam  -     Lipid Panel; Future; Expected date: 04/16/2025  -     CBC Auto Differential; Future; Expected date: 04/16/2025  -     Comprehensive Metabolic Panel; Future; Expected date: 04/16/2025  -     Hemoglobin A1C; Future; Expected date: 04/16/2025  -     PSA, Screening; Future; Expected date: 04/29/2025  -     Vitamin D; Future; Expected date: 10/13/2025  -     Vitamin B12; Future; Expected date: 04/16/2025    -     Microalbumin/Creatinine Ratio, Urine; Future; Expected date: 04/16/2025    Type 2 diabetes mellitus with microalbuminuria, without long-term current use of insulin  Aortic atherosclerosis  - A1c 6.0  He does not change his blood sugar at home. Has glucometer.     -     pravastatin (PRAVACHOL) 10 MG tablet; Take 1 tablet (10 mg total) by mouth once daily.     -     SITagliptin (JANUVIA) 25 MG Tab; Take 1 tablet (25 mg total)  by mouth once daily.         Consider ACE-I or ARB in the future. (Avoid polypharmacy due to memory issues)     Mixed COPD  - History of heavy smoking.     8-2019 PFT:  Normal airflow. Airflow not improved after bronchodilator. Moderate restriction.  Diffusion capacity is mildly decreased. Oximetry is normal.     - No inhaler necessary at present.     B12 Deficiency  Poor memory  - On B12 injection monthly.  - Improved with B12 injection.     -     cyanocobalamin 1,000 mcg/mL injection; Inject 1 mL (1,000 mcg total) into the muscle every 30 days.  ([facility]IMARY [facility]ARE PHARMACY FOR INJECTION)     Vitamin D insufficiency  -     cholecalciferol, vitamin D3, 125 mcg (5,000 unit) Tab; Take 1 tablet (5,000 Units total) by mouth once daily.        History of hepatitis C, s/p successful Rx w/ SVR12 (cure) - 11/2019     - Screening for liver cancer: AFB and CEA.     Partial idiopathic epilepsy    5-2020: Seizure X 2    7-19-19 Neurology:              - Advised him to go to the ED in case of any seizures              - f/u in clinic in 6-9 months for monitoring recurrence of events and prescriptions              -  if remains seizure-free for up to 2 years, will consider weaning off of AEDs     - On Lamictal 150 mg BID.  -   -     lamoTRIgine (LAMICTAL) 150 MG Tab; Take 2 tablets (300 mg total) by mouth once daily.       Ex-very heavy cigarette smoker (more than 40 per day)  - No smoking in 13 years now.  Last CT Chest Lung 5/3/2024 Negative     -     CT Chest Lung Screening Low Dose; Future; Expected date: 04/16/2025      Benign prostatic hyperplasia with urinary frequency  Changed silodosin (RAPAFLO) to Flomax 0.4 mg daily due to availability issues.  (Has no ejaculation from this, but he is okay with it).    - Flomax 0.4 mg daily.     Other male erectile dysfunction  -     sildenafiL (VIAGRA) 50 MG tablet; Take 1 tablet (50 mg total) by mouth daily as needed for Erectile Dysfunction. PRIMARY [facility]ARE PHARMACY)     Preventive Health  Maintenance:     Up to date     Return to Clinic for Follow Up with me: 6 months    Scheduled Follow-up :  Future Appointments   Date Time Provider Department Center   4/16/2025 10:30 AM LAB, APPOINTMENT [facility] IM [facility]   4/22/2025  1:00 PM Sussy Cruz MD [facility] OPHTHAL [facility]   10/22/2025  2:00 PM Srikanth Son MD [facility]       After Visit Medication List :     Medication List            Accurate as of April 16, 2025 10:08 AM. If you have any questions, ask your nurse or doctor.                CONTINUE taking these medications      blood sugar diagnostic Strp  1 strip by Misc.(Non-Drug; Combo Route) route 2 (two) times daily before meals.     brimonidine 0.2% 0.2 % Drop  Commonly known as: ALPHAGAN  Place 1 drop into both eyes 3 (three) times daily.     cholecalciferol (vitamin D3) 125 mcg (5,000 unit) Tab  Take 1 tablet (5,000 Units total) by mouth once daily.     cyanocobalamin 1,000 mcg/mL injection  Inject 1 mL (1,000 mcg total) into the muscle every 30 days.     lamoTRIgine 150 MG Tab  Commonly known as: LAMICTAL  Take 2 tablets (300 mg total) by mouth every morning.     lancets Misc  Commonly known as: LANCETS,ULTRA THIN  1 application  by Misc.(Non-Drug; Combo Route) route 2 (two) times daily before meals.     naproxen 500 MG tablet  Commonly known as: NAPROSYN  Take 1 tablet (500 mg total) by mouth 2 (two) times daily as needed (headache).     pravastatin 10 MG tablet  Commonly known as: PRAVACHOL  Take 1 tablet (10 mg total) by mouth every morning.     sildenafiL 50 MG tablet  Commonly known as: VIAGRA  Take 1 tablet (50 mg total) by mouth daily as needed for Erectile Dysfunction.     SITagliptin phosphate 25 MG Tab  Commonly known as: JANUVIA  Take 1 tablet (25 mg total) by mouth every morning.     tamsulosin 0.4 mg Cap  Commonly known as: FLOMAX  Take 1 capsule (0.4 mg total) by mouth once daily.               Where to Get Your Medications        These  medications were sent to [facility]  [address] [facility] [address]      Hours: Mon-Fri, 8a-5:30p Phone: [phone]   cyanocobalamin 1,000 mcg/mL injection  sildenafiL 50 MG tablet       These medications were sent to [id number] - [place] - [address] AT [facility] [address]  [address] [place] [address]      Phone: [phone]   blood sugar diagnostic Strp  cholecalciferol (vitamin D3) 125 mcg (5,000 unit) Tab  lamoTRIgine 150 MG Tab  lancets Misc  naproxen 500 MG tablet  pravastatin 10 MG tablet  SITagliptin phosphate 25 MG Tab  tamsulosin 0.4 mg Cap         Signing Physician:  Srikanth Son MD         [1]   Current Outpatient Medications on File Prior to Visit   Medication Sig Dispense Refill    brimonidine 0.2% (ALPHAGAN) 0.2 % Drop Place 1 drop into both eyes 3 (three) times daily. 5 mL 3    [DISCONTINUED] blood sugar diagnostic Strp 1 strip by Misc.(Non-Drug; Combo Route) route 2 (two) times daily before meals. 200 strip 3    [DISCONTINUED] cholecalciferol, vitamin D3, 125 mcg (5,000 unit) Tab Take 1 tablet (5,000 Units total) by mouth once daily. 90 tablet 3    [DISCONTINUED] cyanocobalamin 1,000 mcg/mL injection Inject 1 mL (1,000 mcg total) into the muscle every 30 days. 1 mL 11    [DISCONTINUED] lamoTRIgine (LAMICTAL) 150 MG Tab Take 2 tablets (300 mg total) by mouth every morning. 180 tablet 3    [DISCONTINUED] lancets (LANCETS,ULTRA THIN) Misc 1 application  by Misc.(Non-Drug; Combo Route) route 2 (two) times daily before meals. 200 each 3    [DISCONTINUED] naproxen (NAPROSYN) 500 MG tablet Take 1 tablet (500 mg total) by mouth 2 (two) times daily as needed (headache). 60 tablet 5    [DISCONTINUED] pravastatin (PRAVACHOL) 10 MG tablet Take 1 tablet (10 mg total) by mouth every morning. 90 tablet 3    [DISCONTINUED] sildenafiL (VIAGRA) 50 MG tablet Take 1 tablet (50 mg total) by mouth daily as needed for Erectile  Dysfunction. 30 tablet 11    [DISCONTINUED] SITagliptin phosphate (JANUVIA) 25 MG Tab Take 1 tablet (25 mg total) by mouth every morning. 90 tablet 3    [DISCONTINUED] tamsulosin (FLOMAX) 0.4 mg Cap Take 1 capsule (0.4 mg total) by mouth once daily. 90 capsule 3     No current facility-administered medications on file prior to visit.

## 2025-04-17 ENCOUNTER — RESULTS FOLLOW-UP (OUTPATIENT)
Dept: INTERNAL MEDICINE | Facility: CLINIC | Age: 70
End: 2025-04-17
Payer: MEDICARE

## 2025-04-21 ENCOUNTER — TELEPHONE (OUTPATIENT)
Dept: PULMONOLOGY | Facility: CLINIC | Age: 70
End: 2025-04-21
Payer: MEDICARE

## 2025-04-21 NOTE — TELEPHONE ENCOUNTER
Called and spoke with pt to confirm scheduling of follow up LDCT scan.  Agreeable with scheduling on May 5tth, instructions given on where to go.

## 2025-05-13 ENCOUNTER — OFFICE VISIT (OUTPATIENT)
Dept: OPHTHALMOLOGY | Facility: CLINIC | Age: 70
End: 2025-05-13
Payer: MEDICARE

## 2025-05-13 DIAGNOSIS — H40.1111 PRIMARY OPEN ANGLE GLAUCOMA (POAG) OF RIGHT EYE, MILD STAGE: Primary | ICD-10-CM

## 2025-05-13 DIAGNOSIS — H40.1121 PRIMARY OPEN ANGLE GLAUCOMA (POAG) OF LEFT EYE, MILD STAGE: ICD-10-CM

## 2025-05-13 PROCEDURE — 99999 PR PBB SHADOW E&M-EST. PATIENT-LVL II: CPT | Mod: PBBFAC,,, | Performed by: STUDENT IN AN ORGANIZED HEALTH CARE EDUCATION/TRAINING PROGRAM

## 2025-05-13 PROCEDURE — 65855 TRABECULOPLASTY LASER SURG: CPT | Mod: LT,S$GLB,, | Performed by: STUDENT IN AN ORGANIZED HEALTH CARE EDUCATION/TRAINING PROGRAM

## 2025-05-13 PROCEDURE — 99499 UNLISTED E&M SERVICE: CPT | Mod: S$GLB,,, | Performed by: STUDENT IN AN ORGANIZED HEALTH CARE EDUCATION/TRAINING PROGRAM

## 2025-05-13 NOTE — PROGRESS NOTES
Subjective:  HPI    DLS: 3/25/25 w/ Dr. Cruz     70 y.o. male presents for SLT OS. Patient denies changes to VA, pain,   flashes, and floaters. Has headaches on left side frequently.     1. Primary open angle glaucoma (POAG) of right eye, mild stage  2. Primary open angle glaucoma (POAG) of left eye, mild stage  3. Dry eye syndrome of both eyes  4. Pseudophakia of both eyes    AT's PRN OU       Last edited by Lois Calixto on 5/13/2025 11:18 AM.        Exam:  See encounter report for full exam    Assessment:  1. Primary open angle glaucoma (POAG) of right eye, mild stage  2. Primary open angle glaucoma (POAG) of left eye, mild stage  - Referral from: Dr. Scanlon. Establishing me 2/2025.  - PMH: DM2, COPD, B12 deficiency, hep C s/p Rx 11/2019, partial epilepsy, ex tobacco use, BPH  - Surg hx:   Phaco/IOL OU [place] 2019   - Laser hx:    SLT OD 3/25/25 Janon   SLT OS 5/13/25 Nancy  - Glaucoma FHx: brother  -  / 527  - Gonio: open/pseudophakic ([place] 2/2025)  - Tmax: 32/33  - Target IOP: teens OU (prelim)  - Med adverse effects: BB (COPD)  - Medication hx: Latanoprost (no effect), Rocklatan ($$), Brimonidine (non-compliant)    Baseline photos pending    Last:  HVF: reliable, full, VFI 99 OD  reliable, non-specific depression, VFI 93 OS -- overall stable OU, fluctuates OS  OCT RNFL: ST thinning, avg 76 OD  ST thinning, avg 75 OS -- stable compared with prior    05/13/2025  SLT OS completed without complication 05/13/2025    3. Dry eye syndrome of both eyes  ATs QID OU    4. Pseudophakia of both eyes  Good lens position  Monitor    5. Type 2 diabetes mellitus with microalbuminuria, without long-term current use of insulin  No retinopathy on Dr. Scanlon DFE 5/2024  BP/BG control per PCP  Next DFE due 5/2025     Plan:  Mild POAG OU. Rec SLT OU.    Today: SLT OS completed today without complication  - Acular 0/4 x 5 days then stop    Today's visit is associated with current and anticipated ongoing medical  care related to this patient's single serious/complex condition (glaucoma). Follow up is to be continued indefinitely to monitor the condition.    Return 6 weeks IOP  Me 4 months OCT    Sussy Cruz MD  [facility]

## 2025-05-15 ENCOUNTER — TELEPHONE (OUTPATIENT)
Dept: PULMONOLOGY | Facility: CLINIC | Age: 70
End: 2025-05-15
Payer: MEDICARE

## 2025-05-15 NOTE — TELEPHONE ENCOUNTER
Called and spoke with pt to confirm scheduling of follow up LDCT scan.  Agreeable with scheduling on June 9th, instructions given on where to go.

## 2025-06-02 RX ORDER — SILODOSIN 8 MG/1
CAPSULE ORAL DAILY
OUTPATIENT
Start: 2025-06-02 | End: 2026-06-02

## 2025-06-03 RX ORDER — SILODOSIN 4 MG/1
4 CAPSULE ORAL
COMMUNITY
Start: 2025-03-03

## 2025-06-04 RX ORDER — SILODOSIN 4 MG/1
4 CAPSULE ORAL
OUTPATIENT
Start: 2025-06-04

## 2025-06-06 RX ORDER — SILODOSIN 4 MG/1
4 CAPSULE ORAL
OUTPATIENT
Start: 2025-06-06

## 2025-06-09 ENCOUNTER — HOSPITAL ENCOUNTER (OUTPATIENT)
Dept: RADIOLOGY | Facility: HOSPITAL | Age: 70
Discharge: HOME | End: 2025-06-09
Attending: INTERNAL MEDICINE
Payer: MEDICARE

## 2025-06-09 DIAGNOSIS — J44.9 MIXED TYPE COPD (CHRONIC OBSTRUCTIVE PULMONARY DISEASE): ICD-10-CM

## 2025-06-09 DIAGNOSIS — Z87.891 EX-VERY HEAVY CIGARETTE SMOKER (MORE THAN 40 PER DAY): ICD-10-CM

## 2025-06-09 PROCEDURE — 71271 CT THORAX LUNG CANCER SCR C-: CPT | Mod: TC

## 2025-06-09 PROCEDURE — 71271 CT THORAX LUNG CANCER SCR C-: CPT | Mod: 26,,, | Performed by: RADIOLOGY

## 2025-06-14 ENCOUNTER — HOSPITAL ENCOUNTER (EMERGENCY)
Facility: HOSPITAL | Age: 70
Discharge: HOME | End: 2025-06-14
Attending: STUDENT IN AN ORGANIZED HEALTH CARE EDUCATION/TRAINING PROGRAM
Payer: MEDICARE

## 2025-06-14 VITALS
OXYGEN SATURATION: 97 % | RESPIRATION RATE: 16 BRPM | TEMPERATURE: 98 F | SYSTOLIC BLOOD PRESSURE: 183 MMHG | HEIGHT: 67 IN | DIASTOLIC BLOOD PRESSURE: 92 MMHG | WEIGHT: 170 LBS | HEART RATE: 59 BPM | BODY MASS INDEX: 26.68 KG/M2

## 2025-06-14 DIAGNOSIS — R42 DIZZINESS: ICD-10-CM

## 2025-06-14 DIAGNOSIS — R07.89 BURNING CHEST PAIN: ICD-10-CM

## 2025-06-14 DIAGNOSIS — R42 VERTIGO: Primary | ICD-10-CM

## 2025-06-14 LAB
ABSOLUTE EOSINOPHIL (OHS): 0.12 K/UL
ABSOLUTE MONOCYTE (OHS): 0.37 K/UL (ref 0.3–1)
ABSOLUTE NEUTROPHIL COUNT (OHS): 3.83 K/UL (ref 1.8–7.7)
ALBUMIN SERPL BCP-MCNC: 4.2 G/DL (ref 3.5–5.2)
ALP SERPL-CCNC: 53 UNIT/L (ref 40–150)
ALT SERPL W/O P-5'-P-CCNC: 17 UNIT/L (ref 10–44)
ANION GAP (OHS): 9 MMOL/L (ref 8–16)
AST SERPL-CCNC: 14 UNIT/L (ref 11–45)
BASOPHILS # BLD AUTO: 0.04 K/UL
BASOPHILS NFR BLD AUTO: 0.7 %
BILIRUB SERPL-MCNC: 0.4 MG/DL (ref 0.1–1)
BNP SERPL-MCNC: 20 PG/ML (ref 0–99)
BUN SERPL-MCNC: 11 MG/DL (ref 8–23)
CALCIUM SERPL-MCNC: 9.1 MG/DL (ref 8.7–10.5)
CHLORIDE SERPL-SCNC: 108 MMOL/L (ref 95–110)
CO2 SERPL-SCNC: 25 MMOL/L (ref 23–29)
CREAT SERPL-MCNC: 1 MG/DL (ref 0.5–1.4)
ERYTHROCYTE [DISTWIDTH] IN BLOOD BY AUTOMATED COUNT: 14.6 % (ref 11.5–14.5)
GFR SERPLBLD CREATININE-BSD FMLA CKD-EPI: >60 ML/MIN/1.73/M2
GLUCOSE SERPL-MCNC: 107 MG/DL (ref 70–110)
HCT VFR BLD AUTO: 41.8 % (ref 40–54)
HGB BLD-MCNC: 13.5 GM/DL (ref 14–18)
IMM GRANULOCYTES # BLD AUTO: 0.02 K/UL (ref 0–0.04)
IMM GRANULOCYTES NFR BLD AUTO: 0.3 % (ref 0–0.5)
LYMPHOCYTES # BLD AUTO: 1.46 K/UL (ref 1–4.8)
MAGNESIUM SERPL-MCNC: 1.7 MG/DL (ref 1.6–2.6)
MCH RBC QN AUTO: 28.1 PG (ref 27–31)
MCHC RBC AUTO-ENTMCNC: 32.3 G/DL (ref 32–36)
MCV RBC AUTO: 87 FL (ref 82–98)
NUCLEATED RBC (/100WBC) (OHS): 0 /100 WBC
PLATELET # BLD AUTO: 152 K/UL (ref 150–450)
PMV BLD AUTO: 10.6 FL (ref 9.2–12.9)
POTASSIUM SERPL-SCNC: 4 MMOL/L (ref 3.5–5.1)
PROT SERPL-MCNC: 7.3 GM/DL (ref 6–8.4)
RBC # BLD AUTO: 4.8 M/UL (ref 4.6–6.2)
RELATIVE EOSINOPHIL (OHS): 2.1 %
RELATIVE LYMPHOCYTE (OHS): 25 % (ref 18–48)
RELATIVE MONOCYTE (OHS): 6.3 % (ref 4–15)
RELATIVE NEUTROPHIL (OHS): 65.6 % (ref 38–73)
SODIUM SERPL-SCNC: 142 MMOL/L (ref 136–145)
TROPONIN I SERPL HS-MCNC: <3 NG/L
WBC # BLD AUTO: 5.84 K/UL (ref 3.9–12.7)

## 2025-06-14 PROCEDURE — 99285 EMERGENCY DEPT VISIT HI MDM: CPT | Mod: 25

## 2025-06-14 PROCEDURE — 80053 COMPREHEN METABOLIC PANEL: CPT | Performed by: STUDENT IN AN ORGANIZED HEALTH CARE EDUCATION/TRAINING PROGRAM

## 2025-06-14 PROCEDURE — 83880 ASSAY OF NATRIURETIC PEPTIDE: CPT | Performed by: STUDENT IN AN ORGANIZED HEALTH CARE EDUCATION/TRAINING PROGRAM

## 2025-06-14 PROCEDURE — 96375 TX/PRO/DX INJ NEW DRUG ADDON: CPT

## 2025-06-14 PROCEDURE — 93005 ELECTROCARDIOGRAM TRACING: CPT

## 2025-06-14 PROCEDURE — 25000003 PHARM REV CODE 250: Performed by: STUDENT IN AN ORGANIZED HEALTH CARE EDUCATION/TRAINING PROGRAM

## 2025-06-14 PROCEDURE — 84484 ASSAY OF TROPONIN QUANT: CPT | Performed by: STUDENT IN AN ORGANIZED HEALTH CARE EDUCATION/TRAINING PROGRAM

## 2025-06-14 PROCEDURE — 63600175 PHARM REV CODE 636 W HCPCS: Performed by: STUDENT IN AN ORGANIZED HEALTH CARE EDUCATION/TRAINING PROGRAM

## 2025-06-14 PROCEDURE — 93010 ELECTROCARDIOGRAM REPORT: CPT | Mod: ,,, | Performed by: INTERNAL MEDICINE

## 2025-06-14 PROCEDURE — 85025 COMPLETE CBC W/AUTO DIFF WBC: CPT | Performed by: STUDENT IN AN ORGANIZED HEALTH CARE EDUCATION/TRAINING PROGRAM

## 2025-06-14 PROCEDURE — 83735 ASSAY OF MAGNESIUM: CPT | Performed by: STUDENT IN AN ORGANIZED HEALTH CARE EDUCATION/TRAINING PROGRAM

## 2025-06-14 PROCEDURE — 96374 THER/PROPH/DIAG INJ IV PUSH: CPT

## 2025-06-14 RX ORDER — METOCLOPRAMIDE HYDROCHLORIDE 5 MG/ML
10 INJECTION INTRAMUSCULAR; INTRAVENOUS
Status: COMPLETED | OUTPATIENT
Start: 2025-06-14 | End: 2025-06-14

## 2025-06-14 RX ORDER — MECLIZINE HCL 12.5 MG 12.5 MG/1
50 TABLET ORAL
Status: COMPLETED | OUTPATIENT
Start: 2025-06-14 | End: 2025-06-14

## 2025-06-14 RX ORDER — FAMOTIDINE 10 MG/ML
20 INJECTION, SOLUTION INTRAVENOUS
Status: COMPLETED | OUTPATIENT
Start: 2025-06-14 | End: 2025-06-14

## 2025-06-14 RX ORDER — DIPHENHYDRAMINE HYDROCHLORIDE 50 MG/ML
12.5 INJECTION, SOLUTION INTRAMUSCULAR; INTRAVENOUS
Status: COMPLETED | OUTPATIENT
Start: 2025-06-14 | End: 2025-06-14

## 2025-06-14 RX ORDER — KETOROLAC TROMETHAMINE 30 MG/ML
15 INJECTION, SOLUTION INTRAMUSCULAR; INTRAVENOUS
Status: COMPLETED | OUTPATIENT
Start: 2025-06-14 | End: 2025-06-14

## 2025-06-14 RX ORDER — MECLIZINE HYDROCHLORIDE 25 MG/1
25 TABLET ORAL 3 TIMES DAILY PRN
Qty: 30 TABLET | Refills: 0 | Status: SHIPPED | OUTPATIENT
Start: 2025-06-14

## 2025-06-14 RX ORDER — ALUMINUM HYDROXIDE, MAGNESIUM HYDROXIDE, AND SIMETHICONE 1200; 120; 1200 MG/30ML; MG/30ML; MG/30ML
15 SUSPENSION ORAL
Status: COMPLETED | OUTPATIENT
Start: 2025-06-14 | End: 2025-06-14

## 2025-06-14 RX ADMIN — KETOROLAC TROMETHAMINE 15 MG: 30 INJECTION, SOLUTION INTRAMUSCULAR; INTRAVENOUS at 12:06

## 2025-06-14 RX ADMIN — MECLIZINE HYDROCHLORIDE 50 MG: 12.5 TABLET ORAL at 09:06

## 2025-06-14 RX ADMIN — FAMOTIDINE 20 MG: 10 INJECTION, SOLUTION INTRAVENOUS at 09:06

## 2025-06-14 RX ADMIN — METOCLOPRAMIDE 10 MG: 5 INJECTION, SOLUTION INTRAMUSCULAR; INTRAVENOUS at 12:06

## 2025-06-14 RX ADMIN — DIPHENHYDRAMINE HYDROCHLORIDE 12.5 MG: 50 INJECTION, SOLUTION INTRAMUSCULAR; INTRAVENOUS at 12:06

## 2025-06-14 RX ADMIN — ALUMINUM HYDROXIDE, MAGNESIUM HYDROXIDE, AND SIMETHICONE 15 ML: 200; 200; 20 SUSPENSION ORAL at 09:06

## 2025-06-14 NOTE — ED TRIAGE NOTES
"  Dizziness (States having dizziness since Wednesday. States his "whole inside feels hot and I just feel bad" Denies any pain or sob)     "

## 2025-06-14 NOTE — ED NOTES
Patient identifiers verified and correct for Cristiano Cruz  LOC: The patient is awake, alert and aware of environment with an appropriate affect, the patient is oriented x 3 and speaking appropriately. Pt reports dizziness.   APPEARANCE: Patient appears comfortable and in no acute distress, patient is clean and well groomed.  SKIN: The skin is warm and dry, color consistent with ethnicity, patient has normal skin turgor and moist mucus membranes, skin intact, no breakdown or bruising noted.   MUSCULOSKELETAL: Patient moving all extremities spontaneously, no swelling noted.  RESPIRATORY: Airway is open and patent, respirations are spontaneous, patient has a normal effort and rate, no accessory muscle use noted, O2 Sat 97% on room air.  CARDIAC: Patient has a normal rate and regular rhythm, no edema noted, capillary refill < 3 seconds.   GASTRO: Soft and non tender to palpation, no distention noted, Pt states bowel movements have been regular.  : Pt denies any pain or frequency with urination.  NEURO: Pt opens eyes spontaneously, behavior appropriate to situation, follows commands, facial expression symmetrical, bilateral hand grasp equal and even, purposeful motor response noted, normal sensation in all extremities when touched with a finger.

## 2025-06-14 NOTE — ED PROVIDER NOTES
"Encounter Date: 6/14/2025       History     Chief Complaint   Patient presents with    Dizziness     States having dizziness since Wednesday. States his "whole inside feels hot and I just feel bad" Denies any pain or sob     HPI    71 yo M w/COPD, NIDDM, seizure disorder, presenting with room spinning dizziness, nausea, chest burning, headache/posterior neck pain x 3 days.    Difficult to ascertain if room spinning dizziness is constant or episodic from his history but does report worsening with standing and walking, has been having difficulty with walking straight and without imbalance sensation since Wednesday. No numbness or weakness in extremities, no recent trauma.    No history of CVA, or similar symptoms in the past.  No recent illness, or ear pain or hearing changes.  No vision changes.  No headache.      Review of patient's allergies indicates:  No Known Allergies  Past Medical History:   Diagnosis Date    B12 nutritional deficiency 08/07/2019    Bilateral dry eyes 03/12/2019    COVID-19 12/24/2021    E. coli UTI 01/2019    During hospitalization for seizure    Generalized tonic-clonic seizure 01/26/2019 1-2019 admitted for new-onset seizures.Normal CT head.  His mental status normalized, and he had no recurrent seizures following keppra loading in the ED and twice-daily maintenance upon arrival to the floor. Workup into the etiology of his seizures remained negative. EEG was performed, with the preliminary interpretation being no epileptiform activity with normal background.     Glaucoma     History of hepatitis C, s/p successful Rx w/ SVR12 (cure) - 11/2019 02/08/2019    S/p epclusa    Kidney stone on left side 06/28/2019    Mixed type COPD (chronic obstructive pulmonary disease) 08/15/2019    8-2019 PFT: Normal airflow. Airflow not improved after bronchodilator. Moderate restriction. Diffusion capacity is mildly decreased. Oximetry is normal.    Nuclear sclerotic cataract of both eyes 03/12/2019    " Nuclear sclerotic cataract of left eye 08/11/2020    Nuclear sclerotic cataract of right eye 07/28/2020    Partial idiopathic epilepsy with seizures of localized onset, not intractable, without status epilepticus 01/26/2019    new-onset seizures (1/209). Normal CTH, MRI and routine EEG . On keppra 500 mg BID    Pterygium, bilateral 03/12/2019    Type 2 diabetes mellitus with microalbuminuria, without long-term current use of insulin 02/11/2019    Vitamin D insufficiency 07/29/2021     Past Surgical History:   Procedure Laterality Date    CATARACT EXTRACTION W/  INTRAOCULAR LENS IMPLANT Right 07/28/2020    Procedure: EXTRACTION, CATARACT, WITH IOL INSERTION;  Surgeon: Daryl Calloway MD;  Location: [facility];  Service: Ophthalmology;  Laterality: Right;    CATARACT EXTRACTION W/  INTRAOCULAR LENS IMPLANT Left 08/11/2020    Procedure: EXTRACTION, CATARACT, WITH IOL INSERTION;  Surgeon: Daryl Calloway MD;  Location: [facility];  Service: Ophthalmology;  Laterality: Left;    HERNIA REPAIR Right 2000    [facility]     Family History   Problem Relation Name Age of Onset    Hypertension Mother      Glaucoma Brother 2     No Known Problems Daughter      Amblyopia Neg Hx      Blindness Neg Hx      Macular degeneration Neg Hx      Retinal detachment Neg Hx      Strabismus Neg Hx      Cataracts Neg Hx       Social History[1]  Review of Systems    Physical Exam     Initial Vitals [06/14/25 0822]   BP Pulse Resp Temp SpO2   (!) 190/96 67 20 97.8 °F (36.6 °C) 96 %      MAP       --         Physical Exam    Constitutional: He appears well-developed.   HENT:   Head: Normocephalic and atraumatic.   Eyes: Conjunctivae and EOM are normal.   Neck: Neck supple.   Pulmonary/Chest: Breath sounds normal. No stridor.   Abdominal: Abdomen is soft. He exhibits no distension.   Musculoskeletal:      Cervical back: Neck supple.     Neurological: He is alert.   No nystagmus, shoulder shrug intact, face symmetric, tongue  midline, finger-nose-finger nl, sensation intact and equal in face/arms/legs, upper extremity and lower extremity flexor and extensor strength equal and intact, ataxic gait     Skin: Skin is warm.         ED Course   Procedures  Labs Reviewed   CBC WITH DIFFERENTIAL - Abnormal       Result Value    WBC 5.84      RBC 4.80      HGB 13.5 (*)     HCT 41.8      MCV 87      MCH 28.1      MCHC 32.3      RDW 14.6 (*)     Platelet Count 152      MPV 10.6      Nucleated RBC 0      Neut % 65.6      Lymph % 25.0      Mono % 6.3      Eos % 2.1      Basophil % 0.7      Imm Grans % 0.3      Neut # 3.83      Lymph # 1.46      Mono # 0.37      Eos # 0.12      Baso # 0.04      Imm Grans # 0.02     B-TYPE NATRIURETIC PEPTIDE - Normal    BNP 20     COMPREHENSIVE METABOLIC PANEL - Normal    Sodium 142      Potassium 4.0      Chloride 108      CO2 25      Glucose 107      BUN 11      Creatinine 1.0      Calcium 9.1      Protein Total 7.3      Albumin 4.2      Bilirubin Total 0.4      ALP 53      AST 14      ALT 17      Anion Gap 9      eGFR >60     MAGNESIUM - Normal    Magnesium  1.7     TROPONIN I HIGH SENSITIVITY - Normal    Troponin High Sensitive <3     CBC W/ AUTO DIFFERENTIAL    Narrative:     The following orders were created for panel order CBC auto differential.  Procedure                               Abnormality         Status                     ---------                               -----------         ------                     CBC with Differential[[id number]]       Abnormal            Final result                 Please view results for these tests on the individual orders.          Imaging Results              MRI Brain Without Contrast (Final result)  Result time 06/14/25 11:26:27      Final result by Grady Sanderson MD (06/14/25 11:26:27)                   Impression:      No acute intracranial process.  Specifically no evidence of acute infarct.  The cerebellum maintains normal signal intensity.      Electronically  signed by: Grady Sanderson  Date:    06/14/2025  Time:    11:26               Narrative:    EXAMINATION:  MRI BRAIN WITHOUT CONTRAST    CLINICAL HISTORY:  Dizziness, persistent/recurrent, cardiac or vascular cause suspected;posterior CVA w/u subacute x 3 days dizziness ataxia;    TECHNIQUE:  Multiplanar multisequence MR imaging of the brain was performed without contrast.    COMPARISON:  CT 06/14/2025, MRI 06/11/2020    FINDINGS:  Limited by motion    No evidence of hydrocephalus, mass effect, intracranial hemorrhage or acute infarct.    The brain parenchyma maintains normal signal intensity.    Normal arterial flow voids are preserved at the skull base.    The visualized paranasal sinuses and mastoid air cells are essentially clear.                                       X-Ray Chest AP Portable (Final result)  Result time 06/14/25 10:17:45      Final result by Wendy Mcguire MD (06/14/25 10:17:45)                   Impression:      No acute parenchymal process.      Electronically signed by: Wendy Mcguire MD  Date:    06/14/2025  Time:    10:17               Narrative:    EXAMINATION:  XR CHEST AP PORTABLE    CLINICAL HISTORY:  Other chest pain    TECHNIQUE:  Single frontal view of the chest was performed.    COMPARISON:  08/23/2022    FINDINGS:  The lungs are hyperexpanded, but clear.  Heart size is normal.  No consolidation or pleural effusions.  Skeletal structures are intact.                                       CT Head Without Contrast (Final result)  Result time 06/14/25 09:58:59      Final result by Francis Moore MD (06/14/25 09:58:59)                   Impression:      No acute intracranial findings.      Electronically signed by: Francis Moore  Date:    06/14/2025  Time:    09:58               Narrative:    EXAMINATION:  CT HEAD WITHOUT CONTRAST    CLINICAL HISTORY:  Headache, new or worsening (Age >= 50y);    TECHNIQUE:  Low dose axial images were obtained through the head.  Coronal and  sagittal reformations were also performed. Contrast was not administered.    COMPARISON:  MRI brain performed 01/26/2019 and CT head performed 01/26/2019. MRI brain 06/11/2020.    FINDINGS:  Blood: No acute intracranial hemorrhage.    Parenchyma: No definite loss of gray-white differentiation to suggest acute or subacute transcortical infarct. Generalized pattern of age-related parenchymal volume loss.    Ventricles/Extra-axial spaces: No abnormal extra-axial fluid collection. Basal cisterns are patent.    Vessels: Minimal atherosclerotic calcification.    Orbits: Status post bilateral lens replacements.    Scalp: Unremarkable.    Skull: There are no depressed skull fractures or destructive bone lesions.    Sinuses and mastoids: Scattered relatively modest paranasal sinus mucosal thickening with likely retention cyst right maxillary sinus.    Other findings: None                                       Medications   famotidine (PF) injection 20 mg (20 mg Intravenous Given 6/14/25 0953)   aluminum-magnesium hydroxide-simethicone 200-200-20 mg/5 mL suspension 15 mL (15 mLs Oral Given 6/14/25 0953)   meclizine tablet 50 mg (50 mg Oral Given 6/14/25 0952)   ketorolac injection 15 mg (15 mg Intravenous Given 6/14/25 1250)   metoclopramide injection 10 mg (10 mg Intravenous Given 6/14/25 1251)   diphenhydrAMINE injection 12.5 mg (12.5 mg Intravenous Given 6/14/25 1249)     Medical Decision Making    71 yo M w/COPD, NIDDM, seizure disorder, presenting with room spinning dizziness, nausea, chest burning, headache/posterior neck pain x 3 days.      Differential diagnosis includes but is not limited to: CVA, vertigo, gastritis, tension headache, muscular pain    Patient is difficult historian, who is unable to elaborate on his dizziness with specifics, however does not have any focal neuro deficits, with no nystagmus seen on on exam, given possibility of persistent room spinning dizziness since 3 days ago, as well as no  significant improvement after meclizine, obtained a MRI, low chest negative for posterior CVA.  Patient was given Toradol, as well as Reglan, Benadryl, and meclizine, ultimately with significant improvement in his ataxia and room spinning dizziness with ambulation testing.    No significant abnormalities on CBC, CMP, with troponin and BNP also normal, nonischemic EKG, which chest burning beginning after retching with room spinning dizziness, likely due to gastritis.  Discharge home with the ENT follow up, vestibular therapy resources.  No recent viral symptoms to suggest vestibular neuritis as cause of his room spinning dizziness.    I discussed with the patient/family the diagnosis, treatment plan, indications for return to the emergency department, as well as for expected follow-up. The patient/family verbalized an understanding. The patient/family is asked if there were any questions or concerns, which were addressed to patient/family satisfaction. Patient/family understands and is agreeable to the plan.         Amount and/or Complexity of Data Reviewed  Labs: ordered.  Radiology: ordered.    Risk  OTC drugs.  Prescription drug management.               ED Course as of 06/14/25 2051   Sat Jun 14, 2025   1035 Patient reassessed, continues to have ataxic gait, with room spinning dizziness, will obtain MRI to look for subacute posterior CVA [LF]   1230 MRI negative for posterior CVA [LF]      ED Course User Index  [LF] Tahira Bowden MD        EKG independently interpreted by me with normal sinus rhythm rate of 67, nonspecific ST depression seen in lateral and inferior leads seen on prior EKG from 2022, no STEMI,                    Clinical Impression:  Final diagnoses:  [R42] Dizziness  [R07.89] Burning chest pain  [R42] Vertigo (Primary)          ED Disposition Condition    Discharge Stable          ED Prescriptions       Medication Sig Dispense Start Date End Date Auth. Provider    meclizine (ANTIVERT) 25  mg tablet Take 1 tablet (25 mg total) by mouth 3 (three) times daily as needed. 30 tablet 2025 -- Tahira Bowden MD          Follow-up Information       Follow up With Specialties Details Why Contact Info    Srikanth Son MD Internal Medicine, Hospitalist  [facility]  ED follow up [address]  [place] [address]  [phone]      [facility] ENT Otolaryngology  [place] [address] [facility] [id number] [phone]    [facility] ([facility] Physical Therapy  vestibular therapy [place] [address] [facility] [address] [phone]                   [1]   Social History  Tobacco Use    Smoking status: Former     Current packs/day: 0.00     Average packs/day: 3.0 packs/day for 25.0 years (75.0 ttl pk-yrs)     Types: Cigarettes     Start date: 1987     Quit date: 2012     Years since quittin.3    Smokeless tobacco: Never   Substance Use Topics    Alcohol use: No    Drug use: Not Currently     Types: Cocaine     Comment: H/O abuse        Tahira Bowden MD  25

## 2025-06-14 NOTE — PROVIDER PROGRESS NOTES - EMERGENCY DEPT.
Encounter Date: 6/14/2025    ED Physician Progress Notes         EKG - STEMI Decision  Initial Reading: No STEMI present.  Response: No Action Needed (LVH with repolarization abnormality, noted on prior EKG.).

## 2025-06-14 NOTE — DISCHARGE INSTRUCTIONS

## 2025-06-15 LAB
OHS QRS DURATION: 100 MS
OHS QTC CALCULATION: 429 MS

## 2025-06-16 ENCOUNTER — PATIENT OUTREACH (OUTPATIENT)
Facility: OTHER | Age: 70
End: 2025-06-16
Payer: MEDICARE

## 2025-06-16 NOTE — PROGRESS NOTES
Patient was seen in the ED on 6/14/25. Phoned patient to assist with Post ED Discharge Navigation. Patient declined assistance scheduling a PCP follow-up with PCP within 7 days. Patient has a scheduled follow-up with ENT. Appointment reminder set.  Matthew Mesa

## 2025-06-18 DIAGNOSIS — E11.29 TYPE 2 DIABETES MELLITUS WITH MICROALBUMINURIA, WITHOUT LONG-TERM CURRENT USE OF INSULIN: ICD-10-CM

## 2025-06-18 DIAGNOSIS — R80.9 TYPE 2 DIABETES MELLITUS WITH MICROALBUMINURIA, WITHOUT LONG-TERM CURRENT USE OF INSULIN: ICD-10-CM

## 2025-06-18 NOTE — TELEPHONE ENCOUNTER
No care due was identified.  [facility] Embedded Care Due Messages. Reference number: [id number].   6/18/2025 1:06:23 PM CDT

## 2025-07-07 ENCOUNTER — OFFICE VISIT (OUTPATIENT)
Dept: OPHTHALMOLOGY | Facility: CLINIC | Age: 70
End: 2025-07-07
Payer: MEDICARE

## 2025-07-07 DIAGNOSIS — H25.13 NUCLEAR SCLEROTIC CATARACT OF BOTH EYES: ICD-10-CM

## 2025-07-07 DIAGNOSIS — Z91.199 NON-ADHERENCE TO MEDICAL TREATMENT: ICD-10-CM

## 2025-07-07 DIAGNOSIS — H40.1131 PRIMARY OPEN ANGLE GLAUCOMA (POAG) OF BOTH EYES, MILD STAGE: Primary | ICD-10-CM

## 2025-07-07 PROCEDURE — 1159F MED LIST DOCD IN RCRD: CPT | Mod: CPTII,S$GLB,, | Performed by: OPHTHALMOLOGY

## 2025-07-07 PROCEDURE — 1160F RVW MEDS BY RX/DR IN RCRD: CPT | Mod: CPTII,S$GLB,, | Performed by: OPHTHALMOLOGY

## 2025-07-07 PROCEDURE — G2211 COMPLEX E/M VISIT ADD ON: HCPCS | Mod: S$GLB,,, | Performed by: OPHTHALMOLOGY

## 2025-07-07 PROCEDURE — 3044F HG A1C LEVEL LT 7.0%: CPT | Mod: CPTII,S$GLB,, | Performed by: OPHTHALMOLOGY

## 2025-07-07 PROCEDURE — 99999 PR PBB SHADOW E&M-EST. PATIENT-LVL III: CPT | Mod: PBBFAC,,, | Performed by: OPHTHALMOLOGY

## 2025-07-07 PROCEDURE — 1126F AMNT PAIN NOTED NONE PRSNT: CPT | Mod: CPTII,S$GLB,, | Performed by: OPHTHALMOLOGY

## 2025-07-07 PROCEDURE — 3060F POS MICROALBUMINURIA REV: CPT | Mod: CPTII,S$GLB,, | Performed by: OPHTHALMOLOGY

## 2025-07-07 PROCEDURE — 3066F NEPHROPATHY DOC TX: CPT | Mod: CPTII,S$GLB,, | Performed by: OPHTHALMOLOGY

## 2025-07-07 PROCEDURE — 99214 OFFICE O/P EST MOD 30 MIN: CPT | Mod: S$GLB,,, | Performed by: OPHTHALMOLOGY

## 2025-07-07 PROCEDURE — 2023F DILAT RTA XM W/O RTNOPTHY: CPT | Mod: CPTII,S$GLB,, | Performed by: OPHTHALMOLOGY

## 2025-07-07 NOTE — PROGRESS NOTES
HPI     Glaucoma     Additional comments: 2 mon iop post slt OS iop chk           Comments    Patient states that vision seems about the same as last visit in both   eyes.    1. Primary open angle glaucoma (POAG) of right eye, mild stage  2. Primary open angle glaucoma (POAG) of left eye, mild stage  3. Dry eye syndrome of both eyes  4. Pseudophakia of both eyes  S/p SLT OU    AT's PRN OU  Brimonidine BID OU-hasn't used in 2 days and doesn't use all of the time.               Last edited by Kit Bowie on 7/7/2025  1:43 PM.            Assessment /Plan     For exam results, see Encounter Report.    Primary open angle glaucoma (POAG) of both eyes, mild stage    Nuclear sclerotic cataract of both eyes    Non-adherence to medical treatment      Subjective:  HPI     Glaucoma     Additional comments: 2 mon iop post slt OS iop chk           Comments    Patient states that vision seems about the same as last visit in both   eyes.    1. Primary open angle glaucoma (POAG) of right eye, mild stage  2. Primary open angle glaucoma (POAG) of left eye, mild stage  3. Dry eye syndrome of both eyes  4. Pseudophakia of both eyes  S/p SLT OU    AT's PRN OU  Brimonidine BID OU-hasn't used in 2 days and doesn't use all of the time.               Last edited by Kit Bowie on 7/7/2025  1:43 PM.        Exam:  See encounter report for full exam    Assessment:  1. Primary open angle glaucoma (POAG) of right eye, mild stage  2. Primary open angle glaucoma (POAG) of left eye, mild stage  - Referral from: Dr. Scanlon. Establishing me 2/2025.  - PMH: DM2, COPD, B12 deficiency, hep C s/p Rx 11/2019, partial epilepsy, ex tobacco use, BPH  - Surg hx:   Phaco/IOL OU [place] 2019   - Laser hx:    SLT OD 3/25/25 Coulon   SLT OS 5/13/25 Coulon  - Glaucoma FHx: brother  -  / 527  - Gonio: open/pseudophakic ([facility] 2/2025)  - Tmax: 32/33  - Target IOP: teens OU --> achieved  - Med adverse effects: BB (COPD)  - Medication hx: Latanoprost (no  effect), Rocklatan ($$), Brimonidine (non-compliant)      Both eyes --> tolerating with poor adherence --> Discussed and push use  Brim BID    Baseline photos pending    Last:  HVF: reliable, full, VFI 99 OD  reliable, non-specific depression, VFI 93 OS -- overall stable OU, fluctuates OS  OCT RNFL: ST thinning, avg 76 OD  ST thinning, avg 75 OS -- stable compared with prior        3. Dry eye syndrome of both eyes  ATs QID OU    4. Pseudophakia of both eyes  Good lens position  Monitor    5. Type 2 diabetes mellitus with microalbuminuria, without long-term current use of insulin  No retinopathy on Dr. Scanlon DFE 5/2024  BP/BG control per PCP  Next DFE due 5/2025     Plan:  Mild POAG OU. SP SLT OU.        Today's visit is associated with current and anticipated ongoing medical care related to this patient's single serious/complex condition (glaucoma). Follow up is to be continued indefinitely to monitor the condition.      RTC  4 months OCT & adherence  RTC sooner prn with good understanding    Chico Portillo MD  [facility]

## 2025-07-09 NOTE — PROGRESS NOTES
Ear, Nose, & Throat  Otolaryngology - Head & Neck Surgery    Summary of Visit:  Cristiano Cruz was referred to me by Dr. Tahira Bowden for vertigo      Subjective:     Chief Complaint: No chief complaint on file.      Cristiano Cruz is a 70 y.o. male with history of DMII, seizures, B12 deficiency who presents today for vertigo. He was seen on 6/14/2025 for room spinning dizziness and imbalance, some ataxia. MRI obtained and posterior CVA ruled out. He was given reglan, benadryl, meclizine and toradol. He states since this visit he has not had any significant issues. He cannot recall the last time he had any dizziness and feels it is now a non-issue thus did not establish with vestibular therapy. He states prior to being seen for this he was experiencing intermittent dizziness mainly in the mornings. He denies any falls, headaches, hearing changes.     Chart review:    6/14/2025   persistent room spinning dizziness since 3 days ago, as well as no significant improvement after meclizine, obtained a MRI, low chest negative for posterior CVA.  Patient was given Toradol, as well as Reglan, Benadryl, and meclizine, ultimately with significant improvement in his ataxia and room spinning dizziness with ambulation testing.    -referral to vestibular therapy       Past Medical History  Past Medical History:   Diagnosis Date    B12 nutritional deficiency 08/07/2019    Bilateral dry eyes 03/12/2019    COVID-19 12/24/2021    E. coli UTI 01/2019    During hospitalization for seizure    Generalized tonic-clonic seizure 01/26/2019 1-2019 admitted for new-onset seizures.Normal CT head.  His mental status normalized, and he had no recurrent seizures following keppra loading in the ED and twice-daily maintenance upon arrival to the floor. Workup into the etiology of his seizures remained negative. EEG was performed, with the preliminary interpretation being no epileptiform activity with normal background.     Glaucoma     History of  hepatitis C, s/p successful Rx w/ SVR12 (cure) - 11/2019 02/08/2019    S/p epclusa    Kidney stone on left side 06/28/2019    Mixed type COPD (chronic obstructive pulmonary disease) 08/15/2019    8-2019 PFT: Normal airflow. Airflow not improved after bronchodilator. Moderate restriction. Diffusion capacity is mildly decreased. Oximetry is normal.    Nuclear sclerotic cataract of both eyes 03/12/2019    Nuclear sclerotic cataract of left eye 08/11/2020    Nuclear sclerotic cataract of right eye 07/28/2020    Partial idiopathic epilepsy with seizures of localized onset, not intractable, without status epilepticus 01/26/2019    new-onset seizures (1/209). Normal CTH, MRI and routine EEG . On keppra 500 mg BID    Pterygium, bilateral 03/12/2019    Type 2 diabetes mellitus with microalbuminuria, without long-term current use of insulin 02/11/2019    Vitamin D insufficiency 07/29/2021       Past Surgical History  Past Surgical History:   Procedure Laterality Date    CATARACT EXTRACTION W/  INTRAOCULAR LENS IMPLANT Right 07/28/2020    Procedure: EXTRACTION, CATARACT, WITH IOL INSERTION;  Surgeon: Daryl Calloway MD;  Location: [facility];  Service: Ophthalmology;  Laterality: Right;    CATARACT EXTRACTION W/  INTRAOCULAR LENS IMPLANT Left 08/11/2020    Procedure: EXTRACTION, CATARACT, WITH IOL INSERTION;  Surgeon: Daryl Calloway MD;  Location: [facility];  Service: Ophthalmology;  Laterality: Left;    HERNIA REPAIR Right 2000    [facility]       Past Surgical History:   Procedure Laterality Date    CATARACT EXTRACTION W/  INTRAOCULAR LENS IMPLANT Right 07/28/2020    Procedure: EXTRACTION, CATARACT, WITH IOL INSERTION;  Surgeon: Daryl Calloway MD;  Location: [facility];  Service: Ophthalmology;  Laterality: Right;    CATARACT EXTRACTION W/  INTRAOCULAR LENS IMPLANT Left 08/11/2020    Procedure: EXTRACTION, CATARACT, WITH IOL INSERTION;  Surgeon: Daryl Calloway MD;  Location: [facility];  Service:  Ophthalmology;  Laterality: Left;    HERNIA REPAIR Right     [facility]        Family History  Family History   Problem Relation Name Age of Onset    Hypertension Mother      Glaucoma Brother 2     No Known Problems Daughter      Amblyopia Neg Hx      Blindness Neg Hx      Macular degeneration Neg Hx      Retinal detachment Neg Hx      Strabismus Neg Hx      Cataracts Neg Hx         Social History  Social History     Socioeconomic History    Marital status:     Number of children: 1   Occupational History    Occupation: Retired   Tobacco Use    Smoking status: Former     Current packs/day: 0.00     Average packs/day: 3.0 packs/day for 25.0 years (75.0 ttl pk-yrs)     Types: Cigarettes     Start date: 1987     Quit date: 2012     Years since quittin.4    Smokeless tobacco: Never   Substance and Sexual Activity    Alcohol use: No    Drug use: Not Currently     Types: Cocaine     Comment: H/O abuse    Sexual activity: Yes     Partners: Female   Social History Narrative     with 1 daughter (42 as of 2019).    Work in [employer] - Retired     Social Drivers of Health     Financial Resource Strain: Low Risk  (2025)    Overall Financial Resource Strain (CARDIA)     Difficulty of Paying Living Expenses: Not hard at all   Food Insecurity: Food Insecurity Present (2025)    Hunger Vital Sign     Worried About Running Out of Food in the Last Year: Sometimes true     Ran Out of Food in the Last Year: Sometimes true   Transportation Needs: No Transportation Needs (2025)    PRAPARE - Transportation     Lack of Transportation (Medical): No     Lack of Transportation (Non-Medical): No   Physical Activity: Sufficiently Active (2025)    Exercise Vital Sign     Days of Exercise per Week: 7 days     Minutes of Exercise per Session: 60 min   Stress: No Stress Concern Present (2025)    [employer] [place] [employer] - Occupational Stress Questionnaire      Feeling of Stress : Not at all   Housing Stability: Low Risk  (6/16/2025)    Housing Stability Vital Sign     Unable to Pay for Housing in the Last Year: No     Homeless in the Last Year: No       Allergies  Patient has no known allergies.    Medications  Current Medications[1]    ROS:  Pertinent positive and negative review of systems as noted in HPI.     Objective:     There were no vitals taken for this visit.     Physical Exam    General Appearance:   Awake, Alert and Oriented. NAD. Appropriate affect and appearance      Neuro:   Spontaneous eye opening, appropriate verbal responses, follows commands  Pupils equal, round & brisk. EOMI, no proptosis  Face is symmetric, non-edematous bilaterally     Head and Face:   skin is intact with no lesions noted.      Ears:  Periauricular regions non-erythematous, non-fluctuanct non-tender  Pinna normal bilaterally, no skin lesions  EACs are severely impacted with cerumen plugs    Nose:   External nose is symmetric, no skin lesions     OC/OP:  Tongue midline on extension, non-edematous, soft  No labial, buccal, oral tongue or floor of mouth lesions  Soft palate symmetric, midline and without lesions or masses, tonsils symmetric,  No masses or lesions of the visualized oropharynx     Neck:  Neck is symmetric, non-edematous, non-erythematous  Trachea is midline      Respiratory:  Normal work of breathing, no accessory muscle use, no stridor     Voice:  Normal vocal quality, volume and articulation    Paterson-Hallpike Left: Negative for torsional and up-beating nystagmus    Mary-Hallpike Right: Negative for torsional and up-beating nystagmus    Tandem Gait: abnormal, imbalanced    Romberg: Negative    Fukuda step test: negative     Data Review:   LABS        IMAGING        AUDIO      Procedures:   Procedure Note:  The patient was brought to the minor procedure room and placed under the operating microscope of the right ear canal which was cleaned of ceruminous debris. Using a  combination of suction, curettes and cup forceps the patient's cerumen was removed. The patient tolerated the procedure well. There were no complications. The TM is translucent and intact, no effusion. Hearing improved and returned to baseline.       Assessment and Plan:     1. Bilateral impacted cerumen    2. Dizziness      I had a long discussion with the patient regarding his condition and the further workup and management options. Due to severity of cerumen impaction he will need to apply softener to the left ear: mineral or baby oil daily and return for ear cleaning. Successfully removed cerumen plug from right. Will then obtain audiogram at next visit. I encouraged him to get established with vestibular therapy due to imbalance noted on tandem gate--he was previously referred to though he states he feels fine and is not interested at this time. Questions were encouraged and answered. The patient understands to return sooner for any new symptoms/concerns/worsening prior to any scheduled visits.    No orders of the defined types were placed in this encounter.         Problem List Items Addressed This Visit    None              [1]   Current Outpatient Medications   Medication Sig Dispense Refill    blood sugar diagnostic Strp 1 strip by Misc.(Non-Drug; Combo Route) route 2 (two) times daily before meals. 200 strip 3    brimonidine 0.2% (ALPHAGAN) 0.2 % Drop Place 1 drop into both eyes 3 (three) times daily. 5 mL 3    cholecalciferol, vitamin D3, 125 mcg (5,000 unit) Tab Take 1 tablet (5,000 Units total) by mouth once daily. 90 tablet 3    cyanocobalamin 1,000 mcg/mL injection Inject 1 mL (1,000 mcg total) into the muscle every 30 days. 1 mL 11    lamoTRIgine (LAMICTAL) 150 MG Tab Take 2 tablets (300 mg total) by mouth every morning. 180 tablet 3    lancets (LANCETS,ULTRA THIN) Misc 1 application  by Misc.(Non-Drug; Combo Route) route 2 (two) times daily before meals. 200 each 3    meclizine (ANTIVERT) 25 mg  tablet Take 1 tablet (25 mg total) by mouth 3 (three) times daily as needed. 30 tablet 0    naproxen (NAPROSYN) 500 MG tablet Take 1 tablet (500 mg total) by mouth 2 (two) times daily as needed (headache). 60 tablet 5    pravastatin (PRAVACHOL) 10 MG tablet Take 1 tablet (10 mg total) by mouth every morning. 90 tablet 3    sildenafiL (VIAGRA) 50 MG tablet Take 1 tablet (50 mg total) by mouth daily as needed for Erectile Dysfunction. 30 tablet 11    silodosin (RAPAFLO) 4 mg Cap capsule Take 4 mg by mouth.      SITagliptin phosphate (JANUVIA) 25 MG Tab Take 1 tablet (25 mg total) by mouth every morning. 90 tablet 3    tamsulosin (FLOMAX) 0.4 mg Cap Take 1 capsule (0.4 mg total) by mouth once daily. 90 capsule 3     No current facility-administered medications for this visit.

## 2025-07-11 ENCOUNTER — PATIENT OUTREACH (OUTPATIENT)
Facility: OTHER | Age: 70
End: 2025-07-11
Payer: MEDICARE

## 2025-07-11 NOTE — PROGRESS NOTES
Phoned patient for follow-up. Patient has a scheduled ED follow-up appointment. Reminded patient of upcoming appointment on 7/14/25 at 10:00 with CHRISSY Singh [facility], Audiology [address] [place] [phone] and at 11:00 with Rafia Spaulding PA-C, [facility], Otolaryngology [facility] [place] [id number].  Matthew Mesa

## 2025-07-14 ENCOUNTER — OFFICE VISIT (OUTPATIENT)
Dept: OTOLARYNGOLOGY | Facility: CLINIC | Age: 70
End: 2025-07-14
Payer: MEDICARE

## 2025-07-14 DIAGNOSIS — R42 DIZZINESS: ICD-10-CM

## 2025-07-14 DIAGNOSIS — H61.23 BILATERAL IMPACTED CERUMEN: Primary | ICD-10-CM

## 2025-07-14 PROCEDURE — 3044F HG A1C LEVEL LT 7.0%: CPT | Mod: CPTII,S$GLB,, | Performed by: PHYSICIAN ASSISTANT

## 2025-07-14 PROCEDURE — 3060F POS MICROALBUMINURIA REV: CPT | Mod: CPTII,S$GLB,, | Performed by: PHYSICIAN ASSISTANT

## 2025-07-14 PROCEDURE — 1126F AMNT PAIN NOTED NONE PRSNT: CPT | Mod: CPTII,S$GLB,, | Performed by: PHYSICIAN ASSISTANT

## 2025-07-14 PROCEDURE — 1159F MED LIST DOCD IN RCRD: CPT | Mod: CPTII,S$GLB,, | Performed by: PHYSICIAN ASSISTANT

## 2025-07-14 PROCEDURE — 99999 PR PBB SHADOW E&M-EST. PATIENT-LVL II: CPT | Mod: PBBFAC,,, | Performed by: PHYSICIAN ASSISTANT

## 2025-07-14 PROCEDURE — 99213 OFFICE O/P EST LOW 20 MIN: CPT | Mod: 25,S$GLB,, | Performed by: PHYSICIAN ASSISTANT

## 2025-07-14 PROCEDURE — 69210 REMOVE IMPACTED EAR WAX UNI: CPT | Mod: S$GLB,,, | Performed by: PHYSICIAN ASSISTANT

## 2025-07-14 PROCEDURE — 1101F PT FALLS ASSESS-DOCD LE1/YR: CPT | Mod: CPTII,S$GLB,, | Performed by: PHYSICIAN ASSISTANT

## 2025-07-14 PROCEDURE — 3066F NEPHROPATHY DOC TX: CPT | Mod: CPTII,S$GLB,, | Performed by: PHYSICIAN ASSISTANT

## 2025-07-14 PROCEDURE — 3288F FALL RISK ASSESSMENT DOCD: CPT | Mod: CPTII,S$GLB,, | Performed by: PHYSICIAN ASSISTANT

## 2025-08-07 NOTE — PROGRESS NOTES
Ear, Nose, & Throat  Otolaryngology - Head & Neck Surgery    Summary of Visit:  Cristiano Cruz was seen for ear cleaning/hearing evaluation    Subjective:     Chief Complaint: No chief complaint on file.      Cristiano Cruz is a 70 y.o. male who presents for follow up left ear cleaning due to severe impaction. He was been putting oil/peroxide to soften the wax. He denies any episodes of dizziness or any new symptoms since last visit and is doing well.      7/14/2025 Chief Complaint: No chief complaint on file.     Cristiano Cruz is a 70 y.o. male with history of DMII, seizures, B12 deficiency who presents today for vertigo. He was seen on 6/14/2025 for room spinning dizziness and imbalance, some ataxia. MRI obtained and posterior CVA ruled out. He was given reglan, benadryl, meclizine and toradol. He states since this visit he has not had any significant issues. He cannot recall the last time he had any dizziness and feels it is now a non-issue thus did not establish with vestibular therapy. He states prior to being seen for this he was experiencing intermittent dizziness mainly in the mornings. He denies any falls, headaches, hearing changes.      Due to severity of cerumen impaction he will need to apply softener to the left ear: mineral or baby oil daily and return for ear cleaning. Successfully removed cerumen plug from right. Will then obtain audiogram at next visit. I encouraged him to get established with vestibular therapy due to imbalance noted on tandem gate--he was previously referred to though he states he feels fine and is not interested at this time.       Past Medical History  Past Medical History:   Diagnosis Date    B12 nutritional deficiency 08/07/2019    Bilateral dry eyes 03/12/2019    COVID-19 12/24/2021    E. coli UTI 01/2019    During hospitalization for seizure    Generalized tonic-clonic seizure 01/26/2019 1-2019 admitted for new-onset seizures.Normal CT head.  His mental status normalized, and  he had no recurrent seizures following keppra loading in the ED and twice-daily maintenance upon arrival to the floor. Workup into the etiology of his seizures remained negative. EEG was performed, with the preliminary interpretation being no epileptiform activity with normal background.     Glaucoma     History of hepatitis C, s/p successful Rx w/ SVR12 (cure) - 11/2019 02/08/2019    S/p epclusa    Kidney stone on left side 06/28/2019    Mixed type COPD (chronic obstructive pulmonary disease) 08/15/2019    8-2019 PFT: Normal airflow. Airflow not improved after bronchodilator. Moderate restriction. Diffusion capacity is mildly decreased. Oximetry is normal.    Nuclear sclerotic cataract of both eyes 03/12/2019    Nuclear sclerotic cataract of left eye 08/11/2020    Nuclear sclerotic cataract of right eye 07/28/2020    Partial idiopathic epilepsy with seizures of localized onset, not intractable, without status epilepticus 01/26/2019    new-onset seizures (1/209). Normal CTH, MRI and routine EEG . On keppra 500 mg BID    Pterygium, bilateral 03/12/2019    Type 2 diabetes mellitus with microalbuminuria, without long-term current use of insulin 02/11/2019    Vitamin D insufficiency 07/29/2021       Past Surgical History    Past Surgical History:   Procedure Laterality Date    CATARACT EXTRACTION W/  INTRAOCULAR LENS IMPLANT Right 07/28/2020    Procedure: EXTRACTION, CATARACT, WITH IOL INSERTION;  Surgeon: Daryl Calloway MD;  Location: [facility];  Service: Ophthalmology;  Laterality: Right;    CATARACT EXTRACTION W/  INTRAOCULAR LENS IMPLANT Left 08/11/2020    Procedure: EXTRACTION, CATARACT, WITH IOL INSERTION;  Surgeon: Daryl Calloway MD;  Location: [facility];  Service: Ophthalmology;  Laterality: Left;    HERNIA REPAIR Right 2000    [facility]        Family History  Family History   Problem Relation Name Age of Onset    Hypertension Mother      Glaucoma Brother 2     No Known Problems Daughter       Amblyopia Neg Hx      Blindness Neg Hx      Macular degeneration Neg Hx      Retinal detachment Neg Hx      Strabismus Neg Hx      Cataracts Neg Hx         Social History  Social History     Socioeconomic History    Marital status:     Number of children: 1   Occupational History    Occupation: Retired   Tobacco Use    Smoking status: Former     Current packs/day: 0.00     Average packs/day: 3.0 packs/day for 25.0 years (75.0 ttl pk-yrs)     Types: Cigarettes     Start date: 1987     Quit date: 2012     Years since quittin.5    Smokeless tobacco: Never   Substance and Sexual Activity    Alcohol use: No    Drug use: Not Currently     Types: Cocaine     Comment: H/O abuse    Sexual activity: Yes     Partners: Female   Social History Narrative     with 1 daughter (42 as of 2019).    Work in [employer] - Retired     Social Drivers of Health     Financial Resource Strain: Low Risk  (2025)    Overall Financial Resource Strain (CARDIA)     Difficulty of Paying Living Expenses: Not hard at all   Food Insecurity: Food Insecurity Present (2025)    Hunger Vital Sign     Worried About Running Out of Food in the Last Year: Sometimes true     Ran Out of Food in the Last Year: Sometimes true   Transportation Needs: No Transportation Needs (2025)    PRAPARE - Transportation     Lack of Transportation (Medical): No     Lack of Transportation (Non-Medical): No   Physical Activity: Sufficiently Active (2025)    Exercise Vital Sign     Days of Exercise per Week: 7 days     Minutes of Exercise per Session: 60 min   Stress: No Stress Concern Present (2025)    [employer] [place] [employer] - Occupational Stress Questionnaire     Feeling of Stress : Not at all   Housing Stability: Low Risk  (2025)    Housing Stability Vital Sign     Unable to Pay for Housing in the Last Year: No     Homeless in the Last Year: No       Allergies  Patient has no known  allergies.    Medications  Current Outpatient Medications   Medication Sig Dispense Refill    blood sugar diagnostic Strp 1 strip by Misc.(Non-Drug; Combo Route) route 2 (two) times daily before meals. 200 strip 3    brimonidine 0.2% (ALPHAGAN) 0.2 % Drop Place 1 drop into both eyes 3 (three) times daily. 5 mL 3    cholecalciferol, vitamin D3, 125 mcg (5,000 unit) Tab Take 1 tablet (5,000 Units total) by mouth once daily. 90 tablet 3    cyanocobalamin 1,000 mcg/mL injection Inject 1 mL (1,000 mcg total) into the muscle every 30 days. 1 mL 11    lamoTRIgine (LAMICTAL) 150 MG Tab Take 2 tablets (300 mg total) by mouth every morning. 180 tablet 3    lancets (LANCETS,ULTRA THIN) Misc 1 application  by Misc.(Non-Drug; Combo Route) route 2 (two) times daily before meals. 200 each 3    meclizine (ANTIVERT) 25 mg tablet Take 1 tablet (25 mg total) by mouth 3 (three) times daily as needed. 30 tablet 0    naproxen (NAPROSYN) 500 MG tablet Take 1 tablet (500 mg total) by mouth 2 (two) times daily as needed (headache). 60 tablet 5    pravastatin (PRAVACHOL) 10 MG tablet Take 1 tablet (10 mg total) by mouth every morning. 90 tablet 3    sildenafiL (VIAGRA) 50 MG tablet Take 1 tablet (50 mg total) by mouth daily as needed for Erectile Dysfunction. 30 tablet 11    silodosin (RAPAFLO) 4 mg Cap capsule Take 4 mg by mouth.      SITagliptin phosphate (JANUVIA) 25 MG Tab Take 1 tablet (25 mg total) by mouth every morning. 90 tablet 3    tamsulosin (FLOMAX) 0.4 mg Cap Take 1 capsule (0.4 mg total) by mouth once daily. 90 capsule 3     No current facility-administered medications for this visit.       ROS:  Pertinent positive and negative review of systems as noted in HPI.     Objective:     There were no vitals taken for this visit.     Physical Exam    General Appearance:   Awake, Alert and Oriented. NAD. Appropriate affect and appearance      Neuro:   Spontaneous eye opening, appropriate verbal responses, follows commands  EOMI, no  proptosis  Face is symmetric, non-edematous bilaterally     Head and Face:   skin is intact with no lesions noted.      Ears:  Periauricular regions non-erythematous, non-fluctuanct non-tender  Pinna normal bilaterally, no skin lesions  Right EAC patent and without drainage, TM translucent and intact  Left EAC with impaction   No middle ear effusion     Nose:   External nose is symmetric, no skin lesions     OC/OP:  Tongue midline on extension, non-edematous, soft  No labial, buccal, oral tongue or floor of mouth lesions  Soft palate symmetric, midline and without lesions or masses,  No masses or lesions of the visualized oropharynx     Neck:  Neck is symmetric, non-edematous, non-erythematous  Trachea is midline  No thyromegaly, thyroid nodules or masses, non-tender   No palpable adenopathy or masses in levels I-VI     Respiratory:  Normal work of breathing, no accessory muscle use, no stridor     Voice:  Normal vocal quality, volume and articulation    Data Review:   AUDIO    I have performed and independently reviewed the tracings of the hearing screening performed today.  I reviewed the audiogram with the patient as well.  Pertinent findings include binaural sloping HF sensorineural hearing loss with normal left tymp    Procedures:   Procedure Note:  The patient was brought to the minor procedure room and placed under the operating microscope of the left ear canal which was cleaned of ceruminous debris. Using a combination of suction, curettes and cup forceps the patient's cerumen was removed. The patient tolerated the procedure well. There were no complications. The TM is translucent and intact, no effusion. Hearing improved.     Assessment and Plan:     1. Impacted cerumen of left ear    2. Sensorineural hearing loss (SNHL) of both ears      I had a long discussion with the patient regarding his condition and the further workup and management options.  Audiometric testing interpretation consistent with  sensorineural hearing loss.  Discussed the etiology of SNHL.  Medically cleared for hearing amplification, and will follow-up with Audiology if interested. Hearing conservation in noisy environments. Return to clinic every year for audiometric testing. Questions were encouraged and answered. The patient understands to return sooner for any new symptoms/concerns/worsening prior to any scheduled visits.           Problem List Items Addressed This Visit    None

## 2025-08-08 ENCOUNTER — CLINICAL SUPPORT (OUTPATIENT)
Dept: AUDIOLOGY | Facility: CLINIC | Age: 70
End: 2025-08-08
Payer: MEDICARE

## 2025-08-08 ENCOUNTER — OFFICE VISIT (OUTPATIENT)
Dept: OTOLARYNGOLOGY | Facility: CLINIC | Age: 70
End: 2025-08-08
Payer: MEDICARE

## 2025-08-08 DIAGNOSIS — H90.3 SENSORINEURAL HEARING LOSS (SNHL) OF BOTH EARS: ICD-10-CM

## 2025-08-08 DIAGNOSIS — H61.22 IMPACTED CERUMEN OF LEFT EAR: Primary | ICD-10-CM

## 2025-08-08 DIAGNOSIS — H90.3 SENSORINEURAL HEARING LOSS (SNHL), BILATERAL: Primary | ICD-10-CM

## 2025-08-08 PROCEDURE — 99999 PR PBB SHADOW E&M-EST. PATIENT-LVL II: CPT | Mod: PBBFAC,,, | Performed by: PHYSICIAN ASSISTANT

## 2025-08-08 NOTE — PROGRESS NOTES
History:  Cristiano Cruz, a 70 y.o. male, was seen today for an audiologic evaluation. He reported sometimes having difficulty hearing/understanding speech. He also reported a prior history of dizziness, which has not occurred recently.  Patient denied otalgia, aural fullness, and tinnitus.    Results:  Tympanometry revealed Type A tympanogram in the left ear and could not be completed in the right ear due to inability to obtain a hermetic seal.  Pure tone audiometry revealed normal hearing sensitivity sloping to moderately severe sensorineural hearing loss in both ears.  Speech reception thresholds were obtained at 25 dB HL in the right ear and 25 dB HL in the left ear.  Word recognition scores were 100% in the right ear and 92% in the left ear.      Recommendations:  ENT evaluation  Hearing aid consultation. Recommend inquiring with insurance about hearing aid benefits and in-network hearing aid providers.  Use hearing protection when in noise.  Return for follow-up audiologic evaluation annually or sooner if a change in hearing is noted.

## (undated) DEVICE — GLOVE BIOGEL SKINSENSE PI 7.5

## (undated) DEVICE — SOL IRR STRL WATER 500ML

## (undated) DEVICE — NDL 18GA X1 1/2 REG BEVEL

## (undated) DEVICE — DRESSING TRANS 4X4 TEGADERM

## (undated) DEVICE — SUT CTD VICRYL 3-0 UND BR

## (undated) DEVICE — SOL BETADINE 5%

## (undated) DEVICE — SUT VICRYL 3-0 27 SH

## (undated) DEVICE — SYR SLIP TIP 1CC

## (undated) DEVICE — TRAY MINOR GEN SURG

## (undated) DEVICE — ADHESIVE DERMABOND ADVANCED

## (undated) DEVICE — CASSETTE INFINITI

## (undated) DEVICE — SUT VICRYL PLUS 0 CT1 18IN

## (undated) DEVICE — DRESSING TELFA STRL 4X3 LF

## (undated) DEVICE — Device

## (undated) DEVICE — SUT PDS II 2-0 CT-2 VIL

## (undated) DEVICE — BLADE SURG BVL ANG COAX 2.4MM

## (undated) DEVICE — ELECTRODE REM PLYHSV RETURN 9

## (undated) DEVICE — SHEILD & GARTERS FOX METAL EYE

## (undated) DEVICE — SUT PROLENE 2-0 30 SH

## (undated) DEVICE — GOWN SURGICAL X-LARGE

## (undated) DEVICE — SUT MCRYL PLUS 4-0 PS2 27IN

## (undated) DEVICE — CLOSURE SKIN STERI STRIP 1/4X3

## (undated) DEVICE — APPLICATOR CHLORAPREP ORN 26ML

## (undated) DEVICE — NDL HYPO REG 25G X 1 1/2

## (undated) DEVICE — DRAIN PENROSE XRAY 12 X 1/4 ST

## (undated) DEVICE — SEE MEDLINE ITEM 157148

## (undated) DEVICE — ADHESIVE MASTISOL VIAL 48/BX

## (undated) DEVICE — SEE MEDLINE ITEM 157117

## (undated) DEVICE — SEE MEDLINE ITEM 152622